# Patient Record
Sex: FEMALE | Race: ASIAN | NOT HISPANIC OR LATINO
[De-identification: names, ages, dates, MRNs, and addresses within clinical notes are randomized per-mention and may not be internally consistent; named-entity substitution may affect disease eponyms.]

---

## 2017-02-01 ENCOUNTER — APPOINTMENT (OUTPATIENT)
Dept: OPHTHALMOLOGY | Facility: CLINIC | Age: 72
End: 2017-02-01

## 2017-07-11 ENCOUNTER — APPOINTMENT (OUTPATIENT)
Dept: OPHTHALMOLOGY | Facility: CLINIC | Age: 72
End: 2017-07-11

## 2017-09-01 ENCOUNTER — APPOINTMENT (OUTPATIENT)
Dept: PAIN MANAGEMENT | Facility: CLINIC | Age: 72
End: 2017-09-01
Payer: MEDICARE

## 2017-09-01 VITALS
HEART RATE: 74 BPM | WEIGHT: 122 LBS | BODY MASS INDEX: 23.95 KG/M2 | DIASTOLIC BLOOD PRESSURE: 82 MMHG | SYSTOLIC BLOOD PRESSURE: 156 MMHG | HEIGHT: 60 IN

## 2017-09-01 DIAGNOSIS — M54.9 DORSALGIA, UNSPECIFIED: ICD-10-CM

## 2017-09-01 PROCEDURE — 99204 OFFICE O/P NEW MOD 45 MIN: CPT

## 2017-09-01 RX ORDER — URSODIOL 250 MG/1
250 TABLET ORAL
Qty: 180 | Refills: 0 | Status: ACTIVE | COMMUNITY
Start: 2017-05-05

## 2017-09-01 RX ORDER — PREDNISONE 10 MG/1
10 TABLET ORAL
Qty: 90 | Refills: 0 | Status: ACTIVE | COMMUNITY
Start: 2017-04-17

## 2017-09-01 RX ORDER — ALISKIREN HEMIFUMARATE 150 MG/1
150 TABLET, FILM COATED ORAL
Qty: 90 | Refills: 0 | Status: ACTIVE | COMMUNITY
Start: 2017-07-17

## 2017-09-01 RX ORDER — HYDROXYCHLOROQUINE SULFATE 200 MG/1
200 TABLET ORAL
Refills: 0 | Status: ACTIVE | COMMUNITY

## 2017-09-01 RX ORDER — METHOTREXATE 2.5 MG/1
2.5 TABLET ORAL
Qty: 25 | Refills: 0 | Status: ACTIVE | COMMUNITY
Start: 2017-04-15

## 2017-09-01 RX ORDER — MORPHINE SULFATE 15 MG/1
15 TABLET, FILM COATED, EXTENDED RELEASE ORAL
Qty: 60 | Refills: 0 | Status: ACTIVE | COMMUNITY
Start: 2017-04-13

## 2017-09-01 RX ORDER — PAROXETINE 25 MG/1
25 TABLET, FILM COATED, EXTENDED RELEASE ORAL
Qty: 90 | Refills: 0 | Status: ACTIVE | COMMUNITY
Start: 2017-03-22

## 2017-09-01 RX ORDER — OXYCODONE AND ACETAMINOPHEN 7.5; 325 MG/1; MG/1
7.5-325 TABLET ORAL
Qty: 240 | Refills: 0 | Status: ACTIVE | COMMUNITY
Start: 2017-03-21

## 2017-09-01 RX ORDER — AMLODIPINE BESYLATE 10 MG/1
10 TABLET ORAL
Qty: 30 | Refills: 0 | Status: ACTIVE | COMMUNITY
Start: 2017-04-06

## 2017-09-01 RX ORDER — LABETALOL HYDROCHLORIDE 100 MG/1
100 TABLET, FILM COATED ORAL
Qty: 540 | Refills: 0 | Status: ACTIVE | COMMUNITY
Start: 2017-04-17

## 2017-09-01 RX ORDER — LISINOPRIL 20 MG/1
20 TABLET ORAL
Qty: 30 | Refills: 0 | Status: ACTIVE | COMMUNITY
Start: 2017-04-06

## 2017-09-01 RX ORDER — OMEPRAZOLE 20 MG/1
20 CAPSULE, DELAYED RELEASE ORAL
Qty: 60 | Refills: 0 | Status: ACTIVE | COMMUNITY
Start: 2017-03-13

## 2017-09-05 ENCOUNTER — MEDICATION RENEWAL (OUTPATIENT)
Age: 72
End: 2017-09-05

## 2017-09-05 RX ORDER — NAPROXEN 500 MG/1
500 TABLET, DELAYED RELEASE ORAL TWICE DAILY
Qty: 60 | Refills: 0 | Status: DISCONTINUED | COMMUNITY
Start: 2017-09-01 | End: 2017-09-05

## 2017-09-13 ENCOUNTER — APPOINTMENT (OUTPATIENT)
Dept: PAIN MANAGEMENT | Facility: CLINIC | Age: 72
End: 2017-09-13
Payer: MEDICARE

## 2017-09-13 VITALS
WEIGHT: 120 LBS | BODY MASS INDEX: 23.56 KG/M2 | SYSTOLIC BLOOD PRESSURE: 144 MMHG | HEART RATE: 84 BPM | DIASTOLIC BLOOD PRESSURE: 77 MMHG | HEIGHT: 60 IN

## 2017-09-13 PROCEDURE — 99213 OFFICE O/P EST LOW 20 MIN: CPT

## 2017-09-14 RX ORDER — MORPHINE SULFATE 30 MG/1
30 TABLET, FILM COATED, EXTENDED RELEASE ORAL
Qty: 30 | Refills: 0 | Status: DISCONTINUED | COMMUNITY
Start: 2017-03-03 | End: 2017-09-14

## 2017-09-24 ENCOUNTER — INPATIENT (INPATIENT)
Facility: HOSPITAL | Age: 72
LOS: 3 days | Discharge: HOME | DRG: 872 | End: 2017-09-28
Attending: STUDENT IN AN ORGANIZED HEALTH CARE EDUCATION/TRAINING PROGRAM | Admitting: STUDENT IN AN ORGANIZED HEALTH CARE EDUCATION/TRAINING PROGRAM
Payer: MEDICARE

## 2017-09-24 VITALS
SYSTOLIC BLOOD PRESSURE: 144 MMHG | RESPIRATION RATE: 19 BRPM | OXYGEN SATURATION: 95 % | DIASTOLIC BLOOD PRESSURE: 97 MMHG | HEART RATE: 106 BPM | TEMPERATURE: 102 F

## 2017-09-24 DIAGNOSIS — A41.9 SEPSIS, UNSPECIFIED ORGANISM: ICD-10-CM

## 2017-09-24 DIAGNOSIS — I10 ESSENTIAL (PRIMARY) HYPERTENSION: ICD-10-CM

## 2017-09-24 DIAGNOSIS — Z96.649 PRESENCE OF UNSPECIFIED ARTIFICIAL HIP JOINT: Chronic | ICD-10-CM

## 2017-09-24 DIAGNOSIS — Z96.659 PRESENCE OF UNSPECIFIED ARTIFICIAL KNEE JOINT: Chronic | ICD-10-CM

## 2017-09-24 DIAGNOSIS — M06.9 RHEUMATOID ARTHRITIS, UNSPECIFIED: ICD-10-CM

## 2017-09-24 DIAGNOSIS — E87.6 HYPOKALEMIA: ICD-10-CM

## 2017-09-24 LAB
ALBUMIN SERPL ELPH-MCNC: 4 G/DL — SIGNIFICANT CHANGE UP (ref 3.3–5)
ALP SERPL-CCNC: 96 U/L — SIGNIFICANT CHANGE UP (ref 40–120)
ALT FLD-CCNC: 26 U/L RC — SIGNIFICANT CHANGE UP (ref 10–45)
ANION GAP SERPL CALC-SCNC: 10 MMOL/L — SIGNIFICANT CHANGE UP (ref 5–17)
ANION GAP SERPL CALC-SCNC: 16 MMOL/L — SIGNIFICANT CHANGE UP (ref 5–17)
ANION GAP SERPL CALC-SCNC: 17 MMOL/L — SIGNIFICANT CHANGE UP (ref 5–17)
APPEARANCE UR: CLEAR — SIGNIFICANT CHANGE UP
AST SERPL-CCNC: 32 U/L — SIGNIFICANT CHANGE UP (ref 10–40)
BACTERIA # UR AUTO: ABNORMAL /HPF
BASE EXCESS BLDV CALC-SCNC: -0.4 MMOL/L — SIGNIFICANT CHANGE UP (ref -2–2)
BASE EXCESS BLDV CALC-SCNC: 0.8 MMOL/L — SIGNIFICANT CHANGE UP (ref -2–2)
BASOPHILS # BLD AUTO: 0.1 K/UL — SIGNIFICANT CHANGE UP (ref 0–0.2)
BILIRUB SERPL-MCNC: 0.3 MG/DL — SIGNIFICANT CHANGE UP (ref 0.2–1.2)
BILIRUB UR-MCNC: NEGATIVE — SIGNIFICANT CHANGE UP
BUN SERPL-MCNC: 11 MG/DL — SIGNIFICANT CHANGE UP (ref 7–23)
BUN SERPL-MCNC: 13 MG/DL — SIGNIFICANT CHANGE UP (ref 7–23)
BUN SERPL-MCNC: 16 MG/DL — SIGNIFICANT CHANGE UP (ref 7–23)
CA-I SERPL-SCNC: 1.1 MMOL/L — LOW (ref 1.12–1.3)
CA-I SERPL-SCNC: 1.11 MMOL/L — LOW (ref 1.12–1.3)
CALCIUM SERPL-MCNC: 8.1 MG/DL — LOW (ref 8.4–10.5)
CALCIUM SERPL-MCNC: 8.3 MG/DL — LOW (ref 8.4–10.5)
CALCIUM SERPL-MCNC: 9.3 MG/DL — SIGNIFICANT CHANGE UP (ref 8.4–10.5)
CHLORIDE BLDV-SCNC: 100 MMOL/L — SIGNIFICANT CHANGE UP (ref 96–108)
CHLORIDE BLDV-SCNC: 103 MMOL/L — SIGNIFICANT CHANGE UP (ref 96–108)
CHLORIDE SERPL-SCNC: 101 MMOL/L — SIGNIFICANT CHANGE UP (ref 96–108)
CHLORIDE SERPL-SCNC: 101 MMOL/L — SIGNIFICANT CHANGE UP (ref 96–108)
CHLORIDE SERPL-SCNC: 98 MMOL/L — SIGNIFICANT CHANGE UP (ref 96–108)
CO2 BLDV-SCNC: 24 MMOL/L — SIGNIFICANT CHANGE UP (ref 22–30)
CO2 BLDV-SCNC: 25 MMOL/L — SIGNIFICANT CHANGE UP (ref 22–30)
CO2 SERPL-SCNC: 21 MMOL/L — LOW (ref 22–31)
CO2 SERPL-SCNC: 22 MMOL/L — SIGNIFICANT CHANGE UP (ref 22–31)
CO2 SERPL-SCNC: 23 MMOL/L — SIGNIFICANT CHANGE UP (ref 22–31)
COLOR SPEC: SIGNIFICANT CHANGE UP
CREAT SERPL-MCNC: 0.56 MG/DL — SIGNIFICANT CHANGE UP (ref 0.5–1.3)
CREAT SERPL-MCNC: 0.58 MG/DL — SIGNIFICANT CHANGE UP (ref 0.5–1.3)
CREAT SERPL-MCNC: 0.59 MG/DL — SIGNIFICANT CHANGE UP (ref 0.5–1.3)
DIFF PNL FLD: NEGATIVE — SIGNIFICANT CHANGE UP
EOSINOPHIL # BLD AUTO: 0.1 K/UL — SIGNIFICANT CHANGE UP (ref 0–0.5)
GAS PNL BLDV: 131 MMOL/L — LOW (ref 136–145)
GAS PNL BLDV: 132 MMOL/L — LOW (ref 136–145)
GAS PNL BLDV: SIGNIFICANT CHANGE UP
GLUCOSE BLDV-MCNC: 131 MG/DL — HIGH (ref 70–99)
GLUCOSE BLDV-MCNC: 206 MG/DL — HIGH (ref 70–99)
GLUCOSE SERPL-MCNC: 153 MG/DL — HIGH (ref 70–99)
GLUCOSE SERPL-MCNC: 153 MG/DL — HIGH (ref 70–99)
GLUCOSE SERPL-MCNC: 206 MG/DL — HIGH (ref 70–99)
GLUCOSE UR QL: NEGATIVE — SIGNIFICANT CHANGE UP
HCO3 BLDV-SCNC: 22 MMOL/L — SIGNIFICANT CHANGE UP (ref 21–29)
HCO3 BLDV-SCNC: 24 MMOL/L — SIGNIFICANT CHANGE UP (ref 21–29)
HCT VFR BLD CALC: 29.9 % — LOW (ref 34.5–45)
HCT VFR BLD CALC: 36.2 % — SIGNIFICANT CHANGE UP (ref 34.5–45)
HCT VFR BLDA CALC: 30 % — LOW (ref 39–50)
HCT VFR BLDA CALC: 32 % — LOW (ref 39–50)
HGB BLD CALC-MCNC: 10.3 G/DL — LOW (ref 11.5–15.5)
HGB BLD CALC-MCNC: 9.6 G/DL — LOW (ref 11.5–15.5)
HGB BLD-MCNC: 11.5 G/DL — SIGNIFICANT CHANGE UP (ref 11.5–15.5)
HGB BLD-MCNC: 9.9 G/DL — LOW (ref 11.5–15.5)
KETONES UR-MCNC: NEGATIVE — SIGNIFICANT CHANGE UP
LACTATE BLDV-MCNC: 1.4 MMOL/L — SIGNIFICANT CHANGE UP (ref 0.7–2)
LACTATE BLDV-MCNC: 2.7 MMOL/L — HIGH (ref 0.7–2)
LACTATE BLDV-MCNC: 3.9 MMOL/L — HIGH (ref 0.7–2)
LEUKOCYTE ESTERASE UR-ACNC: NEGATIVE — SIGNIFICANT CHANGE UP
LYMPHOCYTES # BLD AUTO: 2.1 K/UL — SIGNIFICANT CHANGE UP (ref 1–3.3)
LYMPHOCYTES # BLD AUTO: 7 % — LOW (ref 13–44)
MAGNESIUM SERPL-MCNC: 1.4 MG/DL — LOW (ref 1.6–2.6)
MAGNESIUM SERPL-MCNC: 2.7 MG/DL — HIGH (ref 1.6–2.6)
MCHC RBC-ENTMCNC: 30.2 PG — SIGNIFICANT CHANGE UP (ref 27–34)
MCHC RBC-ENTMCNC: 31.7 PG — SIGNIFICANT CHANGE UP (ref 27–34)
MCHC RBC-ENTMCNC: 31.8 GM/DL — LOW (ref 32–36)
MCHC RBC-ENTMCNC: 33.2 GM/DL — SIGNIFICANT CHANGE UP (ref 32–36)
MCV RBC AUTO: 95.1 FL — SIGNIFICANT CHANGE UP (ref 80–100)
MCV RBC AUTO: 95.6 FL — SIGNIFICANT CHANGE UP (ref 80–100)
MONOCYTES # BLD AUTO: 2 K/UL — HIGH (ref 0–0.9)
MONOCYTES NFR BLD AUTO: 9 % — SIGNIFICANT CHANGE UP (ref 2–14)
NEUTROPHILS # BLD AUTO: 24 K/UL — HIGH (ref 1.8–7.4)
NEUTROPHILS NFR BLD AUTO: 73 % — SIGNIFICANT CHANGE UP (ref 43–77)
NITRITE UR-MCNC: NEGATIVE — SIGNIFICANT CHANGE UP
PCO2 BLDV: 32 MMHG — LOW (ref 35–50)
PCO2 BLDV: 33 MMHG — LOW (ref 35–50)
PH BLDV: 7.46 — HIGH (ref 7.35–7.45)
PH BLDV: 7.47 — HIGH (ref 7.35–7.45)
PH UR: 6 — SIGNIFICANT CHANGE UP (ref 5–8)
PLATELET # BLD AUTO: 473 K/UL — HIGH (ref 150–400)
PLATELET # BLD AUTO: 539 K/UL — HIGH (ref 150–400)
PO2 BLDV: 44 MMHG — SIGNIFICANT CHANGE UP (ref 25–45)
PO2 BLDV: 52 MMHG — HIGH (ref 25–45)
POTASSIUM BLDV-SCNC: 3.3 MMOL/L — LOW (ref 3.5–5)
POTASSIUM BLDV-SCNC: 3.9 MMOL/L — SIGNIFICANT CHANGE UP (ref 3.5–5)
POTASSIUM SERPL-MCNC: 2.8 MMOL/L — CRITICAL LOW (ref 3.5–5.3)
POTASSIUM SERPL-MCNC: 3.7 MMOL/L — SIGNIFICANT CHANGE UP (ref 3.5–5.3)
POTASSIUM SERPL-MCNC: 4.2 MMOL/L — SIGNIFICANT CHANGE UP (ref 3.5–5.3)
POTASSIUM SERPL-SCNC: 2.8 MMOL/L — CRITICAL LOW (ref 3.5–5.3)
POTASSIUM SERPL-SCNC: 3.7 MMOL/L — SIGNIFICANT CHANGE UP (ref 3.5–5.3)
POTASSIUM SERPL-SCNC: 4.2 MMOL/L — SIGNIFICANT CHANGE UP (ref 3.5–5.3)
PROT SERPL-MCNC: 7.3 G/DL — SIGNIFICANT CHANGE UP (ref 6–8.3)
PROT UR-MCNC: NEGATIVE — SIGNIFICANT CHANGE UP
RBC # BLD: 3.13 M/UL — LOW (ref 3.8–5.2)
RBC # BLD: 3.8 M/UL — SIGNIFICANT CHANGE UP (ref 3.8–5.2)
RBC # FLD: 14.5 % — SIGNIFICANT CHANGE UP (ref 10.3–14.5)
RBC # FLD: 14.7 % — HIGH (ref 10.3–14.5)
RBC CASTS # UR COMP ASSIST: SIGNIFICANT CHANGE UP /HPF (ref 0–2)
SAO2 % BLDV: 80 % — SIGNIFICANT CHANGE UP (ref 67–88)
SAO2 % BLDV: 85 % — SIGNIFICANT CHANGE UP (ref 67–88)
SODIUM SERPL-SCNC: 133 MMOL/L — LOW (ref 135–145)
SODIUM SERPL-SCNC: 135 MMOL/L — SIGNIFICANT CHANGE UP (ref 135–145)
SODIUM SERPL-SCNC: 141 MMOL/L — SIGNIFICANT CHANGE UP (ref 135–145)
SP GR SPEC: 1.01 — LOW (ref 1.01–1.02)
UROBILINOGEN FLD QL: NEGATIVE — SIGNIFICANT CHANGE UP
WBC # BLD: 28.3 K/UL — HIGH (ref 3.8–10.5)
WBC # BLD: 33.8 K/UL — HIGH (ref 3.8–10.5)
WBC # FLD AUTO: 28.3 K/UL — HIGH (ref 3.8–10.5)
WBC # FLD AUTO: 33.8 K/UL — HIGH (ref 3.8–10.5)
WBC UR QL: SIGNIFICANT CHANGE UP /HPF (ref 0–5)

## 2017-09-24 PROCEDURE — 99285 EMERGENCY DEPT VISIT HI MDM: CPT | Mod: GC

## 2017-09-24 PROCEDURE — 76705 ECHO EXAM OF ABDOMEN: CPT | Mod: 26,RT

## 2017-09-24 PROCEDURE — 71010: CPT | Mod: 26

## 2017-09-24 PROCEDURE — 74177 CT ABD & PELVIS W/CONTRAST: CPT | Mod: 26

## 2017-09-24 PROCEDURE — 99223 1ST HOSP IP/OBS HIGH 75: CPT

## 2017-09-24 PROCEDURE — 76705 ECHO EXAM OF ABDOMEN: CPT | Mod: 26

## 2017-09-24 RX ORDER — ASPIRIN/CALCIUM CARB/MAGNESIUM 324 MG
81 TABLET ORAL DAILY
Qty: 0 | Refills: 0 | Status: DISCONTINUED | OUTPATIENT
Start: 2017-09-24 | End: 2017-09-28

## 2017-09-24 RX ORDER — ACETAMINOPHEN 500 MG
1000 TABLET ORAL ONCE
Qty: 0 | Refills: 0 | Status: COMPLETED | OUTPATIENT
Start: 2017-09-24 | End: 2017-09-24

## 2017-09-24 RX ORDER — KETOROLAC TROMETHAMINE 30 MG/ML
15 SYRINGE (ML) INJECTION ONCE
Qty: 0 | Refills: 0 | Status: DISCONTINUED | OUTPATIENT
Start: 2017-09-24 | End: 2017-09-24

## 2017-09-24 RX ORDER — SODIUM CHLORIDE 9 MG/ML
1000 INJECTION, SOLUTION INTRAVENOUS
Qty: 0 | Refills: 0 | Status: DISCONTINUED | OUTPATIENT
Start: 2017-09-24 | End: 2017-09-28

## 2017-09-24 RX ORDER — CHOLECALCIFEROL (VITAMIN D3) 125 MCG
2000 CAPSULE ORAL DAILY
Qty: 0 | Refills: 0 | Status: DISCONTINUED | OUTPATIENT
Start: 2017-09-24 | End: 2017-09-28

## 2017-09-24 RX ORDER — OXYCODONE AND ACETAMINOPHEN 5; 325 MG/1; MG/1
1 TABLET ORAL ONCE
Qty: 0 | Refills: 0 | Status: DISCONTINUED | OUTPATIENT
Start: 2017-09-24 | End: 2017-09-24

## 2017-09-24 RX ORDER — INSULIN LISPRO 100/ML
VIAL (ML) SUBCUTANEOUS
Qty: 0 | Refills: 0 | Status: DISCONTINUED | OUTPATIENT
Start: 2017-09-24 | End: 2017-09-28

## 2017-09-24 RX ORDER — MAGNESIUM SULFATE 500 MG/ML
2 VIAL (ML) INJECTION ONCE
Qty: 0 | Refills: 0 | Status: DISCONTINUED | OUTPATIENT
Start: 2017-09-24 | End: 2017-09-24

## 2017-09-24 RX ORDER — SODIUM CHLORIDE 9 MG/ML
1000 INJECTION INTRAMUSCULAR; INTRAVENOUS; SUBCUTANEOUS
Qty: 0 | Refills: 0 | Status: COMPLETED | OUTPATIENT
Start: 2017-09-24 | End: 2017-09-24

## 2017-09-24 RX ORDER — DEXTROSE 50 % IN WATER 50 %
25 SYRINGE (ML) INTRAVENOUS ONCE
Qty: 0 | Refills: 0 | Status: DISCONTINUED | OUTPATIENT
Start: 2017-09-24 | End: 2017-09-28

## 2017-09-24 RX ORDER — HYDROCORTISONE 20 MG
100 TABLET ORAL ONCE
Qty: 0 | Refills: 0 | Status: COMPLETED | OUTPATIENT
Start: 2017-09-24 | End: 2017-09-24

## 2017-09-24 RX ORDER — OXYCODONE AND ACETAMINOPHEN 5; 325 MG/1; MG/1
2 TABLET ORAL EVERY 6 HOURS
Qty: 0 | Refills: 0 | Status: DISCONTINUED | OUTPATIENT
Start: 2017-09-24 | End: 2017-09-28

## 2017-09-24 RX ORDER — GLUCAGON INJECTION, SOLUTION 0.5 MG/.1ML
1 INJECTION, SOLUTION SUBCUTANEOUS ONCE
Qty: 0 | Refills: 0 | Status: DISCONTINUED | OUTPATIENT
Start: 2017-09-24 | End: 2017-09-28

## 2017-09-24 RX ORDER — MORPHINE SULFATE 50 MG/1
15 CAPSULE, EXTENDED RELEASE ORAL DAILY
Qty: 0 | Refills: 0 | Status: DISCONTINUED | OUTPATIENT
Start: 2017-09-24 | End: 2017-09-28

## 2017-09-24 RX ORDER — VANCOMYCIN HCL 1 G
1000 VIAL (EA) INTRAVENOUS EVERY 12 HOURS
Qty: 0 | Refills: 0 | Status: DISCONTINUED | OUTPATIENT
Start: 2017-09-24 | End: 2017-09-24

## 2017-09-24 RX ORDER — ACETAMINOPHEN 500 MG
1000 TABLET ORAL ONCE
Qty: 0 | Refills: 0 | Status: DISCONTINUED | OUTPATIENT
Start: 2017-09-24 | End: 2017-09-28

## 2017-09-24 RX ORDER — PIPERACILLIN AND TAZOBACTAM 4; .5 G/20ML; G/20ML
3.38 INJECTION, POWDER, LYOPHILIZED, FOR SOLUTION INTRAVENOUS EVERY 8 HOURS
Qty: 0 | Refills: 0 | Status: DISCONTINUED | OUTPATIENT
Start: 2017-09-24 | End: 2017-09-26

## 2017-09-24 RX ORDER — PIPERACILLIN AND TAZOBACTAM 4; .5 G/20ML; G/20ML
3.38 INJECTION, POWDER, LYOPHILIZED, FOR SOLUTION INTRAVENOUS ONCE
Qty: 0 | Refills: 0 | Status: COMPLETED | OUTPATIENT
Start: 2017-09-24 | End: 2017-09-24

## 2017-09-24 RX ORDER — SODIUM CHLORIDE 9 MG/ML
1000 INJECTION, SOLUTION INTRAVENOUS ONCE
Qty: 0 | Refills: 0 | Status: COMPLETED | OUTPATIENT
Start: 2017-09-24 | End: 2017-09-24

## 2017-09-24 RX ORDER — ENOXAPARIN SODIUM 100 MG/ML
40 INJECTION SUBCUTANEOUS EVERY 24 HOURS
Qty: 0 | Refills: 0 | Status: DISCONTINUED | OUTPATIENT
Start: 2017-09-24 | End: 2017-09-28

## 2017-09-24 RX ORDER — POTASSIUM CHLORIDE 20 MEQ
40 PACKET (EA) ORAL ONCE
Qty: 0 | Refills: 0 | Status: COMPLETED | OUTPATIENT
Start: 2017-09-24 | End: 2017-09-24

## 2017-09-24 RX ORDER — MORPHINE SULFATE 50 MG/1
4 CAPSULE, EXTENDED RELEASE ORAL ONCE
Qty: 0 | Refills: 0 | Status: DISCONTINUED | OUTPATIENT
Start: 2017-09-24 | End: 2017-09-24

## 2017-09-24 RX ORDER — MAGNESIUM SULFATE 500 MG/ML
1 VIAL (ML) INJECTION ONCE
Qty: 0 | Refills: 0 | Status: COMPLETED | OUTPATIENT
Start: 2017-09-24 | End: 2017-09-24

## 2017-09-24 RX ORDER — MAGNESIUM SULFATE 500 MG/ML
2 VIAL (ML) INJECTION ONCE
Qty: 0 | Refills: 0 | Status: COMPLETED | OUTPATIENT
Start: 2017-09-24 | End: 2017-09-24

## 2017-09-24 RX ORDER — POTASSIUM CHLORIDE 20 MEQ
10 PACKET (EA) ORAL ONCE
Qty: 0 | Refills: 0 | Status: COMPLETED | OUTPATIENT
Start: 2017-09-24 | End: 2017-09-24

## 2017-09-24 RX ORDER — PANTOPRAZOLE SODIUM 20 MG/1
40 TABLET, DELAYED RELEASE ORAL
Qty: 0 | Refills: 0 | Status: DISCONTINUED | OUTPATIENT
Start: 2017-09-24 | End: 2017-09-28

## 2017-09-24 RX ORDER — URSODIOL 250 MG/1
250 TABLET, FILM COATED ORAL
Qty: 0 | Refills: 0 | Status: DISCONTINUED | OUTPATIENT
Start: 2017-09-24 | End: 2017-09-28

## 2017-09-24 RX ORDER — VANCOMYCIN HCL 1 G
750 VIAL (EA) INTRAVENOUS EVERY 12 HOURS
Qty: 0 | Refills: 0 | Status: DISCONTINUED | OUTPATIENT
Start: 2017-09-24 | End: 2017-09-26

## 2017-09-24 RX ORDER — HYDROXYCHLOROQUINE SULFATE 200 MG
400 TABLET ORAL DAILY
Qty: 0 | Refills: 0 | Status: DISCONTINUED | OUTPATIENT
Start: 2017-09-24 | End: 2017-09-28

## 2017-09-24 RX ORDER — VANCOMYCIN HCL 1 G
1000 VIAL (EA) INTRAVENOUS ONCE
Qty: 0 | Refills: 0 | Status: COMPLETED | OUTPATIENT
Start: 2017-09-24 | End: 2017-09-24

## 2017-09-24 RX ORDER — SODIUM CHLORIDE 9 MG/ML
2000 INJECTION, SOLUTION INTRAVENOUS ONCE
Qty: 0 | Refills: 0 | Status: COMPLETED | OUTPATIENT
Start: 2017-09-24 | End: 2017-09-24

## 2017-09-24 RX ORDER — DEXTROSE 50 % IN WATER 50 %
1 SYRINGE (ML) INTRAVENOUS ONCE
Qty: 0 | Refills: 0 | Status: DISCONTINUED | OUTPATIENT
Start: 2017-09-24 | End: 2017-09-28

## 2017-09-24 RX ADMIN — SODIUM CHLORIDE 1000 MILLILITER(S): 9 INJECTION, SOLUTION INTRAVENOUS at 09:12

## 2017-09-24 RX ADMIN — Medication 10 MILLIGRAM(S): at 14:45

## 2017-09-24 RX ADMIN — MORPHINE SULFATE 4 MILLIGRAM(S): 50 CAPSULE, EXTENDED RELEASE ORAL at 10:47

## 2017-09-24 RX ADMIN — Medication 15 MILLIGRAM(S): at 04:39

## 2017-09-24 RX ADMIN — PIPERACILLIN AND TAZOBACTAM 25 GRAM(S): 4; .5 INJECTION, POWDER, LYOPHILIZED, FOR SOLUTION INTRAVENOUS at 14:37

## 2017-09-24 RX ADMIN — OXYCODONE AND ACETAMINOPHEN 2 TABLET(S): 5; 325 TABLET ORAL at 22:30

## 2017-09-24 RX ADMIN — OXYCODONE AND ACETAMINOPHEN 1 TABLET(S): 5; 325 TABLET ORAL at 03:15

## 2017-09-24 RX ADMIN — MORPHINE SULFATE 4 MILLIGRAM(S): 50 CAPSULE, EXTENDED RELEASE ORAL at 04:39

## 2017-09-24 RX ADMIN — OXYCODONE AND ACETAMINOPHEN 2 TABLET(S): 5; 325 TABLET ORAL at 17:42

## 2017-09-24 RX ADMIN — Medication 50 GRAM(S): at 14:37

## 2017-09-24 RX ADMIN — OXYCODONE AND ACETAMINOPHEN 1 TABLET(S): 5; 325 TABLET ORAL at 05:39

## 2017-09-24 RX ADMIN — Medication 400 MILLIGRAM(S): at 09:13

## 2017-09-24 RX ADMIN — URSODIOL 250 MILLIGRAM(S): 250 TABLET, FILM COATED ORAL at 18:07

## 2017-09-24 RX ADMIN — Medication 81 MILLIGRAM(S): at 14:45

## 2017-09-24 RX ADMIN — Medication 20 MILLIGRAM(S): at 14:46

## 2017-09-24 RX ADMIN — Medication 100 GRAM(S): at 07:51

## 2017-09-24 RX ADMIN — SODIUM CHLORIDE 75 MILLILITER(S): 9 INJECTION INTRAMUSCULAR; INTRAVENOUS; SUBCUTANEOUS at 14:38

## 2017-09-24 RX ADMIN — OXYCODONE AND ACETAMINOPHEN 2 TABLET(S): 5; 325 TABLET ORAL at 16:24

## 2017-09-24 RX ADMIN — SODIUM CHLORIDE 4000 MILLILITER(S): 9 INJECTION, SOLUTION INTRAVENOUS at 03:32

## 2017-09-24 RX ADMIN — PIPERACILLIN AND TAZOBACTAM 25 GRAM(S): 4; .5 INJECTION, POWDER, LYOPHILIZED, FOR SOLUTION INTRAVENOUS at 21:25

## 2017-09-24 RX ADMIN — Medication 100 MILLIGRAM(S): at 05:38

## 2017-09-24 RX ADMIN — Medication 100 MILLIEQUIVALENT(S): at 07:51

## 2017-09-24 RX ADMIN — Medication 40 MILLIEQUIVALENT(S): at 04:39

## 2017-09-24 RX ADMIN — Medication 400 MILLIGRAM(S): at 14:46

## 2017-09-24 RX ADMIN — PIPERACILLIN AND TAZOBACTAM 200 GRAM(S): 4; .5 INJECTION, POWDER, LYOPHILIZED, FOR SOLUTION INTRAVENOUS at 04:00

## 2017-09-24 RX ADMIN — ENOXAPARIN SODIUM 40 MILLIGRAM(S): 100 INJECTION SUBCUTANEOUS at 14:45

## 2017-09-24 RX ADMIN — Medication 150 MILLIGRAM(S): at 18:07

## 2017-09-24 RX ADMIN — Medication 250 MILLIGRAM(S): at 06:11

## 2017-09-24 RX ADMIN — OXYCODONE AND ACETAMINOPHEN 2 TABLET(S): 5; 325 TABLET ORAL at 22:02

## 2017-09-24 RX ADMIN — Medication 2000 UNIT(S): at 14:44

## 2017-09-24 NOTE — H&P ADULT - PROBLEM SELECTOR PLAN 1
-severe sepsis given elevated lactate. unclear etiology, presumably GI in origin given symptoms of n/v and abd pain and CT findings of pneumobilia  -cont vanc/zosyn for now, f/u cultures  -likely GI Eval, spoke w/ Dr Sanchez, she will reach out to them  -s/p 3L LR, lactate improving, monitor closely  -hold antihypertensives in setting of sepsis  -check TTE given hx of endocarditis and murmur on exam -severe sepsis given elevated lactate. unclear etiology, presumably GI in origin given symptoms of n/v and abd pain and CT findings of pneumobilia  -cont vanc/zosyn for now, f/u cultures  -GI Eval, spoke w/ Dr Sanchez, she will reach out to them  -s/p 3L LR, lactate improving, monitor closely  -hold antihypertensives in setting of sepsis  -check TTE given hx of endocarditis and murmur on exam

## 2017-09-24 NOTE — PATIENT PROFILE ADULT. - FAMILY HISTORY
Mother  Still living? Unknown  Family history of cerebrovascular accident (CVA) in mother, Age at diagnosis: Age Unknown

## 2017-09-24 NOTE — ED ADULT NURSE REASSESSMENT NOTE - NS ED NURSE REASSESS COMMENT FT1
pt c/o pain at IV site- iv checked and site is free of redness and swelling. Pt is resting comfortably in bed at this time. Pt states she has chronic pain from RA. Pt  at the bedside. pending admission. will reassess.

## 2017-09-24 NOTE — H&P ADULT - ASSESSMENT
72f pmh htn ra on q6wk remicade, severe osteoporosis, L MCA CVA 2016 presumably 2/2 to mitral valve endocarditis p/w weakness abdominal pain

## 2017-09-24 NOTE — ED PROVIDER NOTE - ATTENDING CONTRIBUTION TO CARE
I was physically present for the E/M service provided. I agree with above history, physical, and plan which I have reviewed and edited where appropriate. I was physically present for the key portions of the service provided.    72F PMH of CVA in assisted living p/w fever, abdominal pain and nausea. Lungs CTA, abdomen soft NTND. Sepsis evaluations: IVF, Tylenol, cultures, UA and CT a/p. I was physically present for the E/M service provided. I agree with above history, physical, and plan which I have reviewed and edited where appropriate. I was physically present for the key portions of the service provided.    72F PMH of CVA in assisted living p/w fever, abdominal pain and nausea. Lungs CTA, abdomen soft NTND. Sepsis evaluations: IVF, Tylenol, cultures, UA and CT a/p. Broad spectrum Abx. Labs notable for Hypokalemia (normal intervals on EKG) oral and IV replacement given. +Leukocytosis however pt also on chronic steroids for RA. Mentating at baseline. +Elevated lactate IVF given. HD Stable.

## 2017-09-24 NOTE — ED ADULT NURSE REASSESSMENT NOTE - NS ED NURSE REASSESS COMMENT FT1
Pt received from Sharon Huntley RN.  Pt is resting in bed comfortably.  Feels warm to touch.  Rectal temp 100.5 MD Garcia  made aware.  ROM limited do to RA.  K and Mg hanging as per orders.  Waiting lab results.

## 2017-09-24 NOTE — ED PROVIDER NOTE - PHYSICAL EXAMINATION
Gen: WDWN, NAD  HEENT: EOMI, no nasal discharge, mucous membranes moist  CV: RRR, +S1/S2, no M/R/G  Resp: CTAB, no W/R/R  GI: Abdomen soft non-distended, minimal tenderness to palpation over suprapubic region, no masses  MSK: No open wounds, no bruising, no LE edema  Neuro: A&Ox2 (name and place), following commands, moving all four extremities spontaneously

## 2017-09-24 NOTE — ED PROVIDER NOTE - PMH
Osteoporosis    Rheumatoid arthritis CVA (cerebral vascular accident)    GERD (gastroesophageal reflux disease)    Hypertension    Leukocytosis    Osteoporosis    Pneumobilia    Rheumatoid arthritis

## 2017-09-24 NOTE — H&P ADULT - NSHPPHYSICALEXAM_GEN_ALL_CORE
PHYSICAL EXAM:  GENERAL: NAD  HEAD:  Atraumatic, Normocephalic  EYES: EOMI, PERRLA, conjunctiva and sclera clear  ENMT: Supple, No JVD, dry MM   RESPIRATORY: Clear to auscultation bilaterally; No wheeze  CARDIOVASCULAR: Regular rate and rhythm; 2/6 systolic murmur LUSB   GI: Soft, bowel sounds presents ttp throughout   EXTREMITIES:  2+ Peripheral Pulses, No clubbing, cyanosis, or edema  PSYCH: AAOx3  NEUROLOGY: non-focal  SKIN: No rashes or lesions

## 2017-09-24 NOTE — ED ADULT NURSE NOTE - OBJECTIVE STATEMENT
72y female presents to the ER s/p fall. Pt is disoriented but is able to speak coherently. Pt has a hx of stroke in 2016 and has acute memory loss. Pt  at the bedside states she had an episode of vomiting at dinner tonight and fell on the way to the bathroom. Pt also had acute onset of weakness after vomiting episode. Pt has hx of DM, stroke, chronic UTI, RA and htn. pt denies pain after bowel movement. Pt has a 2cm laceration to the right shin after fall. bruising noted diffusely to the right shin. Pt able to move all extremities. LASHAY. MD hanson pending. Will reassess.

## 2017-09-24 NOTE — ED ADULT NURSE NOTE - PSH
History of hip replacement, unspecified laterality    History of total knee replacement, unspecified laterality

## 2017-09-24 NOTE — H&P ADULT - PROBLEM SELECTOR PLAN 3
cont home plaquenil -cont home plaquenil  -pts  would like to resume standing percocet (we do not have 7.5mg/325mg tabs available on formulary here), voiced my hesitation w/ standing tylenol given potential to mask fever but  states oxycodone alone does not adequately relieve her pain

## 2017-09-24 NOTE — ED PROVIDER NOTE - OBJECTIVE STATEMENT
Patient is a 72 year old female with PMHx of CVA, RA, HTN, and gerd presenting with abdominal pain and weakness for 3 days. Patient is a 72 year old female with PMHx of CVA, RA, HTN, and gerd presenting with abdominal pain and weakness for 3 days. She was sent by her assisted living facility Patient is a 72 year old female with PMHx of CVA, RA, HTN, and gerd presenting with abdominal pain and weakness for 3 days. She was sent by her assisted living facility for abdominal pain. She has had dysuria, nausea, vomiting, and possible LOC. She is a poor historian and her  the health care proxy is unable to provide any additional history. Patient is a 72 year old female with PMHx of CVA, RA, HTN, and gerd presenting with abdominal pain and weakness for 3 days. She was sent by her assisted living facility for abdominal pain. She has had dysuria, nausea, vomiting, and possible LOC. She is a poor historian and her  the health care proxy is unable to provide any additional history.  Yancy herndon

## 2017-09-24 NOTE — H&P ADULT - HISTORY OF PRESENT ILLNESS
72f pmh htn ra on q6wk remicade, severe osteoporosis, L MCA CVA 2016 presumably 2/2 to mitral valve endocarditis, completed course of iv vanc/ctx via PICC line, who p/w weakness. Pt was sent in from assisted living for abdominal pain and weakness for 1-2 days. Per  who heard from nurse at assisted living, pt had vomiting abd pain and a bowel movement last night. Pt also notes occasional blood in stool although cannot really say for how long, perhaps over the past few days. Denies sick contacts. No prior hx of cscope.       In ED, pt received vanc zosyn toradol hydrocortisone iv tylenol morphine, IV mag, potassium po/iv and 3L LR

## 2017-09-24 NOTE — H&P ADULT - NSHPLABSRESULTS_GEN_ALL_CORE
Vital Signs Last 24 Hrs  T(C): 37.2 (24 Sep 2017 12:07), Max: 38.9 (24 Sep 2017 02:43)  T(F): 98.9 (24 Sep 2017 12:07), Max: 102 (24 Sep 2017 02:43)  HR: 97 (24 Sep 2017 12:07) (94 - 106)  BP: 107/68 (24 Sep 2017 12:07) (107/68 - 144/97)  BP(mean): --  RR: 18 (24 Sep 2017 12:07) (18 - 19)  SpO2: 96% (24 Sep 2017 12:07) (95% - 97%)  I&O's Summary    CAPILLARY BLOOD GLUCOSE  123 (24 Sep 2017 13:14)                                9.9    33.8  )-----------( 473      ( 24 Sep 2017 07:58 )             29.9     09-24    135  |  98  |  13  ----------------------------<  206<H>  3.7   |  21<L>  |  0.58    Ca    8.3<L>      24 Sep 2017 07:58  Mg     1.4     -    TPro  7.3  /  Alb  4.0  /  TBili  0.3  /  DBili  x   /  AST  32  /  ALT  26  /  AlkPhos  96  09-24        LIVER FUNCTIONS - ( 24 Sep 2017 03:28 )  Alb: 4.0 g/dL / Pro: 7.3 g/dL / ALK PHOS: 96 U/L / ALT: 26 U/L RC / AST: 32 U/L / GGT: x             Urinalysis Basic - ( 24 Sep 2017 03:28 )    Color: x / Appearance: Clear / S.008 / pH: x  Gluc: x / Ketone: Negative  / Bili: Negative / Urobili: Negative   Blood: x / Protein: Negative / Nitrite: Negative   Leuk Esterase: Negative / RBC: 0-2 /HPF / WBC 0-2 /HPF   Sq Epi: x / Non Sq Epi: x / Bacteria: Few /HPF        Labs/imaging personally reviewed:   ekg sinus tach qtc 641 hypomagnesemia hypokalemia severe sepsis elevated lactate   ct a/p results noted pneumobilia

## 2017-09-25 LAB
ANION GAP SERPL CALC-SCNC: 12 MMOL/L — SIGNIFICANT CHANGE UP (ref 5–17)
BASOPHILS # BLD AUTO: 0 K/UL — SIGNIFICANT CHANGE UP (ref 0–0.2)
BASOPHILS NFR BLD AUTO: 0 % — SIGNIFICANT CHANGE UP (ref 0–2)
BUN SERPL-MCNC: 9 MG/DL — SIGNIFICANT CHANGE UP (ref 7–23)
CALCIUM SERPL-MCNC: 8.1 MG/DL — LOW (ref 8.4–10.5)
CHLORIDE SERPL-SCNC: 101 MMOL/L — SIGNIFICANT CHANGE UP (ref 96–108)
CO2 SERPL-SCNC: 21 MMOL/L — LOW (ref 22–31)
CREAT SERPL-MCNC: 0.54 MG/DL — SIGNIFICANT CHANGE UP (ref 0.5–1.3)
CULTURE RESULTS: NO GROWTH — SIGNIFICANT CHANGE UP
EOSINOPHIL # BLD AUTO: 0 K/UL — SIGNIFICANT CHANGE UP (ref 0–0.5)
EOSINOPHIL NFR BLD AUTO: 0 % — SIGNIFICANT CHANGE UP (ref 0–6)
GLUCOSE SERPL-MCNC: 106 MG/DL — HIGH (ref 70–99)
HBA1C BLD-MCNC: 5.8 % — HIGH (ref 4–5.6)
HCT VFR BLD CALC: 28 % — LOW (ref 34.5–45)
HGB BLD-MCNC: 9.2 G/DL — LOW (ref 11.5–15.5)
LYMPHOCYTES # BLD AUTO: 2.51 K/UL — SIGNIFICANT CHANGE UP (ref 1–3.3)
LYMPHOCYTES # BLD AUTO: 6 % — LOW (ref 13–44)
MAGNESIUM SERPL-MCNC: 2.4 MG/DL — SIGNIFICANT CHANGE UP (ref 1.6–2.6)
MCHC RBC-ENTMCNC: 30.4 PG — SIGNIFICANT CHANGE UP (ref 27–34)
MCHC RBC-ENTMCNC: 32.9 GM/DL — SIGNIFICANT CHANGE UP (ref 32–36)
MCV RBC AUTO: 92.4 FL — SIGNIFICANT CHANGE UP (ref 80–100)
MONOCYTES # BLD AUTO: 2.51 K/UL — HIGH (ref 0–0.9)
MONOCYTES NFR BLD AUTO: 6 % — SIGNIFICANT CHANGE UP (ref 2–14)
NEUTROPHILS # BLD AUTO: 36.87 K/UL — HIGH (ref 1.8–7.4)
NEUTROPHILS NFR BLD AUTO: 82 % — HIGH (ref 43–77)
PLATELET # BLD AUTO: 413 K/UL — HIGH (ref 150–400)
POTASSIUM SERPL-MCNC: 3.8 MMOL/L — SIGNIFICANT CHANGE UP (ref 3.5–5.3)
POTASSIUM SERPL-SCNC: 3.8 MMOL/L — SIGNIFICANT CHANGE UP (ref 3.5–5.3)
RBC # BLD: 3.03 M/UL — LOW (ref 3.8–5.2)
RBC # FLD: 15.8 % — HIGH (ref 10.3–14.5)
SODIUM SERPL-SCNC: 134 MMOL/L — LOW (ref 135–145)
SPECIMEN SOURCE: SIGNIFICANT CHANGE UP
VANCOMYCIN TROUGH SERPL-MCNC: 7.4 UG/ML — LOW (ref 10–20)
WBC # BLD: 41.9 K/UL — CRITICAL HIGH (ref 3.8–10.5)
WBC # FLD AUTO: 41.9 K/UL — CRITICAL HIGH (ref 3.8–10.5)

## 2017-09-25 PROCEDURE — 78226 HEPATOBILIARY SYSTEM IMAGING: CPT | Mod: 26

## 2017-09-25 RX ORDER — ONDANSETRON 8 MG/1
4 TABLET, FILM COATED ORAL EVERY 6 HOURS
Qty: 0 | Refills: 0 | Status: DISCONTINUED | OUTPATIENT
Start: 2017-09-25 | End: 2017-09-28

## 2017-09-25 RX ORDER — SODIUM CHLORIDE 9 MG/ML
1000 INJECTION INTRAMUSCULAR; INTRAVENOUS; SUBCUTANEOUS
Qty: 0 | Refills: 0 | Status: DISCONTINUED | OUTPATIENT
Start: 2017-09-25 | End: 2017-09-26

## 2017-09-25 RX ADMIN — OXYCODONE AND ACETAMINOPHEN 2 TABLET(S): 5; 325 TABLET ORAL at 04:04

## 2017-09-25 RX ADMIN — OXYCODONE AND ACETAMINOPHEN 2 TABLET(S): 5; 325 TABLET ORAL at 21:50

## 2017-09-25 RX ADMIN — Medication 400 MILLIGRAM(S): at 13:02

## 2017-09-25 RX ADMIN — Medication 81 MILLIGRAM(S): at 13:01

## 2017-09-25 RX ADMIN — Medication 20 MILLIGRAM(S): at 13:01

## 2017-09-25 RX ADMIN — MORPHINE SULFATE 15 MILLIGRAM(S): 50 CAPSULE, EXTENDED RELEASE ORAL at 12:08

## 2017-09-25 RX ADMIN — OXYCODONE AND ACETAMINOPHEN 2 TABLET(S): 5; 325 TABLET ORAL at 15:30

## 2017-09-25 RX ADMIN — Medication 150 MILLIGRAM(S): at 18:47

## 2017-09-25 RX ADMIN — Medication 150 MILLIGRAM(S): at 05:33

## 2017-09-25 RX ADMIN — OXYCODONE AND ACETAMINOPHEN 2 TABLET(S): 5; 325 TABLET ORAL at 14:36

## 2017-09-25 RX ADMIN — Medication 2000 UNIT(S): at 13:02

## 2017-09-25 RX ADMIN — ENOXAPARIN SODIUM 40 MILLIGRAM(S): 100 INJECTION SUBCUTANEOUS at 13:02

## 2017-09-25 RX ADMIN — PIPERACILLIN AND TAZOBACTAM 25 GRAM(S): 4; .5 INJECTION, POWDER, LYOPHILIZED, FOR SOLUTION INTRAVENOUS at 05:33

## 2017-09-25 RX ADMIN — Medication 10 MILLIGRAM(S): at 05:33

## 2017-09-25 RX ADMIN — SODIUM CHLORIDE 75 MILLILITER(S): 9 INJECTION INTRAMUSCULAR; INTRAVENOUS; SUBCUTANEOUS at 12:08

## 2017-09-25 RX ADMIN — OXYCODONE AND ACETAMINOPHEN 2 TABLET(S): 5; 325 TABLET ORAL at 09:15

## 2017-09-25 RX ADMIN — URSODIOL 250 MILLIGRAM(S): 250 TABLET, FILM COATED ORAL at 18:47

## 2017-09-25 RX ADMIN — PIPERACILLIN AND TAZOBACTAM 25 GRAM(S): 4; .5 INJECTION, POWDER, LYOPHILIZED, FOR SOLUTION INTRAVENOUS at 13:00

## 2017-09-25 RX ADMIN — OXYCODONE AND ACETAMINOPHEN 2 TABLET(S): 5; 325 TABLET ORAL at 20:39

## 2017-09-25 RX ADMIN — URSODIOL 250 MILLIGRAM(S): 250 TABLET, FILM COATED ORAL at 08:14

## 2017-09-25 RX ADMIN — PIPERACILLIN AND TAZOBACTAM 25 GRAM(S): 4; .5 INJECTION, POWDER, LYOPHILIZED, FOR SOLUTION INTRAVENOUS at 21:59

## 2017-09-25 RX ADMIN — OXYCODONE AND ACETAMINOPHEN 2 TABLET(S): 5; 325 TABLET ORAL at 04:42

## 2017-09-25 RX ADMIN — OXYCODONE AND ACETAMINOPHEN 2 TABLET(S): 5; 325 TABLET ORAL at 10:00

## 2017-09-25 RX ADMIN — PANTOPRAZOLE SODIUM 40 MILLIGRAM(S): 20 TABLET, DELAYED RELEASE ORAL at 05:33

## 2017-09-25 NOTE — PROGRESS NOTE ADULT - PROBLEM SELECTOR PLAN 4
hold antihypertensives for now given sepsis hold antihypertensives for now given sepsis    plan of care dw patient,  and NP.

## 2017-09-25 NOTE — CONSULT NOTE ADULT - SUBJECTIVE AND OBJECTIVE BOX
HPI:   Patient is a 72y female with a history of RA,severe osteoporosis,Left MCA CVA in 2016 with concerns of MV endocarditis,s/p 4 weeks of CTX,who was admitted from an assisted living facility with fever and nausea and vomiting.The patient is a poor historian, cannot provide much useful medical information.Her  was called yesterday by the SNF that she had vomited .She was treated 1 year ago for culture negative possible native valve sbe with ceftriaxone.She has not required any in patient care since than.She is treated with MTX,remikade,and 10 mg daily of prednisone for her RA.No diarrhea and no abdominal pain today.Her ilness was of a short duration at the SNF.CT scan of C/A/P with a distended gallbladder,no wall thickening or perocholecystic fluid.She has received both vanco and zosyn.  GI has been asked to see.  REVIEW OF SYSTEMS:  All other review of systems negative (Comprehensive ROS)  ?aphasic,poor memory  PAST MEDICAL & SURGICAL HISTORY:  Osteoporosis  Rheumatoid arthritis  History of hip replacement, unspecified laterality  History of total knee replacement, b/l      Allergies    Actonel (Hives)  clonidine (Other)  crab meat (Unknown)    Intolerances        Antimicrobials Day #  :2  hydroxychloroquine 400 milliGRAM(s) Oral daily  piperacillin/tazobactam IVPB. 3.375 Gram(s) IV Intermittent every 8 hours  vancomycin  IVPB 750 milliGRAM(s) IV Intermittent every 12 hours    Other Medications:  enoxaparin Injectable 40 milliGRAM(s) SubCutaneous every 24 hours  insulin lispro (HumaLOG) corrective regimen sliding scale   SubCutaneous three times a day before meals  dextrose 5%. 1000 milliLiter(s) IV Continuous <Continuous>  dextrose Gel 1 Dose(s) Oral once PRN  dextrose 50% Injectable 25 Gram(s) IV Push once  glucagon  Injectable 1 milliGRAM(s) IntraMuscular once PRN  predniSONE   Tablet 10 milliGRAM(s) Oral daily  aspirin enteric coated 81 milliGRAM(s) Oral daily  PARoxetine 20 milliGRAM(s) Oral daily  ursodiol Tablet 250 milliGRAM(s) Oral two times a day with meals  pantoprazole    Tablet 40 milliGRAM(s) Oral before breakfast  cholecalciferol 2000 Unit(s) Oral daily  morphine ER Tablet 15 milliGRAM(s) Oral daily  acetaminophen  IVPB. 1000 milliGRAM(s) IV Intermittent once  oxyCODONE    5 mG/acetaminophen 325 mG 2 Tablet(s) Oral every 6 hours  sodium chloride 0.9%. 1000 milliLiter(s) IV Continuous <Continuous>      FAMILY HISTORY:  Family history of cerebrovascular accident (CVA) in mother (Mother)      SOCIAL HISTORY:  Smoking:  no   ETOH:  no   Drug Use: no       T(F): 98.7 (17 @ 06:15), Max: 99.7 (17 @ 15:12)  HR: 94 (17 @ 06:15)  BP: 120/74 (17 @ 06:15)  RR: 18 (17 @ 06:15)  SpO2: 94% (17 @ 06:15)  Wt(kg): --    PHYSICAL EXAM:  General: alert, no acute distress  Eyes:  anicteric, no conjunctival injection, no discharge  Oropharynx: no lesions or injection 	  Neck: supple, without adenopathy  Lungs: clear to auscultation  Heart: regular rate and rhythm; no murmur, rubs or gallops  Abdomen: soft, nondistended, nontender, without mass or organomegaly  Skin: few bruises on shins  Extremities: no clubbing, cyanosis, or edema.No knee effusions  Neurologic: alert, oriented, moves all extremities  Rheumatoid hand deformities  LAB RESULTS:                        9.2    41.90 )-----------( 413      ( 25 Sep 2017 07:27 )             28.0         134<L>  |  101  |  9   ----------------------------<  106<H>  3.8   |  21<L>  |  0.54    Ca    8.1<L>      25 Sep 2017 07:31  Mg     2.4         TPro  7.3  /  Alb  4.0  /  TBili  0.3  /  DBili  x   /  AST  32  /  ALT  26  /  AlkPhos  96      LIVER FUNCTIONS - ( 24 Sep 2017 03:28 )  Alb: 4.0 g/dL / Pro: 7.3 g/dL / ALK PHOS: 96 U/L / ALT: 26 U/L RC / AST: 32 U/L / GGT: x           Urinalysis Basic - ( 24 Sep 2017 03:28 )    Color: x / Appearance: Clear / S.008 / pH: x  Gluc: x / Ketone: Negative  / Bili: Negative / Urobili: Negative   Blood: x / Protein: Negative / Nitrite: Negative   Leuk Esterase: Negative / RBC: 0-2 /HPF / WBC 0-2 /HPF   Sq Epi: x / Non Sq Epi: x / Bacteria: Few /HPF        MICROBIOLOGY:  RECENT CULTURES:   blood and urine cultures are negative      RADIOLOGY REVIEWED:  CXR  negative  Sonogram   IMPRESSION:     Mild sludge within the gallbladder. CBD is within the upper limits of   normal and measures 0.9 cm.    Equivocal sonographic Garvin sign as patient is diffusely tender during   examination.  CT   IMPRESSION:     No significant bowel wall thickening or inflammatory change. No drainable   fluid collection, bowel obstruction or intraperitoneal free air.    Nonspecific 0.4 cm right middle lobe nodule. If the patient is at low   risk for malignancy, no further follow-up is needed. If the patient is at   high risk for malignancy, follow-up CT chest in 12 months is recommended.    Additional findings as described.
72 F immunocompromised due to Rheumatoid Arthritis for which she is on Remicade / Methotrexate / Prednisone 10mg, HTN, osteoporosis, MCA CVA, hx of mitral valve endocarditis admitted after having vomiting and weakness starting last Saturday evening 2 days ago.  Patient is not reliably able to provide entire HPI. Her  is at bedside but he does not reside with her at assisted living.  As per review of chart and , patient was referred to ED for vomiting.  It is unclear if she ever had abdominal pain although she denies any pain at this time.    Patient was last seen in our GI practice ~ . She has a history of dilated CBD and intermittently elevated LFTs. She has had an ERCP in the past (over 6 years ago)  Currently does not have any fevers or chills. No abd pain. Denies d/c.  Currently on broad spectrum antibiotics.     PAST MEDICAL & SURGICAL HISTORY:  Osteoporosis  Rheumatoid arthritis  History of hip replacement, unspecified laterality  History of total knee replacement, unspecified laterality      MEDICATIONS  (STANDING):  enoxaparin Injectable 40 milliGRAM(s) SubCutaneous every 24 hours  insulin lispro (HumaLOG) corrective regimen sliding scale   SubCutaneous three times a day before meals  dextrose 5%. 1000 milliLiter(s) (50 mL/Hr) IV Continuous <Continuous>  dextrose 50% Injectable 25 Gram(s) IV Push once  predniSONE   Tablet 10 milliGRAM(s) Oral daily  aspirin enteric coated 81 milliGRAM(s) Oral daily  PARoxetine 20 milliGRAM(s) Oral daily  hydroxychloroquine 400 milliGRAM(s) Oral daily  ursodiol Tablet 250 milliGRAM(s) Oral two times a day with meals  pantoprazole    Tablet 40 milliGRAM(s) Oral before breakfast  cholecalciferol 2000 Unit(s) Oral daily  morphine ER Tablet 15 milliGRAM(s) Oral daily  piperacillin/tazobactam IVPB. 3.375 Gram(s) IV Intermittent every 8 hours  vancomycin  IVPB 750 milliGRAM(s) IV Intermittent every 12 hours  acetaminophen  IVPB. 1000 milliGRAM(s) IV Intermittent once  oxyCODONE    5 mG/acetaminophen 325 mG 2 Tablet(s) Oral every 6 hours  sodium chloride 0.9%. 1000 milliLiter(s) (75 mL/Hr) IV Continuous <Continuous>    MEDICATIONS  (PRN):  dextrose Gel 1 Dose(s) Oral once PRN Blood Glucose LESS THAN 70 milliGRAM(s)/deciliter  glucagon  Injectable 1 milliGRAM(s) IntraMuscular once PRN Glucose LESS THAN 70 milligrams/deciliter      Allergies    Actonel (Hives)  clonidine (Other)  crab meat (Unknown)    Intolerances        Review of Systems:    General: +fevers, chills, night sweats,fatigue,   CV:  No pain, palpitatioins, hypo/hypertension  Resp:  No dyspnea, cough, tachypnea, wheezing  GI:  see HPI  :  No pain, bleeding, incontinence, nocturia  Muscle:  No pain, weakness  Neuro:  No weakness, tingling, memory problems  Psych:  No fatigue, insomnia, mood problems, depression  Endocrine:  No polyuria, polydypsia, cold/heat intolerance  Heme:  No petechiae, ecchymosis, easy bruisability  Skin:  No rash, tattoos, scars, edema      Vital Signs Last 24 Hrs  T(C): 37.1 (25 Sep 2017 06:15), Max: 37.6 (24 Sep 2017 15:12)  T(F): 98.7 (25 Sep 2017 06:15), Max: 99.7 (24 Sep 2017 15:12)  HR: 94 (25 Sep 2017 06:15) (85 - 101)  BP: 120/74 (25 Sep 2017 06:15) (119/66 - 144/78)  BP(mean): --  RR: 18 (25 Sep 2017 06:15) (18 - 18)  SpO2: 94% (25 Sep 2017 06:15) (94% - 97%)    PHYSICAL EXAM:    Constitutional: NAD, well-developed  Neck: No LAD  Respiratory: CTA   Cardiovascular: S1 and S2  Gastrointestinal: BS+, soft, NT/ND  Extremities: No peripheral edema  Vascular: 2+ peripheral pulses  Psychiatric: Normal mood, normal affect  Skin: No rashes      LABS:                        9.2    41.90 )-----------( 413      ( 25 Sep 2017 07:27 )             28.0                         9.9    33.8  )-----------( 473      ( 24 Sep 2017 07:58 )             29.9                         11.5   28.3  )-----------( 539      ( 24 Sep 2017 03:28 )             36.2     09-25    134<L>  |  101  |  9   ----------------------------<  106<H>  3.8   |  21<L>  |  0.54    Ca    8.1<L>      25 Sep 2017 07:31  Mg     2.4         TPro  7.3  /  Alb  4.0  /  TBili  0.3  /  DBili  x   /  AST  32  /  ALT  26  /  AlkPhos  96        Urinalysis Basic - ( 24 Sep 2017 03:28 )    Color: x / Appearance: Clear / S.008 / pH: x  Gluc: x / Ketone: Negative  / Bili: Negative / Urobili: Negative   Blood: x / Protein: Negative / Nitrite: Negative   Leuk Esterase: Negative / RBC: 0-2 /HPF / WBC 0-2 /HPF   Sq Epi: x / Non Sq Epi: x / Bacteria: Few /HPF      LIVER FUNCTIONS - ( 24 Sep 2017 03:28 )  Alb: 4.0 g/dL / Pro: 7.3 g/dL / ALK PHOS: 96 U/L / ALT: 26 U/L RC / AST: 32 U/L / GGT: x                 RADIOLOGY & ADDITIONAL TESTS:

## 2017-09-25 NOTE — CONSULT NOTE ADULT - ASSESSMENT
71 yo female with RA on steroids,remikade,and MTX admitted with fever, n/v and abdominal pain.  He exam today is rather benign although she is heavily immune compromised.  Leukocytosis of concern, out  of proportion to other parameters of infection and in part may still in part reflect steroid effect.  Suggest:  1.Vanco and zosyn okay for now  2.give blood cultures additional time of incubation  3.Consider HIDA,will defer to GI  4.Monitor exam and wbc count,unfortunately  she is an unreliable historian and most of history is with her 
71 yo immunocompromised female on Remicade/Methotrexate/Prednisone and chronic opioids p/w vomiting and ?abdominal pain  Patient has a history of dilated CBD and intermittently elevated LFTs for which she did have an ERCP in the past  Currently does not have any abdominal pain  No signs of acute cholecystitis on CT Scan a/p and US abd. ? dialted gallbladder  Agree w/ HIDA scan  Daily LFTs  Monitor WBC on antibiotics  Findings on examination and symptoms may be blunted due to degree of immunosuppression and findings masked by pain medications - will follow closely/

## 2017-09-26 PROCEDURE — 93306 TTE W/DOPPLER COMPLETE: CPT | Mod: 26

## 2017-09-26 RX ORDER — LISINOPRIL 2.5 MG/1
10 TABLET ORAL DAILY
Qty: 0 | Refills: 0 | Status: DISCONTINUED | OUTPATIENT
Start: 2017-09-26 | End: 2017-09-28

## 2017-09-26 RX ORDER — SODIUM CHLORIDE 9 MG/ML
1000 INJECTION INTRAMUSCULAR; INTRAVENOUS; SUBCUTANEOUS
Qty: 0 | Refills: 0 | Status: DISCONTINUED | OUTPATIENT
Start: 2017-09-26 | End: 2017-09-28

## 2017-09-26 RX ORDER — POTASSIUM CHLORIDE 20 MEQ
40 PACKET (EA) ORAL EVERY 4 HOURS
Qty: 0 | Refills: 0 | Status: COMPLETED | OUTPATIENT
Start: 2017-09-26 | End: 2017-09-26

## 2017-09-26 RX ORDER — AMLODIPINE BESYLATE 2.5 MG/1
10 TABLET ORAL DAILY
Qty: 0 | Refills: 0 | Status: DISCONTINUED | OUTPATIENT
Start: 2017-09-26 | End: 2017-09-28

## 2017-09-26 RX ADMIN — Medication 10 MILLIGRAM(S): at 05:07

## 2017-09-26 RX ADMIN — OXYCODONE AND ACETAMINOPHEN 2 TABLET(S): 5; 325 TABLET ORAL at 09:30

## 2017-09-26 RX ADMIN — PIPERACILLIN AND TAZOBACTAM 25 GRAM(S): 4; .5 INJECTION, POWDER, LYOPHILIZED, FOR SOLUTION INTRAVENOUS at 05:07

## 2017-09-26 RX ADMIN — OXYCODONE AND ACETAMINOPHEN 2 TABLET(S): 5; 325 TABLET ORAL at 02:22

## 2017-09-26 RX ADMIN — Medication 40 MILLIEQUIVALENT(S): at 14:56

## 2017-09-26 RX ADMIN — ENOXAPARIN SODIUM 40 MILLIGRAM(S): 100 INJECTION SUBCUTANEOUS at 14:57

## 2017-09-26 RX ADMIN — Medication 20 MILLIGRAM(S): at 12:02

## 2017-09-26 RX ADMIN — PANTOPRAZOLE SODIUM 40 MILLIGRAM(S): 20 TABLET, DELAYED RELEASE ORAL at 05:07

## 2017-09-26 RX ADMIN — Medication 81 MILLIGRAM(S): at 12:03

## 2017-09-26 RX ADMIN — Medication 400 MILLIGRAM(S): at 12:03

## 2017-09-26 RX ADMIN — OXYCODONE AND ACETAMINOPHEN 2 TABLET(S): 5; 325 TABLET ORAL at 16:00

## 2017-09-26 RX ADMIN — OXYCODONE AND ACETAMINOPHEN 2 TABLET(S): 5; 325 TABLET ORAL at 03:20

## 2017-09-26 RX ADMIN — Medication 40 MILLIEQUIVALENT(S): at 18:22

## 2017-09-26 RX ADMIN — LISINOPRIL 10 MILLIGRAM(S): 2.5 TABLET ORAL at 12:01

## 2017-09-26 RX ADMIN — OXYCODONE AND ACETAMINOPHEN 2 TABLET(S): 5; 325 TABLET ORAL at 14:55

## 2017-09-26 RX ADMIN — OXYCODONE AND ACETAMINOPHEN 2 TABLET(S): 5; 325 TABLET ORAL at 21:00

## 2017-09-26 RX ADMIN — Medication 150 MILLIGRAM(S): at 05:07

## 2017-09-26 RX ADMIN — AMLODIPINE BESYLATE 10 MILLIGRAM(S): 2.5 TABLET ORAL at 12:01

## 2017-09-26 RX ADMIN — MORPHINE SULFATE 15 MILLIGRAM(S): 50 CAPSULE, EXTENDED RELEASE ORAL at 12:01

## 2017-09-26 RX ADMIN — Medication 2000 UNIT(S): at 12:02

## 2017-09-26 RX ADMIN — URSODIOL 250 MILLIGRAM(S): 250 TABLET, FILM COATED ORAL at 08:27

## 2017-09-26 RX ADMIN — OXYCODONE AND ACETAMINOPHEN 2 TABLET(S): 5; 325 TABLET ORAL at 08:26

## 2017-09-26 RX ADMIN — PIPERACILLIN AND TAZOBACTAM 25 GRAM(S): 4; .5 INJECTION, POWDER, LYOPHILIZED, FOR SOLUTION INTRAVENOUS at 14:55

## 2017-09-26 RX ADMIN — URSODIOL 250 MILLIGRAM(S): 250 TABLET, FILM COATED ORAL at 17:20

## 2017-09-26 RX ADMIN — MORPHINE SULFATE 15 MILLIGRAM(S): 50 CAPSULE, EXTENDED RELEASE ORAL at 13:10

## 2017-09-26 RX ADMIN — OXYCODONE AND ACETAMINOPHEN 2 TABLET(S): 5; 325 TABLET ORAL at 20:30

## 2017-09-27 DIAGNOSIS — Z02.9 ENCOUNTER FOR ADMINISTRATIVE EXAMINATIONS, UNSPECIFIED: ICD-10-CM

## 2017-09-27 DIAGNOSIS — Z29.9 ENCOUNTER FOR PROPHYLACTIC MEASURES, UNSPECIFIED: ICD-10-CM

## 2017-09-27 LAB
ANION GAP SERPL CALC-SCNC: 11 MMOL/L — SIGNIFICANT CHANGE UP (ref 5–17)
ANISOCYTOSIS BLD QL: SLIGHT — SIGNIFICANT CHANGE UP
BASOPHILS # BLD AUTO: 0.13 K/UL — SIGNIFICANT CHANGE UP (ref 0–0.2)
BASOPHILS NFR BLD AUTO: 0.9 % — SIGNIFICANT CHANGE UP (ref 0–2)
BUN SERPL-MCNC: 7 MG/DL — SIGNIFICANT CHANGE UP (ref 7–23)
CALCIUM SERPL-MCNC: 8.4 MG/DL — SIGNIFICANT CHANGE UP (ref 8.4–10.5)
CHLORIDE SERPL-SCNC: 94 MMOL/L — LOW (ref 96–108)
CO2 SERPL-SCNC: 20 MMOL/L — LOW (ref 22–31)
CREAT SERPL-MCNC: 0.43 MG/DL — LOW (ref 0.5–1.3)
EOSINOPHIL # BLD AUTO: 0.52 K/UL — HIGH (ref 0–0.5)
EOSINOPHIL NFR BLD AUTO: 3.5 % — SIGNIFICANT CHANGE UP (ref 0–6)
GIANT PLATELETS BLD QL SMEAR: PRESENT — SIGNIFICANT CHANGE UP
GLUCOSE SERPL-MCNC: 80 MG/DL — SIGNIFICANT CHANGE UP (ref 70–99)
HCT VFR BLD CALC: 28 % — LOW (ref 34.5–45)
HGB BLD-MCNC: 9.2 G/DL — LOW (ref 11.5–15.5)
HYPOCHROMIA BLD QL: SLIGHT — SIGNIFICANT CHANGE UP
LYMPHOCYTES # BLD AUTO: 1.44 K/UL — SIGNIFICANT CHANGE UP (ref 1–3.3)
LYMPHOCYTES # BLD AUTO: 9.6 % — LOW (ref 13–44)
MACROCYTES BLD QL: SLIGHT — SIGNIFICANT CHANGE UP
MANUAL SMEAR VERIFICATION: SIGNIFICANT CHANGE UP
MCHC RBC-ENTMCNC: 29.8 PG — SIGNIFICANT CHANGE UP (ref 27–34)
MCHC RBC-ENTMCNC: 32.9 GM/DL — SIGNIFICANT CHANGE UP (ref 32–36)
MCV RBC AUTO: 90.6 FL — SIGNIFICANT CHANGE UP (ref 80–100)
MONOCYTES # BLD AUTO: 1.05 K/UL — HIGH (ref 0–0.9)
MONOCYTES NFR BLD AUTO: 7 % — SIGNIFICANT CHANGE UP (ref 2–14)
NEUTROPHILS # BLD AUTO: 11.84 K/UL — HIGH (ref 1.8–7.4)
NEUTROPHILS NFR BLD AUTO: 79 % — HIGH (ref 43–77)
PLAT MORPH BLD: NORMAL — SIGNIFICANT CHANGE UP
PLATELET # BLD AUTO: 491 K/UL — HIGH (ref 150–400)
POTASSIUM SERPL-MCNC: 4.1 MMOL/L — SIGNIFICANT CHANGE UP (ref 3.5–5.3)
POTASSIUM SERPL-SCNC: 4.1 MMOL/L — SIGNIFICANT CHANGE UP (ref 3.5–5.3)
RBC # BLD: 3.09 M/UL — LOW (ref 3.8–5.2)
RBC # FLD: 15.1 % — HIGH (ref 10.3–14.5)
RBC BLD AUTO: ABNORMAL
SODIUM SERPL-SCNC: 125 MMOL/L — LOW (ref 135–145)
WBC # BLD: 14.99 K/UL — HIGH (ref 3.8–10.5)
WBC # FLD AUTO: 14.99 K/UL — HIGH (ref 3.8–10.5)

## 2017-09-27 RX ORDER — INFLIXIMAB-DYYB 120 MG/ML
450 INJECTION SUBCUTANEOUS ONCE
Qty: 0 | Refills: 0 | Status: COMPLETED | OUTPATIENT
Start: 2017-09-27 | End: 2017-09-27

## 2017-09-27 RX ORDER — DIPHENHYDRAMINE HCL 50 MG
25 CAPSULE ORAL ONCE
Qty: 0 | Refills: 0 | Status: COMPLETED | OUTPATIENT
Start: 2017-09-27 | End: 2017-09-27

## 2017-09-27 RX ADMIN — Medication 25 MILLIGRAM(S): at 18:59

## 2017-09-27 RX ADMIN — Medication 400 MILLIGRAM(S): at 11:55

## 2017-09-27 RX ADMIN — URSODIOL 250 MILLIGRAM(S): 250 TABLET, FILM COATED ORAL at 08:31

## 2017-09-27 RX ADMIN — PANTOPRAZOLE SODIUM 40 MILLIGRAM(S): 20 TABLET, DELAYED RELEASE ORAL at 05:17

## 2017-09-27 RX ADMIN — MORPHINE SULFATE 15 MILLIGRAM(S): 50 CAPSULE, EXTENDED RELEASE ORAL at 12:56

## 2017-09-27 RX ADMIN — OXYCODONE AND ACETAMINOPHEN 2 TABLET(S): 5; 325 TABLET ORAL at 22:19

## 2017-09-27 RX ADMIN — LISINOPRIL 10 MILLIGRAM(S): 2.5 TABLET ORAL at 05:17

## 2017-09-27 RX ADMIN — Medication 2000 UNIT(S): at 11:54

## 2017-09-27 RX ADMIN — OXYCODONE AND ACETAMINOPHEN 2 TABLET(S): 5; 325 TABLET ORAL at 08:31

## 2017-09-27 RX ADMIN — OXYCODONE AND ACETAMINOPHEN 2 TABLET(S): 5; 325 TABLET ORAL at 21:01

## 2017-09-27 RX ADMIN — OXYCODONE AND ACETAMINOPHEN 2 TABLET(S): 5; 325 TABLET ORAL at 09:30

## 2017-09-27 RX ADMIN — OXYCODONE AND ACETAMINOPHEN 2 TABLET(S): 5; 325 TABLET ORAL at 15:30

## 2017-09-27 RX ADMIN — Medication 81 MILLIGRAM(S): at 11:54

## 2017-09-27 RX ADMIN — MORPHINE SULFATE 15 MILLIGRAM(S): 50 CAPSULE, EXTENDED RELEASE ORAL at 11:54

## 2017-09-27 RX ADMIN — Medication 20 MILLIGRAM(S): at 11:55

## 2017-09-27 RX ADMIN — ENOXAPARIN SODIUM 40 MILLIGRAM(S): 100 INJECTION SUBCUTANEOUS at 14:33

## 2017-09-27 RX ADMIN — OXYCODONE AND ACETAMINOPHEN 2 TABLET(S): 5; 325 TABLET ORAL at 14:32

## 2017-09-27 RX ADMIN — INFLIXIMAB-DYYB 147.5 MILLIGRAM(S): 120 INJECTION SUBCUTANEOUS at 18:58

## 2017-09-27 RX ADMIN — URSODIOL 250 MILLIGRAM(S): 250 TABLET, FILM COATED ORAL at 17:24

## 2017-09-27 RX ADMIN — OXYCODONE AND ACETAMINOPHEN 2 TABLET(S): 5; 325 TABLET ORAL at 04:13

## 2017-09-27 RX ADMIN — Medication 10 MILLIGRAM(S): at 05:17

## 2017-09-27 RX ADMIN — OXYCODONE AND ACETAMINOPHEN 2 TABLET(S): 5; 325 TABLET ORAL at 03:35

## 2017-09-27 RX ADMIN — AMLODIPINE BESYLATE 10 MILLIGRAM(S): 2.5 TABLET ORAL at 05:17

## 2017-09-27 NOTE — PHYSICAL THERAPY INITIAL EVALUATION ADULT - GENERAL OBSERVATIONS, REHAB EVAL
Pt received semi-supine in bed, NAD. Pt's spouse at bedside. Pt alert, forgetful, agreeable to PT eval.

## 2017-09-27 NOTE — PHYSICAL THERAPY INITIAL EVALUATION ADULT - ADDITIONAL COMMENTS
Pt lives in [facility] living [facility]. PTA, pt ambulating to/from dining room independently with rollator. Pt's spouse assists pt with ADLs. Pt owns rollator, w/c.

## 2017-09-27 NOTE — PROGRESS NOTE ADULT - SUBJECTIVE AND OBJECTIVE BOX
CC: f/u for fever    Patient reports no fever,no abdominal pain.She is tolerating diet    REVIEW OF SYSTEMS:  All other review of systems negative (Comprehensive ROS)    Antimicrobials Day # 3 :  hydroxychloroquine 400 milliGRAM(s) Oral daily  piperacillin/tazobactam IVPB. 3.375 Gram(s) IV Intermittent every 8 hours  vancomycin  IVPB 750 milliGRAM(s) IV Intermittent every 12 hours    Other Medications Reviewed    T(F): 98.8 (09-26-17 @ 05:19), Max: 98.8 (09-26-17 @ 05:19)  HR: 83 (09-26-17 @ 12:06)  BP: 164/95 (09-26-17 @ 12:06)  RR: 18 (09-26-17 @ 05:19)  SpO2: 97% (09-26-17 @ 05:19)  Wt(kg): --    PHYSICAL EXAM:  General: alert, no acute distress  Eyes:  anicteric, no conjunctival injection, no discharge  Oropharynx: no lesions or injection 	  Neck: supple, without adenopathy  Lungs: clear to auscultation  Heart: regular rate and rhythm; soft john  Abdomen: soft, nondistended, nontender, without mass or organomegaly  Skin: no lesions  Extremities: no clubbing, cyanosis, or edema  Neurologic: alert, oriented, moves all extremities    LAB RESULTS:                        9.6    31.3  )-----------( 436      ( 26 Sep 2017 09:31 )             29.2     09-26    130<L>  |  97  |  5<L>  ----------------------------<  147<H>  3.0<L>   |  20<L>  |  0.41<L>    Ca    8.3<L>      26 Sep 2017 09:31  Mg     2.4     09-25          MICROBIOLOGY:  RECENT CULTURES:  9/24 blod and urine culture negative    RADIOLOGY REVIEWED:  HIDA negative
CC: f/u for fever/leukocytosis    Patient reports no abdominal pain or GI symptoms.Her antibiotics were stopped yesterday.    REVIEW OF SYSTEMS:  All other review of systems negative (Comprehensive ROS)    Antimicrobials Day #  :  hydroxychloroquine 400 milliGRAM(s) Oral daily    Other Medications Reviewed    T(F): 98.7 (09-27-17 @ 05:08), Max: 98.7 (09-27-17 @ 05:08)  HR: 77 (09-27-17 @ 05:08)  BP: 156/94 (09-27-17 @ 05:08)  RR: 18 (09-27-17 @ 05:08)  SpO2: 97% (09-27-17 @ 05:08)  Wt(kg): --    PHYSICAL EXAM:  General: alert, no acute distress  Eyes:  anicteric, no conjunctival injection, no discharge  Oropharynx: no lesions or injection 	  Neck: supple, without adenopathy  Lungs: clear to auscultation  Heart: regular rate and rhythm; soft john  Abdomen: soft, nondistended, nontender, without mass or organomegaly  Skin: no lesions  Extremities: no clubbing, cyanosis, or edema  Neurologic: alert, oriented, moves all extremities    LAB RESULTS:                        9.2    14.99 )-----------( 491      ( 27 Sep 2017 07:50 )             28.0     09-27    125<L>  |  94<L>  |  7   ----------------------------<  80  4.1   |  20<L>  |  0.43<L>    Ca    8.4      27 Sep 2017 07:50          MICROBIOLOGY:  RECENT CULTURES:  9/24 blood cultures are negative    RADIOLOGY REVIEWED:    TTE treviewed
INTERVAL HPI/OVERNIGHT EVENTS:  no issues overnight. WBC continues to rise. normal HIDA scan with entrance into small bowel at 20 minutes.   no nausea or vomiting. tolerating diet. no fevers    MEDICATIONS  (STANDING):  enoxaparin Injectable 40 milliGRAM(s) SubCutaneous every 24 hours  insulin lispro (HumaLOG) corrective regimen sliding scale   SubCutaneous three times a day before meals  dextrose 5%. 1000 milliLiter(s) (50 mL/Hr) IV Continuous <Continuous>  dextrose 50% Injectable 25 Gram(s) IV Push once  predniSONE   Tablet 10 milliGRAM(s) Oral daily  aspirin enteric coated 81 milliGRAM(s) Oral daily  PARoxetine 20 milliGRAM(s) Oral daily  hydroxychloroquine 400 milliGRAM(s) Oral daily  ursodiol Tablet 250 milliGRAM(s) Oral two times a day with meals  pantoprazole    Tablet 40 milliGRAM(s) Oral before breakfast  cholecalciferol 2000 Unit(s) Oral daily  morphine ER Tablet 15 milliGRAM(s) Oral daily  piperacillin/tazobactam IVPB. 3.375 Gram(s) IV Intermittent every 8 hours  vancomycin  IVPB 750 milliGRAM(s) IV Intermittent every 12 hours  acetaminophen  IVPB. 1000 milliGRAM(s) IV Intermittent once  oxyCODONE    5 mG/acetaminophen 325 mG 2 Tablet(s) Oral every 6 hours  amLODIPine   Tablet 10 milliGRAM(s) Oral daily  lisinopril 10 milliGRAM(s) Oral daily  sodium chloride 0.9%. 1000 milliLiter(s) (50 mL/Hr) IV Continuous <Continuous>    MEDICATIONS  (PRN):  dextrose Gel 1 Dose(s) Oral once PRN Blood Glucose LESS THAN 70 milliGRAM(s)/deciliter  glucagon  Injectable 1 milliGRAM(s) IntraMuscular once PRN Glucose LESS THAN 70 milligrams/deciliter  ondansetron Injectable 4 milliGRAM(s) IV Push every 6 hours PRN Nausea and/or Vomiting      Allergies    Actonel (Hives)  clonidine (Other)  crab meat (Unknown)    Intolerances        Vital Signs Last 24 Hrs  T(C): 37.1 (26 Sep 2017 05:19), Max: 37.3 (25 Sep 2017 14:52)  T(F): 98.8 (26 Sep 2017 05:19), Max: 99.2 (25 Sep 2017 14:52)  HR: 85 (26 Sep 2017 05:19) (85 - 90)  BP: 167/91 (26 Sep 2017 05:19) (150/85 - 167/91)  BP(mean): --  RR: 18 (26 Sep 2017 05:19) (18 - 18)  SpO2: 97% (26 Sep 2017 05:19) (93% - 97%)    PHYSICAL EXAM:  General: comfortable in No acute distress  CV: regular rate and rhythm. no murmurs rubs or gallops  Lungs: clear to ascultation bilaterally  abdomen: soft nontender nondistened normal bowel sounds  ext: negative edema  skin: no rashes noted      LABS:                        9.6    31.3  )-----------( 436      ( 26 Sep 2017 09:31 )             29.2     09-25    134<L>  |  101  |  9   ----------------------------<  106<H>  3.8   |  21<L>  |  0.54    Ca    8.1<L>      25 Sep 2017 07:31  Mg     2.4     09-25          LIVER FUNCTIONS - ( 24 Sep 2017 03:28 )  Alb: 4.0 g/dL / Pro: 7.3 g/dL / ALK PHOS: 96 U/L / ALT: 26 U/L RC / AST: 32 U/L / GGT: x             RADIOLOGY & ADDITIONAL TESTS:
No fevers or recurrence of vomiting/diarrhea.  No acute cough/SOB.  Still has generalized myalgias.        Vital Signs Last 24 Hrs  T(C): 37.4 (27 Sep 2017 14:03), Max: 37.4 (27 Sep 2017 14:03)  T(F): 99.3 (27 Sep 2017 14:03), Max: 99.3 (27 Sep 2017 14:03)  HR: 90 (27 Sep 2017 15:14) (77 - 93)  BP: 99/58 (27 Sep 2017 15:14) (99/58 - 156/94)  BP(mean): --  RR: 18 (27 Sep 2017 14:03) (18 - 18)  SpO2: 95% (27 Sep 2017 15:14) (93% - 97%)    GENERAL: NAD, AAOx1  HEAD:  Atraumatic, Normocephalic  EYES: EOMI  NECK: Supple, No JVD  CHEST/LUNG: CTABL, No wheeze  HEART: Regular rate and rhythm; + murmur  ABDOMEN: Soft, Nontender, Nondistended; NABS  EXTREMITIES:  2+ Peripheral Pulses, No LE edema b/l  SKIN: R LE ulcer   NEURO: expressive aphasia     LABS:                        9.2    14.99 )-----------( 491      ( 27 Sep 2017 07:50 )             28.0     09-27    125<L>  |  94<L>  |  7   ----------------------------<  80  4.1   |  20<L>  |  0.43<L>    Ca    8.4      27 Sep 2017 07:50        CAPILLARY BLOOD GLUCOSE  114 (27 Sep 2017 16:38)  119 (27 Sep 2017 12:14)  104 (27 Sep 2017 08:01)  191 (26 Sep 2017 21:02)
Patient is a 72y old  Female who presents with a chief complaint of abd pain (24 Sep 2017 13:28)      SUBJECTIVE / OVERNIGHT EVENTS:    Patient seen and examined. denies cp sob. expressive aphasia from prior CVA. co severe left arm pain from RA worsened when she was moved.      Vital Signs Last 24 Hrs  T(C): 37.1 (25 Sep 2017 06:15), Max: 37.6 (24 Sep 2017 15:12)  T(F): 98.7 (25 Sep 2017 06:15), Max: 99.7 (24 Sep 2017 15:12)  HR: 94 (25 Sep 2017 06:15) (85 - 101)  BP: 120/74 (25 Sep 2017 06:15) (107/68 - 144/78)  BP(mean): --  RR: 18 (25 Sep 2017 06:15) (18 - 18)  SpO2: 94% (25 Sep 2017 06:15) (94% - 97%)  I&O's Summary    24 Sep 2017 07:01  -  25 Sep 2017 07:00  --------------------------------------------------------  IN: 350 mL / OUT: 0 mL / NET: 350 mL      PE:  GENERAL: NAD, AAOx1, non toxic  HEAD:  Atraumatic, Normocephalic  EYES: EOMI, PERRLA, conjunctiva and sclera clear  NECK: Supple, No JVD  CHEST/LUNG: CTABL, No wheeze  HEART: Regular rate and rhythm; + murmur  ABDOMEN: Soft, Nontender, Nondistended; Bowel sounds present, neg murphys  EXTREMITIES:  2+ Peripheral Pulses, No clubbing, cyanosis, or edema  SKIN: R LE ulcer   NEURO: expressive aphasia     LABS:                        9.2    41.90 )-----------( 413      ( 25 Sep 2017 07:27 )             28.0         134<L>  |  101  |  9   ----------------------------<  106<H>  3.8   |  21<L>  |  0.54    Ca    8.1<L>      25 Sep 2017 07:31  Mg     2.4         TPro  7.3  /  Alb  4.0  /  TBili  0.3  /  DBili  x   /  AST  32  /  ALT  26  /  AlkPhos  96        CAPILLARY BLOOD GLUCOSE  113 (25 Sep 2017 11:34)  109 (25 Sep 2017 08:09)  148 (24 Sep 2017 17:40)  123 (24 Sep 2017 13:14)            Urinalysis Basic - ( 24 Sep 2017 03:28 )    Color: x / Appearance: Clear / S.008 / pH: x  Gluc: x / Ketone: Negative  / Bili: Negative / Urobili: Negative   Blood: x / Protein: Negative / Nitrite: Negative   Leuk Esterase: Negative / RBC: 0-2 /HPF / WBC 0-2 /HPF   Sq Epi: x / Non Sq Epi: x / Bacteria: Few /HPF        RADIOLOGY & ADDITIONAL TESTS:    Imaging Personally Reviewed:  [x] YES  [ ] NO    Consultant(s) Notes Reviewed:  [x] YES  [ ] NO    MEDICATIONS  (STANDING):  enoxaparin Injectable 40 milliGRAM(s) SubCutaneous every 24 hours  insulin lispro (HumaLOG) corrective regimen sliding scale   SubCutaneous three times a day before meals  dextrose 5%. 1000 milliLiter(s) (50 mL/Hr) IV Continuous <Continuous>  dextrose 50% Injectable 25 Gram(s) IV Push once  predniSONE   Tablet 10 milliGRAM(s) Oral daily  aspirin enteric coated 81 milliGRAM(s) Oral daily  PARoxetine 20 milliGRAM(s) Oral daily  hydroxychloroquine 400 milliGRAM(s) Oral daily  ursodiol Tablet 250 milliGRAM(s) Oral two times a day with meals  pantoprazole    Tablet 40 milliGRAM(s) Oral before breakfast  cholecalciferol 2000 Unit(s) Oral daily  morphine ER Tablet 15 milliGRAM(s) Oral daily  piperacillin/tazobactam IVPB. 3.375 Gram(s) IV Intermittent every 8 hours  vancomycin  IVPB 750 milliGRAM(s) IV Intermittent every 12 hours  acetaminophen  IVPB. 1000 milliGRAM(s) IV Intermittent once  oxyCODONE    5 mG/acetaminophen 325 mG 2 Tablet(s) Oral every 6 hours    MEDICATIONS  (PRN):  dextrose Gel 1 Dose(s) Oral once PRN Blood Glucose LESS THAN 70 milliGRAM(s)/deciliter  glucagon  Injectable 1 milliGRAM(s) IntraMuscular once PRN Glucose LESS THAN 70 milligrams/deciliter      Care Discussed with Consultants/Other Providers [x] YES  [ ] NO    HEALTH ISSUES - PROBLEM Dx:  Hypertension: Hypertension  Rheumatoid arthritis: Rheumatoid arthritis  Hypokalemia: Hypokalemia  Sepsis: Sepsis
Patient is a 72y old  Female who presents with a chief complaint of abd pain (24 Sep 2017 13:28)      SUBJECTIVE / OVERNIGHT EVENTS:    Patient seen and examined. denies cp sob abd pain.  at bedside assisting with history as patient has expressive aphasia. no acute events.    Vital Signs Last 24 Hrs  T(C): 37.1 (25 Sep 2017 06:15), Max: 37.6 (24 Sep 2017 15:12)  T(F): 98.7 (25 Sep 2017 06:15), Max: 99.7 (24 Sep 2017 15:12)  HR: 94 (25 Sep 2017 06:15) (85 - 101)  BP: 120/74 (25 Sep 2017 06:15) (107/68 - 144/78)  BP(mean): --  RR: 18 (25 Sep 2017 06:15) (18 - 18)  SpO2: 94% (25 Sep 2017 06:15) (94% - 97%)  I&O's Summary    24 Sep 2017 07:01  -  25 Sep 2017 07:00  --------------------------------------------------------  IN: 350 mL / OUT: 0 mL / NET: 350 mL      PE:  GENERAL: NAD, AAOx1, non toxic  HEAD:  Atraumatic, Normocephalic  EYES: EOMI, PERRLA, conjunctiva and sclera clear  NECK: Supple, No JVD  CHEST/LUNG: CTABL, No wheeze  HEART: Regular rate and rhythm; + murmur  ABDOMEN: Soft, Nontender, Nondistended; Bowel sounds present, neg murphys  EXTREMITIES:  2+ Peripheral Pulses, No clubbing, cyanosis, or edema  SKIN: R LE ulcer   NEURO: expressive aphasia     LABS:                        9.2    41.90 )-----------( 413      ( 25 Sep 2017 07:27 )             28.0         134<L>  |  101  |  9   ----------------------------<  106<H>  3.8   |  21<L>  |  0.54    Ca    8.1<L>      25 Sep 2017 07:31  Mg     2.4         TPro  7.3  /  Alb  4.0  /  TBili  0.3  /  DBili  x   /  AST  32  /  ALT  26  /  AlkPhos  96        CAPILLARY BLOOD GLUCOSE  113 (25 Sep 2017 11:34)  109 (25 Sep 2017 08:09)  148 (24 Sep 2017 17:40)  123 (24 Sep 2017 13:14)            Urinalysis Basic - ( 24 Sep 2017 03:28 )    Color: x / Appearance: Clear / S.008 / pH: x  Gluc: x / Ketone: Negative  / Bili: Negative / Urobili: Negative   Blood: x / Protein: Negative / Nitrite: Negative   Leuk Esterase: Negative / RBC: 0-2 /HPF / WBC 0-2 /HPF   Sq Epi: x / Non Sq Epi: x / Bacteria: Few /HPF        RADIOLOGY & ADDITIONAL TESTS:    Imaging Personally Reviewed:  [x] YES  [ ] NO    Consultant(s) Notes Reviewed:  [x] YES  [ ] NO    MEDICATIONS  (STANDING):  enoxaparin Injectable 40 milliGRAM(s) SubCutaneous every 24 hours  insulin lispro (HumaLOG) corrective regimen sliding scale   SubCutaneous three times a day before meals  dextrose 5%. 1000 milliLiter(s) (50 mL/Hr) IV Continuous <Continuous>  dextrose 50% Injectable 25 Gram(s) IV Push once  predniSONE   Tablet 10 milliGRAM(s) Oral daily  aspirin enteric coated 81 milliGRAM(s) Oral daily  PARoxetine 20 milliGRAM(s) Oral daily  hydroxychloroquine 400 milliGRAM(s) Oral daily  ursodiol Tablet 250 milliGRAM(s) Oral two times a day with meals  pantoprazole    Tablet 40 milliGRAM(s) Oral before breakfast  cholecalciferol 2000 Unit(s) Oral daily  morphine ER Tablet 15 milliGRAM(s) Oral daily  piperacillin/tazobactam IVPB. 3.375 Gram(s) IV Intermittent every 8 hours  vancomycin  IVPB 750 milliGRAM(s) IV Intermittent every 12 hours  acetaminophen  IVPB. 1000 milliGRAM(s) IV Intermittent once  oxyCODONE    5 mG/acetaminophen 325 mG 2 Tablet(s) Oral every 6 hours    MEDICATIONS  (PRN):  dextrose Gel 1 Dose(s) Oral once PRN Blood Glucose LESS THAN 70 milliGRAM(s)/deciliter  glucagon  Injectable 1 milliGRAM(s) IntraMuscular once PRN Glucose LESS THAN 70 milligrams/deciliter      Care Discussed with Consultants/Other Providers [x] YES  [ ] NO    HEALTH ISSUES - PROBLEM Dx:  Hypertension: Hypertension  Rheumatoid arthritis: Rheumatoid arthritis  Hypokalemia: Hypokalemia  Sepsis: Sepsis

## 2017-09-27 NOTE — PHYSICAL THERAPY INITIAL EVALUATION ADULT - DIAGNOSIS, PT EVAL
Decr. functional mobility 2/2 R-sided weakness, decr. dynamic balance & postural control; gait dysfunction

## 2017-09-27 NOTE — ED ADULT NURSE REASSESSMENT NOTE - NS ED NURSE REASSESS COMMENT FT1
Problem: Pain:  Goal: Pain level will decrease  Pain level will decrease   Outcome: Ongoing hygiene care completed. IV fluids running at this time. MD barrow denies need for tele monitoring,

## 2017-09-27 NOTE — PHYSICAL THERAPY INITIAL EVALUATION ADULT - PLANNED THERAPY INTERVENTIONS, PT EVAL
gait training/postural re-education/strengthening/bed mobility training/balance training/transfer training

## 2017-09-27 NOTE — PROGRESS NOTE ADULT - PROVIDER SPECIALTY LIST ADULT
Gastroenterology
Infectious Disease
Infectious Disease
Internal Medicine

## 2017-09-27 NOTE — PHYSICAL THERAPY INITIAL EVALUATION ADULT - ACTIVE RANGE OF MOTION EXAMINATION, REHAB EVAL
bilateral upper extremity Active ROM was WFL (within functional limits)/bilateral  lower extremity Active ROM was WFL (within functional limits)/Pt with hx rheumatoid arthritis, (+) ulnar drift B fingers

## 2017-09-27 NOTE — PHYSICAL THERAPY INITIAL EVALUATION ADULT - DISCHARGE DISPOSITION, PT EVAL
rehabilitation facility/Subacute rehab. **Pt and pt's spouse refusing rehab, preferring d/c to [facility]; if pt to return to [facility], recommend home PT for gait, balance, & strength training and to return pt to baseline functional mobility status. Assist with all mobility skills (pt's spouse states he can provide). Recommend w/c as primary means for mobility at this time, pt also owns rollator. *Pt's spouse verbalizing understanding of all PT recommendations.

## 2017-09-27 NOTE — PROGRESS NOTE ADULT - PROBLEM SELECTOR PLAN 1
appreciate ID recs  unclear source, possible leukocytosis 2/2 chronic prednisone?  CT findings of pneumobilia, abd US shows sludge, but no longer tender on exam, HIDA neg for acute azeb, unlikely gallbladder source  appreciate GI recs  f/u TTE  NS 50cc/hr for 12 hrs then DC  cont IV vanco and zosyn empirically, f/u blood cultures
appreciate ID recs  possible leukocytosis 2/2 chronic prednisone?  off antibx since no clearcut source of infection found
severe sepsis, worsening leukocytosis but non toxic on exam  ID consult Dr. Marshall group  CT findings of pneumobilia, abd US shows sludge, but no longer tender on exam, consider HIDA?  GI consult  check TTE given hx of endocarditis and murmur on exam  cont NS 75cc/hr  cont IV vanco and zosyn  f/u blood cultures

## 2017-09-27 NOTE — PROGRESS NOTE ADULT - ASSESSMENT
71 yo female with RA on steroids,remikade,and MTX admitted with fever, n/v and abdominal pain.  He exam today is rather benign although she is heavily immune compromised.  Leukocytosis of concern, out  of proportion to other parameters of infection and in part may still in part reflect steroid effect.  Suggest:  1.With benign abdomin and negative cultures will stop antibiotics  2.Follow wbc count,? impact on steroids  4.She appears stable,  would like to d/c on 9/27, will await cbc and defer to primary team.She appears at baseline,TTE results are pending, this is not clinical picture of sbe,
71 yo female with RA on steroids,remikade,and MTX admitted with fever, n/v and abdominal pain.  He exam today is rather benign although she is heavily immune compromised.  Leukocytosis of concern, out  of proportion to other parameters of infection and in part may still in part reflect steroid effect.It has moderated from admission.No defined bacterial infection.A viral process is possible  Suggest:  1.With benign abdomin and negative cultures will observe  antibiotics  2.Follow wbc count,? impact on steroids, it is moderating  3.Timing of discharge per primary team.At this point advise clinical observation,? d/c today or 9/28
72 year old woman with RA on remicade, mtx, steroids with nausea fevers and vomiting and leukocytosis  lft's normal  mild biliary dilation likely 2nd to opiate use.   normal hida  no evidence of cholecystitis.   possible viral infection and appears to be improving.   no diarrhea currently and no evidence of colitis. low suspicion for C diff but would check if no other source obtained and wbc continues to rise. Will defer to ID.   at this time favor against further GI workup  will sign off.   please call if new issues develop  [phone]
72-yo female with pmhx of htn ra on q6wk remicade, severe osteoporosis, L MCA CVA 2016 presumably 2/2 to mitral valve endocarditis p/w weakness abdominal pain
72f pmh htn ra on q6wk remicade, severe osteoporosis, L MCA CVA 2016 presumably 2/2 to mitral valve endocarditis p/w weakness abdominal pain
72f pmh htn ra on q6wk remicade, severe osteoporosis, L MCA CVA 2016 presumably 2/2 to mitral valve endocarditis p/w weakness abdominal pain

## 2017-09-27 NOTE — PHYSICAL THERAPY INITIAL EVALUATION ADULT - PERTINENT HX OF CURRENT PROBLEM, REHAB EVAL
72f pmh htn ra on q6wk remicade, severe osteoporosis, L MCA CVA 2016 presumably 2/2 to mitral valve endocarditis, completed course of iv vanc/ctx via PICC line, who p/w weakness. Pt was sent in from assisted living for abdominal pain and weakness for 1-2 days. 72f pmh htn ra on q6wk remicade, severe osteoporosis, L MCA CVA 2016 presumably 2/2 to mitral valve endocarditis, completed course of iv vanc/ctx via PICC line, who p/w weakness. Pt was sent in from assisted living for abdominal pain and weakness for 1-2 days. EKG: sinus tachycardia. Pt s/p TTE 9/26.

## 2017-09-28 ENCOUNTER — TRANSCRIPTION ENCOUNTER (OUTPATIENT)
Age: 72
End: 2017-09-28

## 2017-09-28 VITALS
RESPIRATION RATE: 18 BRPM | TEMPERATURE: 99 F | HEART RATE: 98 BPM | DIASTOLIC BLOOD PRESSURE: 87 MMHG | OXYGEN SATURATION: 96 % | SYSTOLIC BLOOD PRESSURE: 145 MMHG

## 2017-09-28 LAB
ANION GAP SERPL CALC-SCNC: 17 MMOL/L — SIGNIFICANT CHANGE UP (ref 5–17)
BUN SERPL-MCNC: 12 MG/DL — SIGNIFICANT CHANGE UP (ref 7–23)
CALCIUM SERPL-MCNC: 8.6 MG/DL — SIGNIFICANT CHANGE UP (ref 8.4–10.5)
CHLORIDE SERPL-SCNC: 99 MMOL/L — SIGNIFICANT CHANGE UP (ref 96–108)
CO2 SERPL-SCNC: 19 MMOL/L — LOW (ref 22–31)
CREAT SERPL-MCNC: 0.61 MG/DL — SIGNIFICANT CHANGE UP (ref 0.5–1.3)
GLUCOSE SERPL-MCNC: 111 MG/DL — HIGH (ref 70–99)
HCT VFR BLD CALC: 30.6 % — LOW (ref 34.5–45)
HGB BLD-MCNC: 9.8 G/DL — LOW (ref 11.5–15.5)
MCHC RBC-ENTMCNC: 29.1 PG — SIGNIFICANT CHANGE UP (ref 27–34)
MCHC RBC-ENTMCNC: 32 GM/DL — SIGNIFICANT CHANGE UP (ref 32–36)
MCV RBC AUTO: 90.8 FL — SIGNIFICANT CHANGE UP (ref 80–100)
PLATELET # BLD AUTO: 602 K/UL — HIGH (ref 150–400)
POTASSIUM SERPL-MCNC: 3.9 MMOL/L — SIGNIFICANT CHANGE UP (ref 3.5–5.3)
POTASSIUM SERPL-SCNC: 3.9 MMOL/L — SIGNIFICANT CHANGE UP (ref 3.5–5.3)
RBC # BLD: 3.37 M/UL — LOW (ref 3.8–5.2)
RBC # FLD: 14.9 % — HIGH (ref 10.3–14.5)
SODIUM SERPL-SCNC: 135 MMOL/L — SIGNIFICANT CHANGE UP (ref 135–145)
WBC # BLD: 8.78 K/UL — SIGNIFICANT CHANGE UP (ref 3.8–10.5)
WBC # FLD AUTO: 8.78 K/UL — SIGNIFICANT CHANGE UP (ref 3.8–10.5)

## 2017-09-28 RX ORDER — DOCUSATE SODIUM 100 MG
100 CAPSULE ORAL THREE TIMES A DAY
Qty: 0 | Refills: 0 | Status: DISCONTINUED | OUTPATIENT
Start: 2017-09-28 | End: 2017-09-28

## 2017-09-28 RX ORDER — LABETALOL HCL 100 MG
3 TABLET ORAL
Qty: 0 | Refills: 0 | COMMUNITY

## 2017-09-28 RX ORDER — AMLODIPINE BESYLATE 2.5 MG/1
1 TABLET ORAL
Qty: 30 | Refills: 0 | OUTPATIENT
Start: 2017-09-28 | End: 2017-10-28

## 2017-09-28 RX ORDER — OMEPRAZOLE 10 MG/1
1 CAPSULE, DELAYED RELEASE ORAL
Qty: 30 | Refills: 0 | OUTPATIENT
Start: 2017-09-28 | End: 2017-10-28

## 2017-09-28 RX ORDER — DOCUSATE SODIUM 100 MG
1 CAPSULE ORAL
Qty: 90 | Refills: 0 | OUTPATIENT
Start: 2017-09-28 | End: 2017-10-28

## 2017-09-28 RX ORDER — AMLODIPINE BESYLATE 2.5 MG/1
1 TABLET ORAL
Qty: 0 | Refills: 0 | COMMUNITY

## 2017-09-28 RX ORDER — ASPIRIN/CALCIUM CARB/MAGNESIUM 324 MG
1 TABLET ORAL
Qty: 30 | Refills: 0 | OUTPATIENT
Start: 2017-09-28 | End: 2017-10-28

## 2017-09-28 RX ORDER — LISINOPRIL 2.5 MG/1
1 TABLET ORAL
Qty: 30 | Refills: 0 | OUTPATIENT
Start: 2017-09-28 | End: 2017-10-28

## 2017-09-28 RX ORDER — POLYETHYLENE GLYCOL 3350 17 G/17G
17 POWDER, FOR SOLUTION ORAL DAILY
Qty: 0 | Refills: 0 | Status: DISCONTINUED | OUTPATIENT
Start: 2017-09-28 | End: 2017-09-28

## 2017-09-28 RX ORDER — LISINOPRIL 2.5 MG/1
1 TABLET ORAL
Qty: 0 | Refills: 0 | COMMUNITY

## 2017-09-28 RX ORDER — ALISKIREN HEMIFUMARATE 300 MG/1
1 TABLET, FILM COATED ORAL
Qty: 30 | Refills: 0 | OUTPATIENT
Start: 2017-09-28 | End: 2017-10-28

## 2017-09-28 RX ORDER — HYDROXYCHLOROQUINE SULFATE 200 MG
2 TABLET ORAL
Qty: 0 | Refills: 0 | COMMUNITY

## 2017-09-28 RX ORDER — METHOTREXATE 2.5 MG/1
5 TABLET ORAL
Qty: 21 | Refills: 0 | OUTPATIENT
Start: 2017-09-28 | End: 2017-10-28

## 2017-09-28 RX ORDER — CHOLECALCIFEROL (VITAMIN D3) 125 MCG
1 CAPSULE ORAL
Qty: 30 | Refills: 0 | OUTPATIENT
Start: 2017-09-28 | End: 2017-10-28

## 2017-09-28 RX ORDER — URSODIOL 250 MG/1
1 TABLET, FILM COATED ORAL
Qty: 60 | Refills: 0 | OUTPATIENT
Start: 2017-09-28 | End: 2017-10-28

## 2017-09-28 RX ORDER — METHOTREXATE 2.5 MG/1
5 TABLET ORAL
Qty: 0 | Refills: 0 | COMMUNITY

## 2017-09-28 RX ORDER — SENNA PLUS 8.6 MG/1
2 TABLET ORAL
Qty: 60 | Refills: 0 | OUTPATIENT
Start: 2017-09-28 | End: 2017-10-28

## 2017-09-28 RX ORDER — LABETALOL HCL 100 MG
3 TABLET ORAL
Qty: 180 | Refills: 0 | OUTPATIENT
Start: 2017-09-28 | End: 2017-10-28

## 2017-09-28 RX ORDER — HYDROXYCHLOROQUINE SULFATE 200 MG
2 TABLET ORAL
Qty: 60 | Refills: 0 | OUTPATIENT
Start: 2017-09-28 | End: 2017-10-28

## 2017-09-28 RX ORDER — CHOLECALCIFEROL (VITAMIN D3) 125 MCG
1 CAPSULE ORAL
Qty: 0 | Refills: 0 | COMMUNITY

## 2017-09-28 RX ORDER — SENNA PLUS 8.6 MG/1
2 TABLET ORAL AT BEDTIME
Qty: 0 | Refills: 0 | Status: DISCONTINUED | OUTPATIENT
Start: 2017-09-28 | End: 2017-09-28

## 2017-09-28 RX ORDER — URSODIOL 250 MG/1
1 TABLET, FILM COATED ORAL
Qty: 0 | Refills: 0 | COMMUNITY

## 2017-09-28 RX ORDER — POLYETHYLENE GLYCOL 3350 17 G/17G
17 POWDER, FOR SOLUTION ORAL
Qty: 519 | Refills: 0 | OUTPATIENT
Start: 2017-09-28 | End: 2017-10-28

## 2017-09-28 RX ORDER — ASPIRIN/CALCIUM CARB/MAGNESIUM 324 MG
1 TABLET ORAL
Qty: 0 | Refills: 0 | COMMUNITY

## 2017-09-28 RX ORDER — OXYCODONE HYDROCHLORIDE 5 MG/1
5 TABLET ORAL ONCE
Qty: 0 | Refills: 0 | Status: DISCONTINUED | OUTPATIENT
Start: 2017-09-28 | End: 2017-09-28

## 2017-09-28 RX ADMIN — Medication 10 MILLIGRAM(S): at 05:36

## 2017-09-28 RX ADMIN — URSODIOL 250 MILLIGRAM(S): 250 TABLET, FILM COATED ORAL at 08:22

## 2017-09-28 RX ADMIN — OXYCODONE AND ACETAMINOPHEN 2 TABLET(S): 5; 325 TABLET ORAL at 08:22

## 2017-09-28 RX ADMIN — LISINOPRIL 10 MILLIGRAM(S): 2.5 TABLET ORAL at 05:36

## 2017-09-28 RX ADMIN — OXYCODONE HYDROCHLORIDE 5 MILLIGRAM(S): 5 TABLET ORAL at 03:14

## 2017-09-28 RX ADMIN — Medication 400 MILLIGRAM(S): at 12:25

## 2017-09-28 RX ADMIN — Medication 20 MILLIGRAM(S): at 12:24

## 2017-09-28 RX ADMIN — OXYCODONE AND ACETAMINOPHEN 2 TABLET(S): 5; 325 TABLET ORAL at 03:14

## 2017-09-28 RX ADMIN — Medication 100 MILLIGRAM(S): at 12:23

## 2017-09-28 RX ADMIN — OXYCODONE HYDROCHLORIDE 5 MILLIGRAM(S): 5 TABLET ORAL at 01:56

## 2017-09-28 RX ADMIN — OXYCODONE AND ACETAMINOPHEN 2 TABLET(S): 5; 325 TABLET ORAL at 09:10

## 2017-09-28 RX ADMIN — POLYETHYLENE GLYCOL 3350 17 GRAM(S): 17 POWDER, FOR SOLUTION ORAL at 12:23

## 2017-09-28 RX ADMIN — AMLODIPINE BESYLATE 10 MILLIGRAM(S): 2.5 TABLET ORAL at 05:36

## 2017-09-28 RX ADMIN — ENOXAPARIN SODIUM 40 MILLIGRAM(S): 100 INJECTION SUBCUTANEOUS at 14:20

## 2017-09-28 RX ADMIN — OXYCODONE AND ACETAMINOPHEN 2 TABLET(S): 5; 325 TABLET ORAL at 14:19

## 2017-09-28 RX ADMIN — PANTOPRAZOLE SODIUM 40 MILLIGRAM(S): 20 TABLET, DELAYED RELEASE ORAL at 05:36

## 2017-09-28 RX ADMIN — MORPHINE SULFATE 15 MILLIGRAM(S): 50 CAPSULE, EXTENDED RELEASE ORAL at 12:22

## 2017-09-28 RX ADMIN — MORPHINE SULFATE 15 MILLIGRAM(S): 50 CAPSULE, EXTENDED RELEASE ORAL at 13:00

## 2017-09-28 RX ADMIN — Medication 81 MILLIGRAM(S): at 12:24

## 2017-09-28 RX ADMIN — OXYCODONE AND ACETAMINOPHEN 2 TABLET(S): 5; 325 TABLET ORAL at 04:37

## 2017-09-28 RX ADMIN — Medication 2000 UNIT(S): at 12:24

## 2017-09-28 NOTE — DISCHARGE NOTE ADULT - HOSPITAL COURSE
Pt admitted with FUO; treated with broad spectrum antibioitcs; pan cultured - all cultures negative; given normal saline for hypnatremia - resolved; received remicade infusion for RA; returned to Assisted Living with Home Pt

## 2017-09-28 NOTE — DISCHARGE NOTE ADULT - PLAN OF CARE
resolved s/p course of broad spectrum antobiotics; f/u with PCP remicade given during admission; f/u wit private rheumatologist take prescribed medication; f/u with PCP s/p course of broad spectrum antibiotics; f/u with PCP

## 2017-09-28 NOTE — DISCHARGE NOTE ADULT - HOME CARE AGENCY
[facility] [phone]  Referral has been made for visiting nurser and physical therapy.

## 2017-09-28 NOTE — DISCHARGE NOTE ADULT - MEDICATION SUMMARY - MEDICATIONS TO TAKE
I will START or STAY ON the medications listed below when I get home from the hospital:    MS Contin 15mg tablet  -- 1 tab(s) by mouth once a day  -- Indication: For pain    Tekturna 150mg tablets  -- 1 tab(s) by mouth once a day   -- Indication: For Htn    predniSONE 10 mg oral tablet  -- 1 tab(s) by mouth once a day  -- Indication: For RA    oxycodone-acetaminophen 7.5 mg-300 mg oral tablet  -- 1 tab(s) by mouth every 6 hours  -- Indication: For pain    Aspirin Enteric Coated 81 mg oral delayed release tablet  -- 1 tab(s) by mouth once a day  -- Indication: For antiplatelet    lisinopril 20 mg oral tablet  -- 1 tab(s) by mouth once a day  -- Indication: For blood pressure    Paxil CR 25 mg oral tablet, extended release  -- 1 tab(s) by mouth once a day  -- Indication: For Depression    hydroxychloroquine 200 mg oral tablet  -- 2 tab(s) by mouth once a day  -- Indication: For RA    methotrexate 2.5 mg oral tablet  -- 5 tab(s) by mouth once a week on saturday  -- Indication: For RA    labetalol 100 mg oral tablet  -- 3 tab(s) by mouth 2 times a day  -- Indication: For Htn    Norvasc 10 mg oral tablet  -- 1 tab(s) by mouth once a day  -- Indication: For Htn    ursodiol 250 mg oral tablet  -- 1 tab(s) by mouth 2 times a day  -- Indication: For GI    docusate sodium 100 mg oral capsule  -- 1 cap(s) by mouth 3 times a day  -- Indication: For Stool softener    polyethylene glycol 3350 oral powder for reconstitution  -- 17 gram(s) by mouth once a day, As needed, Constipation  -- Indication: For constipatiion    senna oral tablet  -- 2 tab(s) by mouth once a day (at bedtime)  -- Indication: For Stool softener    PriLOSEC 20 mg oral delayed release capsule  -- 1 cap(s) by mouth once a day  -- Indication: For GI    aliskiren 150 mg oral tablet  -- 1 tab(s) by mouth once a day   -- Do not take this drug if you are pregnant.    -- Indication: For Htn    B Complex 50 oral tablet, extended release  -- 1 tab(s) by mouth once a day  -- Indication: For Supplement    Vitamin D3 2000 intl units oral capsule  -- 1 cap(s) by mouth once a day  -- Indication: For Supplement I will START or STAY ON the medications listed below when I get home from the hospital:    MS Contin 15mg tablet  -- 1 tab(s) by mouth once a day  -- Indication: For pain    predniSONE 10 mg oral tablet  -- 1 tab(s) by mouth once a day  -- Indication: For RA    oxycodone-acetaminophen 7.5 mg-300 mg oral tablet  -- 1 tab(s) by mouth every 6 hours  -- Indication: For pain    Aspirin Enteric Coated 81 mg oral delayed release tablet  -- 1 tab(s) by mouth once a day  -- Indication: For antiplatelet    lisinopril 20 mg oral tablet  -- 1 tab(s) by mouth once a day  -- Indication: For blood pressure    Paxil CR 25 mg oral tablet, extended release  -- 1 tab(s) by mouth once a day  -- Indication: For Depression    hydroxychloroquine 200 mg oral tablet  -- 2 tab(s) by mouth once a day  -- Indication: For RA    methotrexate 2.5 mg oral tablet  -- 5 tab(s) by mouth once a week on saturday  -- Indication: For RA    labetalol 100 mg oral tablet  -- 3 tab(s) by mouth 2 times a day  -- Indication: For Htn    Norvasc 10 mg oral tablet  -- 1 tab(s) by mouth once a day  -- Indication: For Htn    ursodiol 250 mg oral tablet  -- 1 tab(s) by mouth 2 times a day  -- Indication: For GI    docusate sodium 100 mg oral capsule  -- 1 cap(s) by mouth 3 times a day  -- Indication: For Stool softener    polyethylene glycol 3350 oral powder for reconstitution  -- 17 gram(s) by mouth once a day, As needed, Constipation  -- Indication: For constipatiion    senna oral tablet  -- 2 tab(s) by mouth once a day (at bedtime)  -- Indication: For Stool softener    omeprazole 20 mg oral delayed release capsule  -- 1 cap(s) by mouth once a day   -- It is very important that you take or use this exactly as directed.  Do not skip doses or discontinue unless directed by your doctor.  Obtain medical advice before taking any non-prescription drugs as some may affect the action of this medication.  Swallow whole.  Do not crush.    -- Indication: For GI    aliskiren 150 mg oral tablet  -- 1 tab(s) by mouth once a day   -- Do not take this drug if you are pregnant.    -- Indication: For Htn    B Complex 50 oral tablet, extended release  -- 1 tab(s) by mouth once a day  -- Indication: For Supplement    Vitamin D3 2000 intl units oral capsule  -- 1 cap(s) by mouth once a day  -- Indication: For Supplement I will START or STAY ON the medications listed below when I get home from the hospital:    MS Contin 15mg tablet  -- 1 tab(s) by mouth once a day  -- Indication: For pain    predniSONE 10 mg oral tablet  -- 1 tab(s) by mouth once a day  -- Indication: For RA    oxycodone-acetaminophen 7.5 mg-300 mg oral tablet  -- 1 tab(s) by mouth every 6 hours  -- Indication: For pain    Aspirin Enteric Coated 81 mg oral delayed release tablet  -- 1 tab(s) by mouth once a day  -- Indication: For antiplatelet    lisinopril 20 mg oral tablet  -- 1 tab(s) by mouth once a day  -- Indication: For blood pressure    Paxil CR 25 mg oral tablet, extended release  -- 1 tab(s) by mouth once a day  -- Indication: For Depression    hydroxychloroquine 200 mg oral tablet  -- 2 tab(s) by mouth once a day  -- Indication: For RA    methotrexate 2.5 mg oral tablet  -- 5 tab(s) by mouth once a week on saturday  -- Indication: For RA    labetalol 100 mg oral tablet  -- 3 tab(s) by mouth 2 times a day  -- Indication: For Htn    Norvasc 10 mg oral tablet  -- 1 tab(s) by mouth once a day  -- Indication: For Htn    ursodiol 250 mg oral tablet  -- 1 tab(s) by mouth 2 times a day  -- Indication: For GI    docusate sodium 100 mg oral capsule  -- 1 cap(s) by mouth 3 times a day  -- Indication: For Stool softener    polyethylene glycol 3350 oral powder for reconstitution  -- 17 gram(s) by mouth once a day, As needed, Constipation  -- Indication: For constipatiion    senna oral tablet  -- 2 tab(s) by mouth once a day (at bedtime)  -- Indication: For Stool softener    omeprazole 20 mg oral delayed release capsule  -- 1 cap(s) by mouth once a day   -- It is very important that you take or use this exactly as directed.  Do not skip doses or discontinue unless directed by your doctor.  Obtain medical advice before taking any non-prescription drugs as some may affect the action of this medication.  Swallow whole.  Do not crush.    -- Indication: For GI    aliskiren 150 mg oral tablet  -- 1 tab(s) by mouth once a day   -- Do not take this drug if you are pregnant.    -- Indication: For Htn    Balanced B-50 oral tablet  -- 1 tab(s) by mouth once a day   -- Indication: For Supplement    Vitamin D3 2000 intl units oral capsule  -- 1 cap(s) by mouth once a day  -- Indication: For Supplement

## 2017-09-28 NOTE — DISCHARGE NOTE ADULT - CARE PLAN
Principal Discharge DX:	Sepsis  Goal:	resolved  Instructions for follow-up, activity and diet:	s/p course of broad spectrum antobiotics; f/u with PCP  Secondary Diagnosis:	Rheumatoid arthritis  Instructions for follow-up, activity and diet:	remicade given during admission; f/u wit private rheumatologist  Secondary Diagnosis:	Hypertension  Instructions for follow-up, activity and diet:	take prescribed medication; f/u with PCP Principal Discharge DX:	Sepsis  Goal:	resolved  Instructions for follow-up, activity and diet:	s/p course of broad spectrum antibiotics; f/u with PCP  Secondary Diagnosis:	Rheumatoid arthritis  Instructions for follow-up, activity and diet:	remicade given during admission; f/u wit private rheumatologist  Secondary Diagnosis:	Hypertension  Instructions for follow-up, activity and diet:	take prescribed medication; f/u with PCP

## 2017-09-28 NOTE — DISCHARGE NOTE ADULT - PATIENT PORTAL LINK FT
“You can access the Follow[employer] Patient Portal, offered by [facility], by registering with the following website: [email or web address]”

## 2017-09-28 NOTE — DISCHARGE NOTE ADULT - CARE PROVIDER_API CALL
Yancy Starr), Internal Medicine  [address] [place] [address]  Phone: [phone]  Fax: [phone]

## 2017-09-29 LAB
CULTURE RESULTS: SIGNIFICANT CHANGE UP
CULTURE RESULTS: SIGNIFICANT CHANGE UP
SPECIMEN SOURCE: SIGNIFICANT CHANGE UP
SPECIMEN SOURCE: SIGNIFICANT CHANGE UP

## 2017-10-02 ENCOUNTER — APPOINTMENT (OUTPATIENT)
Dept: PAIN MANAGEMENT | Facility: CLINIC | Age: 72
End: 2017-10-02

## 2017-10-10 ENCOUNTER — TRANSCRIPTION ENCOUNTER (OUTPATIENT)
Age: 72
End: 2017-10-10

## 2017-10-16 ENCOUNTER — INPATIENT (INPATIENT)
Facility: HOSPITAL | Age: 72
LOS: 6 days | Discharge: HOME | DRG: 481 | End: 2017-10-23
Attending: ORTHOPAEDIC SURGERY | Admitting: ORTHOPAEDIC SURGERY
Payer: MEDICARE

## 2017-10-16 VITALS
TEMPERATURE: 99 F | HEART RATE: 65 BPM | RESPIRATION RATE: 19 BRPM | DIASTOLIC BLOOD PRESSURE: 73 MMHG | SYSTOLIC BLOOD PRESSURE: 154 MMHG | OXYGEN SATURATION: 96 %

## 2017-10-16 DIAGNOSIS — Z96.659 PRESENCE OF UNSPECIFIED ARTIFICIAL KNEE JOINT: Chronic | ICD-10-CM

## 2017-10-16 DIAGNOSIS — S72.142A DISPLACED INTERTROCHANTERIC FRACTURE OF LEFT FEMUR, INITIAL ENCOUNTER FOR CLOSED FRACTURE: ICD-10-CM

## 2017-10-16 DIAGNOSIS — M06.9 RHEUMATOID ARTHRITIS, UNSPECIFIED: ICD-10-CM

## 2017-10-16 DIAGNOSIS — Z96.649 PRESENCE OF UNSPECIFIED ARTIFICIAL HIP JOINT: Chronic | ICD-10-CM

## 2017-10-16 DIAGNOSIS — I10 ESSENTIAL (PRIMARY) HYPERTENSION: ICD-10-CM

## 2017-10-16 DIAGNOSIS — M81.0 AGE-RELATED OSTEOPOROSIS WITHOUT CURRENT PATHOLOGICAL FRACTURE: ICD-10-CM

## 2017-10-16 LAB
ALBUMIN SERPL ELPH-MCNC: 3.9 G/DL — SIGNIFICANT CHANGE UP (ref 3.3–5)
ALP SERPL-CCNC: 73 U/L — SIGNIFICANT CHANGE UP (ref 40–120)
ALT FLD-CCNC: 15 U/L RC — SIGNIFICANT CHANGE UP (ref 10–45)
ANION GAP SERPL CALC-SCNC: 14 MMOL/L — SIGNIFICANT CHANGE UP (ref 5–17)
APPEARANCE UR: CLEAR — SIGNIFICANT CHANGE UP
APTT BLD: 27.2 SEC — LOW (ref 27.5–37.4)
AST SERPL-CCNC: 19 U/L — SIGNIFICANT CHANGE UP (ref 10–40)
BASOPHILS # BLD AUTO: 0.1 K/UL — SIGNIFICANT CHANGE UP (ref 0–0.2)
BASOPHILS NFR BLD AUTO: 0.6 % — SIGNIFICANT CHANGE UP (ref 0–2)
BILIRUB SERPL-MCNC: 0.3 MG/DL — SIGNIFICANT CHANGE UP (ref 0.2–1.2)
BILIRUB UR-MCNC: NEGATIVE — SIGNIFICANT CHANGE UP
BLD GP AB SCN SERPL QL: NEGATIVE — SIGNIFICANT CHANGE UP
BUN SERPL-MCNC: 15 MG/DL — SIGNIFICANT CHANGE UP (ref 7–23)
CALCIUM SERPL-MCNC: 8.8 MG/DL — SIGNIFICANT CHANGE UP (ref 8.4–10.5)
CHLORIDE SERPL-SCNC: 103 MMOL/L — SIGNIFICANT CHANGE UP (ref 96–108)
CO2 SERPL-SCNC: 22 MMOL/L — SIGNIFICANT CHANGE UP (ref 22–31)
COLOR SPEC: YELLOW — SIGNIFICANT CHANGE UP
CREAT SERPL-MCNC: 0.53 MG/DL — SIGNIFICANT CHANGE UP (ref 0.5–1.3)
DIFF PNL FLD: NEGATIVE — SIGNIFICANT CHANGE UP
EOSINOPHIL # BLD AUTO: 0.2 K/UL — SIGNIFICANT CHANGE UP (ref 0–0.5)
EOSINOPHIL NFR BLD AUTO: 1.8 % — SIGNIFICANT CHANGE UP (ref 0–6)
GLUCOSE SERPL-MCNC: 130 MG/DL — HIGH (ref 70–99)
GLUCOSE UR QL: NEGATIVE — SIGNIFICANT CHANGE UP
HCT VFR BLD CALC: 34.4 % — LOW (ref 34.5–45)
HGB BLD-MCNC: 11.3 G/DL — LOW (ref 11.5–15.5)
INR BLD: 0.93 RATIO — SIGNIFICANT CHANGE UP (ref 0.88–1.16)
KETONES UR-MCNC: NEGATIVE — SIGNIFICANT CHANGE UP
LEUKOCYTE ESTERASE UR-ACNC: NEGATIVE — SIGNIFICANT CHANGE UP
LYMPHOCYTES # BLD AUTO: 1.2 K/UL — SIGNIFICANT CHANGE UP (ref 1–3.3)
LYMPHOCYTES # BLD AUTO: 11 % — LOW (ref 13–44)
MCHC RBC-ENTMCNC: 32.1 PG — SIGNIFICANT CHANGE UP (ref 27–34)
MCHC RBC-ENTMCNC: 32.9 GM/DL — SIGNIFICANT CHANGE UP (ref 32–36)
MCV RBC AUTO: 97.8 FL — SIGNIFICANT CHANGE UP (ref 80–100)
MONOCYTES # BLD AUTO: 0.5 K/UL — SIGNIFICANT CHANGE UP (ref 0–0.9)
MONOCYTES NFR BLD AUTO: 4.6 % — SIGNIFICANT CHANGE UP (ref 2–14)
NEUTROPHILS # BLD AUTO: 8.7 K/UL — HIGH (ref 1.8–7.4)
NEUTROPHILS NFR BLD AUTO: 82 % — HIGH (ref 43–77)
NITRITE UR-MCNC: NEGATIVE — SIGNIFICANT CHANGE UP
PH UR: 6.5 — SIGNIFICANT CHANGE UP (ref 5–8)
PLATELET # BLD AUTO: 464 K/UL — HIGH (ref 150–400)
POTASSIUM SERPL-MCNC: 4.2 MMOL/L — SIGNIFICANT CHANGE UP (ref 3.5–5.3)
POTASSIUM SERPL-SCNC: 4.2 MMOL/L — SIGNIFICANT CHANGE UP (ref 3.5–5.3)
PROT SERPL-MCNC: 7.7 G/DL — SIGNIFICANT CHANGE UP (ref 6–8.3)
PROT UR-MCNC: 30 MG/DL
PROTHROM AB SERPL-ACNC: 10.1 SEC — SIGNIFICANT CHANGE UP (ref 9.8–12.7)
RBC # BLD: 3.52 M/UL — LOW (ref 3.8–5.2)
RBC # FLD: 14.8 % — HIGH (ref 10.3–14.5)
RH IG SCN BLD-IMP: POSITIVE — SIGNIFICANT CHANGE UP
SODIUM SERPL-SCNC: 139 MMOL/L — SIGNIFICANT CHANGE UP (ref 135–145)
SP GR SPEC: 1.01 — SIGNIFICANT CHANGE UP (ref 1.01–1.02)
UROBILINOGEN FLD QL: NEGATIVE — SIGNIFICANT CHANGE UP
WBC # BLD: 10.7 K/UL — HIGH (ref 3.8–10.5)
WBC # FLD AUTO: 10.7 K/UL — HIGH (ref 3.8–10.5)

## 2017-10-16 PROCEDURE — 70450 CT HEAD/BRAIN W/O DYE: CPT | Mod: 26

## 2017-10-16 PROCEDURE — 73502 X-RAY EXAM HIP UNI 2-3 VIEWS: CPT | Mod: 26,LT

## 2017-10-16 PROCEDURE — 71010: CPT | Mod: 26

## 2017-10-16 PROCEDURE — 99285 EMERGENCY DEPT VISIT HI MDM: CPT

## 2017-10-16 PROCEDURE — 73552 X-RAY EXAM OF FEMUR 2/>: CPT | Mod: 26,LT

## 2017-10-16 RX ORDER — OXYCODONE AND ACETAMINOPHEN 5; 325 MG/1; MG/1
2 TABLET ORAL EVERY 4 HOURS
Qty: 0 | Refills: 0 | Status: DISCONTINUED | OUTPATIENT
Start: 2017-10-16 | End: 2017-10-17

## 2017-10-16 RX ORDER — ACETAMINOPHEN 500 MG
325 TABLET ORAL EVERY 4 HOURS
Qty: 0 | Refills: 0 | Status: DISCONTINUED | OUTPATIENT
Start: 2017-10-16 | End: 2017-10-16

## 2017-10-16 RX ORDER — LISINOPRIL 2.5 MG/1
20 TABLET ORAL DAILY
Qty: 0 | Refills: 0 | Status: DISCONTINUED | OUTPATIENT
Start: 2017-10-16 | End: 2017-10-17

## 2017-10-16 RX ORDER — URSODIOL 250 MG/1
250 TABLET, FILM COATED ORAL
Qty: 0 | Refills: 0 | Status: DISCONTINUED | OUTPATIENT
Start: 2017-10-16 | End: 2017-10-17

## 2017-10-16 RX ORDER — LABETALOL HCL 100 MG
300 TABLET ORAL THREE TIMES A DAY
Qty: 0 | Refills: 0 | Status: DISCONTINUED | OUTPATIENT
Start: 2017-10-16 | End: 2017-10-17

## 2017-10-16 RX ORDER — PANTOPRAZOLE SODIUM 20 MG/1
40 TABLET, DELAYED RELEASE ORAL
Qty: 0 | Refills: 0 | Status: DISCONTINUED | OUTPATIENT
Start: 2017-10-16 | End: 2017-10-17

## 2017-10-16 RX ORDER — OXYCODONE AND ACETAMINOPHEN 5; 325 MG/1; MG/1
1 TABLET ORAL EVERY 4 HOURS
Qty: 0 | Refills: 0 | Status: DISCONTINUED | OUTPATIENT
Start: 2017-10-16 | End: 2017-10-17

## 2017-10-16 RX ORDER — HYDROXYCHLOROQUINE SULFATE 200 MG
200 TABLET ORAL DAILY
Qty: 0 | Refills: 0 | Status: DISCONTINUED | OUTPATIENT
Start: 2017-10-16 | End: 2017-10-17

## 2017-10-16 RX ORDER — OXYCODONE AND ACETAMINOPHEN 5; 325 MG/1; MG/1
2 TABLET ORAL ONCE
Qty: 0 | Refills: 0 | Status: DISCONTINUED | OUTPATIENT
Start: 2017-10-16 | End: 2017-10-16

## 2017-10-16 RX ORDER — AMLODIPINE BESYLATE 2.5 MG/1
10 TABLET ORAL ONCE
Qty: 0 | Refills: 0 | Status: DISCONTINUED | OUTPATIENT
Start: 2017-10-16 | End: 2017-10-17

## 2017-10-16 RX ORDER — MORPHINE SULFATE 50 MG/1
15 CAPSULE, EXTENDED RELEASE ORAL DAILY
Qty: 0 | Refills: 0 | Status: DISCONTINUED | OUTPATIENT
Start: 2017-10-16 | End: 2017-10-17

## 2017-10-16 RX ORDER — ACETAMINOPHEN 500 MG
650 TABLET ORAL EVERY 6 HOURS
Qty: 0 | Refills: 0 | Status: DISCONTINUED | OUTPATIENT
Start: 2017-10-16 | End: 2017-10-16

## 2017-10-16 RX ORDER — SODIUM CHLORIDE 9 MG/ML
1000 INJECTION, SOLUTION INTRAVENOUS
Qty: 0 | Refills: 0 | Status: DISCONTINUED | OUTPATIENT
Start: 2017-10-16 | End: 2017-10-17

## 2017-10-16 RX ORDER — INFLUENZA VIRUS VACCINE 15; 15; 15; 15 UG/.5ML; UG/.5ML; UG/.5ML; UG/.5ML
0.5 SUSPENSION INTRAMUSCULAR ONCE
Qty: 0 | Refills: 0 | Status: DISCONTINUED | OUTPATIENT
Start: 2017-10-16 | End: 2017-10-17

## 2017-10-16 RX ORDER — SENNA PLUS 8.6 MG/1
2 TABLET ORAL AT BEDTIME
Qty: 0 | Refills: 0 | Status: DISCONTINUED | OUTPATIENT
Start: 2017-10-16 | End: 2017-10-17

## 2017-10-16 RX ORDER — OXYCODONE HYDROCHLORIDE 5 MG/1
5 TABLET ORAL EVERY 4 HOURS
Qty: 0 | Refills: 0 | Status: DISCONTINUED | OUTPATIENT
Start: 2017-10-16 | End: 2017-10-16

## 2017-10-16 RX ORDER — DOCUSATE SODIUM 100 MG
100 CAPSULE ORAL THREE TIMES A DAY
Qty: 0 | Refills: 0 | Status: DISCONTINUED | OUTPATIENT
Start: 2017-10-16 | End: 2017-10-17

## 2017-10-16 RX ORDER — METHOTREXATE 2.5 MG/1
12.5 TABLET ORAL
Qty: 0 | Refills: 0 | Status: DISCONTINUED | OUTPATIENT
Start: 2017-10-16 | End: 2017-10-17

## 2017-10-16 RX ORDER — HEPARIN SODIUM 5000 [USP'U]/ML
5000 INJECTION INTRAVENOUS; SUBCUTANEOUS EVERY 12 HOURS
Qty: 0 | Refills: 0 | Status: COMPLETED | OUTPATIENT
Start: 2017-10-16 | End: 2017-10-16

## 2017-10-16 RX ORDER — POLYETHYLENE GLYCOL 3350 17 G/17G
17 POWDER, FOR SOLUTION ORAL DAILY
Qty: 0 | Refills: 0 | Status: DISCONTINUED | OUTPATIENT
Start: 2017-10-16 | End: 2017-10-17

## 2017-10-16 RX ORDER — HYDROMORPHONE HYDROCHLORIDE 2 MG/ML
1 INJECTION INTRAMUSCULAR; INTRAVENOUS; SUBCUTANEOUS EVERY 6 HOURS
Qty: 0 | Refills: 0 | Status: DISCONTINUED | OUTPATIENT
Start: 2017-10-16 | End: 2017-10-17

## 2017-10-16 RX ORDER — OXYCODONE HYDROCHLORIDE 5 MG/1
10 TABLET ORAL EVERY 4 HOURS
Qty: 0 | Refills: 0 | Status: DISCONTINUED | OUTPATIENT
Start: 2017-10-16 | End: 2017-10-16

## 2017-10-16 RX ADMIN — HEPARIN SODIUM 5000 UNIT(S): 5000 INJECTION INTRAVENOUS; SUBCUTANEOUS at 18:00

## 2017-10-16 RX ADMIN — SODIUM CHLORIDE 100 MILLILITER(S): 9 INJECTION, SOLUTION INTRAVENOUS at 15:42

## 2017-10-16 RX ADMIN — SODIUM CHLORIDE 100 MILLILITER(S): 9 INJECTION, SOLUTION INTRAVENOUS at 16:13

## 2017-10-16 RX ADMIN — OXYCODONE AND ACETAMINOPHEN 2 TABLET(S): 5; 325 TABLET ORAL at 16:10

## 2017-10-16 RX ADMIN — Medication 300 MILLIGRAM(S): at 22:29

## 2017-10-16 RX ADMIN — Medication 100 MILLIGRAM(S): at 22:27

## 2017-10-16 RX ADMIN — OXYCODONE AND ACETAMINOPHEN 2 TABLET(S): 5; 325 TABLET ORAL at 22:34

## 2017-10-16 NOTE — H&P ADULT - ASSESSMENT
A/P: 72y Female with L IT fracture  Pain control  NWB L LE, bedrest  FU labs/imaging  Outpt osteoporosis workup  Admit to ortho team  Medical clearance/optimization for OR  plan for OR 10/17  NPOpmn  IVF when NPO

## 2017-10-16 NOTE — PROGRESS NOTE ADULT - SUBJECTIVE AND OBJECTIVE BOX
Preop Dx: L IT Fx  Surgeon: Neal  Procedure: L IMN    Vital Signs Last 24 Hrs  T(C): 37.1 (16 Oct 2017 17:30), Max: 37.3 (16 Oct 2017 16:33)  T(F): 98.8 (16 Oct 2017 17:30), Max: 99.2 (16 Oct 2017 16:33)  HR: 71 (16 Oct 2017 17:30) (59 - 72)  BP: 169/74 (16 Oct 2017 17:30) (135/78 - 169/74)  BP(mean): --  RR: 18 (16 Oct 2017 17:30) (18 - 19)  SpO2: 95% (16 Oct 2017 17:30) (95% - 100%)  CBC Full  -  ( 16 Oct 2017 13:06 )  WBC Count : 10.7 K/uL  Hemoglobin : 11.3 g/dL  Hematocrit : 34.4 %  Platelet Count - Automated : 464 K/uL  Mean Cell Volume : 97.8 fl  Mean Cell Hemoglobin : 32.1 pg  Mean Cell Hemoglobin Concentration : 32.9 gm/dL  Auto Neutrophil # : 8.7 K/uL  Auto Lymphocyte # : 1.2 K/uL  Auto Monocyte # : 0.5 K/uL  Auto Eosinophil # : 0.2 K/uL  Auto Basophil # : 0.1 K/uL  Auto Neutrophil % : 82.0 %  Auto Lymphocyte % : 11.0 %  Auto Monocyte % : 4.6 %  Auto Eosinophil % : 1.8 %  Auto Basophil % : 0.6 %    10-16    139  |  103  |  15  ----------------------------<  130<H>  4.2   |  22  |  0.53    Ca    8.8      16 Oct 2017 13:06    TPro  7.7  /  Alb  3.9  /  TBili  0.3  /  DBili  x   /  AST  19  /  ALT  15  /  AlkPhos  73  10-16    PT/INR - ( 16 Oct 2017 13:06 )   PT: 10.1 sec;   INR: 0.93 ratio         PTT - ( 16 Oct 2017 13:06 )  PTT:27.2 sec  Daily     Daily     EKG: in chart  CXR: clear  UA: negative  UCx; pending  Type and Screen: x1, pending am    Plan:  OR 10/17 L IMN  NPO after midnight except meds  hold anticoagulation after midnight  IVF while NPO  Needs med Clearance for OR

## 2017-10-16 NOTE — ED PROVIDER NOTE - PMH
CVA (cerebral vascular accident)    GERD (gastroesophageal reflux disease)    Hypertension    Leukocytosis    Osteoporosis    Pneumobilia    Rheumatoid arthritis

## 2017-10-16 NOTE — ED ADULT NURSE REASSESSMENT NOTE - NS ED NURSE REASSESS COMMENT FT1
Pt admit Percocet given as ordered made comfortable spouse at bedside Resp even and nonlab Palp DP LLE Denies numbness or tingling lle

## 2017-10-16 NOTE — H&P ADULT - NSHPPHYSICALEXAM_GEN_ALL_CORE
Physical Exam  Gen: NAD  L LE: skin intact, short/ER leg, unable to SLR, + log roll, +ttp hip/groin, no ttp elsewhere, +ehl/fhl/ta/gs function, no calf ttp, dp/pt pulse intact, compartments soft  Secondary survey: benign, nv intact, able to SLR contralateral leg, negative log roll contralateral leg, no bony ttp elsewhere, bilateral upper extremity skin intact with no gross deformity, non tender to palpation over bony prominences, neurovascularly intact

## 2017-10-16 NOTE — ED ADULT TRIAGE NOTE - ARRIVAL FROM
[facility]

## 2017-10-16 NOTE — ED PROVIDER NOTE - MEDICAL DECISION MAKING DETAILS
Attending MD Jean Baptiste: 72F with RA, CVA presenting from assisted living facility with mechanical fall and left hip pain, exam with left leg foreshortened and externally rotated, likely femur fx. Plan for CT head as well given advanced age and head strike. Cervical spine cleared clinically of fracture without need for imaging according to [employer] Criteria.

## 2017-10-16 NOTE — H&P ADULT - NSHPLABSRESULTS_GEN_ALL_CORE
16 Oct 2017 13:06    139    |  103    |  15     ----------------------------<  130    4.2     |  22     |  0.53     Ca    8.8        16 Oct 2017 13:06    TPro  7.7    /  Alb  3.9    /  TBili  0.3    /  DBili  x      /  AST  19     /  ALT  15     /  AlkPhos  73     16 Oct 2017 13:06    PT/INR - ( 16 Oct 2017 13:06 )   PT: 10.1 sec;   INR: 0.93 ratio         PTT - ( 16 Oct 2017 13:06 )  PTT:27.2 sec  Vital Signs Last 24 Hrs  T(C): 36.8 (10-16-17 @ 12:50), Max: 37.2 (10-16-17 @ 12:23)  T(F): 98.3 (10-16-17 @ 12:50), Max: 99 (10-16-17 @ 12:23)  HR: 59 (10-16-17 @ 12:50) (59 - 65)  BP: 155/71 (10-16-17 @ 12:50) (154/73 - 155/71)  BP(mean): --  RR: 18 (10-16-17 @ 12:50) (18 - 19)  SpO2: 97% (10-16-17 @ 12:50) (96% - 97%)    Imaging: XR were personally reviewed and demonstrates L intertrochanteric hip fracture

## 2017-10-16 NOTE — H&P ADULT - HISTORY OF PRESENT ILLNESS
72y Female community ambulator w/o assistive devices presents c/o R/L hip pain and inability to ambulate sp mechanical fall. Denies HS/LOC. Denies numbness/tingling. Denies fever/chills. Denies pain/injury elsewhere.     PAST MEDICAL & SURGICAL HISTORY:  Pneumobilia  Leukocytosis  GERD (gastroesophageal reflux disease)  Hypertension  CVA (cerebral vascular accident)  Osteoporosis  Rheumatoid arthritis  History of hip replacement, unspecified laterality  History of total knee replacement, unspecified laterality    MEDICATIONS  (STANDING):  lactated ringers. 1000 milliLiter(s) IV Continuous <Continuous>    Allergies    Actonel (Hives)  clonidine (Other)  crab meat (Unknown)    Intolerances

## 2017-10-16 NOTE — ED PROVIDER NOTE - PHYSICAL EXAMINATION
left lower extremity: +ttp with logroll, distal pulses in tact, foreshortened and externally rotated, +ttp left lateral hip

## 2017-10-16 NOTE — ED ADULT NURSE NOTE - OBJECTIVE STATEMENT
72 yr old female to ed via [facility]  s/p trip and fall on floor Ambulates with walker Denies dizziness or lightheaded  Pt states " I hit my head slightly." Pt awake alert and orientedx3 Resp even and nonlab External rotation lle LLE pos sensation Palp DP Denies numbness or tingling denies chest pain Denies sob Denies abd pain denies n/v

## 2017-10-16 NOTE — CONSULT NOTE ADULT - SUBJECTIVE AND OBJECTIVE BOX
72y Female community ambulator w/o assistive devices presents c/o R/L hip pain and inability to ambulate sp mechanical fall. Denies HS/LOC. Denies numbness/tingling. Denies fever/chills. Denies pain/injury elsewhere. Patient scheduled for ORIF of right hip. Patient seen now resting comfortably    Patient is a 72y old  Female who presents with a chief complaint of mechanical fall (16 Oct 2017 14:20)      PAST MEDICAL & SURGICAL HISTORY:  Pneumobilia  Leukocytosis  GERD (gastroesophageal reflux disease)  Hypertension  CVA (cerebral vascular accident)  Osteoporosis  Rheumatoid arthritis  History of hip replacement, unspecified laterality  History of total knee replacement, unspecified laterality        MEDICATIONS  (STANDING):  amLODIPine   Tablet 10 milliGRAM(s) Oral Once  docusate sodium 100 milliGRAM(s) Oral three times a day  hydroxychloroquine 200 milliGRAM(s) Oral daily  influenza   Vaccine 0.5 milliLiter(s) IntraMuscular once  labetalol 300 milliGRAM(s) Oral three times a day  lactated ringers. 1000 milliLiter(s) (100 mL/Hr) IV Continuous <Continuous>  lisinopril 20 milliGRAM(s) Oral daily  methotrexate (Non - oncologic) 12.5 milliGRAM(s) Oral <User Schedule>  morphine ER Tablet 15 milliGRAM(s) Oral daily  pantoprazole    Tablet 40 milliGRAM(s) Oral before breakfast  PARoxetine 20 milliGRAM(s) Oral daily  predniSONE   Tablet 10 milliGRAM(s) Oral daily  senna 2 Tablet(s) Oral at bedtime  ursodiol Tablet 250 milliGRAM(s) Oral two times a day with meals    MEDICATIONS  (PRN):  HYDROmorphone  Injectable 1 milliGRAM(s) IV Push every 6 hours PRN Severe Pain (7 - 10)  oxyCODONE    5 mG/acetaminophen 325 mG 1 Tablet(s) Oral every 4 hours PRN Moderate Pain (4 - 6)  oxyCODONE    5 mG/acetaminophen 325 mG 2 Tablet(s) Oral every 4 hours PRN Severe Pain (7 - 10)  polyethylene glycol 3350 17 Gram(s) Oral daily PRN Constipation    Soc Hx:   Tobacco: Neg  ETOH: Neg  Drugs: Neg    Family Hx:   as per my conversation with the patient. Noncontributory      ROS  CONSTITUTIONAL: No weakness, fevers or chills  EYES/ENT: No visual changes;  No vertigo or throat pain   NECK: No pain or stiffness  RESPIRATORY: No cough, wheezing, hemoptysis; No shortness of breath  CARDIOVASCULAR: No chest pain or palpitations  GASTROINTESTINAL: No abdominal or epigastric pain. No nausea, vomiting, or hematemesis; No diarrhea or constipation. No melena or hematochezia.  GENITOURINARY: No dysuria, frequency or hematuria  NEUROLOGICAL: No numbness or weakness  SKIN: No itching, burning, rashes, or lesions   MUSCULOSKELETAL: right leg pain    INTERVAL HPI/OVERNIGHT EVENTS:  T(C): 37.1 (10-16-17 @ 21:18), Max: 37.3 (10-16-17 @ 16:33)  HR: 73 (10-16-17 @ 21:18) (59 - 73)  BP: 173/80 (10-16-17 @ 21:18) (135/78 - 173/80)  RR: 18 (10-16-17 @ 21:18) (18 - 19)  SpO2: 98% (10-16-17 @ 21:18) (95% - 100%)  Wt(kg): --  I&O's Summary    16 Oct 2017 07:01  -  16 Oct 2017 23:07  --------------------------------------------------------  IN: 320 mL / OUT: 650 mL / NET: -330 mL        PHYSICAL EXAM:  GENERAL: NAD, well-groomed, well-developed  HEAD:  Atraumatic, Normocephalic  EYES: EOMI, PERRLA, conjunctiva and sclera clear  ENMT: No tonsillar erythema, exudates, or enlargement; Moist mucous membranes, Good dentition, No lesions  NECK: Supple, No JVD, Normal thyroid  NERVOUS SYSTEM:  Alert & Oriented X3, Good concentration; Motor Strength 5/5 B/L upper and lower extremities; DTRs 2+ intact and symmetric  CHEST/LUNG: Clear to percussion bilaterally; No rales, rhonchi, wheezing, or rubs  HEART: Regular rate and rhythm; No murmurs, rubs, or gallops  ABDOMEN: Soft, Nontender, Nondistended; Bowel sounds present  EXTREMITIES:  2+ Peripheral Pulses, No clubbing, cyanosis, or edema  LYMPH: No lymphadenopathy noted  SKIN: No rashes or lesions        LABS:                        11.3   10.7  )-----------( 464      ( 16 Oct 2017 13:06 )             34.4     10-16    139  |  103  |  15  ----------------------------<  130<H>  4.2   |  22  |  0.53    Ca    8.8      16 Oct 2017 13:06    TPro  7.7  /  Alb  3.9  /  TBili  0.3  /  DBili  x   /  AST  19  /  ALT  15  /  AlkPhos  73  10-16    PT/INR - ( 16 Oct 2017 13:06 )   PT: 10.1 sec;   INR: 0.93 ratio         PTT - ( 16 Oct 2017 13:06 )  PTT:27.2 sec  Urinalysis Basic - ( 16 Oct 2017 14:44 )    Color: Yellow / Appearance: Clear / S.014 / pH: x  Gluc: x / Ketone: Negative  / Bili: Negative / Urobili: Negative   Blood: x / Protein: 30 mg/dL / Nitrite: Negative   Leuk Esterase: Negative / RBC: 0-2 /HPF / WBC 0-2 /HPF   Sq Epi: x / Non Sq Epi: x / Bacteria: x      CAPILLARY BLOOD GLUCOSE    EKG:   Sinus brad @ 58        Urinalysis Basic - ( 16 Oct 2017 14:44 )    Color: Yellow / Appearance: Clear / S.014 / pH: x  Gluc: x / Ketone: Negative  / Bili: Negative / Urobili: Negative   Blood: x / Protein: 30 mg/dL / Nitrite: Negative   Leuk Esterase: Negative / RBC: 0-2 /HPF / WBC 0-2 /HPF   Sq Epi: x / Non Sq Epi: x / Bacteria: x        Radiology reports:

## 2017-10-16 NOTE — ED PROVIDER NOTE - OBJECTIVE STATEMENT
72F with RA, CVA presenting from assisted living facility with fall and left hip pain. Patient states she might have tripped on the door, fall on left side, +head strike no LOC. No proceding chest pain or dyspnea. No abdominal pain. Ambulates with a walker at baseline.       PMD Dr. Starr

## 2017-10-17 LAB
ANION GAP SERPL CALC-SCNC: 12 MMOL/L — SIGNIFICANT CHANGE UP (ref 5–17)
ANION GAP SERPL CALC-SCNC: 12 MMOL/L — SIGNIFICANT CHANGE UP (ref 5–17)
APTT BLD: 27.1 SEC — LOW (ref 27.5–37.4)
BLD GP AB SCN SERPL QL: NEGATIVE — SIGNIFICANT CHANGE UP
BUN SERPL-MCNC: 13 MG/DL — SIGNIFICANT CHANGE UP (ref 7–23)
BUN SERPL-MCNC: 9 MG/DL — SIGNIFICANT CHANGE UP (ref 7–23)
CALCIUM SERPL-MCNC: 7.6 MG/DL — LOW (ref 8.4–10.5)
CALCIUM SERPL-MCNC: 7.9 MG/DL — LOW (ref 8.4–10.5)
CHLORIDE SERPL-SCNC: 103 MMOL/L — SIGNIFICANT CHANGE UP (ref 96–108)
CHLORIDE SERPL-SCNC: 105 MMOL/L — SIGNIFICANT CHANGE UP (ref 96–108)
CO2 SERPL-SCNC: 21 MMOL/L — LOW (ref 22–31)
CO2 SERPL-SCNC: 23 MMOL/L — SIGNIFICANT CHANGE UP (ref 22–31)
CREAT SERPL-MCNC: 0.36 MG/DL — LOW (ref 0.5–1.3)
CREAT SERPL-MCNC: 0.41 MG/DL — LOW (ref 0.5–1.3)
CULTURE RESULTS: NO GROWTH — SIGNIFICANT CHANGE UP
GLUCOSE SERPL-MCNC: 101 MG/DL — HIGH (ref 70–99)
GLUCOSE SERPL-MCNC: 144 MG/DL — HIGH (ref 70–99)
HCT VFR BLD CALC: 26.1 % — LOW (ref 34.5–45)
HCT VFR BLD CALC: 31.1 % — LOW (ref 34.5–45)
HGB BLD-MCNC: 10.8 G/DL — LOW (ref 11.5–15.5)
HGB BLD-MCNC: 8.9 G/DL — LOW (ref 11.5–15.5)
INR BLD: 1.02 RATIO — SIGNIFICANT CHANGE UP (ref 0.88–1.16)
MCHC RBC-ENTMCNC: 31.7 PG — SIGNIFICANT CHANGE UP (ref 27–34)
MCHC RBC-ENTMCNC: 32.8 PG — SIGNIFICANT CHANGE UP (ref 27–34)
MCHC RBC-ENTMCNC: 34 GM/DL — SIGNIFICANT CHANGE UP (ref 32–36)
MCHC RBC-ENTMCNC: 34.6 GM/DL — SIGNIFICANT CHANGE UP (ref 32–36)
MCV RBC AUTO: 91.8 FL — SIGNIFICANT CHANGE UP (ref 80–100)
MCV RBC AUTO: 96.7 FL — SIGNIFICANT CHANGE UP (ref 80–100)
PLATELET # BLD AUTO: 353 K/UL — SIGNIFICANT CHANGE UP (ref 150–400)
PLATELET # BLD AUTO: 385 K/UL — SIGNIFICANT CHANGE UP (ref 150–400)
POTASSIUM SERPL-MCNC: 3.8 MMOL/L — SIGNIFICANT CHANGE UP (ref 3.5–5.3)
POTASSIUM SERPL-MCNC: 4.3 MMOL/L — SIGNIFICANT CHANGE UP (ref 3.5–5.3)
POTASSIUM SERPL-SCNC: 3.8 MMOL/L — SIGNIFICANT CHANGE UP (ref 3.5–5.3)
POTASSIUM SERPL-SCNC: 4.3 MMOL/L — SIGNIFICANT CHANGE UP (ref 3.5–5.3)
PROTHROM AB SERPL-ACNC: 11.1 SEC — SIGNIFICANT CHANGE UP (ref 9.8–12.7)
RBC # BLD: 2.7 M/UL — LOW (ref 3.8–5.2)
RBC # BLD: 3.39 M/UL — LOW (ref 3.8–5.2)
RBC # FLD: 14.5 % — SIGNIFICANT CHANGE UP (ref 10.3–14.5)
RBC # FLD: 19.4 % — HIGH (ref 10.3–14.5)
RH IG SCN BLD-IMP: POSITIVE — SIGNIFICANT CHANGE UP
SODIUM SERPL-SCNC: 138 MMOL/L — SIGNIFICANT CHANGE UP (ref 135–145)
SODIUM SERPL-SCNC: 138 MMOL/L — SIGNIFICANT CHANGE UP (ref 135–145)
SPECIMEN SOURCE: SIGNIFICANT CHANGE UP
WBC # BLD: 11.1 K/UL — HIGH (ref 3.8–10.5)
WBC # BLD: 12.6 K/UL — HIGH (ref 3.8–10.5)
WBC # FLD AUTO: 11.1 K/UL — HIGH (ref 3.8–10.5)
WBC # FLD AUTO: 12.6 K/UL — HIGH (ref 3.8–10.5)

## 2017-10-17 PROCEDURE — 73552 X-RAY EXAM OF FEMUR 2/>: CPT | Mod: 26,LT

## 2017-10-17 PROCEDURE — 73502 X-RAY EXAM HIP UNI 2-3 VIEWS: CPT | Mod: 26,LT

## 2017-10-17 RX ORDER — URSODIOL 250 MG/1
250 TABLET, FILM COATED ORAL
Qty: 0 | Refills: 0 | Status: DISCONTINUED | OUTPATIENT
Start: 2017-10-17 | End: 2017-10-23

## 2017-10-17 RX ORDER — DOCUSATE SODIUM 100 MG
100 CAPSULE ORAL THREE TIMES A DAY
Qty: 0 | Refills: 0 | Status: DISCONTINUED | OUTPATIENT
Start: 2017-10-17 | End: 2017-10-23

## 2017-10-17 RX ORDER — INFLUENZA VIRUS VACCINE 15; 15; 15; 15 UG/.5ML; UG/.5ML; UG/.5ML; UG/.5ML
0.5 SUSPENSION INTRAMUSCULAR ONCE
Qty: 0 | Refills: 0 | Status: COMPLETED | OUTPATIENT
Start: 2017-10-17 | End: 2017-10-17

## 2017-10-17 RX ORDER — HYDROMORPHONE HYDROCHLORIDE 2 MG/ML
1 INJECTION INTRAMUSCULAR; INTRAVENOUS; SUBCUTANEOUS EVERY 6 HOURS
Qty: 0 | Refills: 0 | Status: DISCONTINUED | OUTPATIENT
Start: 2017-10-17 | End: 2017-10-23

## 2017-10-17 RX ORDER — POLYETHYLENE GLYCOL 3350 17 G/17G
17 POWDER, FOR SOLUTION ORAL DAILY
Qty: 0 | Refills: 0 | Status: DISCONTINUED | OUTPATIENT
Start: 2017-10-17 | End: 2017-10-23

## 2017-10-17 RX ORDER — LABETALOL HCL 100 MG
100 TABLET ORAL THREE TIMES A DAY
Qty: 0 | Refills: 0 | Status: DISCONTINUED | OUTPATIENT
Start: 2017-10-17 | End: 2017-10-17

## 2017-10-17 RX ORDER — OXYCODONE AND ACETAMINOPHEN 5; 325 MG/1; MG/1
2 TABLET ORAL EVERY 4 HOURS
Qty: 0 | Refills: 0 | Status: DISCONTINUED | OUTPATIENT
Start: 2017-10-17 | End: 2017-10-18

## 2017-10-17 RX ORDER — MORPHINE SULFATE 50 MG/1
15 CAPSULE, EXTENDED RELEASE ORAL DAILY
Qty: 0 | Refills: 0 | Status: DISCONTINUED | OUTPATIENT
Start: 2017-10-17 | End: 2017-10-23

## 2017-10-17 RX ORDER — CEFAZOLIN SODIUM 1 G
2000 VIAL (EA) INJECTION EVERY 8 HOURS
Qty: 0 | Refills: 0 | Status: COMPLETED | OUTPATIENT
Start: 2017-10-17 | End: 2017-10-18

## 2017-10-17 RX ORDER — OXYCODONE AND ACETAMINOPHEN 5; 325 MG/1; MG/1
1 TABLET ORAL EVERY 4 HOURS
Qty: 0 | Refills: 0 | Status: DISCONTINUED | OUTPATIENT
Start: 2017-10-17 | End: 2017-10-18

## 2017-10-17 RX ORDER — ONDANSETRON 8 MG/1
4 TABLET, FILM COATED ORAL ONCE
Qty: 0 | Refills: 0 | Status: DISCONTINUED | OUTPATIENT
Start: 2017-10-17 | End: 2017-10-17

## 2017-10-17 RX ORDER — METHOTREXATE 2.5 MG/1
12.5 TABLET ORAL
Qty: 0 | Refills: 0 | Status: DISCONTINUED | OUTPATIENT
Start: 2017-10-17 | End: 2017-10-23

## 2017-10-17 RX ORDER — LABETALOL HCL 100 MG
300 TABLET ORAL THREE TIMES A DAY
Qty: 0 | Refills: 0 | Status: DISCONTINUED | OUTPATIENT
Start: 2017-10-17 | End: 2017-10-17

## 2017-10-17 RX ORDER — HYDROMORPHONE HYDROCHLORIDE 2 MG/ML
0.25 INJECTION INTRAMUSCULAR; INTRAVENOUS; SUBCUTANEOUS
Qty: 0 | Refills: 0 | Status: DISCONTINUED | OUTPATIENT
Start: 2017-10-17 | End: 2017-10-17

## 2017-10-17 RX ORDER — ASPIRIN/CALCIUM CARB/MAGNESIUM 324 MG
325 TABLET ORAL
Qty: 0 | Refills: 0 | Status: DISCONTINUED | OUTPATIENT
Start: 2017-10-18 | End: 2017-10-23

## 2017-10-17 RX ORDER — SODIUM CHLORIDE 9 MG/ML
1000 INJECTION, SOLUTION INTRAVENOUS
Qty: 0 | Refills: 0 | Status: DISCONTINUED | OUTPATIENT
Start: 2017-10-17 | End: 2017-10-23

## 2017-10-17 RX ORDER — AMLODIPINE BESYLATE 2.5 MG/1
10 TABLET ORAL DAILY
Qty: 0 | Refills: 0 | Status: COMPLETED | OUTPATIENT
Start: 2017-10-17 | End: 2017-10-17

## 2017-10-17 RX ORDER — HYDROXYCHLOROQUINE SULFATE 200 MG
200 TABLET ORAL DAILY
Qty: 0 | Refills: 0 | Status: DISCONTINUED | OUTPATIENT
Start: 2017-10-17 | End: 2017-10-23

## 2017-10-17 RX ORDER — SENNA PLUS 8.6 MG/1
2 TABLET ORAL AT BEDTIME
Qty: 0 | Refills: 0 | Status: DISCONTINUED | OUTPATIENT
Start: 2017-10-17 | End: 2017-10-23

## 2017-10-17 RX ORDER — LABETALOL HCL 100 MG
300 TABLET ORAL
Qty: 0 | Refills: 0 | Status: DISCONTINUED | OUTPATIENT
Start: 2017-10-17 | End: 2017-10-23

## 2017-10-17 RX ORDER — PANTOPRAZOLE SODIUM 20 MG/1
40 TABLET, DELAYED RELEASE ORAL
Qty: 0 | Refills: 0 | Status: DISCONTINUED | OUTPATIENT
Start: 2017-10-17 | End: 2017-10-23

## 2017-10-17 RX ORDER — LISINOPRIL 2.5 MG/1
20 TABLET ORAL DAILY
Qty: 0 | Refills: 0 | Status: DISCONTINUED | OUTPATIENT
Start: 2017-10-17 | End: 2017-10-23

## 2017-10-17 RX ORDER — AMLODIPINE BESYLATE 2.5 MG/1
10 TABLET ORAL ONCE
Qty: 0 | Refills: 0 | Status: DISCONTINUED | OUTPATIENT
Start: 2017-10-17 | End: 2017-10-17

## 2017-10-17 RX ADMIN — AMLODIPINE BESYLATE 10 MILLIGRAM(S): 2.5 TABLET ORAL at 17:07

## 2017-10-17 RX ADMIN — Medication 300 MILLIGRAM(S): at 17:08

## 2017-10-17 RX ADMIN — OXYCODONE AND ACETAMINOPHEN 2 TABLET(S): 5; 325 TABLET ORAL at 23:14

## 2017-10-17 RX ADMIN — LISINOPRIL 20 MILLIGRAM(S): 2.5 TABLET ORAL at 06:42

## 2017-10-17 RX ADMIN — Medication 100 MILLIGRAM(S): at 18:18

## 2017-10-17 RX ADMIN — OXYCODONE AND ACETAMINOPHEN 2 TABLET(S): 5; 325 TABLET ORAL at 02:53

## 2017-10-17 RX ADMIN — OXYCODONE AND ACETAMINOPHEN 2 TABLET(S): 5; 325 TABLET ORAL at 15:31

## 2017-10-17 RX ADMIN — MORPHINE SULFATE 15 MILLIGRAM(S): 50 CAPSULE, EXTENDED RELEASE ORAL at 17:08

## 2017-10-17 RX ADMIN — Medication 300 MILLIGRAM(S): at 06:42

## 2017-10-17 RX ADMIN — URSODIOL 250 MILLIGRAM(S): 250 TABLET, FILM COATED ORAL at 17:08

## 2017-10-17 RX ADMIN — OXYCODONE AND ACETAMINOPHEN 2 TABLET(S): 5; 325 TABLET ORAL at 15:00

## 2017-10-17 RX ADMIN — OXYCODONE AND ACETAMINOPHEN 2 TABLET(S): 5; 325 TABLET ORAL at 06:42

## 2017-10-17 RX ADMIN — Medication 200 MILLIGRAM(S): at 22:33

## 2017-10-17 RX ADMIN — PANTOPRAZOLE SODIUM 40 MILLIGRAM(S): 20 TABLET, DELAYED RELEASE ORAL at 17:08

## 2017-10-17 RX ADMIN — OXYCODONE AND ACETAMINOPHEN 2 TABLET(S): 5; 325 TABLET ORAL at 23:44

## 2017-10-17 RX ADMIN — Medication 10 MILLIGRAM(S): at 06:42

## 2017-10-17 RX ADMIN — OXYCODONE AND ACETAMINOPHEN 2 TABLET(S): 5; 325 TABLET ORAL at 18:55

## 2017-10-17 RX ADMIN — MORPHINE SULFATE 15 MILLIGRAM(S): 50 CAPSULE, EXTENDED RELEASE ORAL at 17:59

## 2017-10-17 RX ADMIN — Medication 20 MILLIGRAM(S): at 17:08

## 2017-10-17 NOTE — BRIEF OPERATIVE NOTE - PRE-OP DX
Closed displaced intertrochanteric fracture of left femur, initial encounter  10/17/2017    Active  Phil Henriquez

## 2017-10-17 NOTE — PROGRESS NOTE ADULT - ASSESSMENT
73 yo woman hx of a fall with a right hip fx scheduled for ORIF 73 yo woman hx of a fall with a right hip fx.  Post-op ORIF. 71 yo woman hx of a fall with a left hip fx.  Post-op ORIF with IM nail

## 2017-10-17 NOTE — PROGRESS NOTE ADULT - SUBJECTIVE AND OBJECTIVE BOX
72y Female community ambulator w/o assistive devices presents c/o R/L hip pain and inability to ambulate sp mechanical fall. Denies HS/LOC. Denies numbness/tingling. Denies fever/chills. Denies pain/injury elsewhere. Patient scheduled for ORIF of right hip. Patient seen now resting comfortably    Patient is a 72y old  Female who presents with a chief complaint of mechanical fall (16 Oct 2017 14:20)      PAST MEDICAL & SURGICAL HISTORY:  Pneumobilia  Leukocytosis  GERD (gastroesophageal reflux disease)  Hypertension  CVA (cerebral vascular accident)  Osteoporosis  Rheumatoid arthritis  History of hip replacement, unspecified laterality  History of total knee replacement, unspecified laterality  MEDICATIONS  (STANDING):  ceFAZolin   IVPB 2000 milliGRAM(s) IV Intermittent every 8 hours  docusate sodium 100 milliGRAM(s) Oral three times a day  hydroxychloroquine 200 milliGRAM(s) Oral daily  influenza   Vaccine 0.5 milliLiter(s) IntraMuscular once  labetalol 300 milliGRAM(s) Oral two times a day  lactated ringers. 1000 milliLiter(s) (100 mL/Hr) IV Continuous <Continuous>  lisinopril 20 milliGRAM(s) Oral daily  methotrexate (Non - oncologic) 12.5 milliGRAM(s) Oral <User Schedule>  morphine ER Tablet 15 milliGRAM(s) Oral daily  pantoprazole    Tablet 40 milliGRAM(s) Oral before breakfast  PARoxetine 20 milliGRAM(s) Oral daily  predniSONE   Tablet 10 milliGRAM(s) Oral daily  senna 2 Tablet(s) Oral at bedtime  ursodiol Tablet 250 milliGRAM(s) Oral two times a day with meals    MEDICATIONS  (PRN):  HYDROmorphone  Injectable 1 milliGRAM(s) IV Push every 6 hours PRN Severe Pain (7 - 10)  oxyCODONE    5 mG/acetaminophen 325 mG 1 Tablet(s) Oral every 4 hours PRN Moderate Pain (4 - 6)  oxyCODONE    5 mG/acetaminophen 325 mG 2 Tablet(s) Oral every 4 hours PRN Severe Pain (7 - 10)  polyethylene glycol 3350 17 Gram(s) Oral daily PRN Constipation        Soc Hx:   Tobacco: Neg  ETOH: Neg  Drugs: Neg    Family Hx:   as per my conversation with the patient. Noncontributory      ROS  CONSTITUTIONAL: No weakness, fevers or chills  EYES/ENT: No visual changes;  No vertigo or throat pain   NECK: No pain or stiffness  RESPIRATORY: No cough, wheezing, hemoptysis; No shortness of breath  CARDIOVASCULAR: No chest pain or palpitations  GASTROINTESTINAL: No abdominal or epigastric pain. No nausea, vomiting, or hematemesis; No diarrhea or constipation. No melena or hematochezia.  GENITOURINARY: No dysuria, frequency or hematuria  NEUROLOGICAL: No numbness or weakness  SKIN: No itching, burning, rashes, or lesions   MUSCULOSKELETAL: right leg pain    Vital Signs Last 24 Hrs  T(C): 36.6 (17 Oct 2017 18:06), Max: 37.4 (17 Oct 2017 15:08)  T(F): 97.9 (17 Oct 2017 18:06), Max: 99.3 (17 Oct 2017 15:08)  HR: 73 (17 Oct 2017 18:06) (55 - 85)  BP: 129/75 (17 Oct 2017 18:06) (97/60 - 197/90)  BP(mean): 105 (17 Oct 2017 12:15) (105 - 105)  RR: 18 (17 Oct 2017 18:06) (14 - 18)  SpO2: 95% (17 Oct 2017 18:06) (95% - 100%)      PHYSICAL EXAM:  GENERAL: NAD, well-groomed, well-developed  HEAD:  Atraumatic, Normocephalic  EYES: EOMI, PERRLA, conjunctiva and sclera clear  ENMT: No tonsillar erythema, exudates, or enlargement; Moist mucous membranes, Good dentition, No lesions  NECK: Supple, No JVD, Normal thyroid  NERVOUS SYSTEM:  Alert & Oriented X3, Good concentration; Motor Strength 5/5 B/L upper and lower extremities; DTRs 2+ intact and symmetric  CHEST/LUNG: Clear to percussion bilaterally; No rales, rhonchi, wheezing, or rubs  HEART: Regular rate and rhythm; No murmurs, rubs, or gallops  ABDOMEN: Soft, Nontender, Nondistended; Bowel sounds present  EXTREMITIES:  2+ Peripheral Pulses, No clubbing, cyanosis, or edema  LYMPH: No lymphadenopathy noted  SKIN: No rashes or lesions        LABS:         CBC Full  -  ( 17 Oct 2017 13:11 )  WBC Count : 12.6 K/uL  Hemoglobin : 10.8 g/dL  Hematocrit : 31.1 %  Platelet Count - Automated : 353 K/uL  Mean Cell Volume : 91.8 fl  Mean Cell Hemoglobin : 31.7 pg  Mean Cell Hemoglobin Concentration : 34.6 gm/dL  Auto Neutrophil # : x  Auto Lymphocyte # : x  Auto Monocyte # : x  Auto Eosinophil # : x  Auto Basophil # : x  Auto Neutrophil % : x  Auto Lymphocyte % : x  Auto Monocyte % : x  Auto Eosinophil % : x  Auto Basophil % : x    10-    138  |  105  |  9   ----------------------------<  144<H>  4.3   |  21<L>  |  0.36<L>    Ca    7.6<L>      17 Oct 2017 13:11    TPro  7.7  /  Alb  3.9  /  TBili  0.3  /  DBili  x   /  AST  19  /  ALT  15  /  AlkPhos  73  10-16    LIVER FUNCTIONS - ( 16 Oct 2017 13:06 )  Alb: 3.9 g/dL / Pro: 7.7 g/dL / ALK PHOS: 73 U/L / ALT: 15 U/L RC / AST: 19 U/L / GGT: x           PT/INR - ( 17 Oct 2017 05:17 )   PT: 11.1 sec;   INR: 1.02 ratio         PTT - ( 17 Oct 2017 05:17 )  PTT:27.1 sec  Urinalysis Basic - ( 16 Oct 2017 14:44 )    Color: Yellow / Appearance: Clear / S.014 / pH: x  Gluc: x / Ketone: Negative  / Bili: Negative / Urobili: Negative   Blood: x / Protein: 30 mg/dL / Nitrite: Negative   Leuk Esterase: Negative / RBC: 0-2 /HPF / WBC 0-2 /HPF   Sq Epi: x / Non Sq Epi: x / Bacteria: x                  EKG:   Sinus brad @ 58        Urinalysis Basic - ( 16 Oct 2017 14:44 )    Color: Yellow / Appearance: Clear / S.014 / pH: x  Gluc: x / Ketone: Negative  / Bili: Negative / Urobili: Negative   Blood: x / Protein: 30 mg/dL / Nitrite: Negative   Leuk Esterase: Negative / RBC: 0-2 /HPF / WBC 0-2 /HPF   Sq Epi: x / Non Sq Epi: x / Bacteria: x        Radiology reports: 72y Female community ambulator w/o assistive devices presents c/o Right hip pain and inability to ambulate sp mechanical fall secondary to fracture. Denies HS/LOC. Denies numbness/tingling. Denies fever/chills. Denies pain/injury elsewhere. Patient post-op ORIF of right hip. Patient seen now resting comfortably. Complains of incisional pain but now with better range of motion. At bedrest post-op.      PAST MEDICAL & SURGICAL HISTORY:  Pneumobilia  Leukocytosis  GERD (gastroesophageal reflux disease)  Hypertension  CVA (cerebral vascular accident)  Osteoporosis  Rheumatoid arthritis  History of hip replacement, unspecified laterality  History of total knee replacement, unspecified laterality  MEDICATIONS  (STANDING):  ceFAZolin   IVPB 2000 milliGRAM(s) IV Intermittent every 8 hours  docusate sodium 100 milliGRAM(s) Oral three times a day  hydroxychloroquine 200 milliGRAM(s) Oral daily  influenza   Vaccine 0.5 milliLiter(s) IntraMuscular once  labetalol 300 milliGRAM(s) Oral two times a day  lactated ringers. 1000 milliLiter(s) (100 mL/Hr) IV Continuous <Continuous>  lisinopril 20 milliGRAM(s) Oral daily  methotrexate (Non - oncologic) 12.5 milliGRAM(s) Oral <User Schedule>  morphine ER Tablet 15 milliGRAM(s) Oral daily  pantoprazole    Tablet 40 milliGRAM(s) Oral before breakfast  PARoxetine 20 milliGRAM(s) Oral daily  predniSONE   Tablet 10 milliGRAM(s) Oral daily  senna 2 Tablet(s) Oral at bedtime  ursodiol Tablet 250 milliGRAM(s) Oral two times a day with meals    MEDICATIONS  (PRN):  HYDROmorphone  Injectable 1 milliGRAM(s) IV Push every 6 hours PRN Severe Pain (7 - 10)  oxyCODONE    5 mG/acetaminophen 325 mG 1 Tablet(s) Oral every 4 hours PRN Moderate Pain (4 - 6)  oxyCODONE    5 mG/acetaminophen 325 mG 2 Tablet(s) Oral every 4 hours PRN Severe Pain (7 - 10)  polyethylene glycol 3350 17 Gram(s) Oral daily PRN Constipation        Soc Hx:   Tobacco: Neg  ETOH: Neg  Drugs: Neg    Family Hx:   as per my conversation with the patient. Noncontributory      ROS  CONSTITUTIONAL: No weakness, fevers or chills  EYES/ENT: No visual changes;  No vertigo or throat pain   NECK: No pain or stiffness  RESPIRATORY: No cough, wheezing, hemoptysis; No shortness of breath  CARDIOVASCULAR: No chest pain or palpitations  GASTROINTESTINAL: No abdominal or epigastric pain. No nausea, vomiting, or hematemesis; No diarrhea or constipation. No melena or hematochezia.  GENITOURINARY: No dysuria, frequency or hematuria  NEUROLOGICAL: No numbness or weakness  SKIN: No itching, burning, rashes, or lesions   MUSCULOSKELETAL: right leg pain    Vital Signs Last 24 Hrs  T(C): 36.6 (17 Oct 2017 18:06), Max: 37.4 (17 Oct 2017 15:08)  T(F): 97.9 (17 Oct 2017 18:06), Max: 99.3 (17 Oct 2017 15:08)  HR: 73 (17 Oct 2017 18:06) (55 - 85)  BP: 129/75 (17 Oct 2017 18:06) (97/60 - 197/90)  BP(mean): 105 (17 Oct 2017 12:15) (105 - 105)  RR: 18 (17 Oct 2017 18:06) (14 - 18)  SpO2: 95% (17 Oct 2017 18:06) (95% - 100%)      PHYSICAL EXAM:  GENERAL: NAD, well-groomed, well-developed  HEAD:  Atraumatic, Normocephalic  EYES: EOMI, PERRLA, conjunctiva and sclera clear  ENMT: No tonsillar erythema, exudates, or enlargement; Moist mucous membranes, Good dentition, No lesions  NECK: Supple, No JVD, Normal thyroid  NERVOUS SYSTEM:  Alert & Oriented X3, Good concentration; Motor Strength 5/5 B/L upper and lower extremities; DTRs 2+ intact and symmetric  CHEST/LUNG: Clear to percussion bilaterally; No rales, rhonchi, wheezing, or rubs  HEART: Regular rate and rhythm; No murmurs, rubs, or gallops  ABDOMEN: Soft, Nontender, Nondistended; Bowel sounds present  EXTREMITIES:  2+ Peripheral Pulses, No clubbing, cyanosis, or edema Right hip with 3+ edema post-op  LYMPH: No lymphadenopathy noted  SKIN: No rashes or lesions        LABS:         CBC Full  -  ( 17 Oct 2017 13:11 )  WBC Count : 12.6 K/uL  Hemoglobin : 10.8 g/dL  Hematocrit : 31.1 %  Platelet Count - Automated : 353 K/uL  Mean Cell Volume : 91.8 fl  Mean Cell Hemoglobin : 31.7 pg  Mean Cell Hemoglobin Concentration : 34.6 gm/dL  Auto Neutrophil # : x  Auto Lymphocyte # : x  Auto Monocyte # : x  Auto Eosinophil # : x  Auto Basophil # : x  Auto Neutrophil % : x  Auto Lymphocyte % : x  Auto Monocyte % : x  Auto Eosinophil % : x  Auto Basophil % : x    10-    138  |  105  |  9   ----------------------------<  144<H>  4.3   |  21<L>  |  0.36<L>    Ca    7.6<L>      17 Oct 2017 13:11    TPro  7.7  /  Alb  3.9  /  TBili  0.3  /  DBili  x   /  AST  19  /  ALT  15  /  AlkPhos  73  10-16    LIVER FUNCTIONS - ( 16 Oct 2017 13:06 )  Alb: 3.9 g/dL / Pro: 7.7 g/dL / ALK PHOS: 73 U/L / ALT: 15 U/L RC / AST: 19 U/L / GGT: x           PT/INR - ( 17 Oct 2017 05:17 )   PT: 11.1 sec;   INR: 1.02 ratio         PTT - ( 17 Oct 2017 05:17 )  PTT:27.1 sec  Urinalysis Basic - ( 16 Oct 2017 14:44 )    Color: Yellow / Appearance: Clear / S.014 / pH: x  Gluc: x / Ketone: Negative  / Bili: Negative / Urobili: Negative   Blood: x / Protein: 30 mg/dL / Nitrite: Negative   Leuk Esterase: Negative / RBC: 0-2 /HPF / WBC 0-2 /HPF   Sq Epi: x / Non Sq Epi: x / Bacteria: x                  EKG:   Sinus brad @ 58        Urinalysis Basic - ( 16 Oct 2017 14:44 )    Color: Yellow / Appearance: Clear / S.014 / pH: x  Gluc: x / Ketone: Negative  / Bili: Negative / Urobili: Negative   Blood: x / Protein: 30 mg/dL / Nitrite: Negative   Leuk Esterase: Negative / RBC: 0-2 /HPF / WBC 0-2 /HPF   Sq Epi: x / Non Sq Epi: x / Bacteria: x        Radiology reports: 72y Female community ambulator w/o assistive devices presents c/o Left hip pain and inability to ambulate sp mechanical fall secondary to fracture. Denies HS/LOC. Denies numbness/tingling. Denies fever/chills. Denies pain/injury elsewhere. Patient post-op ORIF of right hip. Patient seen now resting comfortably. Complains of incisional pain but now with better range of motion. At bedrest post-op.      PAST MEDICAL & SURGICAL HISTORY:  Pneumobilia  Leukocytosis  GERD (gastroesophageal reflux disease)  Hypertension  CVA (cerebral vascular accident)  Osteoporosis  Rheumatoid arthritis  History of hip replacement, unspecified laterality  History of total knee replacement, unspecified laterality  MEDICATIONS  (STANDING):  ceFAZolin   IVPB 2000 milliGRAM(s) IV Intermittent every 8 hours  docusate sodium 100 milliGRAM(s) Oral three times a day  hydroxychloroquine 200 milliGRAM(s) Oral daily  influenza   Vaccine 0.5 milliLiter(s) IntraMuscular once  labetalol 300 milliGRAM(s) Oral two times a day  lactated ringers. 1000 milliLiter(s) (100 mL/Hr) IV Continuous <Continuous>  lisinopril 20 milliGRAM(s) Oral daily  methotrexate (Non - oncologic) 12.5 milliGRAM(s) Oral <User Schedule>  morphine ER Tablet 15 milliGRAM(s) Oral daily  pantoprazole    Tablet 40 milliGRAM(s) Oral before breakfast  PARoxetine 20 milliGRAM(s) Oral daily  predniSONE   Tablet 10 milliGRAM(s) Oral daily  senna 2 Tablet(s) Oral at bedtime  ursodiol Tablet 250 milliGRAM(s) Oral two times a day with meals    MEDICATIONS  (PRN):  HYDROmorphone  Injectable 1 milliGRAM(s) IV Push every 6 hours PRN Severe Pain (7 - 10)  oxyCODONE    5 mG/acetaminophen 325 mG 1 Tablet(s) Oral every 4 hours PRN Moderate Pain (4 - 6)  oxyCODONE    5 mG/acetaminophen 325 mG 2 Tablet(s) Oral every 4 hours PRN Severe Pain (7 - 10)  polyethylene glycol 3350 17 Gram(s) Oral daily PRN Constipation        Soc Hx:   Tobacco: Neg  ETOH: Neg  Drugs: Neg    Family Hx:   as per my conversation with the patient. Noncontributory      ROS  CONSTITUTIONAL: No weakness, fevers or chills  EYES/ENT: No visual changes;  No vertigo or throat pain   NECK: No pain or stiffness  RESPIRATORY: No cough, wheezing, hemoptysis; No shortness of breath  CARDIOVASCULAR: No chest pain or palpitations  GASTROINTESTINAL: No abdominal or epigastric pain. No nausea, vomiting, or hematemesis; No diarrhea or constipation. No melena or hematochezia.  GENITOURINARY: No dysuria, frequency or hematuria  NEUROLOGICAL: No numbness or weakness  SKIN: No itching, burning, rashes, or lesions   MUSCULOSKELETAL: right leg pain    Vital Signs Last 24 Hrs  T(C): 36.6 (17 Oct 2017 18:06), Max: 37.4 (17 Oct 2017 15:08)  T(F): 97.9 (17 Oct 2017 18:06), Max: 99.3 (17 Oct 2017 15:08)  HR: 73 (17 Oct 2017 18:06) (55 - 85)  BP: 129/75 (17 Oct 2017 18:06) (97/60 - 197/90)  BP(mean): 105 (17 Oct 2017 12:15) (105 - 105)  RR: 18 (17 Oct 2017 18:06) (14 - 18)  SpO2: 95% (17 Oct 2017 18:06) (95% - 100%)      PHYSICAL EXAM:  GENERAL: NAD, well-groomed, well-developed  HEAD:  Atraumatic, Normocephalic  EYES: EOMI, PERRLA, conjunctiva and sclera clear  ENMT: No tonsillar erythema, exudates, or enlargement; Moist mucous membranes, Good dentition, No lesions  NECK: Supple, No JVD, Normal thyroid  NERVOUS SYSTEM:  Alert & Oriented X3, Good concentration; Motor Strength 5/5 B/L upper and lower extremities; DTRs 2+ intact and symmetric  CHEST/LUNG: Clear to percussion bilaterally; No rales, rhonchi, wheezing, or rubs  HEART: Regular rate and rhythm; No murmurs, rubs, or gallops  ABDOMEN: Soft, Nontender, Nondistended; Bowel sounds present  EXTREMITIES:  2+ Peripheral Pulses, No clubbing, cyanosis, or edema Left hip with 3+ edema post-op  LYMPH: No lymphadenopathy noted  SKIN: No rashes or lesions        LABS:         CBC Full  -  ( 17 Oct 2017 13:11 )  WBC Count : 12.6 K/uL  Hemoglobin : 10.8 g/dL  Hematocrit : 31.1 %  Platelet Count - Automated : 353 K/uL  Mean Cell Volume : 91.8 fl  Mean Cell Hemoglobin : 31.7 pg  Mean Cell Hemoglobin Concentration : 34.6 gm/dL  Auto Neutrophil # : x  Auto Lymphocyte # : x  Auto Monocyte # : x  Auto Eosinophil # : x  Auto Basophil # : x  Auto Neutrophil % : x  Auto Lymphocyte % : x  Auto Monocyte % : x  Auto Eosinophil % : x  Auto Basophil % : x    10-    138  |  105  |  9   ----------------------------<  144<H>  4.3   |  21<L>  |  0.36<L>    Ca    7.6<L>      17 Oct 2017 13:11    TPro  7.7  /  Alb  3.9  /  TBili  0.3  /  DBili  x   /  AST  19  /  ALT  15  /  AlkPhos  73  10-16    LIVER FUNCTIONS - ( 16 Oct 2017 13:06 )  Alb: 3.9 g/dL / Pro: 7.7 g/dL / ALK PHOS: 73 U/L / ALT: 15 U/L RC / AST: 19 U/L / GGT: x           PT/INR - ( 17 Oct 2017 05:17 )   PT: 11.1 sec;   INR: 1.02 ratio         PTT - ( 17 Oct 2017 05:17 )  PTT:27.1 sec  Urinalysis Basic - ( 16 Oct 2017 14:44 )    Color: Yellow / Appearance: Clear / S.014 / pH: x  Gluc: x / Ketone: Negative  / Bili: Negative / Urobili: Negative   Blood: x / Protein: 30 mg/dL / Nitrite: Negative   Leuk Esterase: Negative / RBC: 0-2 /HPF / WBC 0-2 /HPF   Sq Epi: x / Non Sq Epi: x / Bacteria: x                  EKG:   Sinus brad @ 58        Urinalysis Basic - ( 16 Oct 2017 14:44 )    Color: Yellow / Appearance: Clear / S.014 / pH: x  Gluc: x / Ketone: Negative  / Bili: Negative / Urobili: Negative   Blood: x / Protein: 30 mg/dL / Nitrite: Negative   Leuk Esterase: Negative / RBC: 0-2 /HPF / WBC 0-2 /HPF   Sq Epi: x / Non Sq Epi: x / Bacteria: x        Radiology reports:

## 2017-10-17 NOTE — CHART NOTE - NSCHARTNOTEFT_GEN_A_CORE
Resting without complaints.  No Chest Pain, SOB, N/V.    T(C): 37.4 (10-17-17 @ 15:08), Max: 37.4 (10-17-17 @ 15:08)  HR: 85 (10-17-17 @ 15:08) (55 - 85)  BP: 197/90 (10-17-17 @ 15:08) (97/60 - 197/90)  RR: 18 (10-17-17 @ 15:08) (14 - 18)  SpO2: 97% (10-17-17 @ 15:08) (95% - 100%)  Wt(kg): --    Exam:  Alert and [place], No Acute Distress  Card: +S1/S2, RRR  Pulm: CTAB  Lt thigh dsgs c/d/i with soft/compressible compartments  Calves soft, non-tender bilaterally  +PF/DF/EHL/FHL  SILT  + DP pulse                          10.8   12.6  )-----------( 353      ( 17 Oct 2017 13:11 )             31.1    10-17    138  |  105  |  9   ----------------------------<  144<H>  4.3   |  21<L>  |  0.36<L>    17 Oct 2017 13:11    A/P: 72yFemale S/p Lt hip IMN. Hypertensive post op, hx HTN. NAD  -PT/OT-WBAT  -IVL fluids, patient just took HTN meds, repeat BP x1 hour  -IS  -DVT PPx  -Pain Control  -Continue Current Tx    Karan Hastings PA-C  Team Pager #[phone]

## 2017-10-17 NOTE — BRIEF OPERATIVE NOTE - PROCEDURE
<<-----Click on this checkbox to enter Procedure Antegrade intramedullary nailing of left femur  10/17/2017    Active  DSTAL

## 2017-10-18 ENCOUNTER — TRANSCRIPTION ENCOUNTER (OUTPATIENT)
Age: 72
End: 2017-10-18

## 2017-10-18 LAB
ANION GAP SERPL CALC-SCNC: 13 MMOL/L — SIGNIFICANT CHANGE UP (ref 5–17)
BUN SERPL-MCNC: 6 MG/DL — LOW (ref 7–23)
CALCIUM SERPL-MCNC: 7.8 MG/DL — LOW (ref 8.4–10.5)
CHLORIDE SERPL-SCNC: 103 MMOL/L — SIGNIFICANT CHANGE UP (ref 96–108)
CO2 SERPL-SCNC: 22 MMOL/L — SIGNIFICANT CHANGE UP (ref 22–31)
CREAT SERPL-MCNC: 0.43 MG/DL — LOW (ref 0.5–1.3)
GLUCOSE SERPL-MCNC: 102 MG/DL — HIGH (ref 70–99)
HCT VFR BLD CALC: 25.4 % — LOW (ref 34.5–45)
HGB BLD-MCNC: 8.4 G/DL — LOW (ref 11.5–15.5)
MCHC RBC-ENTMCNC: 28.6 PG — SIGNIFICANT CHANGE UP (ref 27–34)
MCHC RBC-ENTMCNC: 33.1 GM/DL — SIGNIFICANT CHANGE UP (ref 32–36)
MCV RBC AUTO: 86.4 FL — SIGNIFICANT CHANGE UP (ref 80–100)
PLATELET # BLD AUTO: 314 K/UL — SIGNIFICANT CHANGE UP (ref 150–400)
POTASSIUM SERPL-MCNC: 3.4 MMOL/L — LOW (ref 3.5–5.3)
POTASSIUM SERPL-SCNC: 3.4 MMOL/L — LOW (ref 3.5–5.3)
RBC # BLD: 2.94 M/UL — LOW (ref 3.8–5.2)
RBC # FLD: 20.7 % — HIGH (ref 10.3–14.5)
SODIUM SERPL-SCNC: 138 MMOL/L — SIGNIFICANT CHANGE UP (ref 135–145)
WBC # BLD: 13.14 K/UL — HIGH (ref 3.8–10.5)
WBC # FLD AUTO: 13.14 K/UL — HIGH (ref 3.8–10.5)

## 2017-10-18 RX ORDER — ACETAMINOPHEN 500 MG
650 TABLET ORAL EVERY 6 HOURS
Qty: 0 | Refills: 0 | Status: DISCONTINUED | OUTPATIENT
Start: 2017-10-18 | End: 2017-10-23

## 2017-10-18 RX ORDER — OXYCODONE HYDROCHLORIDE 5 MG/1
10 TABLET ORAL EVERY 4 HOURS
Qty: 0 | Refills: 0 | Status: DISCONTINUED | OUTPATIENT
Start: 2017-10-18 | End: 2017-10-23

## 2017-10-18 RX ORDER — OXYCODONE HYDROCHLORIDE 5 MG/1
5 TABLET ORAL
Qty: 0 | Refills: 0 | Status: DISCONTINUED | OUTPATIENT
Start: 2017-10-18 | End: 2017-10-23

## 2017-10-18 RX ADMIN — OXYCODONE HYDROCHLORIDE 10 MILLIGRAM(S): 5 TABLET ORAL at 12:26

## 2017-10-18 RX ADMIN — OXYCODONE AND ACETAMINOPHEN 2 TABLET(S): 5; 325 TABLET ORAL at 07:21

## 2017-10-18 RX ADMIN — Medication 300 MILLIGRAM(S): at 17:22

## 2017-10-18 RX ADMIN — Medication 100 MILLIGRAM(S): at 21:15

## 2017-10-18 RX ADMIN — OXYCODONE AND ACETAMINOPHEN 2 TABLET(S): 5; 325 TABLET ORAL at 08:17

## 2017-10-18 RX ADMIN — Medication 10 MILLIGRAM(S): at 06:30

## 2017-10-18 RX ADMIN — OXYCODONE AND ACETAMINOPHEN 2 TABLET(S): 5; 325 TABLET ORAL at 03:42

## 2017-10-18 RX ADMIN — MORPHINE SULFATE 15 MILLIGRAM(S): 50 CAPSULE, EXTENDED RELEASE ORAL at 11:14

## 2017-10-18 RX ADMIN — Medication 100 MILLIGRAM(S): at 03:11

## 2017-10-18 RX ADMIN — PANTOPRAZOLE SODIUM 40 MILLIGRAM(S): 20 TABLET, DELAYED RELEASE ORAL at 06:30

## 2017-10-18 RX ADMIN — Medication 100 MILLIGRAM(S): at 07:36

## 2017-10-18 RX ADMIN — SENNA PLUS 2 TABLET(S): 8.6 TABLET ORAL at 21:15

## 2017-10-18 RX ADMIN — OXYCODONE HYDROCHLORIDE 10 MILLIGRAM(S): 5 TABLET ORAL at 16:14

## 2017-10-18 RX ADMIN — OXYCODONE HYDROCHLORIDE 10 MILLIGRAM(S): 5 TABLET ORAL at 12:21

## 2017-10-18 RX ADMIN — Medication 200 MILLIGRAM(S): at 11:14

## 2017-10-18 RX ADMIN — OXYCODONE AND ACETAMINOPHEN 2 TABLET(S): 5; 325 TABLET ORAL at 03:12

## 2017-10-18 RX ADMIN — OXYCODONE HYDROCHLORIDE 10 MILLIGRAM(S): 5 TABLET ORAL at 20:26

## 2017-10-18 RX ADMIN — URSODIOL 250 MILLIGRAM(S): 250 TABLET, FILM COATED ORAL at 17:22

## 2017-10-18 RX ADMIN — Medication 325 MILLIGRAM(S): at 17:22

## 2017-10-18 RX ADMIN — Medication 300 MILLIGRAM(S): at 06:30

## 2017-10-18 RX ADMIN — Medication 650 MILLIGRAM(S): at 10:32

## 2017-10-18 RX ADMIN — Medication 20 MILLIGRAM(S): at 11:14

## 2017-10-18 RX ADMIN — Medication 650 MILLIGRAM(S): at 11:31

## 2017-10-18 RX ADMIN — URSODIOL 250 MILLIGRAM(S): 250 TABLET, FILM COATED ORAL at 07:21

## 2017-10-18 RX ADMIN — MORPHINE SULFATE 15 MILLIGRAM(S): 50 CAPSULE, EXTENDED RELEASE ORAL at 11:31

## 2017-10-18 RX ADMIN — OXYCODONE HYDROCHLORIDE 10 MILLIGRAM(S): 5 TABLET ORAL at 20:56

## 2017-10-18 RX ADMIN — OXYCODONE HYDROCHLORIDE 10 MILLIGRAM(S): 5 TABLET ORAL at 16:18

## 2017-10-18 RX ADMIN — Medication 325 MILLIGRAM(S): at 07:40

## 2017-10-18 RX ADMIN — LISINOPRIL 20 MILLIGRAM(S): 2.5 TABLET ORAL at 06:30

## 2017-10-18 NOTE — PHYSICAL THERAPY INITIAL EVALUATION ADULT - ACTIVE RANGE OF MOTION EXAMINATION, REHAB EVAL
Right LE Active ROM was WFL (within functional limits)/bilateral upper extremity Active ROM was WFL (within functional limits)/Left hip flexion to 20 deg

## 2017-10-18 NOTE — OCCUPATIONAL THERAPY INITIAL EVALUATION ADULT - LEVEL OF INDEPENDENCE: DRESS UPPER BODY, OT EVAL
moderate assist (50% patients effort)/assistance with closures required/maximum assist (25% patients effort)

## 2017-10-18 NOTE — OCCUPATIONAL THERAPY INITIAL EVALUATION ADULT - ADDITIONAL COMMENTS
pt with some confusion and memory secondary to old cva,  reports she required some assist with adl's such as dressing and bathing secondary to severe arthritis. Pt has a shower chair, walker and grab bars.

## 2017-10-18 NOTE — OCCUPATIONAL THERAPY INITIAL EVALUATION ADULT - LIVES WITH, PROFILE
pt lives in assisted living facility.  visits and assists for meals, bathing and dressing secondary to previous stroke. Meals provided/alone

## 2017-10-18 NOTE — PROGRESS NOTE ADULT - SUBJECTIVE AND OBJECTIVE BOX
Patient seen and examined at bedside.  No acute events overnight    Vital Signs Last 24 Hrs  T(C): 37.2 (18 Oct 2017 04:18), Max: 37.4 (17 Oct 2017 15:08)  T(F): 98.9 (18 Oct 2017 04:18), Max: 99.3 (17 Oct 2017 15:08)  HR: 82 (18 Oct 2017 04:18) (55 - 85)  BP: 160/76 (18 Oct 2017 04:18) (97/60 - 197/90)  BP(mean): 105 (17 Oct 2017 12:15) (105 - 105)  RR: 18 (18 Oct 2017 04:18) (14 - 18)  SpO2: 95% (18 Oct 2017 04:18) (95% - 100%)    PE:  DSG C/D/I  SILT  +TA EHL FHL GSC  soft compartments  neg calf ttp

## 2017-10-18 NOTE — OCCUPATIONAL THERAPY INITIAL EVALUATION ADULT - NS ASR FOLLOW COMMAND OT EVAL
100% of the time/reports difficulty with word finding/able to follow single-step instructions/unable to follow multi-step instructions

## 2017-10-18 NOTE — PROGRESS NOTE ADULT - SUBJECTIVE AND OBJECTIVE BOX
Patient is a 72y old  Female who presents with a chief complaint of mechanical fall (16 Oct 2017 14:20)      HPI:    MEDICATIONS  (STANDING):  aspirin enteric coated 325 milliGRAM(s) Oral two times a day  docusate sodium 100 milliGRAM(s) Oral three times a day  hydroxychloroquine 200 milliGRAM(s) Oral daily  influenza   Vaccine 0.5 milliLiter(s) IntraMuscular once  labetalol 300 milliGRAM(s) Oral two times a day  lactated ringers. 1000 milliLiter(s) (100 mL/Hr) IV Continuous <Continuous>  lisinopril 20 milliGRAM(s) Oral daily  methotrexate (Non - oncologic) 12.5 milliGRAM(s) Oral <User Schedule>  morphine ER Tablet 15 milliGRAM(s) Oral daily  pantoprazole    Tablet 40 milliGRAM(s) Oral before breakfast  PARoxetine 20 milliGRAM(s) Oral daily  predniSONE   Tablet 10 milliGRAM(s) Oral daily  senna 2 Tablet(s) Oral at bedtime  ursodiol Tablet 250 milliGRAM(s) Oral two times a day with meals    MEDICATIONS  (PRN):  acetaminophen   Tablet. 650 milliGRAM(s) Oral every 6 hours PRN Mild Pain (1 - 3)  HYDROmorphone  Injectable 1 milliGRAM(s) IV Push every 6 hours PRN Severe Pain (7 - 10)  oxyCODONE    IR 5 milliGRAM(s) Oral every 3 hours PRN Moderate Pain (4 - 6)  oxyCODONE    IR 10 milliGRAM(s) Oral every 4 hours PRN Severe Pain (7 - 10)  polyethylene glycol 3350 17 Gram(s) Oral daily PRN Constipation      Allergies    Actonel (Hives)  clonidine (Other)  crab meat (Unknown)    Intolerances        REVIEW OF SYSTEM:  CONSTITUTIONAL: No fever, No change in weight, No fatigue  HEAD: No headache, No dizziness, No recent trauma  EYES: No eye pain, No visual disturbances, No discharge  ENT:  No difficulty hearing, No tinnitus, No vertigo, No sinus pain, No throat pain  NECK: No pain, No stiffness  BREASTS: No pain, No masses, No nipple discharge  RESPIRATORY: No cough, No wheezing, No chills, No hemoptysis, No shortness of breath at rest or exertional shortness of breath  CARDIOVASCULAR: No chest pain, No palpitations, No dizziness, No CHF, No arrhythmia, No cardiomegaly, No leg swelling  GASTROINTESTINAL: No abdominal, No epigastric pain. No nausea, No vomiting, No hematemesis, No diarrhea, No constipation. No melena, No hematochezia. No GERD  GENITOURINARY: No dysuria, No frequency, No hematuria, No incontinence, No nocturia, No hesitancy,  SKIN: No itching, No burning, No rashes, No lesions   LYMPH NODES: No history of enlarged glands  ENDOCRINE: No heat or cold intolerance, No hair loss. No osteoporosis, No thyroid disease  MUSCULOSKELETAL: No joint pain or swelling, No muscle, back, or extremity pain  PSYCHIATRIC: No depression, No anxiety, No mood swings, No difficulty sleeping  HEME/LYMPH: No easy bruising, No anticoagulants, No bleeding disorder, No bleeding gums  ALLERGY AND IMMUNOLOGIC: No hives, No eczema  NEUROLOGICAL: No memory loss, No loss of strength, No numbness, No tremors        VITALS:   T(C): 37.6 (10-18-17 @ 17:27), Max: 37.6 (10-18-17 @ 17:27)  HR: 87 (10-18-17 @ 17:27) (72 - 87)  BP: 155/72 (10-18-17 @ 17:27) (135/80 - 160/76)  RR: 18 (10-18-17 @ 17:27) (18 - 18)  SpO2: 95% (10-18-17 @ 17:27) (95% - 95%)  Wt(kg): --    10-17 @ 07:01  -  10-18 @ 07:00  --------------------------------------------------------  IN: 1850 mL / OUT: 1500 mL / NET: 350 mL    10-18 @ 07:01  -  10-18 @ 21:42  --------------------------------------------------------  IN: 1240 mL / OUT: 1325 mL / NET: -85 mL        PHYSICAL EXAM:  GENERAL: NAD, well nourished and conversant  HEAD:  Atraumatic  EYES: EOM, PERRLA, conjunctiva pink and sclera white  ENT: No tonsillar erythema, exudates, or enlargement, moist mucous membranes, good dentition, no lesions  NECK: Supple, No JVD, normal thyroid, carotids with normal upstrokes and no bruits  CHEST/LUNG: Clear to auscultation bilaterally, No rales, rhonchi, wheezing, or rubs  HEART: Regular rate and rhythm, No murmurs, rubs, or gallops  ABDOMEN: Soft, nondistended, no masses, guarding, tenderness or rebound, bowel sounds present  EXTREMITIES:  2+ Peripheral Pulses, No clubbing, cyanosis, or edema. No arthritis of shoulders, elbows, hands, hips, knees, ankles, or feet. No DJD C spine, T spine, or L/S spine  LYMPH: No lymphadenopathy noted  SKIN: No rashes or lesions  NERVOUS SYSTEM:  Alert & Oriented X3, normal cognitive function. Motor Strength 5/5 right upper and right lower.  5/5 left upper and left lower extremities, DTRs 2+ intact and symmetric    LABS:                          8.4    13.14 )-----------( 314      ( 18 Oct 2017 07:30 )             25.4     10-18    138  |  103  |  6<L>  ----------------------------<  102<H>  3.4<L>   |  22  |  0.43<L>  10-17    138  |  105  |  9   ----------------------------<  144<H>  4.3   |  21<L>  |  0.36<L>  10-17    138  |  103  |  13  ----------------------------<  101<H>  3.8   |  23  |  0.41<L>    Ca    7.8<L>      18 Oct 2017 07:33  Ca    7.6<L>      17 Oct 2017 13:11  Ca    7.9<L>      17 Oct 2017 05:17    TPro  7.7  /  Alb  3.9  /  TBili  0.3  /  DBili  x   /  AST  19  /  ALT  15  /  AlkPhos  73  10-16    CAPILLARY BLOOD GLUCOSE          RADIOLOGY & ADDITIONAL TESTS:      Consultant(s):    Care Discussed with Consultants/Other Providers [ ] YES  [ ] NO Patient is a 72y old  Female who presents with a chief complaint of mechanical fall (16 Oct 2017 14:20)      HPI:  Resting comfortably  Pain 3-4 / 10 when moving  No dyspnea or chest pain no dvt .    MEDICATIONS  (STANDING):  aspirin enteric coated 325 milliGRAM(s) Oral two times a day  docusate sodium 100 milliGRAM(s) Oral three times a day  hydroxychloroquine 200 milliGRAM(s) Oral daily  influenza   Vaccine 0.5 milliLiter(s) IntraMuscular once  labetalol 300 milliGRAM(s) Oral two times a day  lactated ringers. 1000 milliLiter(s) (100 mL/Hr) IV Continuous <Continuous>  lisinopril 20 milliGRAM(s) Oral daily  methotrexate (Non - oncologic) 12.5 milliGRAM(s) Oral <User Schedule>  morphine ER Tablet 15 milliGRAM(s) Oral daily  pantoprazole    Tablet 40 milliGRAM(s) Oral before breakfast  PARoxetine 20 milliGRAM(s) Oral daily  predniSONE   Tablet 10 milliGRAM(s) Oral daily  senna 2 Tablet(s) Oral at bedtime  ursodiol Tablet 250 milliGRAM(s) Oral two times a day with meals    MEDICATIONS  (PRN):  acetaminophen   Tablet. 650 milliGRAM(s) Oral every 6 hours PRN Mild Pain (1 - 3)  HYDROmorphone  Injectable 1 milliGRAM(s) IV Push every 6 hours PRN Severe Pain (7 - 10)  oxyCODONE    IR 5 milliGRAM(s) Oral every 3 hours PRN Moderate Pain (4 - 6)  oxyCODONE    IR 10 milliGRAM(s) Oral every 4 hours PRN Severe Pain (7 - 10)  polyethylene glycol 3350 17 Gram(s) Oral daily PRN Constipation      Allergies    Actonel (Hives)  clonidine (Other)  crab meat (Unknown)    Intolerances        REVIEW OF SYSTEM:  CONSTITUTIONAL: No fever, No change in weight, No fatigue  HEAD: No headache, No dizziness, No recent trauma  EYES: No eye pain, No visual disturbances, No discharge  ENT:  No difficulty hearing, No tinnitus, No vertigo, No sinus pain, No throat pain  NECK: No pain, No stiffness  BREASTS: No pain, No masses, No nipple discharge  RESPIRATORY: No cough, No wheezing, No chills, No hemoptysis, No shortness of breath at rest or exertional shortness of breath  CARDIOVASCULAR: No chest pain, No palpitations, No dizziness, No CHF, No arrhythmia, No cardiomegaly, No leg swelling  GASTROINTESTINAL: No abdominal, No epigastric pain. No nausea, No vomiting, No hematemesis, No diarrhea, No constipation. No melena, No hematochezia. No GERD  GENITOURINARY: No dysuria, No frequency, No hematuria, No incontinence, No nocturia, No hesitancy,  SKIN: No itching, No burning, No rashes, No lesions   LYMPH NODES: No history of enlarged glands  ENDOCRINE: No heat or cold intolerance, No hair loss. No osteoporosis, No thyroid disease  MUSCULOSKELETAL: No joint pain or swelling, No muscle, back, or extremity pain  PSYCHIATRIC: No depression, No anxiety, No mood swings, No difficulty sleeping  HEME/LYMPH: No easy bruising, No anticoagulants, No bleeding disorder, No bleeding gums  ALLERGY AND IMMUNOLOGIC: No hives, No eczema  NEUROLOGICAL: No memory loss, No loss of strength, No numbness, No tremors        VITALS:   T(C): 37.6 (10-18-17 @ 17:27), Max: 37.6 (10-18-17 @ 17:27)  HR: 87 (10-18-17 @ 17:27) (72 - 87)  BP: 155/72 (10-18-17 @ 17:27) (135/80 - 160/76)  RR: 18 (10-18-17 @ 17:27) (18 - 18)  SpO2: 95% (10-18-17 @ 17:27) (95% - 95%)  Wt(kg): --    10-17 @ 07:01  -  10-18 @ 07:00  --------------------------------------------------------  IN: 1850 mL / OUT: 1500 mL / NET: 350 mL    10-18 @ 07:01  -  10-18 @ 21:42  --------------------------------------------------------  IN: 1240 mL / OUT: 1325 mL / NET: -85 mL        PHYSICAL EXAM:  GENERAL: NAD, well nourished and conversant  HEAD:  Atraumatic  EYES: EOM, PERRLA, conjunctiva pink and sclera white  ENT: No tonsillar erythema, exudates, or enlargement, moist mucous membranes, good dentition, no lesions  NECK: Supple, No JVD, normal thyroid, carotids with normal upstrokes and no bruits  CHEST/LUNG: Clear to auscultation bilaterally, No rales, rhonchi, wheezing, or rubs  HEART: Regular rate and rhythm, No murmurs, rubs, or gallops  ABDOMEN: Soft, nondistended, no masses, guarding, tenderness or rebound, bowel sounds present  EXTREMITIES:  2+ Peripheral Pulses, No clubbing, cyanosis, or edema. No arthritis of shoulders, elbows, hands, hips, knees, ankles, or feet. No DJD C spine, T spine, or L/S spine  LYMPH: No lymphadenopathy noted  SKIN: No rashes or lesions  NERVOUS SYSTEM:  Alert & Oriented X3, normal cognitive function. Motor Strength 5/5 right upper and right lower.  5/5 left upper and left lower extremities, DTRs 2+ intact and symmetric    LABS:                          8.4    13.14 )-----------( 314      ( 18 Oct 2017 07:30 )             25.4     10-18    138  |  103  |  6<L>  ----------------------------<  102<H>  3.4<L>   |  22  |  0.43<L>  10-17    138  |  105  |  9   ----------------------------<  144<H>  4.3   |  21<L>  |  0.36<L>  10-17    138  |  103  |  13  ----------------------------<  101<H>  3.8   |  23  |  0.41<L>    Ca    7.8<L>      18 Oct 2017 07:33  Ca    7.6<L>      17 Oct 2017 13:11  Ca    7.9<L>      17 Oct 2017 05:17    TPro  7.7  /  Alb  3.9  /  TBili  0.3  /  DBili  x   /  AST  19  /  ALT  15  /  AlkPhos  73  10-16    CAPILLARY BLOOD GLUCOSE          RADIOLOGY & ADDITIONAL TESTS:      Consultant(s):    Care Discussed with Consultants/Other Providers [ ] YES  [ ] NO

## 2017-10-19 ENCOUNTER — TRANSCRIPTION ENCOUNTER (OUTPATIENT)
Age: 72
End: 2017-10-19

## 2017-10-19 LAB
ANION GAP SERPL CALC-SCNC: 16 MMOL/L — SIGNIFICANT CHANGE UP (ref 5–17)
BUN SERPL-MCNC: 7 MG/DL — SIGNIFICANT CHANGE UP (ref 7–23)
CALCIUM SERPL-MCNC: 9.1 MG/DL — SIGNIFICANT CHANGE UP (ref 8.4–10.5)
CHLORIDE SERPL-SCNC: 99 MMOL/L — SIGNIFICANT CHANGE UP (ref 96–108)
CO2 SERPL-SCNC: 22 MMOL/L — SIGNIFICANT CHANGE UP (ref 22–31)
CREAT SERPL-MCNC: 0.43 MG/DL — LOW (ref 0.5–1.3)
GLUCOSE SERPL-MCNC: 83 MG/DL — SIGNIFICANT CHANGE UP (ref 70–99)
HCT VFR BLD CALC: 28.3 % — LOW (ref 34.5–45)
HGB BLD-MCNC: 9.5 G/DL — LOW (ref 11.5–15.5)
MCHC RBC-ENTMCNC: 29.1 PG — SIGNIFICANT CHANGE UP (ref 27–34)
MCHC RBC-ENTMCNC: 33.6 GM/DL — SIGNIFICANT CHANGE UP (ref 32–36)
MCV RBC AUTO: 86.8 FL — SIGNIFICANT CHANGE UP (ref 80–100)
PLATELET # BLD AUTO: 324 K/UL — SIGNIFICANT CHANGE UP (ref 150–400)
POTASSIUM SERPL-MCNC: 3.9 MMOL/L — SIGNIFICANT CHANGE UP (ref 3.5–5.3)
POTASSIUM SERPL-SCNC: 3.9 MMOL/L — SIGNIFICANT CHANGE UP (ref 3.5–5.3)
RBC # BLD: 3.26 M/UL — LOW (ref 3.8–5.2)
RBC # FLD: 20 % — HIGH (ref 10.3–14.5)
SODIUM SERPL-SCNC: 137 MMOL/L — SIGNIFICANT CHANGE UP (ref 135–145)
WBC # BLD: 13.78 K/UL — HIGH (ref 3.8–10.5)
WBC # FLD AUTO: 13.78 K/UL — HIGH (ref 3.8–10.5)

## 2017-10-19 PROCEDURE — 84295 ASSAY OF SERUM SODIUM: CPT

## 2017-10-19 PROCEDURE — 78226 HEPATOBILIARY SYSTEM IMAGING: CPT

## 2017-10-19 PROCEDURE — 83036 HEMOGLOBIN GLYCOSYLATED A1C: CPT

## 2017-10-19 PROCEDURE — 84132 ASSAY OF SERUM POTASSIUM: CPT

## 2017-10-19 PROCEDURE — 87086 URINE CULTURE/COLONY COUNT: CPT

## 2017-10-19 PROCEDURE — 82947 ASSAY GLUCOSE BLOOD QUANT: CPT

## 2017-10-19 PROCEDURE — 85014 HEMATOCRIT: CPT

## 2017-10-19 PROCEDURE — 82435 ASSAY OF BLOOD CHLORIDE: CPT

## 2017-10-19 PROCEDURE — 99285 EMERGENCY DEPT VISIT HI MDM: CPT | Mod: 25

## 2017-10-19 PROCEDURE — 81001 URINALYSIS AUTO W/SCOPE: CPT

## 2017-10-19 PROCEDURE — 71045 X-RAY EXAM CHEST 1 VIEW: CPT

## 2017-10-19 PROCEDURE — 82803 BLOOD GASES ANY COMBINATION: CPT

## 2017-10-19 PROCEDURE — 85027 COMPLETE CBC AUTOMATED: CPT

## 2017-10-19 PROCEDURE — 83605 ASSAY OF LACTIC ACID: CPT

## 2017-10-19 PROCEDURE — 87040 BLOOD CULTURE FOR BACTERIA: CPT

## 2017-10-19 PROCEDURE — 83735 ASSAY OF MAGNESIUM: CPT

## 2017-10-19 PROCEDURE — A9537: CPT

## 2017-10-19 PROCEDURE — 80053 COMPREHEN METABOLIC PANEL: CPT

## 2017-10-19 PROCEDURE — 82330 ASSAY OF CALCIUM: CPT

## 2017-10-19 PROCEDURE — 80048 BASIC METABOLIC PNL TOTAL CA: CPT

## 2017-10-19 PROCEDURE — 76705 ECHO EXAM OF ABDOMEN: CPT

## 2017-10-19 PROCEDURE — 74177 CT ABD & PELVIS W/CONTRAST: CPT

## 2017-10-19 PROCEDURE — 93005 ELECTROCARDIOGRAM TRACING: CPT

## 2017-10-19 PROCEDURE — 93306 TTE W/DOPPLER COMPLETE: CPT

## 2017-10-19 PROCEDURE — 80202 ASSAY OF VANCOMYCIN: CPT

## 2017-10-19 PROCEDURE — 97162 PT EVAL MOD COMPLEX 30 MIN: CPT

## 2017-10-19 PROCEDURE — 96374 THER/PROPH/DIAG INJ IV PUSH: CPT | Mod: XU

## 2017-10-19 PROCEDURE — 96375 TX/PRO/DX INJ NEW DRUG ADDON: CPT

## 2017-10-19 RX ORDER — ASPIRIN/CALCIUM CARB/MAGNESIUM 324 MG
1 TABLET ORAL
Qty: 0 | Refills: 0 | COMMUNITY

## 2017-10-19 RX ORDER — POLYETHYLENE GLYCOL 3350 17 G/17G
17 POWDER, FOR SOLUTION ORAL
Qty: 0 | Refills: 0 | DISCHARGE
Start: 2017-10-19

## 2017-10-19 RX ORDER — MORPHINE SULFATE 50 MG/1
1 CAPSULE, EXTENDED RELEASE ORAL
Qty: 0 | Refills: 0 | COMMUNITY
Start: 2017-10-19

## 2017-10-19 RX ORDER — ASPIRIN/CALCIUM CARB/MAGNESIUM 324 MG
1 TABLET ORAL
Qty: 0 | Refills: 0 | COMMUNITY
Start: 2017-10-19

## 2017-10-19 RX ORDER — POLYETHYLENE GLYCOL 3350 17 G/17G
17 POWDER, FOR SOLUTION ORAL
Qty: 0 | Refills: 0 | COMMUNITY
Start: 2017-10-19

## 2017-10-19 RX ORDER — ACETAMINOPHEN 500 MG
2 TABLET ORAL
Qty: 0 | Refills: 0 | DISCHARGE
Start: 2017-10-19

## 2017-10-19 RX ORDER — OXYCODONE HYDROCHLORIDE 5 MG/1
1 TABLET ORAL
Qty: 0 | Refills: 0 | COMMUNITY
Start: 2017-10-19

## 2017-10-19 RX ORDER — DOCUSATE SODIUM 100 MG
1 CAPSULE ORAL
Qty: 0 | Refills: 0 | DISCHARGE
Start: 2017-10-19

## 2017-10-19 RX ORDER — SENNA PLUS 8.6 MG/1
2 TABLET ORAL
Qty: 0 | Refills: 0 | COMMUNITY
Start: 2017-10-19

## 2017-10-19 RX ORDER — DOCUSATE SODIUM 100 MG
1 CAPSULE ORAL
Qty: 0 | Refills: 0 | COMMUNITY
Start: 2017-10-19

## 2017-10-19 RX ADMIN — Medication 100 MILLIGRAM(S): at 05:53

## 2017-10-19 RX ADMIN — LISINOPRIL 20 MILLIGRAM(S): 2.5 TABLET ORAL at 05:53

## 2017-10-19 RX ADMIN — OXYCODONE HYDROCHLORIDE 10 MILLIGRAM(S): 5 TABLET ORAL at 04:24

## 2017-10-19 RX ADMIN — URSODIOL 250 MILLIGRAM(S): 250 TABLET, FILM COATED ORAL at 08:21

## 2017-10-19 RX ADMIN — Medication 100 MILLIGRAM(S): at 20:27

## 2017-10-19 RX ADMIN — OXYCODONE HYDROCHLORIDE 10 MILLIGRAM(S): 5 TABLET ORAL at 17:17

## 2017-10-19 RX ADMIN — Medication 300 MILLIGRAM(S): at 17:18

## 2017-10-19 RX ADMIN — OXYCODONE HYDROCHLORIDE 10 MILLIGRAM(S): 5 TABLET ORAL at 01:06

## 2017-10-19 RX ADMIN — MORPHINE SULFATE 15 MILLIGRAM(S): 50 CAPSULE, EXTENDED RELEASE ORAL at 11:51

## 2017-10-19 RX ADMIN — OXYCODONE HYDROCHLORIDE 10 MILLIGRAM(S): 5 TABLET ORAL at 08:21

## 2017-10-19 RX ADMIN — OXYCODONE HYDROCHLORIDE 10 MILLIGRAM(S): 5 TABLET ORAL at 20:24

## 2017-10-19 RX ADMIN — OXYCODONE HYDROCHLORIDE 10 MILLIGRAM(S): 5 TABLET ORAL at 12:15

## 2017-10-19 RX ADMIN — Medication 20 MILLIGRAM(S): at 11:16

## 2017-10-19 RX ADMIN — Medication 200 MILLIGRAM(S): at 11:16

## 2017-10-19 RX ADMIN — Medication 300 MILLIGRAM(S): at 05:53

## 2017-10-19 RX ADMIN — OXYCODONE HYDROCHLORIDE 10 MILLIGRAM(S): 5 TABLET ORAL at 04:54

## 2017-10-19 RX ADMIN — URSODIOL 250 MILLIGRAM(S): 250 TABLET, FILM COATED ORAL at 17:17

## 2017-10-19 RX ADMIN — Medication 100 MILLIGRAM(S): at 13:17

## 2017-10-19 RX ADMIN — SENNA PLUS 2 TABLET(S): 8.6 TABLET ORAL at 20:27

## 2017-10-19 RX ADMIN — Medication 10 MILLIGRAM(S): at 05:53

## 2017-10-19 RX ADMIN — Medication 325 MILLIGRAM(S): at 05:53

## 2017-10-19 RX ADMIN — Medication 325 MILLIGRAM(S): at 17:17

## 2017-10-19 RX ADMIN — OXYCODONE HYDROCHLORIDE 10 MILLIGRAM(S): 5 TABLET ORAL at 13:16

## 2017-10-19 RX ADMIN — MORPHINE SULFATE 15 MILLIGRAM(S): 50 CAPSULE, EXTENDED RELEASE ORAL at 11:15

## 2017-10-19 RX ADMIN — PANTOPRAZOLE SODIUM 40 MILLIGRAM(S): 20 TABLET, DELAYED RELEASE ORAL at 05:53

## 2017-10-19 RX ADMIN — OXYCODONE HYDROCHLORIDE 10 MILLIGRAM(S): 5 TABLET ORAL at 16:27

## 2017-10-19 RX ADMIN — OXYCODONE HYDROCHLORIDE 10 MILLIGRAM(S): 5 TABLET ORAL at 08:25

## 2017-10-19 RX ADMIN — OXYCODONE HYDROCHLORIDE 10 MILLIGRAM(S): 5 TABLET ORAL at 21:00

## 2017-10-19 RX ADMIN — OXYCODONE HYDROCHLORIDE 10 MILLIGRAM(S): 5 TABLET ORAL at 00:36

## 2017-10-19 NOTE — DISCHARGE NOTE ADULT - CARE PROVIDER_API CALL
Keara De Jesus (MD), Orthopaedic Surgery  [address] [place] [id number]  Phone: [phone]  Fax: [phone]

## 2017-10-19 NOTE — PROGRESS NOTE ADULT - SUBJECTIVE AND OBJECTIVE BOX
Patient seen and examined at bedside.  No acute events overnight    Vital Signs Last 24 Hrs  T(C): 37.1 (19 Oct 2017 04:33), Max: 37.7 (18 Oct 2017 23:38)  T(F): 98.8 (19 Oct 2017 04:33), Max: 99.9 (18 Oct 2017 23:38)  HR: 73 (19 Oct 2017 04:33) (72 - 87)  BP: 148/68 (19 Oct 2017 04:33) (133/82 - 155/72)  BP(mean): --  RR: 18 (19 Oct 2017 04:33) (18 - 18)  SpO2: 96% (19 Oct 2017 04:33) (95% - 98%)    PE:  DSG C/D/I  SILT  +TA EHL FHL GSC  soft compartments  neg calf ttp

## 2017-10-19 NOTE — DISCHARGE NOTE ADULT - MEDICATION SUMMARY - MEDICATIONS TO TAKE
I will START or STAY ON the medications listed below when I get home from the hospital:    Tekturna 150 mg oral tablet  -- 1 tab(s) by mouth once a day  -- Indication: For Htn    Vitamin B Complex oral tablet  -- 1 tab(s) by mouth once a day  -- Indication: For supplement    predniSONE 10 mg oral tablet  -- 1 tab(s) by mouth once a day  -- Indication: For Rheumatoid arthritis    oxyCODONE 5 mg oral tablet  -- 1-2  tab(s) by mouth every 4 hours, As Needed MDD:12  -- Indication: For pain    aspirin 325 mg oral delayed release tablet  -- 1 tab(s) by mouth 2 times a day x 6 WEEKS Total (blood thinner) then RESUME BABY Aspirin (81mg) DAILY  -- Indication: For blood thinner    acetaminophen 325 mg oral tablet  -- 2 tab(s) by mouth every 6 hours, As needed, Mild Pain (1 - 3)  -- Indication: For pain / fever    lisinopril 20 mg oral tablet  -- 1 tab(s) by mouth once a day  -- Indication: For Hypertension    Paxil CR 25 mg oral tablet, extended release  -- 1 tab(s) by mouth once a day (in the evening)  -- Indication: For anxiety    hydroxychloroquine 200 mg oral tablet  -- 2 tab(s) by mouth once a day  -- Indication: For Rheumatoid arthritis    methotrexate 2.5 mg oral tablet  -- 5 tab(s) by mouth once a week on Saturdays  -- Indication: For Rheumatoid arthritis    labetalol 100 mg oral tablet  -- 3 tab(s) by mouth 2 times a day  -- Indication: For Hypertension    amLODIPine 10 mg oral tablet  -- 1 tab(s) by mouth once a day  -- Indication: For Hypertension    ursodiol 250 mg oral tablet  -- 1 tab(s) by mouth 2 times a day  -- Indication: For Gallstones    docusate sodium 100 mg oral capsule  -- 1 cap(s) by mouth 3 times a day  Purchase over-the-counter Colace 100mg and continue to take three times-a-day to prevent constipation while on pain meds.    -- Indication: For stool softener    polyethylene glycol 3350 oral powder for reconstitution  -- 17 gram(s) by mouth once a day  while on pain medications   -- Indication: For laxative    omeprazole 20 mg oral delayed release capsule  -- 1 cap(s) by mouth 2 times a day  -- Indication: For Reflux    Multiple Vitamins oral tablet  -- 1 tab(s) by mouth once a day  -- Indication: For supplement    Vitamin D3 2000 intl units oral tablet  -- 1 tab(s) by mouth once a day  -- Indication: For supplement

## 2017-10-19 NOTE — DISCHARGE NOTE ADULT - NS AS ACTIVITY OBS
Do not drive or operate machinery/Walking-Indoors allowed/Walking-Outdoors allowed/Stairs allowed/Showering allowed/No Heavy lifting/straining/Do not make important decisions

## 2017-10-19 NOTE — DISCHARGE NOTE ADULT - HOSPITAL COURSE
Chief Complaint/Reason for Admission: mechanical fall  History of Present Illness:   72y Female community ambulator w/o assistive devices presents c/o R/L hip pain and inability to ambulate sp mechanical fall. Denies HS/LOC. Denies numbness/tingling. Denies fever/chills. Denies pain/injury elsewhere.     PAST MEDICAL & SURGICAL HISTORY:  Pneumobilia  Leukocytosis  GERD (gastroesophageal reflux disease)  Hypertension  CVA (cerebral vascular accident)  Osteoporosis  Rheumatoid arthritis  History of hip replacement, unspecified laterality  History of total knee replacement, unspecified laterality    MEDICATIONS  (STANDING):  lactated ringers. 1000 milliLiter(s) IV Continuous <Continuous>    Allergies    Actonel (Hives)  clonidine (Other)  crab meat (Unknown)    Intolerances            Allergies and Intolerances:        Allergies:  	Actonel: Drug, Hives  	clonidine: Drug, Other, dry mouth  	crab meat: Food, Unknown, crab meat    Home Medications:   * Patient Currently Takes Medications as of 28-Sep-2017 12:57 documented in Structured Notes  · 	Vitamin D3 2000 intl units oral capsule: 1 cap(s) orally once a day  · 	Balanced B-50 oral tablet: 1 tab(s) orally once a day   · 	omeprazole 20 mg oral delayed release capsule: 1 cap(s) orally once a day   · 	aliskiren 150 mg oral tablet: 1 tab(s) orally once a day   · 	senna oral tablet: 2 tab(s) orally once a day (at bedtime)  · 	polyethylene glycol 3350 oral powder for reconstitution: 17 gram(s) orally once a day, As needed, Constipation  · 	docusate sodium 100 mg oral capsule: 1 cap(s) orally 3 times a day  · 	ursodiol 250 mg oral tablet: 1 tab(s) orally 2 times a day  · 	Norvasc 10 mg oral tablet: 1 tab(s) orally once a day  · 	labetalol 100 mg oral tablet: 3 tab(s) orally 2 times a day  · 	Aspirin Enteric Coated 81 mg oral delayed release tablet: 1 tab(s) orally once a day  · 	methotrexate 2.5 mg oral tablet: 5 tab(s) orally once a week on saturday  · 	hydroxychloroquine 200 mg oral tablet: 2 tab(s) orally once a day  · 	Paxil CR 25 mg oral tablet, extended release: 1 tab(s) orally once a day  · 	lisinopril 20 mg oral tablet: 1 tab(s) orally once a day  · 	predniSONE 10 mg oral tablet: 1 tab(s) orally once a day  · 	MS Contin 15mg tablet: 1 tab(s) orally once a day  · 	oxycodone-acetaminophen 7.5 mg-300 mg oral tablet: 1 tab(s) orally every 6 hours    .    Patient History:    Past Medical History:  CVA (cerebral vascular accident)    GERD (gastroesophageal reflux disease)    Hypertension    Leukocytosis    Osteoporosis    Pneumobilia    Rheumatoid arthritis.     Past Surgical History:  History of hip replacement, unspecified laterality    History of total knee replacement, unspecified laterality.    Hospital Course:  10/16: Admitted to [facility] with Left intertrochantric femur fracture  10/17: underwent left hip intramedullaray nailing; received 1U prbc intraop for acute blood loss anemia intraop; pt tolerated procedure well   10/19: evaluated by physical therapy/occupational therapy who recommended: rehab  Pt stable for discharge on:  Discharge H/H: Chief Complaint/Reason for Admission: mechanical fall  History of Present Illness:   72y Female community ambulator w/o assistive devices presents c/o R/L hip pain and inability to ambulate sp mechanical fall. Denies HS/LOC. Denies numbness/tingling. Denies fever/chills. Denies pain/injury elsewhere.     PAST MEDICAL & SURGICAL HISTORY:  Pneumobilia  Leukocytosis  GERD (gastroesophageal reflux disease)  Hypertension  CVA (cerebral vascular accident)  Osteoporosis  Rheumatoid arthritis  History of hip replacement, unspecified laterality  History of total knee replacement, unspecified laterality    MEDICATIONS  (STANDING):  lactated ringers. 1000 milliLiter(s) IV Continuous <Continuous>    Allergies    Actonel (Hives)  clonidine (Other)  crab meat (Unknown)    Intolerances            Allergies and Intolerances:        Allergies:  	Actonel: Drug, Hives  	clonidine: Drug, Other, dry mouth  	crab meat: Food, Unknown, crab meat    Home Medications:   * Patient Currently Takes Medications as of 28-Sep-2017 12:57 documented in Structured Notes  · 	Vitamin D3 2000 intl units oral capsule: 1 cap(s) orally once a day  · 	Balanced B-50 oral tablet: 1 tab(s) orally once a day   · 	omeprazole 20 mg oral delayed release capsule: 1 cap(s) orally once a day   · 	aliskiren 150 mg oral tablet: 1 tab(s) orally once a day   · 	senna oral tablet: 2 tab(s) orally once a day (at bedtime)  · 	polyethylene glycol 3350 oral powder for reconstitution: 17 gram(s) orally once a day, As needed, Constipation  · 	docusate sodium 100 mg oral capsule: 1 cap(s) orally 3 times a day  · 	ursodiol 250 mg oral tablet: 1 tab(s) orally 2 times a day  · 	Norvasc 10 mg oral tablet: 1 tab(s) orally once a day  · 	labetalol 100 mg oral tablet: 3 tab(s) orally 2 times a day  · 	Aspirin Enteric Coated 81 mg oral delayed release tablet: 1 tab(s) orally once a day  · 	methotrexate 2.5 mg oral tablet: 5 tab(s) orally once a week on saturday  · 	hydroxychloroquine 200 mg oral tablet: 2 tab(s) orally once a day  · 	Paxil CR 25 mg oral tablet, extended release: 1 tab(s) orally once a day  · 	lisinopril 20 mg oral tablet: 1 tab(s) orally once a day  · 	predniSONE 10 mg oral tablet: 1 tab(s) orally once a day  · 	MS Contin 15mg tablet: 1 tab(s) orally once a day  · 	oxycodone-acetaminophen 7.5 mg-300 mg oral tablet: 1 tab(s) orally every 6 hours    .    Patient History:    Past Medical History:  CVA (cerebral vascular accident)    GERD (gastroesophageal reflux disease)    Hypertension    Leukocytosis    Osteoporosis    Pneumobilia    Rheumatoid arthritis.     Past Surgical History:  History of hip replacement, unspecified laterality    History of total knee replacement, unspecified laterality.    Hospital Course:  10/16: Admitted to [facility] with Left intertrochantric femur fracture  10/17: underwent left hip intramedullaray nailing; received 1U prbc intraop for acute blood loss anemia intraop; pt tolerated procedure well   10/19: evaluated by physical therapy/occupational therapy who recommended: rehab.   Pt stable for discharge on:  Discharge H/H: Chief Complaint/Reason for Admission: mechanical fall  History of Present Illness:   72y Female community ambulator w/o assistive devices presents c/o R/L hip pain and inability to ambulate sp mechanical fall. Denies HS/LOC. Denies numbness/tingling. Denies fever/chills. Denies pain/injury elsewhere.     PAST MEDICAL & SURGICAL HISTORY:  Pneumobilia  Leukocytosis  GERD (gastroesophageal reflux disease)  Hypertension  CVA (cerebral vascular accident)  Osteoporosis  Rheumatoid arthritis  History of hip replacement, unspecified laterality  History of total knee replacement, unspecified laterality    MEDICATIONS  (STANDING):  lactated ringers. 1000 milliLiter(s) IV Continuous <Continuous>    Allergies    Actonel (Hives)  clonidine (Other)  crab meat (Unknown)    Intolerances            Allergies and Intolerances:        Allergies:  	Actonel: Drug, Hives  	clonidine: Drug, Other, dry mouth  	crab meat: Food, Unknown, crab meat    Home Medications:   * Patient Currently Takes Medications as of 28-Sep-2017 12:57 documented in Structured Notes  · 	Vitamin D3 2000 intl units oral capsule: 1 cap(s) orally once a day  · 	Balanced B-50 oral tablet: 1 tab(s) orally once a day   · 	omeprazole 20 mg oral delayed release capsule: 1 cap(s) orally once a day   · 	aliskiren 150 mg oral tablet: 1 tab(s) orally once a day   · 	senna oral tablet: 2 tab(s) orally once a day (at bedtime)  · 	polyethylene glycol 3350 oral powder for reconstitution: 17 gram(s) orally once a day, As needed, Constipation  · 	docusate sodium 100 mg oral capsule: 1 cap(s) orally 3 times a day  · 	ursodiol 250 mg oral tablet: 1 tab(s) orally 2 times a day  · 	Norvasc 10 mg oral tablet: 1 tab(s) orally once a day  · 	labetalol 100 mg oral tablet: 3 tab(s) orally 2 times a day  · 	Aspirin Enteric Coated 81 mg oral delayed release tablet: 1 tab(s) orally once a day  · 	methotrexate 2.5 mg oral tablet: 5 tab(s) orally once a week on saturday  · 	hydroxychloroquine 200 mg oral tablet: 2 tab(s) orally once a day  · 	Paxil CR 25 mg oral tablet, extended release: 1 tab(s) orally once a day  · 	lisinopril 20 mg oral tablet: 1 tab(s) orally once a day  · 	predniSONE 10 mg oral tablet: 1 tab(s) orally once a day  · 	MS Contin 15mg tablet: 1 tab(s) orally once a day  · 	oxycodone-acetaminophen 7.5 mg-300 mg oral tablet: 1 tab(s) orally every 6 hours    .    Patient History:    Past Medical History:  CVA (cerebral vascular accident)    GERD (gastroesophageal reflux disease)    Hypertension    Leukocytosis    Osteoporosis    Pneumobilia    Rheumatoid arthritis.     Past Surgical History:  History of hip replacement, unspecified laterality    History of total knee replacement, unspecified laterality.    Hospital Course:  10/16: Admitted to [facility] with Left intertrochantric femur fracture  10/17: underwent left hip intramedullaray nailing; received 1U prbc intraop for acute blood loss anemia intraop; pt tolerated procedure well   10/19: evaluated by physical therapy/occupational therapy who recommended: rehab.   Pt stable for discharge on: 10/23  Discharge H/H:  9/28

## 2017-10-19 NOTE — DISCHARGE NOTE ADULT - PLAN OF CARE
pain free ambulation DIET: resume regular diet regimen   DVT PROPHYLAXIS: ecotrin 325mg twice a day x 6 weeks total of treatment  WEIGHT-BEARING STATUS: weight bearing as tolerated left leg  BATHING: keep dressing clean and dry   DRESSING CHANGES: every 2 days until dressings clean and dry DIET: resume regular diet regimen   DVT PROPHYLAXIS: ecotrin 325mg twice a day x 6 weeks total of treatment  WEIGHT-BEARING STATUS: weight bearing as tolerated left leg  BATHING: keep dressing clean and dry   DRESSING CHANGES: every 2 days ONLY if Necessary

## 2017-10-19 NOTE — DISCHARGE NOTE ADULT - REASON FOR ADMISSION
mechanical fall mechanical fall: L Hip Fracture  Open Reduction Internal Fixation / Surgical Repair / IM Nail L Hip

## 2017-10-19 NOTE — DISCHARGE NOTE ADULT - CARE PLAN
Principal Discharge DX:	Closed displaced intertrochanteric fracture of left femur, initial encounter  Goal:	pain free ambulation  Instructions for follow-up, activity and diet:	DIET: resume regular diet regimen   DVT PROPHYLAXIS: ecotrin 325mg twice a day x 6 weeks total of treatment  WEIGHT-BEARING STATUS: weight bearing as tolerated left leg  BATHING: keep dressing clean and dry   DRESSING CHANGES: every 2 days until dressings clean and dry  Secondary Diagnosis:	Rheumatoid arthritis, involving unspecified site, unspecified rheumatoid factor presence Principal Discharge DX:	Closed displaced intertrochanteric fracture of left femur, initial encounter  Goal:	pain free ambulation  Instructions for follow-up, activity and diet:	DIET: resume regular diet regimen   DVT PROPHYLAXIS: ecotrin 325mg twice a day x 6 weeks total of treatment  WEIGHT-BEARING STATUS: weight bearing as tolerated left leg  BATHING: keep dressing clean and dry   DRESSING CHANGES: every 2 days ONLY if Necessary  Secondary Diagnosis:	Rheumatoid arthritis, involving unspecified site, unspecified rheumatoid factor presence

## 2017-10-19 NOTE — PROGRESS NOTE ADULT - SUBJECTIVE AND OBJECTIVE BOX
72y Female community ambulator w/o assistive devices presents c/o Left hip pain and inability to ambulate sp mechanical fall secondary to fracture. Denies HS/LOC. Denies numbness/tingling. Denies fever/chills. Denies pain/injury elsewhere. Patient post-op ORIF of right hip. Patient seen now resting comfortably. Complains of incisional pain but now with better range of motion. At bedrest post-op.      MEDICATIONS  (STANDING):  aspirin enteric coated 325 milliGRAM(s) Oral two times a day  docusate sodium 100 milliGRAM(s) Oral three times a day  hydroxychloroquine 200 milliGRAM(s) Oral daily  influenza   Vaccine 0.5 milliLiter(s) IntraMuscular once  labetalol 300 milliGRAM(s) Oral two times a day  lactated ringers. 1000 milliLiter(s) (100 mL/Hr) IV Continuous <Continuous>  lisinopril 20 milliGRAM(s) Oral daily  methotrexate (Non - oncologic) 12.5 milliGRAM(s) Oral <User Schedule>  morphine ER Tablet 15 milliGRAM(s) Oral daily  pantoprazole    Tablet 40 milliGRAM(s) Oral before breakfast  PARoxetine 20 milliGRAM(s) Oral daily  predniSONE   Tablet 10 milliGRAM(s) Oral daily  senna 2 Tablet(s) Oral at bedtime  ursodiol Tablet 250 milliGRAM(s) Oral two times a day with meals    MEDICATIONS  (PRN):  acetaminophen   Tablet. 650 milliGRAM(s) Oral every 6 hours PRN Mild Pain (1 - 3)  HYDROmorphone  Injectable 1 milliGRAM(s) IV Push every 6 hours PRN Severe Pain (7 - 10)  oxyCODONE    IR 5 milliGRAM(s) Oral every 3 hours PRN Moderate Pain (4 - 6)  oxyCODONE    IR 10 milliGRAM(s) Oral every 4 hours PRN Severe Pain (7 - 10)  polyethylene glycol 3350 17 Gram(s) Oral daily PRN Constipation      ROS  CONSTITUTIONAL: No weakness, fevers or chills  EYES/ENT: No visual changes;  No vertigo or throat pain   NECK: No pain or stiffness  RESPIRATORY: No cough, wheezing, hemoptysis; No shortness of breath  CARDIOVASCULAR: No chest pain or palpitations  GASTROINTESTINAL: No abdominal or epigastric pain. No nausea, vomiting, or hematemesis; No diarrhea or constipation. No melena or hematochezia.  GENITOURINARY: No dysuria, frequency or hematuria  NEUROLOGICAL: No numbness or weakness  SKIN: No itching, burning, rashes, or lesions   MUSCULOSKELETAL: right leg pain    VITALS:   T(C): 37 (10-19-17 @ 13:12), Max: 37.7 (10-18-17 @ 23:38)  HR: 73 (10-19-17 @ 13:12) (72 - 87)  BP: 143/85 (10-19-17 @ 13:12) (130/73 - 155/72)  RR: 17 (10-19-17 @ 13:12) (17 - 18)  SpO2: 97% (10-19-17 @ 13:12) (95% - 98%)  Wt(kg): --    PHYSICAL EXAM:  GENERAL: NAD, well-groomed, well-developed  HEAD:  Atraumatic, Normocephalic  EYES: EOMI, PERRLA, conjunctiva and sclera clear  ENMT: No tonsillar erythema, exudates, or enlargement; Moist mucous membranes, Good dentition, No lesions  NECK: Supple, No JVD, Normal thyroid  NERVOUS SYSTEM:  Alert & Oriented X3, Good concentration; Motor Strength 5/5 B/L upper and lower extremities; DTRs 2+ intact and symmetric  CHEST/LUNG: Clear to percussion bilaterally; No rales, rhonchi, wheezing, or rubs  HEART: Regular rate and rhythm; No murmurs, rubs, or gallops  ABDOMEN: Soft, Nontender, Nondistended; Bowel sounds present  EXTREMITIES:  2+ Peripheral Pulses, No clubbing, cyanosis, or edema Left hip with 3+ edema post-op  LYMPH: No lymphadenopathy noted  SKIN: No rashes or lesions    LABS:        CBC Full  -  ( 19 Oct 2017 07:25 )  WBC Count : 13.78 K/uL  Hemoglobin : 9.5 g/dL  Hematocrit : 28.3 %  Platelet Count - Automated : 324 K/uL  Mean Cell Volume : 86.8 fl  Mean Cell Hemoglobin : 29.1 pg  Mean Cell Hemoglobin Concentration : 33.6 gm/dL  Auto Neutrophil # : x  Auto Lymphocyte # : x  Auto Monocyte # : x  Auto Eosinophil # : x  Auto Basophil # : x  Auto Neutrophil % : x  Auto Lymphocyte % : x  Auto Monocyte % : x  Auto Eosinophil % : x  Auto Basophil % : x    10-19    137  |  99  |  7   ----------------------------<  83  3.9   |  22  |  0.43<L>    Ca    9.1      19 Oct 2017 07:25

## 2017-10-19 NOTE — DISCHARGE NOTE ADULT - PATIENT PORTAL LINK FT
“You can access the [facility] Patient Portal, offered by [facility], by registering with the following website: [email or web address]”

## 2017-10-19 NOTE — DISCHARGE NOTE ADULT - MEDICATION SUMMARY - MEDICATIONS TO CHANGE
I will SWITCH the dose or number of times a day I take the medications listed below when I get home from the hospital:    Aspirin Enteric Coated 81 mg oral delayed release tablet  -- 1 tab(s) by mouth once a day    Aspirin Enteric Coated 81 mg oral delayed release tablet  -- 1 tab(s) by mouth once a day

## 2017-10-19 NOTE — DISCHARGE NOTE ADULT - MEDICATION SUMMARY - MEDICATIONS TO STOP TAKING
I will STOP taking the medications listed below when I get home from the hospital:    MS Contin 15mg tablet  -- 1 tab(s) by mouth once a day    oxycodone-acetaminophen 7.5 mg-300 mg oral tablet  -- 1 tab(s) by mouth every 6 hours    oxyCODONE-acetaminophen 7.5 mg-325 mg oral tablet  -- 2 tab(s) by mouth every 6 hours

## 2017-10-19 NOTE — DISCHARGE NOTE ADULT - ADDITIONAL INSTRUCTIONS
- Follow up with Dr. De Jesus in 3-4 weeks after surgery; call office for appointment upon discharge from rehab  - - Please have staples/sutures removed by physician 10-14 days after surgery if applicable  - - please follow up with your primary care doctor after discharge from hospital to discuss your hospital stay and any changes to you medications Continue ECOTRIN 325 mg by mouth 2x / day (at breakfast and at dinner) for a total of 6WEEKS then RESUME BABY Aspirin (81mg) Daily  (New Scripts sent to [facility] @ [phone])  - Follow up with Dr. De Jesus 10-12 days FROM SURGERY re: wound check and staple removal  - - please follow up with your primary care doctor after discharge from hospital to discuss your hospital stay and any changes to you medications  Please call for an appointment

## 2017-10-20 LAB
ANION GAP SERPL CALC-SCNC: 13 MMOL/L — SIGNIFICANT CHANGE UP (ref 5–17)
BUN SERPL-MCNC: 11 MG/DL — SIGNIFICANT CHANGE UP (ref 7–23)
CALCIUM SERPL-MCNC: 8.2 MG/DL — LOW (ref 8.4–10.5)
CHLORIDE SERPL-SCNC: 101 MMOL/L — SIGNIFICANT CHANGE UP (ref 96–108)
CO2 SERPL-SCNC: 23 MMOL/L — SIGNIFICANT CHANGE UP (ref 22–31)
CREAT SERPL-MCNC: 0.4 MG/DL — LOW (ref 0.5–1.3)
GLUCOSE SERPL-MCNC: 107 MG/DL — HIGH (ref 70–99)
HCT VFR BLD CALC: 28.1 % — LOW (ref 34.5–45)
HGB BLD-MCNC: 9.4 G/DL — LOW (ref 11.5–15.5)
MCHC RBC-ENTMCNC: 30.4 PG — SIGNIFICANT CHANGE UP (ref 27–34)
MCHC RBC-ENTMCNC: 33.3 GM/DL — SIGNIFICANT CHANGE UP (ref 32–36)
MCV RBC AUTO: 91.4 FL — SIGNIFICANT CHANGE UP (ref 80–100)
PLATELET # BLD AUTO: 368 K/UL — SIGNIFICANT CHANGE UP (ref 150–400)
POTASSIUM SERPL-MCNC: 3.4 MMOL/L — LOW (ref 3.5–5.3)
POTASSIUM SERPL-SCNC: 3.4 MMOL/L — LOW (ref 3.5–5.3)
RBC # BLD: 3.07 M/UL — LOW (ref 3.8–5.2)
RBC # FLD: 18.4 % — HIGH (ref 10.3–14.5)
SODIUM SERPL-SCNC: 137 MMOL/L — SIGNIFICANT CHANGE UP (ref 135–145)
WBC # BLD: 11.3 K/UL — HIGH (ref 3.8–10.5)
WBC # FLD AUTO: 11.3 K/UL — HIGH (ref 3.8–10.5)

## 2017-10-20 RX ADMIN — URSODIOL 250 MILLIGRAM(S): 250 TABLET, FILM COATED ORAL at 08:25

## 2017-10-20 RX ADMIN — Medication 200 MILLIGRAM(S): at 12:26

## 2017-10-20 RX ADMIN — OXYCODONE HYDROCHLORIDE 10 MILLIGRAM(S): 5 TABLET ORAL at 04:27

## 2017-10-20 RX ADMIN — OXYCODONE HYDROCHLORIDE 10 MILLIGRAM(S): 5 TABLET ORAL at 08:59

## 2017-10-20 RX ADMIN — OXYCODONE HYDROCHLORIDE 10 MILLIGRAM(S): 5 TABLET ORAL at 21:00

## 2017-10-20 RX ADMIN — LISINOPRIL 20 MILLIGRAM(S): 2.5 TABLET ORAL at 06:36

## 2017-10-20 RX ADMIN — Medication 10 MILLIGRAM(S): at 06:37

## 2017-10-20 RX ADMIN — URSODIOL 250 MILLIGRAM(S): 250 TABLET, FILM COATED ORAL at 17:46

## 2017-10-20 RX ADMIN — Medication 325 MILLIGRAM(S): at 06:36

## 2017-10-20 RX ADMIN — OXYCODONE HYDROCHLORIDE 10 MILLIGRAM(S): 5 TABLET ORAL at 00:20

## 2017-10-20 RX ADMIN — Medication 325 MILLIGRAM(S): at 17:46

## 2017-10-20 RX ADMIN — MORPHINE SULFATE 15 MILLIGRAM(S): 50 CAPSULE, EXTENDED RELEASE ORAL at 15:15

## 2017-10-20 RX ADMIN — Medication 20 MILLIGRAM(S): at 12:26

## 2017-10-20 RX ADMIN — OXYCODONE HYDROCHLORIDE 10 MILLIGRAM(S): 5 TABLET ORAL at 12:45

## 2017-10-20 RX ADMIN — Medication 300 MILLIGRAM(S): at 17:46

## 2017-10-20 RX ADMIN — OXYCODONE HYDROCHLORIDE 10 MILLIGRAM(S): 5 TABLET ORAL at 05:00

## 2017-10-20 RX ADMIN — OXYCODONE HYDROCHLORIDE 10 MILLIGRAM(S): 5 TABLET ORAL at 12:27

## 2017-10-20 RX ADMIN — PANTOPRAZOLE SODIUM 40 MILLIGRAM(S): 20 TABLET, DELAYED RELEASE ORAL at 06:36

## 2017-10-20 RX ADMIN — OXYCODONE HYDROCHLORIDE 10 MILLIGRAM(S): 5 TABLET ORAL at 08:25

## 2017-10-20 RX ADMIN — OXYCODONE HYDROCHLORIDE 10 MILLIGRAM(S): 5 TABLET ORAL at 20:28

## 2017-10-20 RX ADMIN — OXYCODONE HYDROCHLORIDE 10 MILLIGRAM(S): 5 TABLET ORAL at 16:18

## 2017-10-20 RX ADMIN — OXYCODONE HYDROCHLORIDE 10 MILLIGRAM(S): 5 TABLET ORAL at 00:50

## 2017-10-20 RX ADMIN — Medication 300 MILLIGRAM(S): at 06:36

## 2017-10-20 RX ADMIN — OXYCODONE HYDROCHLORIDE 10 MILLIGRAM(S): 5 TABLET ORAL at 16:44

## 2017-10-20 RX ADMIN — MORPHINE SULFATE 15 MILLIGRAM(S): 50 CAPSULE, EXTENDED RELEASE ORAL at 14:35

## 2017-10-20 NOTE — PROGRESS NOTE ADULT - SUBJECTIVE AND OBJECTIVE BOX
Patient seen and examined at bedside.  No acute events overnight    AVSS    PE:  DSG C/D/I  SILT  +TA EHL FHL GSC  soft compartments  neg calf ttp      72F s/p Left hip IMN POD #3    pain control  dvt ppx  PT   WBAT  dispo planning

## 2017-10-20 NOTE — PROGRESS NOTE ADULT - SUBJECTIVE AND OBJECTIVE BOX
Patient is a 72y old  Female who presents with a chief complaint of mechanical fall (19 Oct 2017 11:06)      HPI:    MEDICATIONS  (STANDING):  aspirin enteric coated 325 milliGRAM(s) Oral two times a day  docusate sodium 100 milliGRAM(s) Oral three times a day  hydroxychloroquine 200 milliGRAM(s) Oral daily  influenza   Vaccine 0.5 milliLiter(s) IntraMuscular once  labetalol 300 milliGRAM(s) Oral two times a day  lactated ringers. 1000 milliLiter(s) (100 mL/Hr) IV Continuous <Continuous>  lisinopril 20 milliGRAM(s) Oral daily  methotrexate (Non - oncologic) 12.5 milliGRAM(s) Oral <User Schedule>  morphine ER Tablet 15 milliGRAM(s) Oral daily  pantoprazole    Tablet 40 milliGRAM(s) Oral before breakfast  PARoxetine 20 milliGRAM(s) Oral daily  predniSONE   Tablet 10 milliGRAM(s) Oral daily  senna 2 Tablet(s) Oral at bedtime  ursodiol Tablet 250 milliGRAM(s) Oral two times a day with meals    MEDICATIONS  (PRN):  acetaminophen   Tablet. 650 milliGRAM(s) Oral every 6 hours PRN Mild Pain (1 - 3)  HYDROmorphone  Injectable 1 milliGRAM(s) IV Push every 6 hours PRN Severe Pain (7 - 10)  oxyCODONE    IR 5 milliGRAM(s) Oral every 3 hours PRN Moderate Pain (4 - 6)  oxyCODONE    IR 10 milliGRAM(s) Oral every 4 hours PRN Severe Pain (7 - 10)  polyethylene glycol 3350 17 Gram(s) Oral daily PRN Constipation      Allergies    Actonel (Hives)  clonidine (Other)  crab meat (Unknown)    Intolerances        REVIEW OF SYSTEM:  CONSTITUTIONAL: No fever, No change in weight, No fatigue  HEAD: No headache, No dizziness, No recent trauma  EYES: No eye pain, No visual disturbances, No discharge  ENT:  No difficulty hearing, No tinnitus, No vertigo, No sinus pain, No throat pain  NECK: No pain, No stiffness  BREASTS: No pain, No masses, No nipple discharge  RESPIRATORY: No cough, No wheezing, No chills, No hemoptysis, No shortness of breath at rest or exertional shortness of breath  CARDIOVASCULAR: No chest pain, No palpitations, No dizziness, No CHF, No arrhythmia, No cardiomegaly, No leg swelling  GASTROINTESTINAL: No abdominal, No epigastric pain. No nausea, No vomiting, No hematemesis, No diarrhea, No constipation. No melena, No hematochezia. No GERD  GENITOURINARY: No dysuria, No frequency, No hematuria, No incontinence, No nocturia, No hesitancy,  SKIN: No itching, No burning, No rashes, No lesions   LYMPH NODES: No history of enlarged glands  ENDOCRINE: No heat or cold intolerance, No hair loss. No osteoporosis, No thyroid disease  MUSCULOSKELETAL: No joint pain or swelling, No muscle, back, or extremity pain  PSYCHIATRIC: No depression, No anxiety, No mood swings, No difficulty sleeping  HEME/LYMPH: No easy bruising, No anticoagulants, No bleeding disorder, No bleeding gums  ALLERGY AND IMMUNOLOGIC: No hives, No eczema  NEUROLOGICAL: No memory loss, No loss of strength, No numbness, No tremors        VITALS:   T(C): 36.6 (10-20-17 @ 21:26), Max: 36.9 (10-20-17 @ 05:13)  HR: 82 (10-20-17 @ 21:26) (64 - 82)  BP: 115/67 (10-20-17 @ 21:26) (107/73 - 157/77)  RR: 18 (10-20-17 @ 21:26) (18 - 18)  SpO2: 93% (10-20-17 @ 21:26) (93% - 99%)  Wt(kg): --    10-19 @ 07:01  -  10-20 @ 07:00  --------------------------------------------------------  IN: 1660 mL / OUT: 550 mL / NET: 1110 mL    10-20 @ 07:01  -  10-20 @ 22:25  --------------------------------------------------------  IN: 660 mL / OUT: 1150 mL / NET: -490 mL        PHYSICAL EXAM:  GENERAL: NAD, well nourished and conversant  HEAD:  Atraumatic  EYES: EOM, PERRLA, conjunctiva pink and sclera white  ENT: No tonsillar erythema, exudates, or enlargement, moist mucous membranes, good dentition, no lesions  NECK: Supple, No JVD, normal thyroid, carotids with normal upstrokes and no bruits  CHEST/LUNG: Clear to auscultation bilaterally, No rales, rhonchi, wheezing, or rubs  HEART: Regular rate and rhythm, No murmurs, rubs, or gallops  ABDOMEN: Soft, nondistended, no masses, guarding, tenderness or rebound, bowel sounds present  EXTREMITIES:  2+ Peripheral Pulses, No clubbing, cyanosis, or edema. No arthritis of shoulders, elbows, hands, hips, knees, ankles, or feet. No DJD C spine, T spine, or L/S spine  LYMPH: No lymphadenopathy noted  SKIN: No rashes or lesions  NERVOUS SYSTEM:  Alert & Oriented X3, normal cognitive function. Motor Strength 5/5 right upper and right lower.  5/5 left upper and left lower extremities, DTRs 2+ intact and symmetric    LABS:                          9.4    11.3  )-----------( 368      ( 20 Oct 2017 06:28 )             28.1     10-20    137  |  101  |  11  ----------------------------<  107<H>  3.4<L>   |  23  |  0.40<L>  10-19    137  |  99  |  7   ----------------------------<  83  3.9   |  22  |  0.43<L>  10-18    138  |  103  |  6<L>  ----------------------------<  102<H>  3.4<L>   |  22  |  0.43<L>    Ca    8.2<L>      20 Oct 2017 06:28  Ca    9.1      19 Oct 2017 07:25  Ca    7.8<L>      18 Oct 2017 07:33      CAPILLARY BLOOD GLUCOSE          RADIOLOGY & ADDITIONAL TESTS:      Consultant(s):    Care Discussed with Consultants/Other Providers [ ] YES  [ ] NO Patient is a 72y old  Female who presents with a chief complaint of mechanical fall (19 Oct 2017 11:06)      HPI: no specific complaints no chest pain or sob or chest pain   (+) bony pain 3/10    MEDICATIONS  (STANDING):  aspirin enteric coated 325 milliGRAM(s) Oral two times a day  docusate sodium 100 milliGRAM(s) Oral three times a day  hydroxychloroquine 200 milliGRAM(s) Oral daily  influenza   Vaccine 0.5 milliLiter(s) IntraMuscular once  labetalol 300 milliGRAM(s) Oral two times a day  lactated ringers. 1000 milliLiter(s) (100 mL/Hr) IV Continuous <Continuous>  lisinopril 20 milliGRAM(s) Oral daily  methotrexate (Non - oncologic) 12.5 milliGRAM(s) Oral <User Schedule>  morphine ER Tablet 15 milliGRAM(s) Oral daily  pantoprazole    Tablet 40 milliGRAM(s) Oral before breakfast  PARoxetine 20 milliGRAM(s) Oral daily  predniSONE   Tablet 10 milliGRAM(s) Oral daily  senna 2 Tablet(s) Oral at bedtime  ursodiol Tablet 250 milliGRAM(s) Oral two times a day with meals    MEDICATIONS  (PRN):  acetaminophen   Tablet. 650 milliGRAM(s) Oral every 6 hours PRN Mild Pain (1 - 3)  HYDROmorphone  Injectable 1 milliGRAM(s) IV Push every 6 hours PRN Severe Pain (7 - 10)  oxyCODONE    IR 5 milliGRAM(s) Oral every 3 hours PRN Moderate Pain (4 - 6)  oxyCODONE    IR 10 milliGRAM(s) Oral every 4 hours PRN Severe Pain (7 - 10)  polyethylene glycol 3350 17 Gram(s) Oral daily PRN Constipation      Allergies    Actonel (Hives)  clonidine (Other)  crab meat (Unknown)    Intolerances        REVIEW OF SYSTEM:  CONSTITUTIONAL: No fever, No change in weight, No fatigue  HEAD: No headache, No dizziness, No recent trauma  EYES: No eye pain, No visual disturbances, No discharge  ENT:  No difficulty hearing, No tinnitus, No vertigo, No sinus pain, No throat pain  NECK: No pain, No stiffness  BREASTS: No pain, No masses, No nipple discharge  RESPIRATORY: No cough, No wheezing, No chills, No hemoptysis, No shortness of breath at rest or exertional shortness of breath  CARDIOVASCULAR: No chest pain, No palpitations, No dizziness, No CHF, No arrhythmia, No cardiomegaly, No leg swelling  GASTROINTESTINAL: No abdominal, No epigastric pain. No nausea, No vomiting, No hematemesis, No diarrhea, No constipation. No melena, No hematochezia. No GERD  GENITOURINARY: No dysuria, No frequency, No hematuria, No incontinence, No nocturia, No hesitancy,  SKIN: No itching, No burning, No rashes, No lesions   LYMPH NODES: No history of enlarged glands  ENDOCRINE: No heat or cold intolerance, No hair loss. No osteoporosis, No thyroid disease  MUSCULOSKELETAL:  pain 3/10 from femur  fracture repain  PSYCHIATRIC: No depression, No anxiety, No mood swings, No difficulty sleeping  HEME/LYMPH: No easy bruising, No anticoagulants, No bleeding disorder, No bleeding gums  ALLERGY AND IMMUNOLOGIC: No hives, No eczema  NEUROLOGICAL: No memory loss, No loss of strength, No numbness, No tremors        VITALS:   T(C): 36.6 (10-20-17 @ 21:26), Max: 36.9 (10-20-17 @ 05:13)  HR: 82 (10-20-17 @ 21:26) (64 - 82)  BP: 115/67 (10-20-17 @ 21:26) (107/73 - 157/77)  RR: 18 (10-20-17 @ 21:26) (18 - 18)  SpO2: 93% (10-20-17 @ 21:26) (93% - 99%)  Wt(kg): --    10-19 @ 07:01  -  10-20 @ 07:00  --------------------------------------------------------  IN: 1660 mL / OUT: 550 mL / NET: 1110 mL    10-20 @ 07:01  -  10-20 @ 22:25  --------------------------------------------------------  IN: 660 mL / OUT: 1150 mL / NET: -490 mL        PHYSICAL EXAM:  GENERAL: NAD, well nourished and conversant  HEAD:  Atraumatic  EYES: EOM, PERRLA, conjunctiva pink and sclera white  ENT: No tonsillar erythema, exudates, or enlargement, moist mucous membranes, good dentition, no lesions  NECK: Supple, No JVD, normal thyroid, carotids with normal upstrokes and no bruits  CHEST/LUNG: Clear to auscultation bilaterally, No rales, rhonchi, wheezing, or rubs  HEART: Regular rate and rhythm, No murmurs, rubs, or gallops  ABDOMEN: Soft, nondistended, no masses, guarding, tenderness or rebound, bowel sounds present  EXTREMITIES:  2+ Peripheral Pulses, No clubbing, cyanosis, or edema.   (+) femur fracture pain   LYMPH: No lymphadenopathy noted  SKIN: No rashes or lesions  NERVOUS SYSTEM:  Alert & Oriented X3, normal cognitive function. Motor Strength 5/5 right upper and right lower.  5/5 left upper and left lower extremities, DTRs 2+ intact and symmetric    LABS:                          9.4    11.3  )-----------( 368      ( 20 Oct 2017 06:28 )             28.1     10-20    137  |  101  |  11  ----------------------------<  107<H>  3.4<L>   |  23  |  0.40<L>  10-19    137  |  99  |  7   ----------------------------<  83  3.9   |  22  |  0.43<L>  10-18    138  |  103  |  6<L>  ----------------------------<  102<H>  3.4<L>   |  22  |  0.43<L>    Ca    8.2<L>      20 Oct 2017 06:28  Ca    9.1      19 Oct 2017 07:25  Ca    7.8<L>      18 Oct 2017 07:33      CAPILLARY BLOOD GLUCOSE          RADIOLOGY & ADDITIONAL TESTS:      Consultant(s):    Care Discussed with Consultants/Other Providers [ ] YES  [ ] NO

## 2017-10-21 DIAGNOSIS — E87.6 HYPOKALEMIA: ICD-10-CM

## 2017-10-21 RX ADMIN — OXYCODONE HYDROCHLORIDE 10 MILLIGRAM(S): 5 TABLET ORAL at 08:45

## 2017-10-21 RX ADMIN — OXYCODONE HYDROCHLORIDE 10 MILLIGRAM(S): 5 TABLET ORAL at 04:40

## 2017-10-21 RX ADMIN — OXYCODONE HYDROCHLORIDE 10 MILLIGRAM(S): 5 TABLET ORAL at 16:19

## 2017-10-21 RX ADMIN — PANTOPRAZOLE SODIUM 40 MILLIGRAM(S): 20 TABLET, DELAYED RELEASE ORAL at 06:39

## 2017-10-21 RX ADMIN — OXYCODONE HYDROCHLORIDE 10 MILLIGRAM(S): 5 TABLET ORAL at 20:51

## 2017-10-21 RX ADMIN — MORPHINE SULFATE 15 MILLIGRAM(S): 50 CAPSULE, EXTENDED RELEASE ORAL at 12:40

## 2017-10-21 RX ADMIN — Medication 20 MILLIGRAM(S): at 11:58

## 2017-10-21 RX ADMIN — Medication 325 MILLIGRAM(S): at 18:04

## 2017-10-21 RX ADMIN — OXYCODONE HYDROCHLORIDE 10 MILLIGRAM(S): 5 TABLET ORAL at 17:30

## 2017-10-21 RX ADMIN — OXYCODONE HYDROCHLORIDE 10 MILLIGRAM(S): 5 TABLET ORAL at 13:30

## 2017-10-21 RX ADMIN — LISINOPRIL 20 MILLIGRAM(S): 2.5 TABLET ORAL at 06:39

## 2017-10-21 RX ADMIN — OXYCODONE HYDROCHLORIDE 10 MILLIGRAM(S): 5 TABLET ORAL at 00:27

## 2017-10-21 RX ADMIN — Medication 200 MILLIGRAM(S): at 11:58

## 2017-10-21 RX ADMIN — URSODIOL 250 MILLIGRAM(S): 250 TABLET, FILM COATED ORAL at 07:48

## 2017-10-21 RX ADMIN — OXYCODONE HYDROCHLORIDE 10 MILLIGRAM(S): 5 TABLET ORAL at 20:21

## 2017-10-21 RX ADMIN — OXYCODONE HYDROCHLORIDE 10 MILLIGRAM(S): 5 TABLET ORAL at 12:44

## 2017-10-21 RX ADMIN — MORPHINE SULFATE 15 MILLIGRAM(S): 50 CAPSULE, EXTENDED RELEASE ORAL at 11:58

## 2017-10-21 RX ADMIN — Medication 300 MILLIGRAM(S): at 06:39

## 2017-10-21 RX ADMIN — Medication 10 MILLIGRAM(S): at 06:39

## 2017-10-21 RX ADMIN — Medication 300 MILLIGRAM(S): at 18:04

## 2017-10-21 RX ADMIN — Medication 325 MILLIGRAM(S): at 06:39

## 2017-10-21 RX ADMIN — OXYCODONE HYDROCHLORIDE 10 MILLIGRAM(S): 5 TABLET ORAL at 09:30

## 2017-10-21 RX ADMIN — METHOTREXATE 12.5 MILLIGRAM(S): 2.5 TABLET ORAL at 06:39

## 2017-10-21 RX ADMIN — OXYCODONE HYDROCHLORIDE 10 MILLIGRAM(S): 5 TABLET ORAL at 05:15

## 2017-10-21 RX ADMIN — URSODIOL 250 MILLIGRAM(S): 250 TABLET, FILM COATED ORAL at 18:04

## 2017-10-21 NOTE — PROGRESS NOTE ADULT - SUBJECTIVE AND OBJECTIVE BOX
72y Female community ambulator w/o assistive devices presents c/o Left hip pain and inability to ambulate sp mechanical fall secondary to fracture. Denies HS/LOC. Denies numbness/tingling. Denies fever/chills. Denies pain/injury elsewhere. Patient post-op ORIF of right hip. Patient seen now resting comfortably. Complains of incisional pain but now with better range of motion. At bedrest post-op.    MEDICATIONS  (STANDING):  aspirin enteric coated 325 milliGRAM(s) Oral two times a day  docusate sodium 100 milliGRAM(s) Oral three times a day  hydroxychloroquine 200 milliGRAM(s) Oral daily  influenza   Vaccine 0.5 milliLiter(s) IntraMuscular once  labetalol 300 milliGRAM(s) Oral two times a day  lactated ringers. 1000 milliLiter(s) (100 mL/Hr) IV Continuous <Continuous>  lisinopril 20 milliGRAM(s) Oral daily  methotrexate (Non - oncologic) 12.5 milliGRAM(s) Oral <User Schedule>  morphine ER Tablet 15 milliGRAM(s) Oral daily  pantoprazole    Tablet 40 milliGRAM(s) Oral before breakfast  PARoxetine 20 milliGRAM(s) Oral daily  predniSONE   Tablet 10 milliGRAM(s) Oral daily  senna 2 Tablet(s) Oral at bedtime  ursodiol Tablet 250 milliGRAM(s) Oral two times a day with meals    MEDICATIONS  (PRN):  acetaminophen   Tablet. 650 milliGRAM(s) Oral every 6 hours PRN Mild Pain (1 - 3)  HYDROmorphone  Injectable 1 milliGRAM(s) IV Push every 6 hours PRN Severe Pain (7 - 10)  oxyCODONE    IR 5 milliGRAM(s) Oral every 3 hours PRN Moderate Pain (4 - 6)  oxyCODONE    IR 10 milliGRAM(s) Oral every 4 hours PRN Severe Pain (7 - 10)  polyethylene glycol 3350 17 Gram(s) Oral daily PRN Constipation          ROS  CONSTITUTIONAL: No weakness, fevers or chills  EYES/ENT: No visual changes;  No vertigo or throat pain   NECK: No pain or stiffness  RESPIRATORY: No cough, wheezing, hemoptysis; No shortness of breath  CARDIOVASCULAR: No chest pain or palpitations  GASTROINTESTINAL: No abdominal or epigastric pain. No nausea, vomiting, or hematemesis; No diarrhea or constipation. No melena or hematochezia.  GENITOURINARY: No dysuria, frequency or hematuria  NEUROLOGICAL: No numbness or weakness  SKIN: No itching, burning, rashes, or lesions   MUSCULOSKELETAL: right leg pain    Vital Signs Last 24 Hrs  T(C): 36.8 (21 Oct 2017 08:19), Max: 36.9 (20 Oct 2017 16:40)  T(F): 98.3 (21 Oct 2017 08:19), Max: 98.4 (20 Oct 2017 16:40)  HR: 70 (21 Oct 2017 08:19) (64 - 82)  BP: 114/72 (21 Oct 2017 08:19) (114/72 - 157/77)  BP(mean): --  RR: 18 (21 Oct 2017 08:19) (18 - 18)  SpO2: 97% (21 Oct 2017 08:19) (93% - 100%)      PHYSICAL EXAM:  GENERAL: NAD, well-groomed, well-developed  HEAD:  Atraumatic, Normocephalic  EYES: EOMI, PERRLA, conjunctiva and sclera clear  ENMT: No tonsillar erythema, exudates, or enlargement; Moist mucous membranes, Good dentition, No lesions  NECK: Supple, No JVD, Normal thyroid  NERVOUS SYSTEM:  Alert & Oriented X3, Good concentration; Motor Strength 5/5 B/L upper and lower extremities; DTRs 2+ intact and symmetric  CHEST/LUNG: Clear to percussion bilaterally; No rales, rhonchi, wheezing, or rubs  HEART: Regular rate and rhythm; No murmurs, rubs, or gallops  ABDOMEN: Soft, Nontender, Nondistended; Bowel sounds present  EXTREMITIES:  2+ Peripheral Pulses, No clubbing, cyanosis, or edema Left hip with 3+ edema post-op  LYMPH: No lymphadenopathy noted  SKIN: No rashes or lesions      CBC Full  -  ( 20 Oct 2017 06:28 )  WBC Count : 11.3 K/uL  Hemoglobin : 9.4 g/dL  Hematocrit : 28.1 %  Platelet Count - Automated : 368 K/uL  Mean Cell Volume : 91.4 fl  Mean Cell Hemoglobin : 30.4 pg  Mean Cell Hemoglobin Concentration : 33.3 gm/dL  Auto Neutrophil # : x  Auto Lymphocyte # : x  Auto Monocyte # : x  Auto Eosinophil # : x  Auto Basophil # : x  Auto Neutrophil % : x  Auto Lymphocyte % : x  Auto Monocyte % : x  Auto Eosinophil % : x  Auto Basophil % : x    10-20    137  |  101  |  11  ----------------------------<  107<H>  3.4<L>   |  23  |  0.40<L>    Ca    8.2<L>      20 Oct 2017 06:28

## 2017-10-21 NOTE — PROGRESS NOTE ADULT - SUBJECTIVE AND OBJECTIVE BOX
Patient is a 72y old  Female who presents with a chief complaint of mechanical fall (19 Oct 2017 11:06)      POST OPERATIVE DAY #: 4  Patient comfortable       VS:  T(C): 36.8 (10-21-17 @ 05:23), Max: 36.9 (10-20-17 @ 16:40)  T(F): 98.3 (10-21-17 @ 05:23), Max: 98.4 (10-20-17 @ 16:40)  HR: 66 (10-21-17 @ 05:23) (64 - 82)  BP: 152/81 (10-21-17 @ 05:23) (115/67 - 157/77)  RR: 18 (10-21-17 @ 05:23) (18 - 18)  SpO2: 100% (10-21-17 @ 05:23) (93% - 100%)  Wt(kg): --      PHYSICAL EXAM:  NAD, Alert  EXT:   Lt Hip   incisions clean and dry              healing well  No Calf Tenderness  (+) DF/PF; (+) Distal Pulses;  Sensation: No gross deficits noted      B/L, PAS     LABS:                        9.4<L>  11.3<H> )-----------( 368      ( 20 Oct 2017 06:28 )             28.1<L>    10-20    137  |  101  |  11  ----------------------------<  107<H>  3.4<L>   |  23  |  0.40<L>

## 2017-10-21 NOTE — PROGRESS NOTE ADULT - ASSESSMENT
73 yo woman hx of a fall with a left hip fx.  Post-op ORIF with IM nail 71 yo woman hx of a fall with a left hip fx.  Post-op ORIF with IM nail. Stable and requires suitable rehabilitation that has the ability to  communicate  with her in her own langauge

## 2017-10-22 RX ADMIN — OXYCODONE HYDROCHLORIDE 10 MILLIGRAM(S): 5 TABLET ORAL at 20:31

## 2017-10-22 RX ADMIN — PANTOPRAZOLE SODIUM 40 MILLIGRAM(S): 20 TABLET, DELAYED RELEASE ORAL at 05:23

## 2017-10-22 RX ADMIN — LISINOPRIL 20 MILLIGRAM(S): 2.5 TABLET ORAL at 05:23

## 2017-10-22 RX ADMIN — Medication 200 MILLIGRAM(S): at 11:54

## 2017-10-22 RX ADMIN — OXYCODONE HYDROCHLORIDE 10 MILLIGRAM(S): 5 TABLET ORAL at 09:30

## 2017-10-22 RX ADMIN — URSODIOL 250 MILLIGRAM(S): 250 TABLET, FILM COATED ORAL at 16:34

## 2017-10-22 RX ADMIN — Medication 100 MILLIGRAM(S): at 13:08

## 2017-10-22 RX ADMIN — OXYCODONE HYDROCHLORIDE 10 MILLIGRAM(S): 5 TABLET ORAL at 01:05

## 2017-10-22 RX ADMIN — OXYCODONE HYDROCHLORIDE 10 MILLIGRAM(S): 5 TABLET ORAL at 08:42

## 2017-10-22 RX ADMIN — OXYCODONE HYDROCHLORIDE 10 MILLIGRAM(S): 5 TABLET ORAL at 13:07

## 2017-10-22 RX ADMIN — Medication 300 MILLIGRAM(S): at 05:23

## 2017-10-22 RX ADMIN — OXYCODONE HYDROCHLORIDE 10 MILLIGRAM(S): 5 TABLET ORAL at 05:50

## 2017-10-22 RX ADMIN — URSODIOL 250 MILLIGRAM(S): 250 TABLET, FILM COATED ORAL at 08:43

## 2017-10-22 RX ADMIN — OXYCODONE HYDROCHLORIDE 10 MILLIGRAM(S): 5 TABLET ORAL at 13:44

## 2017-10-22 RX ADMIN — OXYCODONE HYDROCHLORIDE 10 MILLIGRAM(S): 5 TABLET ORAL at 16:33

## 2017-10-22 RX ADMIN — OXYCODONE HYDROCHLORIDE 10 MILLIGRAM(S): 5 TABLET ORAL at 00:34

## 2017-10-22 RX ADMIN — OXYCODONE HYDROCHLORIDE 10 MILLIGRAM(S): 5 TABLET ORAL at 17:30

## 2017-10-22 RX ADMIN — MORPHINE SULFATE 15 MILLIGRAM(S): 50 CAPSULE, EXTENDED RELEASE ORAL at 11:53

## 2017-10-22 RX ADMIN — OXYCODONE HYDROCHLORIDE 10 MILLIGRAM(S): 5 TABLET ORAL at 21:01

## 2017-10-22 RX ADMIN — Medication 20 MILLIGRAM(S): at 11:53

## 2017-10-22 RX ADMIN — MORPHINE SULFATE 15 MILLIGRAM(S): 50 CAPSULE, EXTENDED RELEASE ORAL at 12:30

## 2017-10-22 RX ADMIN — Medication 300 MILLIGRAM(S): at 17:35

## 2017-10-22 RX ADMIN — Medication 325 MILLIGRAM(S): at 17:35

## 2017-10-22 RX ADMIN — Medication 10 MILLIGRAM(S): at 05:23

## 2017-10-22 RX ADMIN — OXYCODONE HYDROCHLORIDE 10 MILLIGRAM(S): 5 TABLET ORAL at 05:21

## 2017-10-22 RX ADMIN — Medication 325 MILLIGRAM(S): at 05:23

## 2017-10-22 NOTE — PROGRESS NOTE ADULT - SUBJECTIVE AND OBJECTIVE BOX
72y Female community ambulator w/o assistive devices presents c/o Left hip pain and inability to ambulate sp mechanical fall secondary to fracture. Denies HS/LOC. Denies numbness/tingling. Denies fever/chills. Denies pain/injury elsewhere. Patient post-op ORIF of right hip. Patient seen now resting comfortably. Complains of incisional pain but now with better range of motion. At bedrest post-op.    MEDICATIONS  (STANDING):  aspirin enteric coated 325 milliGRAM(s) Oral two times a day  docusate sodium 100 milliGRAM(s) Oral three times a day  hydroxychloroquine 200 milliGRAM(s) Oral daily  influenza   Vaccine 0.5 milliLiter(s) IntraMuscular once  labetalol 300 milliGRAM(s) Oral two times a day  lactated ringers. 1000 milliLiter(s) (100 mL/Hr) IV Continuous <Continuous>  lisinopril 20 milliGRAM(s) Oral daily  methotrexate (Non - oncologic) 12.5 milliGRAM(s) Oral <User Schedule>  morphine ER Tablet 15 milliGRAM(s) Oral daily  pantoprazole    Tablet 40 milliGRAM(s) Oral before breakfast  PARoxetine 20 milliGRAM(s) Oral daily  predniSONE   Tablet 10 milliGRAM(s) Oral daily  senna 2 Tablet(s) Oral at bedtime  ursodiol Tablet 250 milliGRAM(s) Oral two times a day with meals    MEDICATIONS  (PRN):  acetaminophen   Tablet. 650 milliGRAM(s) Oral every 6 hours PRN Mild Pain (1 - 3)  HYDROmorphone  Injectable 1 milliGRAM(s) IV Push every 6 hours PRN Severe Pain (7 - 10)  oxyCODONE    IR 5 milliGRAM(s) Oral every 3 hours PRN Moderate Pain (4 - 6)  oxyCODONE    IR 10 milliGRAM(s) Oral every 4 hours PRN Severe Pain (7 - 10)  polyethylene glycol 3350 17 Gram(s) Oral daily PRN Constipation          ROS  CONSTITUTIONAL: No weakness, fevers or chills  EYES/ENT: No visual changes;  No vertigo or throat pain   NECK: No pain or stiffness  RESPIRATORY: No cough, wheezing, hemoptysis; No shortness of breath  CARDIOVASCULAR: No chest pain or palpitations  GASTROINTESTINAL: No abdominal or epigastric pain. No nausea, vomiting, or hematemesis; No diarrhea or constipation. No melena or hematochezia.  GENITOURINARY: No dysuria, frequency or hematuria  NEUROLOGICAL: No numbness or weakness  SKIN: No itching, burning, rashes, or lesions   MUSCULOSKELETAL: right leg pain    Vital Signs Last 24 Hrs  T(C): 36.8 (21 Oct 2017 08:19), Max: 36.9 (20 Oct 2017 16:40)  T(F): 98.3 (21 Oct 2017 08:19), Max: 98.4 (20 Oct 2017 16:40)  HR: 70 (21 Oct 2017 08:19) (64 - 82)  BP: 114/72 (21 Oct 2017 08:19) (114/72 - 157/77)  BP(mean): --  RR: 18 (21 Oct 2017 08:19) (18 - 18)  SpO2: 97% (21 Oct 2017 08:19) (93% - 100%)      PHYSICAL EXAM:  GENERAL: NAD, well-groomed, well-developed  HEAD:  Atraumatic, Normocephalic  EYES: EOMI, PERRLA, conjunctiva and sclera clear  ENMT: No tonsillar erythema, exudates, or enlargement; Moist mucous membranes, Good dentition, No lesions  NECK: Supple, No JVD, Normal thyroid  NERVOUS SYSTEM:  Alert & Oriented X3, Good concentration; Motor Strength 5/5 B/L upper and lower extremities; DTRs 2+ intact and symmetric  CHEST/LUNG: Clear to percussion bilaterally; No rales, rhonchi, wheezing, or rubs  HEART: Regular rate and rhythm; No murmurs, rubs, or gallops  ABDOMEN: Soft, Nontender, Nondistended; Bowel sounds present  EXTREMITIES:  2+ Peripheral Pulses, No clubbing, cyanosis, or edema Left hip with 3+ edema post-op  LYMPH: No lymphadenopathy noted  SKIN: No rashes or lesions      CBC Full  -  ( 20 Oct 2017 06:28 )  WBC Count : 11.3 K/uL  Hemoglobin : 9.4 g/dL  Hematocrit : 28.1 %  Platelet Count - Automated : 368 K/uL  Mean Cell Volume : 91.4 fl  Mean Cell Hemoglobin : 30.4 pg  Mean Cell Hemoglobin Concentration : 33.3 gm/dL  Auto Neutrophil # : x  Auto Lymphocyte # : x  Auto Monocyte # : x  Auto Eosinophil # : x  Auto Basophil # : x  Auto Neutrophil % : x  Auto Lymphocyte % : x  Auto Monocyte % : x  Auto Eosinophil % : x  Auto Basophil % : x    10-20    137  |  101  |  11  ----------------------------<  107<H>  3.4<L>   |  23  |  0.40<L>    Ca    8.2<L>      20 Oct 2017 06:28 72y Female community ambulator w/o assistive devices presents c/o Left hip pain and inability to ambulate sp mechanical fall secondary to fracture. Denies HS/LOC. Denies numbness/tingling. Denies fever/chills. Denies pain/injury elsewhere. Patient post-op ORIF of right hip. Patient seen now resting comfortably. Complains of incisional pain but now with better range of motion. At bedrest post-op.    MEDICATIONS  (STANDING):  aspirin enteric coated 325 milliGRAM(s) Oral two times a day  docusate sodium 100 milliGRAM(s) Oral three times a day  hydroxychloroquine 200 milliGRAM(s) Oral daily  influenza   Vaccine 0.5 milliLiter(s) IntraMuscular once  labetalol 300 milliGRAM(s) Oral two times a day  lactated ringers. 1000 milliLiter(s) (100 mL/Hr) IV Continuous <Continuous>  lisinopril 20 milliGRAM(s) Oral daily  methotrexate (Non - oncologic) 12.5 milliGRAM(s) Oral <User Schedule>  morphine ER Tablet 15 milliGRAM(s) Oral daily  pantoprazole    Tablet 40 milliGRAM(s) Oral before breakfast  PARoxetine 20 milliGRAM(s) Oral daily  predniSONE   Tablet 10 milliGRAM(s) Oral daily  senna 2 Tablet(s) Oral at bedtime  ursodiol Tablet 250 milliGRAM(s) Oral two times a day with meals    MEDICATIONS  (PRN):  acetaminophen   Tablet. 650 milliGRAM(s) Oral every 6 hours PRN Mild Pain (1 - 3)  HYDROmorphone  Injectable 1 milliGRAM(s) IV Push every 6 hours PRN Severe Pain (7 - 10)  oxyCODONE    IR 5 milliGRAM(s) Oral every 3 hours PRN Moderate Pain (4 - 6)  oxyCODONE    IR 10 milliGRAM(s) Oral every 4 hours PRN Severe Pain (7 - 10)  polyethylene glycol 3350 17 Gram(s) Oral daily PRN Constipation          ROS  CONSTITUTIONAL: No weakness, fevers or chills  EYES/ENT: No visual changes;  No vertigo or throat pain   NECK: No pain or stiffness  RESPIRATORY: No cough, wheezing, hemoptysis; No shortness of breath  CARDIOVASCULAR: No chest pain or palpitations  GASTROINTESTINAL: No abdominal or epigastric pain. No nausea, vomiting, or hematemesis; No diarrhea or constipation. No melena or hematochezia.  GENITOURINARY: No dysuria, frequency or hematuria  NEUROLOGICAL: No numbness or weakness  SKIN: No itching, burning, rashes, or lesions   MUSCULOSKELETAL: left leg pain    Vital Signs Last 24 Hrs  T(C): 36.8 (22 Oct 2017 21:03), Max: 37.2 (22 Oct 2017 00:44)  T(F): 98.3 (22 Oct 2017 21:03), Max: 98.9 (22 Oct 2017 00:44)  HR: 72 (22 Oct 2017 21:03) (66 - 76)  BP: 148/74 (22 Oct 2017 21:03) (134/75 - 163/76)  BP(mean): --  RR: 18 (22 Oct 2017 21:03) (18 - 18)  SpO2: 97% (22 Oct 2017 21:03) (95% - 99%)    PHYSICAL EXAM:  GENERAL: NAD, well-groomed, well-developed  HEAD:  Atraumatic, Normocephalic  EYES: EOMI, PERRLA, conjunctiva and sclera clear  ENMT: No tonsillar erythema, exudates, or enlargement; Moist mucous membranes, Good dentition, No lesions  NECK: Supple, No JVD, Normal thyroid  NERVOUS SYSTEM:  Alert & Oriented X3, Good concentration; Motor Strength 5/5 B/L upper and lower extremities; DTRs 2+ intact and symmetric  CHEST/LUNG: Clear to percussion bilaterally; No rales, rhonchi, wheezing, or rubs  HEART: Regular rate and rhythm; No murmurs, rubs, or gallops  ABDOMEN: Soft, Nontender, Nondistended; Bowel sounds present  EXTREMITIES: Both hands with advanced rheumatoid arthritis. 2+ Peripheral Pulses, No clubbing, cyanosis, or edema Left hip with 3+ edema post-op  LYMPH: No lymphadenopathy noted  SKIN: No rashes or lesions    LABS:    No labs obtained today 72y Female community ambulator w/o assistive devices presents c/o Left hip pain and inability to ambulate sp mechanical fall secondary to fracture. Denies HS/LOC. Denies numbness/tingling. Denies fever/chills. Denies pain/injury elsewhere. Patient post-op ORIF of right hip. Patient seen now resting comfortably. Complains of incisional pain but now with better range of motion. At bedrest post-op. Patient complains of inability to use her hands because of advanced rheumatoid arthritis.  Left hip discomfort persists and the patient is walking slowly.  Son has informed me that her assisted living facility will be able to provide rehabilitative services for her and anticipates return  to this facility tomorrow.      MEDICATIONS  (STANDING):  aspirin enteric coated 325 milliGRAM(s) Oral two times a day  docusate sodium 100 milliGRAM(s) Oral three times a day  hydroxychloroquine 200 milliGRAM(s) Oral daily  influenza   Vaccine 0.5 milliLiter(s) IntraMuscular once  labetalol 300 milliGRAM(s) Oral two times a day  lactated ringers. 1000 milliLiter(s) (100 mL/Hr) IV Continuous <Continuous>  lisinopril 20 milliGRAM(s) Oral daily  methotrexate (Non - oncologic) 12.5 milliGRAM(s) Oral <User Schedule>  morphine ER Tablet 15 milliGRAM(s) Oral daily  pantoprazole    Tablet 40 milliGRAM(s) Oral before breakfast  PARoxetine 20 milliGRAM(s) Oral daily  predniSONE   Tablet 10 milliGRAM(s) Oral daily  senna 2 Tablet(s) Oral at bedtime  ursodiol Tablet 250 milliGRAM(s) Oral two times a day with meals    MEDICATIONS  (PRN):  acetaminophen   Tablet. 650 milliGRAM(s) Oral every 6 hours PRN Mild Pain (1 - 3)  HYDROmorphone  Injectable 1 milliGRAM(s) IV Push every 6 hours PRN Severe Pain (7 - 10)  oxyCODONE    IR 5 milliGRAM(s) Oral every 3 hours PRN Moderate Pain (4 - 6)  oxyCODONE    IR 10 milliGRAM(s) Oral every 4 hours PRN Severe Pain (7 - 10)  polyethylene glycol 3350 17 Gram(s) Oral daily PRN Constipation          ROS  CONSTITUTIONAL: No weakness, fevers or chills  EYES/ENT: No visual changes;  No vertigo or throat pain   NECK: No pain or stiffness  RESPIRATORY: No cough, wheezing, hemoptysis; No shortness of breath  CARDIOVASCULAR: No chest pain or palpitations  GASTROINTESTINAL: No abdominal or epigastric pain. No nausea, vomiting, or hematemesis; No diarrhea or constipation. No melena or hematochezia.  GENITOURINARY: No dysuria, frequency or hematuria  NEUROLOGICAL: No numbness or weakness  SKIN: No itching, burning, rashes, or lesions   MUSCULOSKELETAL: left leg pain    Vital Signs Last 24 Hrs  T(C): 36.8 (22 Oct 2017 21:03), Max: 37.2 (22 Oct 2017 00:44)  T(F): 98.3 (22 Oct 2017 21:03), Max: 98.9 (22 Oct 2017 00:44)  HR: 72 (22 Oct 2017 21:03) (66 - 76)  BP: 148/74 (22 Oct 2017 21:03) (134/75 - 163/76)  BP(mean): --  RR: 18 (22 Oct 2017 21:03) (18 - 18)  SpO2: 97% (22 Oct 2017 21:03) (95% - 99%)    PHYSICAL EXAM:  GENERAL: NAD, well-groomed, well-developed  HEAD:  Atraumatic, Normocephalic  EYES: EOMI, PERRLA, conjunctiva and sclera clear  ENMT: No tonsillar erythema, exudates, or enlargement; Moist mucous membranes, Good dentition, No lesions  NECK: Supple, No JVD, Normal thyroid  NERVOUS SYSTEM:  Alert & Oriented X3, Good concentration; Motor Strength 5/5 B/L upper and lower extremities; DTRs 2+ intact and symmetric  CHEST/LUNG: Clear to percussion bilaterally; No rales, rhonchi, wheezing, or rubs  HEART: Regular rate and rhythm; No murmurs, rubs, or gallops  ABDOMEN: Soft, Nontender, Nondistended; Bowel sounds present  EXTREMITIES: Both hands with advanced rheumatoid arthritis. 2+ Peripheral Pulses, No clubbing, cyanosis, or edema Left hip with 3+ edema post-op  LYMPH: No lymphadenopathy noted  SKIN: No rashes or lesions    LABS:    No labs obtained today

## 2017-10-22 NOTE — PROGRESS NOTE ADULT - ASSESSMENT
73 yo woman hx of a fall with a left hip fx.  Post-op ORIF with IM nail. Stable and requires suitable rehabilitation that has the ability to  communicate  with her in her own langauge 73 yo woman hx of a fall with a left hip fx.  Post-op ORIF with IM nail. Stable and requires suitable rehabilitation that has the ability to  communicate  with her in her own langauge. c/o Left hip pain and inability to ambulate sp mechanical fall secondary to fracture. Denies HS/LOC. Denies numbness/tingling. Denies fever/chills. Denies pain/injury elsewhere. Patient post-op ORIF of right hip. Patient seen now resting comfortably. Complains of incisional pain but now with better range of motion. At bedrest post-op. Patient complains of inability to use her hands because of advanced rheumatoid arthritis.  Left hip discomfort persists and the patient is walking slowly.  Son has informed me that her assisted living facility will be able to provide rehabilitative services for her and anticipates return  to this facility tomorrow.

## 2017-10-22 NOTE — PROGRESS NOTE ADULT - SUBJECTIVE AND OBJECTIVE BOX
10-22-17 @ 09:50    Pt comfortable.  Pain well controlled.  No overnight events.    T(C): 36.8 (10-22-17 @ 05:26), Max: 37.2 (10-21-17 @ 16:38)  HR: 69 (10-22-17 @ 05:26) (68 - 72)  BP: 157/79 (10-22-17 @ 05:26) (135/69 - 166/69)  RR: 18 (10-22-17 @ 05:26) (18 - 18)  SpO2: 99% (10-22-17 @ 05:26) (95% - 99%)    Left hip incisions clean/dry/intact.  +DF/PF.  +Distal Pulses.   Motor/Sensory function grossly intact.  Calves soft/NT with venodynes  intact.

## 2017-10-23 VITALS
OXYGEN SATURATION: 96 % | TEMPERATURE: 98 F | SYSTOLIC BLOOD PRESSURE: 137 MMHG | DIASTOLIC BLOOD PRESSURE: 74 MMHG | RESPIRATION RATE: 18 BRPM | HEART RATE: 86 BPM

## 2017-10-23 RX ORDER — ASPIRIN/CALCIUM CARB/MAGNESIUM 324 MG
1 TABLET ORAL
Qty: 80 | Refills: 0
Start: 2017-10-23 | End: 2017-12-02

## 2017-10-23 RX ORDER — OXYCODONE HYDROCHLORIDE 5 MG/1
1 TABLET ORAL
Qty: 84 | Refills: 0
Start: 2017-10-23

## 2017-10-23 RX ORDER — OXYCODONE HYDROCHLORIDE 5 MG/1
1 TABLET ORAL
Qty: 0 | Refills: 0 | DISCHARGE
Start: 2017-10-23

## 2017-10-23 RX ADMIN — Medication 300 MILLIGRAM(S): at 05:20

## 2017-10-23 RX ADMIN — OXYCODONE HYDROCHLORIDE 10 MILLIGRAM(S): 5 TABLET ORAL at 12:51

## 2017-10-23 RX ADMIN — URSODIOL 250 MILLIGRAM(S): 250 TABLET, FILM COATED ORAL at 08:48

## 2017-10-23 RX ADMIN — Medication 10 MILLIGRAM(S): at 05:19

## 2017-10-23 RX ADMIN — OXYCODONE HYDROCHLORIDE 10 MILLIGRAM(S): 5 TABLET ORAL at 01:35

## 2017-10-23 RX ADMIN — Medication 200 MILLIGRAM(S): at 12:20

## 2017-10-23 RX ADMIN — Medication 325 MILLIGRAM(S): at 05:19

## 2017-10-23 RX ADMIN — OXYCODONE HYDROCHLORIDE 10 MILLIGRAM(S): 5 TABLET ORAL at 13:25

## 2017-10-23 RX ADMIN — MORPHINE SULFATE 15 MILLIGRAM(S): 50 CAPSULE, EXTENDED RELEASE ORAL at 12:21

## 2017-10-23 RX ADMIN — OXYCODONE HYDROCHLORIDE 10 MILLIGRAM(S): 5 TABLET ORAL at 08:48

## 2017-10-23 RX ADMIN — OXYCODONE HYDROCHLORIDE 10 MILLIGRAM(S): 5 TABLET ORAL at 01:05

## 2017-10-23 RX ADMIN — Medication 1 TABLET(S): at 12:21

## 2017-10-23 RX ADMIN — OXYCODONE HYDROCHLORIDE 10 MILLIGRAM(S): 5 TABLET ORAL at 16:22

## 2017-10-23 RX ADMIN — MORPHINE SULFATE 15 MILLIGRAM(S): 50 CAPSULE, EXTENDED RELEASE ORAL at 12:52

## 2017-10-23 RX ADMIN — OXYCODONE HYDROCHLORIDE 10 MILLIGRAM(S): 5 TABLET ORAL at 09:22

## 2017-10-23 RX ADMIN — OXYCODONE HYDROCHLORIDE 10 MILLIGRAM(S): 5 TABLET ORAL at 04:51

## 2017-10-23 RX ADMIN — Medication 20 MILLIGRAM(S): at 12:20

## 2017-10-23 RX ADMIN — LISINOPRIL 20 MILLIGRAM(S): 2.5 TABLET ORAL at 05:20

## 2017-10-23 RX ADMIN — PANTOPRAZOLE SODIUM 40 MILLIGRAM(S): 20 TABLET, DELAYED RELEASE ORAL at 05:19

## 2017-10-23 RX ADMIN — OXYCODONE HYDROCHLORIDE 10 MILLIGRAM(S): 5 TABLET ORAL at 05:21

## 2017-10-23 NOTE — PROGRESS NOTE ADULT - PROBLEM SELECTOR PLAN 3
Treatment as per ortho/ Rheum  currently stable
Treatment as per ortho/ Rheum
Treatment as per ortho/ Rheum  currently stable
Treatment as per ortho/ Rheum  currently stable

## 2017-10-23 NOTE — PROGRESS NOTE ADULT - ATTENDING COMMENTS
Beginning to ambulate and doing well. No medical complications post-op to date and to proceed with physical therapy, as tolerated. Continues pulmonary toilet to lessen atelectasis, leg exercises as taught to lessen the risk of DVT and supervised pain medications for post-op pain control. I anticipate transfer to rehabilitation to follow when cleared by orthopedics.
No medical complications post-op to date and to proceed with physical therapy, as tolerated. Continues pulmonary toilet to lessen atelectasis, leg exercises as taught to lessen the risk of DVT and supervised pain medications for post-op pain control.
Patient dced to assisted living  PO as tolerated  activity as tolerated  continue current meds  follow up with ortho  Patient and family aware of plan
No medical complications post-op to date and to proceed with physical therapy, as tolerated. Continues pulmonary toilet to lessen atelectasis, leg exercises as taught to lessen the risk of DVT and supervised pain medications for post-op pain control.
Beginning to ambulate and doing well. No medical complications post-op to date and to proceed with physical therapy, as tolerated. Continues pulmonary toilet to lessen atelectasis, leg exercises as taught to lessen the risk of DVT and supervised pain medications for post-op pain control. I anticipate transfer to rehabilitation to follow when cleared by orthopedics.

## 2017-10-23 NOTE — PROGRESS NOTE ADULT - SUBJECTIVE AND OBJECTIVE BOX
Patient seen and examined. Pain controlled. No acute events overnight. denies cp/sob.      HEALTH ISSUES - PROBLEM Dx:  Hypokalemia: Hypokalemia  Osteoporosis: Osteoporosis  Rheumatoid arthritis: Rheumatoid arthritis  Hypertension: Hypertension  Closed displaced intertrochanteric fracture of left femur, initial encounter: Closed displaced intertrochanteric fracture of left femur, initial encounter        MEDICATIONS  (STANDING):  aspirin enteric coated 325 milliGRAM(s) Oral two times a day  docusate sodium 100 milliGRAM(s) Oral three times a day  hydroxychloroquine 200 milliGRAM(s) Oral daily  influenza   Vaccine 0.5 milliLiter(s) IntraMuscular once  labetalol 300 milliGRAM(s) Oral two times a day  lactated ringers. 1000 milliLiter(s) IV Continuous <Continuous>  lisinopril 20 milliGRAM(s) Oral daily  methotrexate (Non - oncologic) 12.5 milliGRAM(s) Oral <User Schedule>  morphine ER Tablet 15 milliGRAM(s) Oral daily  pantoprazole    Tablet 40 milliGRAM(s) Oral before breakfast  PARoxetine 20 milliGRAM(s) Oral daily  predniSONE   Tablet 10 milliGRAM(s) Oral daily  senna 2 Tablet(s) Oral at bedtime  ursodiol Tablet 250 milliGRAM(s) Oral two times a day with meals    Allergies    Actonel (Hives)  clonidine (Other)  crab meat (Unknown)    Intolerances                  Vital Signs Last 24 Hrs  T(C): 37.3 (10-23-17 @ 05:12), Max: 37.3 (10-23-17 @ 05:12)  T(F): 99.1 (10-23-17 @ 05:12), Max: 99.1 (10-23-17 @ 05:12)  HR: 70 (10-23-17 @ 05:12) (66 - 76)  BP: 150/80 (10-23-17 @ 05:12) (132/67 - 163/76)  BP(mean): --  RR: 18 (10-23-17 @ 05:12) (18 - 18)  SpO2: 97% (10-23-17 @ 05:12) (94% - 98%)    Gen: NAD  LLE:  Dressing c/d/i  +ehl/fhl/ta/gs function  l2-s1 SILT  dp/pt pulse intact  no calf ttp  compartments soft

## 2017-10-23 NOTE — PROGRESS NOTE ADULT - ASSESSMENT
A/P: 72y Female sp L Hip IMN POD 6  pain control  dvt ppx  PT/OOBTC/WBAT with assistive devices as needed  Ice  FU Labs  Incentive spirometry   Medical management  Dispo planning

## 2017-10-23 NOTE — PROGRESS NOTE ADULT - PROVIDER SPECIALTY LIST ADULT
Infectious Disease
Internal Medicine
Orthopedics
Internal Medicine
Internal Medicine

## 2017-10-23 NOTE — PROGRESS NOTE ADULT - PROBLEM SELECTOR PLAN 1
Doing well with pain 2/10    continue physical therapy  PO as tolerated  DVT and GI prophylaxis  DC planning back to assisted living
Doing well with pain 2/10  Stat PT
Doing well with pain 2/10    continue physical therapy  PO as tolerated  DVT and GI prophylaxis  DC planning back to assisted living

## 2017-10-23 NOTE — PROGRESS NOTE ADULT - PROBLEM SELECTOR PLAN 2
blood pressure under control  will adjust meds as needed
blood pressure under control
blood pressure under control  will adjust meds as needed
blood pressure under control  will adjust meds as needed

## 2017-10-23 NOTE — PROGRESS NOTE ADULT - PROBLEM SELECTOR PROBLEM 1
Closed displaced intertrochanteric fracture of left femur, initial encounter

## 2017-10-23 NOTE — PROGRESS NOTE ADULT - PROBLEM SELECTOR PROBLEM 3
Rheumatoid arthritis

## 2017-10-23 NOTE — PROGRESS NOTE ADULT - SUBJECTIVE AND OBJECTIVE BOX
72y Female community ambulator w/o assistive devices presents c/o Left hip pain and inability to ambulate sp mechanical fall secondary to fracture. Denies HS/LOC. Denies numbness/tingling. Denies fever/chills. Denies pain/injury elsewhere. Patient post-op ORIF of right hip. Patient seen now resting comfortably. Complains of incisional pain but now with better range of motion. At bedrest post-op.    MEDICATIONS  (STANDING):  aspirin enteric coated 325 milliGRAM(s) Oral two times a day  docusate sodium 100 milliGRAM(s) Oral three times a day  hydroxychloroquine 200 milliGRAM(s) Oral daily  influenza   Vaccine 0.5 milliLiter(s) IntraMuscular once  labetalol 300 milliGRAM(s) Oral two times a day  lactated ringers. 1000 milliLiter(s) (100 mL/Hr) IV Continuous <Continuous>  lisinopril 20 milliGRAM(s) Oral daily  methotrexate (Non - oncologic) 12.5 milliGRAM(s) Oral <User Schedule>  morphine ER Tablet 15 milliGRAM(s) Oral daily  pantoprazole    Tablet 40 milliGRAM(s) Oral before breakfast  PARoxetine 20 milliGRAM(s) Oral daily  predniSONE   Tablet 10 milliGRAM(s) Oral daily  senna 2 Tablet(s) Oral at bedtime  ursodiol Tablet 250 milliGRAM(s) Oral two times a day with meals    MEDICATIONS  (PRN):  acetaminophen   Tablet. 650 milliGRAM(s) Oral every 6 hours PRN Mild Pain (1 - 3)  HYDROmorphone  Injectable 1 milliGRAM(s) IV Push every 6 hours PRN Severe Pain (7 - 10)  oxyCODONE    IR 5 milliGRAM(s) Oral every 3 hours PRN Moderate Pain (4 - 6)  oxyCODONE    IR 10 milliGRAM(s) Oral every 4 hours PRN Severe Pain (7 - 10)  polyethylene glycol 3350 17 Gram(s) Oral daily PRN Constipation          ROS  CONSTITUTIONAL: No weakness, fevers or chills  EYES/ENT: No visual changes;  No vertigo or throat pain   NECK: No pain or stiffness  RESPIRATORY: No cough, wheezing, hemoptysis; No shortness of breath  CARDIOVASCULAR: No chest pain or palpitations  GASTROINTESTINAL: No abdominal or epigastric pain. No nausea, vomiting, or hematemesis; No diarrhea or constipation. No melena or hematochezia.  GENITOURINARY: No dysuria, frequency or hematuria  NEUROLOGICAL: No numbness or weakness  SKIN: No itching, burning, rashes, or lesions   MUSCULOSKELETAL: left leg pain    Vital Signs Last 24 Hrs  T(C): 36.8 (22 Oct 2017 21:03), Max: 37.2 (22 Oct 2017 00:44)  T(F): 98.3 (22 Oct 2017 21:03), Max: 98.9 (22 Oct 2017 00:44)  HR: 72 (22 Oct 2017 21:03) (66 - 76)  BP: 148/74 (22 Oct 2017 21:03) (134/75 - 163/76)  BP(mean): --  RR: 18 (22 Oct 2017 21:03) (18 - 18)  SpO2: 97% (22 Oct 2017 21:03) (95% - 99%)    PHYSICAL EXAM:  GENERAL: NAD, well-groomed, well-developed  HEAD:  Atraumatic, Normocephalic  EYES: EOMI, PERRLA, conjunctiva and sclera clear  ENMT: No tonsillar erythema, exudates, or enlargement; Moist mucous membranes, Good dentition, No lesions  NECK: Supple, No JVD, Normal thyroid  NERVOUS SYSTEM:  Alert & Oriented X3, Good concentration; Motor Strength 5/5 B/L upper and lower extremities; DTRs 2+ intact and symmetric  CHEST/LUNG: Clear to percussion bilaterally; No rales, rhonchi, wheezing, or rubs  HEART: Regular rate and rhythm; No murmurs, rubs, or gallops  ABDOMEN: Soft, Nontender, Nondistended; Bowel sounds present  EXTREMITIES: Both hands with advanced rheumatoid arthritis. 2+ Peripheral Pulses, No clubbing, cyanosis, or edema Left hip with 3+ edema post-op  LYMPH: No lymphadenopathy noted  SKIN: No rashes or lesions    LABS:    No labs obtained today

## 2017-10-23 NOTE — PROGRESS NOTE ADULT - ASSESSMENT
71 yo woman hx of a fall with a left hip fx.  Post-op ORIF with IM nail. Stable and requires suitable rehabilitation that has the ability to  communicate  with her in her own langauge

## 2017-10-23 NOTE — PROGRESS NOTE ADULT - PROBLEM SELECTOR PLAN 4
outpatient follow up with endocrine
outpatient follow up
outpatient follow up with endocrine

## 2017-12-19 PROCEDURE — 86900 BLOOD TYPING SEROLOGIC ABO: CPT

## 2017-12-19 PROCEDURE — 97162 PT EVAL MOD COMPLEX 30 MIN: CPT

## 2017-12-19 PROCEDURE — P9016: CPT

## 2017-12-19 PROCEDURE — C1769: CPT

## 2017-12-19 PROCEDURE — 80048 BASIC METABOLIC PNL TOTAL CA: CPT

## 2017-12-19 PROCEDURE — 80053 COMPREHEN METABOLIC PANEL: CPT

## 2017-12-19 PROCEDURE — 71045 X-RAY EXAM CHEST 1 VIEW: CPT

## 2017-12-19 PROCEDURE — 81001 URINALYSIS AUTO W/SCOPE: CPT

## 2017-12-19 PROCEDURE — 86850 RBC ANTIBODY SCREEN: CPT

## 2017-12-19 PROCEDURE — 87086 URINE CULTURE/COLONY COUNT: CPT

## 2017-12-19 PROCEDURE — 85610 PROTHROMBIN TIME: CPT

## 2017-12-19 PROCEDURE — 73502 X-RAY EXAM HIP UNI 2-3 VIEWS: CPT

## 2017-12-19 PROCEDURE — 97530 THERAPEUTIC ACTIVITIES: CPT

## 2017-12-19 PROCEDURE — 85027 COMPLETE CBC AUTOMATED: CPT

## 2017-12-19 PROCEDURE — C1713: CPT

## 2017-12-19 PROCEDURE — 97116 GAIT TRAINING THERAPY: CPT

## 2017-12-19 PROCEDURE — 73552 X-RAY EXAM OF FEMUR 2/>: CPT

## 2017-12-19 PROCEDURE — 97110 THERAPEUTIC EXERCISES: CPT

## 2017-12-19 PROCEDURE — 86901 BLOOD TYPING SEROLOGIC RH(D): CPT

## 2017-12-19 PROCEDURE — 99285 EMERGENCY DEPT VISIT HI MDM: CPT | Mod: 25

## 2017-12-19 PROCEDURE — 76000 FLUOROSCOPY <1 HR PHYS/QHP: CPT

## 2017-12-19 PROCEDURE — 51702 INSERT TEMP BLADDER CATH: CPT

## 2017-12-19 PROCEDURE — 70450 CT HEAD/BRAIN W/O DYE: CPT

## 2017-12-19 PROCEDURE — 85730 THROMBOPLASTIN TIME PARTIAL: CPT

## 2017-12-19 PROCEDURE — 97165 OT EVAL LOW COMPLEX 30 MIN: CPT

## 2017-12-19 PROCEDURE — 36430 TRANSFUSION BLD/BLD COMPNT: CPT

## 2017-12-19 PROCEDURE — 86923 COMPATIBILITY TEST ELECTRIC: CPT

## 2018-04-18 ENCOUNTER — APPOINTMENT (OUTPATIENT)
Dept: OPHTHALMOLOGY | Facility: CLINIC | Age: 73
End: 2018-04-18
Payer: MEDICARE

## 2018-04-18 PROCEDURE — 92083 EXTENDED VISUAL FIELD XM: CPT

## 2018-04-18 PROCEDURE — 92014 COMPRE OPH EXAM EST PT 1/>: CPT

## 2018-04-18 PROCEDURE — 92226: CPT | Mod: LT

## 2018-04-18 PROCEDURE — 92134 CPTRZ OPH DX IMG PST SGM RTA: CPT

## 2018-09-12 NOTE — H&P ADULT - NSHPPOADEEPVENOUSTHROMB_GEN_A_CORE
Per Dr. Smith: dispense Humalog vial samples, provide Verio Flex meter to pt, send script for Arely View, and order CMP, MAR, A1c, FLP, TSH 1/2019.    no

## 2018-10-15 ENCOUNTER — APPOINTMENT (OUTPATIENT)
Dept: OPHTHALMOLOGY | Facility: CLINIC | Age: 73
End: 2018-10-15
Payer: MEDICARE

## 2018-10-15 DIAGNOSIS — Z79.899 OTHER LONG TERM (CURRENT) DRUG THERAPY: ICD-10-CM

## 2018-10-15 DIAGNOSIS — H35.371 PUCKERING OF MACULA, RIGHT EYE: ICD-10-CM

## 2018-10-15 PROBLEM — K21.9 GASTRO-ESOPHAGEAL REFLUX DISEASE WITHOUT ESOPHAGITIS: Chronic | Status: ACTIVE | Noted: 2017-10-08

## 2018-10-15 PROBLEM — D72.829 ELEVATED WHITE BLOOD CELL COUNT, UNSPECIFIED: Chronic | Status: ACTIVE | Noted: 2017-10-08

## 2018-10-15 PROBLEM — K83.8 OTHER SPECIFIED DISEASES OF BILIARY TRACT: Chronic | Status: ACTIVE | Noted: 2017-10-08

## 2018-10-15 PROBLEM — I10 ESSENTIAL (PRIMARY) HYPERTENSION: Chronic | Status: ACTIVE | Noted: 2017-10-08

## 2018-10-15 PROBLEM — I63.9 CEREBRAL INFARCTION, UNSPECIFIED: Chronic | Status: ACTIVE | Noted: 2017-10-08

## 2018-10-15 PROCEDURE — 92134 CPTRZ OPH DX IMG PST SGM RTA: CPT

## 2018-10-15 PROCEDURE — 92083 EXTENDED VISUAL FIELD XM: CPT

## 2018-10-15 PROCEDURE — ZZZZZ: CPT

## 2018-10-15 PROCEDURE — 92015 DETERMINE REFRACTIVE STATE: CPT

## 2018-10-15 PROCEDURE — 92285 EXTERNAL OCULAR PHOTOGRAPHY: CPT

## 2018-10-15 PROCEDURE — 92014 COMPRE OPH EXAM EST PT 1/>: CPT

## 2019-01-01 ENCOUNTER — TRANSCRIPTION ENCOUNTER (OUTPATIENT)
Age: 74
End: 2019-01-01

## 2019-01-01 ENCOUNTER — INPATIENT (INPATIENT)
Facility: HOSPITAL | Age: 74
LOS: 4 days | Discharge: HOME | DRG: 871 | End: 2019-10-31
Attending: HOSPITALIST | Admitting: HOSPITALIST
Payer: MEDICARE

## 2019-01-01 VITALS
WEIGHT: 126.99 LBS | DIASTOLIC BLOOD PRESSURE: 78 MMHG | HEART RATE: 89 BPM | SYSTOLIC BLOOD PRESSURE: 118 MMHG | RESPIRATION RATE: 20 BRPM | TEMPERATURE: 100 F | OXYGEN SATURATION: 96 % | HEIGHT: 60 IN

## 2019-01-01 VITALS
DIASTOLIC BLOOD PRESSURE: 85 MMHG | OXYGEN SATURATION: 95 % | TEMPERATURE: 98 F | HEART RATE: 81 BPM | SYSTOLIC BLOOD PRESSURE: 137 MMHG | RESPIRATION RATE: 18 BRPM

## 2019-01-01 DIAGNOSIS — R74.0 NONSPECIFIC ELEVATION OF LEVELS OF TRANSAMINASE AND LACTIC ACID DEHYDROGENASE [LDH]: ICD-10-CM

## 2019-01-01 DIAGNOSIS — A41.9 SEPSIS, UNSPECIFIED ORGANISM: ICD-10-CM

## 2019-01-01 DIAGNOSIS — Z96.659 PRESENCE OF UNSPECIFIED ARTIFICIAL KNEE JOINT: Chronic | ICD-10-CM

## 2019-01-01 DIAGNOSIS — R50.9 FEVER, UNSPECIFIED: ICD-10-CM

## 2019-01-01 DIAGNOSIS — I10 ESSENTIAL (PRIMARY) HYPERTENSION: ICD-10-CM

## 2019-01-01 DIAGNOSIS — F32.9 MAJOR DEPRESSIVE DISORDER, SINGLE EPISODE, UNSPECIFIED: ICD-10-CM

## 2019-01-01 DIAGNOSIS — J18.9 PNEUMONIA, UNSPECIFIED ORGANISM: ICD-10-CM

## 2019-01-01 DIAGNOSIS — E87.6 HYPOKALEMIA: ICD-10-CM

## 2019-01-01 DIAGNOSIS — Z96.649 PRESENCE OF UNSPECIFIED ARTIFICIAL HIP JOINT: Chronic | ICD-10-CM

## 2019-01-01 DIAGNOSIS — M06.9 RHEUMATOID ARTHRITIS, UNSPECIFIED: ICD-10-CM

## 2019-01-01 DIAGNOSIS — E87.1 HYPO-OSMOLALITY AND HYPONATREMIA: ICD-10-CM

## 2019-01-01 DIAGNOSIS — D72.829 ELEVATED WHITE BLOOD CELL COUNT, UNSPECIFIED: ICD-10-CM

## 2019-01-01 DIAGNOSIS — E83.39 OTHER DISORDERS OF PHOSPHORUS METABOLISM: ICD-10-CM

## 2019-01-01 DIAGNOSIS — G93.41 METABOLIC ENCEPHALOPATHY: ICD-10-CM

## 2019-01-01 LAB
ALBUMIN SERPL ELPH-MCNC: 3 G/DL — LOW (ref 3.3–5)
ALBUMIN SERPL ELPH-MCNC: 3.3 G/DL — SIGNIFICANT CHANGE UP (ref 3.3–5)
ALBUMIN SERPL ELPH-MCNC: 3.9 G/DL — SIGNIFICANT CHANGE UP (ref 3.3–5)
ALP SERPL-CCNC: 160 U/L — HIGH (ref 40–120)
ALP SERPL-CCNC: 186 U/L — HIGH (ref 40–120)
ALT FLD-CCNC: 102 U/L — HIGH (ref 10–45)
ALT FLD-CCNC: 119 U/L — HIGH (ref 10–45)
ANION GAP SERPL CALC-SCNC: 10 MMOL/L — SIGNIFICANT CHANGE UP (ref 5–17)
ANION GAP SERPL CALC-SCNC: 13 MMOL/L — SIGNIFICANT CHANGE UP (ref 5–17)
ANION GAP SERPL CALC-SCNC: 16 MMOL/L — SIGNIFICANT CHANGE UP (ref 5–17)
ANION GAP SERPL CALC-SCNC: 16 MMOL/L — SIGNIFICANT CHANGE UP (ref 5–17)
ANION GAP SERPL CALC-SCNC: 19 MMOL/L — HIGH (ref 5–17)
ANION GAP SERPL CALC-SCNC: 23 MMOL/L — HIGH (ref 5–17)
APPEARANCE UR: ABNORMAL
APPEARANCE UR: CLEAR — SIGNIFICANT CHANGE UP
APTT BLD: 29.4 SEC — SIGNIFICANT CHANGE UP (ref 27.5–36.3)
AST SERPL-CCNC: 73 U/L — HIGH (ref 10–40)
AST SERPL-CCNC: 88 U/L — HIGH (ref 10–40)
B PERT DNA SPEC QL NAA+PROBE: SIGNIFICANT CHANGE UP
BACTERIA # UR AUTO: NEGATIVE — SIGNIFICANT CHANGE UP
BASOPHILS # BLD AUTO: 0 K/UL — SIGNIFICANT CHANGE UP (ref 0–0.2)
BASOPHILS # BLD AUTO: 0.02 K/UL — SIGNIFICANT CHANGE UP (ref 0–0.2)
BASOPHILS # BLD AUTO: 0.03 K/UL — SIGNIFICANT CHANGE UP (ref 0–0.2)
BASOPHILS NFR BLD AUTO: 0 % — SIGNIFICANT CHANGE UP (ref 0–2)
BASOPHILS NFR BLD AUTO: 0.1 % — SIGNIFICANT CHANGE UP (ref 0–2)
BASOPHILS NFR BLD AUTO: 0.1 % — SIGNIFICANT CHANGE UP (ref 0–2)
BILIRUB SERPL-MCNC: 1.1 MG/DL — SIGNIFICANT CHANGE UP (ref 0.2–1.2)
BILIRUB SERPL-MCNC: 1.1 MG/DL — SIGNIFICANT CHANGE UP (ref 0.2–1.2)
BILIRUB UR-MCNC: ABNORMAL
BILIRUB UR-MCNC: NEGATIVE — SIGNIFICANT CHANGE UP
BUN SERPL-MCNC: 11 MG/DL — SIGNIFICANT CHANGE UP (ref 7–23)
BUN SERPL-MCNC: 16 MG/DL — SIGNIFICANT CHANGE UP (ref 7–23)
BUN SERPL-MCNC: 7 MG/DL — SIGNIFICANT CHANGE UP (ref 7–23)
BUN SERPL-MCNC: 8 MG/DL — SIGNIFICANT CHANGE UP (ref 7–23)
C PNEUM DNA SPEC QL NAA+PROBE: SIGNIFICANT CHANGE UP
CALCIUM SERPL-MCNC: 7.9 MG/DL — LOW (ref 8.4–10.5)
CALCIUM SERPL-MCNC: 8 MG/DL — LOW (ref 8.4–10.5)
CALCIUM SERPL-MCNC: 8.1 MG/DL — LOW (ref 8.4–10.5)
CALCIUM SERPL-MCNC: 8.8 MG/DL — SIGNIFICANT CHANGE UP (ref 8.4–10.5)
CALCIUM UR-MCNC: 1.6 MG/DL — SIGNIFICANT CHANGE UP
CHLORIDE SERPL-SCNC: 100 MMOL/L — SIGNIFICANT CHANGE UP (ref 96–108)
CHLORIDE SERPL-SCNC: 103 MMOL/L — SIGNIFICANT CHANGE UP (ref 96–108)
CHLORIDE SERPL-SCNC: 92 MMOL/L — LOW (ref 96–108)
CHLORIDE SERPL-SCNC: 94 MMOL/L — LOW (ref 96–108)
CHLORIDE SERPL-SCNC: 94 MMOL/L — LOW (ref 96–108)
CHLORIDE SERPL-SCNC: 95 MMOL/L — LOW (ref 96–108)
CHLORIDE UR-SCNC: <35 MMOL/L — SIGNIFICANT CHANGE UP
CO2 SERPL-SCNC: 17 MMOL/L — LOW (ref 22–31)
CO2 SERPL-SCNC: 18 MMOL/L — LOW (ref 22–31)
CO2 SERPL-SCNC: 18 MMOL/L — LOW (ref 22–31)
CO2 SERPL-SCNC: 20 MMOL/L — LOW (ref 22–31)
CO2 SERPL-SCNC: 22 MMOL/L — SIGNIFICANT CHANGE UP (ref 22–31)
CO2 SERPL-SCNC: 23 MMOL/L — SIGNIFICANT CHANGE UP (ref 22–31)
COLOR SPEC: ABNORMAL
COLOR SPEC: YELLOW — SIGNIFICANT CHANGE UP
COMMENT - URINE: SIGNIFICANT CHANGE UP
CORTICOSTEROID BINDING GLOBULIN RESULT: 2 MG/DL — SIGNIFICANT CHANGE UP
CORTIS F/TOTAL MFR SERPL: 8.8 % — SIGNIFICANT CHANGE UP
CORTIS SERPL-MCNC: 11 UG/DL — SIGNIFICANT CHANGE UP
CORTISOL, FREE RESULT: 0.97 UG/DL — SIGNIFICANT CHANGE UP
CREAT ?TM UR-MCNC: 75 MG/DL — SIGNIFICANT CHANGE UP
CREAT SERPL-MCNC: 0.35 MG/DL — LOW (ref 0.5–1.3)
CREAT SERPL-MCNC: 0.37 MG/DL — LOW (ref 0.5–1.3)
CREAT SERPL-MCNC: 0.41 MG/DL — LOW (ref 0.5–1.3)
CREAT SERPL-MCNC: 0.43 MG/DL — LOW (ref 0.5–1.3)
CREAT SERPL-MCNC: 0.48 MG/DL — LOW (ref 0.5–1.3)
CREAT SERPL-MCNC: 0.5 MG/DL — SIGNIFICANT CHANGE UP (ref 0.5–1.3)
CULTURE RESULTS: NO GROWTH — SIGNIFICANT CHANGE UP
CULTURE RESULTS: SIGNIFICANT CHANGE UP
CULTURE RESULTS: SIGNIFICANT CHANGE UP
DIFF PNL FLD: ABNORMAL
DIFF PNL FLD: NEGATIVE — SIGNIFICANT CHANGE UP
EOSINOPHIL # BLD AUTO: 0 K/UL — SIGNIFICANT CHANGE UP (ref 0–0.5)
EOSINOPHIL NFR BLD AUTO: 0 % — SIGNIFICANT CHANGE UP (ref 0–6)
EPI CELLS # UR: 7 — HIGH
FLUAV H1 2009 PAND RNA SPEC QL NAA+PROBE: SIGNIFICANT CHANGE UP
FLUAV H1 RNA SPEC QL NAA+PROBE: SIGNIFICANT CHANGE UP
FLUAV H3 RNA SPEC QL NAA+PROBE: SIGNIFICANT CHANGE UP
FLUAV SUBTYP SPEC NAA+PROBE: SIGNIFICANT CHANGE UP
FLUBV RNA SPEC QL NAA+PROBE: SIGNIFICANT CHANGE UP
GAS PNL BLDV: SIGNIFICANT CHANGE UP
GLUCOSE SERPL-MCNC: 119 MG/DL — HIGH (ref 70–99)
GLUCOSE SERPL-MCNC: 143 MG/DL — HIGH (ref 70–99)
GLUCOSE SERPL-MCNC: 145 MG/DL — HIGH (ref 70–99)
GLUCOSE SERPL-MCNC: 149 MG/DL — HIGH (ref 70–99)
GLUCOSE SERPL-MCNC: 160 MG/DL — HIGH (ref 70–99)
GLUCOSE SERPL-MCNC: 168 MG/DL — HIGH (ref 70–99)
GLUCOSE UR QL: NEGATIVE — SIGNIFICANT CHANGE UP
GLUCOSE UR QL: NEGATIVE — SIGNIFICANT CHANGE UP
HADV DNA SPEC QL NAA+PROBE: SIGNIFICANT CHANGE UP
HCOV PNL SPEC NAA+PROBE: SIGNIFICANT CHANGE UP
HCT VFR BLD CALC: 30.9 % — LOW (ref 34.5–45)
HCT VFR BLD CALC: 31 % — LOW (ref 34.5–45)
HCT VFR BLD CALC: 32 % — LOW (ref 34.5–45)
HCT VFR BLD CALC: 33.4 % — LOW (ref 34.5–45)
HCT VFR BLD CALC: 34.8 % — SIGNIFICANT CHANGE UP (ref 34.5–45)
HCV AB S/CO SERPL IA: 0.35 S/CO — SIGNIFICANT CHANGE UP (ref 0–0.99)
HCV AB SERPL-IMP: SIGNIFICANT CHANGE UP
HGB BLD-MCNC: 10.7 G/DL — LOW (ref 11.5–15.5)
HGB BLD-MCNC: 10.8 G/DL — LOW (ref 11.5–15.5)
HGB BLD-MCNC: 11.1 G/DL — LOW (ref 11.5–15.5)
HGB BLD-MCNC: 11.6 G/DL — SIGNIFICANT CHANGE UP (ref 11.5–15.5)
HGB BLD-MCNC: 11.7 G/DL — SIGNIFICANT CHANGE UP (ref 11.5–15.5)
HMPV RNA SPEC QL NAA+PROBE: SIGNIFICANT CHANGE UP
HPIV1 RNA SPEC QL NAA+PROBE: SIGNIFICANT CHANGE UP
HPIV2 RNA SPEC QL NAA+PROBE: SIGNIFICANT CHANGE UP
HPIV3 RNA SPEC QL NAA+PROBE: SIGNIFICANT CHANGE UP
HPIV4 RNA SPEC QL NAA+PROBE: SIGNIFICANT CHANGE UP
HYALINE CASTS # UR AUTO: 0 /LPF — SIGNIFICANT CHANGE UP (ref 0–7)
IMM GRANULOCYTES NFR BLD AUTO: 1.1 % — SIGNIFICANT CHANGE UP (ref 0–1.5)
IMM GRANULOCYTES NFR BLD AUTO: 1.3 % — SIGNIFICANT CHANGE UP (ref 0–1.5)
INR BLD: 1.06 RATIO — SIGNIFICANT CHANGE UP (ref 0.88–1.16)
KETONES UR-MCNC: ABNORMAL
KETONES UR-MCNC: ABNORMAL
LEGIONELLA AG UR QL: NEGATIVE — SIGNIFICANT CHANGE UP
LEUKOCYTE ESTERASE UR-ACNC: NEGATIVE — SIGNIFICANT CHANGE UP
LEUKOCYTE ESTERASE UR-ACNC: NEGATIVE — SIGNIFICANT CHANGE UP
LYMPHOCYTES # BLD AUTO: 0 % — LOW (ref 13–44)
LYMPHOCYTES # BLD AUTO: 0 K/UL — LOW (ref 1–3.3)
LYMPHOCYTES # BLD AUTO: 0.55 K/UL — LOW (ref 1–3.3)
LYMPHOCYTES # BLD AUTO: 0.92 K/UL — LOW (ref 1–3.3)
LYMPHOCYTES # BLD AUTO: 2 % — LOW (ref 13–44)
LYMPHOCYTES # BLD AUTO: 4 % — LOW (ref 13–44)
MAGNESIUM SERPL-MCNC: 2 MG/DL — SIGNIFICANT CHANGE UP (ref 1.6–2.6)
MAGNESIUM SERPL-MCNC: 2.3 MG/DL — SIGNIFICANT CHANGE UP (ref 1.6–2.6)
MCHC RBC-ENTMCNC: 31.9 PG — SIGNIFICANT CHANGE UP (ref 27–34)
MCHC RBC-ENTMCNC: 32.2 PG — SIGNIFICANT CHANGE UP (ref 27–34)
MCHC RBC-ENTMCNC: 32.6 PG — SIGNIFICANT CHANGE UP (ref 27–34)
MCHC RBC-ENTMCNC: 33.3 GM/DL — SIGNIFICANT CHANGE UP (ref 32–36)
MCHC RBC-ENTMCNC: 33.4 GM/DL — SIGNIFICANT CHANGE UP (ref 32–36)
MCHC RBC-ENTMCNC: 34.8 GM/DL — SIGNIFICANT CHANGE UP (ref 32–36)
MCHC RBC-ENTMCNC: 35 GM/DL — SIGNIFICANT CHANGE UP (ref 32–36)
MCHC RBC-ENTMCNC: 35.9 GM/DL — SIGNIFICANT CHANGE UP (ref 32–36)
MCV RBC AUTO: 90.6 FL — SIGNIFICANT CHANGE UP (ref 80–100)
MCV RBC AUTO: 92 FL — SIGNIFICANT CHANGE UP (ref 80–100)
MCV RBC AUTO: 92.5 FL — SIGNIFICANT CHANGE UP (ref 80–100)
MCV RBC AUTO: 95.5 FL — SIGNIFICANT CHANGE UP (ref 80–100)
MCV RBC AUTO: 96.7 FL — SIGNIFICANT CHANGE UP (ref 80–100)
MONOCYTES # BLD AUTO: 0.88 K/UL — SIGNIFICANT CHANGE UP (ref 0–0.9)
MONOCYTES # BLD AUTO: 1.39 K/UL — HIGH (ref 0–0.9)
MONOCYTES # BLD AUTO: 1.76 K/UL — HIGH (ref 0–0.9)
MONOCYTES NFR BLD AUTO: 4.4 % — SIGNIFICANT CHANGE UP (ref 2–14)
MONOCYTES NFR BLD AUTO: 5.1 % — SIGNIFICANT CHANGE UP (ref 2–14)
MONOCYTES NFR BLD AUTO: 7.7 % — SIGNIFICANT CHANGE UP (ref 2–14)
MRSA PCR RESULT.: SIGNIFICANT CHANGE UP
NEUTROPHILS # BLD AUTO: 18.26 K/UL — HIGH (ref 1.8–7.4)
NEUTROPHILS # BLD AUTO: 19.86 K/UL — HIGH (ref 1.8–7.4)
NEUTROPHILS # BLD AUTO: 25.15 K/UL — HIGH (ref 1.8–7.4)
NEUTROPHILS NFR BLD AUTO: 87.1 % — HIGH (ref 43–77)
NEUTROPHILS NFR BLD AUTO: 90.4 % — HIGH (ref 43–77)
NEUTROPHILS NFR BLD AUTO: 91.5 % — HIGH (ref 43–77)
NITRITE UR-MCNC: NEGATIVE — SIGNIFICANT CHANGE UP
NITRITE UR-MCNC: NEGATIVE — SIGNIFICANT CHANGE UP
NRBC # BLD: 0 /100 WBCS — SIGNIFICANT CHANGE UP (ref 0–0)
OSMOLALITY SERPL: 275 MOSMOL/KG — LOW (ref 280–301)
OSMOLALITY UR: 481 MOS/KG — SIGNIFICANT CHANGE UP (ref 300–900)
PH UR: 6 — SIGNIFICANT CHANGE UP (ref 5–8)
PH UR: 7 — SIGNIFICANT CHANGE UP (ref 5–8)
PHOSPHATE 24H UR-MCNC: 68 MG/DL — SIGNIFICANT CHANGE UP
PHOSPHATE SERPL-MCNC: 1.7 MG/DL — LOW (ref 2.5–4.5)
PHOSPHATE SERPL-MCNC: 1.8 MG/DL — LOW (ref 2.5–4.5)
PLATELET # BLD AUTO: 313 K/UL — SIGNIFICANT CHANGE UP (ref 150–400)
PLATELET # BLD AUTO: 313 K/UL — SIGNIFICANT CHANGE UP (ref 150–400)
PLATELET # BLD AUTO: 352 K/UL — SIGNIFICANT CHANGE UP (ref 150–400)
PLATELET # BLD AUTO: 379 K/UL — SIGNIFICANT CHANGE UP (ref 150–400)
PLATELET # BLD AUTO: 439 K/UL — HIGH (ref 150–400)
POTASSIUM SERPL-MCNC: 2.9 MMOL/L — CRITICAL LOW (ref 3.5–5.3)
POTASSIUM SERPL-MCNC: 3.1 MMOL/L — LOW (ref 3.5–5.3)
POTASSIUM SERPL-MCNC: 3.3 MMOL/L — LOW (ref 3.5–5.3)
POTASSIUM SERPL-MCNC: 3.7 MMOL/L — SIGNIFICANT CHANGE UP (ref 3.5–5.3)
POTASSIUM SERPL-MCNC: 4.2 MMOL/L — SIGNIFICANT CHANGE UP (ref 3.5–5.3)
POTASSIUM SERPL-MCNC: 4.5 MMOL/L — SIGNIFICANT CHANGE UP (ref 3.5–5.3)
POTASSIUM SERPL-SCNC: 2.9 MMOL/L — CRITICAL LOW (ref 3.5–5.3)
POTASSIUM SERPL-SCNC: 3.1 MMOL/L — LOW (ref 3.5–5.3)
POTASSIUM SERPL-SCNC: 3.3 MMOL/L — LOW (ref 3.5–5.3)
POTASSIUM SERPL-SCNC: 3.7 MMOL/L — SIGNIFICANT CHANGE UP (ref 3.5–5.3)
POTASSIUM SERPL-SCNC: 4.2 MMOL/L — SIGNIFICANT CHANGE UP (ref 3.5–5.3)
POTASSIUM SERPL-SCNC: 4.5 MMOL/L — SIGNIFICANT CHANGE UP (ref 3.5–5.3)
POTASSIUM UR-SCNC: 56 MMOL/L — SIGNIFICANT CHANGE UP
PROCALCITONIN SERPL-MCNC: 0.44 NG/ML — HIGH (ref 0.02–0.1)
PROT SERPL-MCNC: 7.3 G/DL — SIGNIFICANT CHANGE UP (ref 6–8.3)
PROT SERPL-MCNC: 7.8 G/DL — SIGNIFICANT CHANGE UP (ref 6–8.3)
PROT UR-MCNC: 100 — SIGNIFICANT CHANGE UP
PROT UR-MCNC: ABNORMAL
PROTHROM AB SERPL-ACNC: 12.2 SEC — SIGNIFICANT CHANGE UP (ref 10–12.9)
RAPID RVP RESULT: SIGNIFICANT CHANGE UP
RAPID RVP RESULT: SIGNIFICANT CHANGE UP
RBC # BLD: 3.35 M/UL — LOW (ref 3.8–5.2)
RBC # BLD: 3.35 M/UL — LOW (ref 3.8–5.2)
RBC # BLD: 3.41 M/UL — LOW (ref 3.8–5.2)
RBC # BLD: 3.6 M/UL — LOW (ref 3.8–5.2)
RBC # BLD: 3.63 M/UL — LOW (ref 3.8–5.2)
RBC # FLD: 13.5 % — SIGNIFICANT CHANGE UP (ref 10.3–14.5)
RBC # FLD: 13.8 % — SIGNIFICANT CHANGE UP (ref 10.3–14.5)
RBC # FLD: 13.9 % — SIGNIFICANT CHANGE UP (ref 10.3–14.5)
RBC # FLD: 14 % — SIGNIFICANT CHANGE UP (ref 10.3–14.5)
RBC # FLD: 14.3 % — SIGNIFICANT CHANGE UP (ref 10.3–14.5)
RBC CASTS # UR COMP ASSIST: 3 /HPF — SIGNIFICANT CHANGE UP (ref 0–4)
RSV RNA SPEC QL NAA+PROBE: SIGNIFICANT CHANGE UP
RV+EV RNA SPEC QL NAA+PROBE: SIGNIFICANT CHANGE UP
S AUREUS DNA NOSE QL NAA+PROBE: SIGNIFICANT CHANGE UP
SODIUM SERPL-SCNC: 128 MMOL/L — LOW (ref 135–145)
SODIUM SERPL-SCNC: 129 MMOL/L — LOW (ref 135–145)
SODIUM SERPL-SCNC: 133 MMOL/L — LOW (ref 135–145)
SODIUM SERPL-SCNC: 133 MMOL/L — LOW (ref 135–145)
SODIUM SERPL-SCNC: 135 MMOL/L — SIGNIFICANT CHANGE UP (ref 135–145)
SODIUM SERPL-SCNC: 135 MMOL/L — SIGNIFICANT CHANGE UP (ref 135–145)
SODIUM UR-SCNC: <20 MMOL/L — SIGNIFICANT CHANGE UP
SP GR SPEC: 1.02 — SIGNIFICANT CHANGE UP (ref 1.01–1.02)
SP GR SPEC: 1.03 — HIGH (ref 1.01–1.02)
SPECIMEN SOURCE: SIGNIFICANT CHANGE UP
T4 AB SER-ACNC: 4.8 UG/DL — SIGNIFICANT CHANGE UP (ref 4.6–12)
TSH SERPL-MCNC: 0.23 UIU/ML — LOW (ref 0.27–4.2)
TSH SERPL-MCNC: 0.31 UIU/ML — SIGNIFICANT CHANGE UP (ref 0.27–4.2)
URATE SERPL-MCNC: 2.5 MG/DL — SIGNIFICANT CHANGE UP (ref 2.5–7)
UROBILINOGEN FLD QL: ABNORMAL
UROBILINOGEN FLD QL: NEGATIVE — SIGNIFICANT CHANGE UP
UUN UR-MCNC: 644 MG/DL — SIGNIFICANT CHANGE UP
VANCOMYCIN TROUGH SERPL-MCNC: 11.8 UG/ML — SIGNIFICANT CHANGE UP (ref 10–20)
WBC # BLD: 20 K/UL — HIGH (ref 3.8–10.5)
WBC # BLD: 21.84 K/UL — HIGH (ref 3.8–10.5)
WBC # BLD: 22.82 K/UL — HIGH (ref 3.8–10.5)
WBC # BLD: 27.47 K/UL — HIGH (ref 3.8–10.5)
WBC # BLD: 28.75 K/UL — HIGH (ref 3.8–10.5)
WBC # FLD AUTO: 20 K/UL — HIGH (ref 3.8–10.5)
WBC # FLD AUTO: 21.84 K/UL — HIGH (ref 3.8–10.5)
WBC # FLD AUTO: 22.82 K/UL — HIGH (ref 3.8–10.5)
WBC # FLD AUTO: 27.47 K/UL — HIGH (ref 3.8–10.5)
WBC # FLD AUTO: 28.75 K/UL — HIGH (ref 3.8–10.5)
WBC UR QL: 14 /HPF — HIGH (ref 0–5)

## 2019-01-01 PROCEDURE — 70450 CT HEAD/BRAIN W/O DYE: CPT

## 2019-01-01 PROCEDURE — 82330 ASSAY OF CALCIUM: CPT

## 2019-01-01 PROCEDURE — 71275 CT ANGIOGRAPHY CHEST: CPT

## 2019-01-01 PROCEDURE — 82533 TOTAL CORTISOL: CPT

## 2019-01-01 PROCEDURE — 83930 ASSAY OF BLOOD OSMOLALITY: CPT

## 2019-01-01 PROCEDURE — 84443 ASSAY THYROID STIM HORMONE: CPT

## 2019-01-01 PROCEDURE — 87449 NOS EACH ORGANISM AG IA: CPT

## 2019-01-01 PROCEDURE — 82570 ASSAY OF URINE CREATININE: CPT

## 2019-01-01 PROCEDURE — 82436 ASSAY OF URINE CHLORIDE: CPT

## 2019-01-01 PROCEDURE — 85014 HEMATOCRIT: CPT

## 2019-01-01 PROCEDURE — 84295 ASSAY OF SERUM SODIUM: CPT

## 2019-01-01 PROCEDURE — C8929: CPT

## 2019-01-01 PROCEDURE — 87581 M.PNEUMON DNA AMP PROBE: CPT

## 2019-01-01 PROCEDURE — 80048 BASIC METABOLIC PNL TOTAL CA: CPT

## 2019-01-01 PROCEDURE — 84100 ASSAY OF PHOSPHORUS: CPT

## 2019-01-01 PROCEDURE — 82947 ASSAY GLUCOSE BLOOD QUANT: CPT

## 2019-01-01 PROCEDURE — 71045 X-RAY EXAM CHEST 1 VIEW: CPT

## 2019-01-01 PROCEDURE — 83735 ASSAY OF MAGNESIUM: CPT

## 2019-01-01 PROCEDURE — 87633 RESP VIRUS 12-25 TARGETS: CPT

## 2019-01-01 PROCEDURE — 97162 PT EVAL MOD COMPLEX 30 MIN: CPT

## 2019-01-01 PROCEDURE — 93005 ELECTROCARDIOGRAM TRACING: CPT

## 2019-01-01 PROCEDURE — 83605 ASSAY OF LACTIC ACID: CPT

## 2019-01-01 PROCEDURE — 84436 ASSAY OF TOTAL THYROXINE: CPT

## 2019-01-01 PROCEDURE — 82435 ASSAY OF BLOOD CHLORIDE: CPT

## 2019-01-01 PROCEDURE — 80202 ASSAY OF VANCOMYCIN: CPT

## 2019-01-01 PROCEDURE — 82340 ASSAY OF CALCIUM IN URINE: CPT

## 2019-01-01 PROCEDURE — 87798 DETECT AGENT NOS DNA AMP: CPT

## 2019-01-01 PROCEDURE — 81001 URINALYSIS AUTO W/SCOPE: CPT

## 2019-01-01 PROCEDURE — 71045 X-RAY EXAM CHEST 1 VIEW: CPT | Mod: 26

## 2019-01-01 PROCEDURE — 96374 THER/PROPH/DIAG INJ IV PUSH: CPT | Mod: XU

## 2019-01-01 PROCEDURE — 85027 COMPLETE CBC AUTOMATED: CPT

## 2019-01-01 PROCEDURE — 80069 RENAL FUNCTION PANEL: CPT

## 2019-01-01 PROCEDURE — 70450 CT HEAD/BRAIN W/O DYE: CPT | Mod: 26

## 2019-01-01 PROCEDURE — 76700 US EXAM ABDOM COMPLETE: CPT | Mod: 26

## 2019-01-01 PROCEDURE — 74177 CT ABD & PELVIS W/CONTRAST: CPT

## 2019-01-01 PROCEDURE — 84105 ASSAY OF URINE PHOSPHORUS: CPT

## 2019-01-01 PROCEDURE — 76700 US EXAM ABDOM COMPLETE: CPT

## 2019-01-01 PROCEDURE — 86803 HEPATITIS C AB TEST: CPT

## 2019-01-01 PROCEDURE — 99223 1ST HOSP IP/OBS HIGH 75: CPT

## 2019-01-01 PROCEDURE — 71275 CT ANGIOGRAPHY CHEST: CPT | Mod: 26

## 2019-01-01 PROCEDURE — 93306 TTE W/DOPPLER COMPLETE: CPT | Mod: 26

## 2019-01-01 PROCEDURE — 84133 ASSAY OF URINE POTASSIUM: CPT

## 2019-01-01 PROCEDURE — 87040 BLOOD CULTURE FOR BACTERIA: CPT

## 2019-01-01 PROCEDURE — 80053 COMPREHEN METABOLIC PANEL: CPT

## 2019-01-01 PROCEDURE — 93010 ELECTROCARDIOGRAM REPORT: CPT

## 2019-01-01 PROCEDURE — 87640 STAPH A DNA AMP PROBE: CPT

## 2019-01-01 PROCEDURE — 84132 ASSAY OF SERUM POTASSIUM: CPT

## 2019-01-01 PROCEDURE — 74177 CT ABD & PELVIS W/CONTRAST: CPT | Mod: 26

## 2019-01-01 PROCEDURE — 87486 CHLMYD PNEUM DNA AMP PROBE: CPT

## 2019-01-01 PROCEDURE — 85730 THROMBOPLASTIN TIME PARTIAL: CPT

## 2019-01-01 PROCEDURE — 94640 AIRWAY INHALATION TREATMENT: CPT

## 2019-01-01 PROCEDURE — 87641 MR-STAPH DNA AMP PROBE: CPT

## 2019-01-01 PROCEDURE — 82803 BLOOD GASES ANY COMBINATION: CPT

## 2019-01-01 PROCEDURE — 84540 ASSAY OF URINE/UREA-N: CPT

## 2019-01-01 PROCEDURE — 85610 PROTHROMBIN TIME: CPT

## 2019-01-01 PROCEDURE — 84550 ASSAY OF BLOOD/URIC ACID: CPT

## 2019-01-01 PROCEDURE — 99285 EMERGENCY DEPT VISIT HI MDM: CPT | Mod: GC

## 2019-01-01 PROCEDURE — 99285 EMERGENCY DEPT VISIT HI MDM: CPT | Mod: 25

## 2019-01-01 PROCEDURE — 83935 ASSAY OF URINE OSMOLALITY: CPT

## 2019-01-01 PROCEDURE — 84300 ASSAY OF URINE SODIUM: CPT

## 2019-01-01 PROCEDURE — 87086 URINE CULTURE/COLONY COUNT: CPT

## 2019-01-01 PROCEDURE — 84145 PROCALCITONIN (PCT): CPT

## 2019-01-01 RX ORDER — HYDROXYCHLOROQUINE SULFATE 200 MG
2 TABLET ORAL
Qty: 0 | Refills: 0 | DISCHARGE

## 2019-01-01 RX ORDER — ACETAMINOPHEN 500 MG
650 TABLET ORAL EVERY 6 HOURS
Refills: 0 | Status: DISCONTINUED | OUTPATIENT
Start: 2019-01-01 | End: 2019-01-01

## 2019-01-01 RX ORDER — CEFUROXIME AXETIL 250 MG
1 TABLET ORAL
Qty: 8 | Refills: 0
Start: 2019-01-01 | End: 2019-01-01

## 2019-01-01 RX ORDER — POTASSIUM CHLORIDE 20 MEQ
10 PACKET (EA) ORAL
Refills: 0 | Status: COMPLETED | OUTPATIENT
Start: 2019-01-01 | End: 2019-01-01

## 2019-01-01 RX ORDER — PIPERACILLIN AND TAZOBACTAM 4; .5 G/20ML; G/20ML
3.38 INJECTION, POWDER, LYOPHILIZED, FOR SOLUTION INTRAVENOUS ONCE
Refills: 0 | Status: COMPLETED | OUTPATIENT
Start: 2019-01-01 | End: 2019-01-01

## 2019-01-01 RX ORDER — CHOLECALCIFEROL (VITAMIN D3) 125 MCG
2000 CAPSULE ORAL DAILY
Refills: 0 | Status: DISCONTINUED | OUTPATIENT
Start: 2019-01-01 | End: 2019-01-01

## 2019-01-01 RX ORDER — SODIUM CHLORIDE 9 MG/ML
1000 INJECTION INTRAMUSCULAR; INTRAVENOUS; SUBCUTANEOUS
Refills: 0 | Status: DISCONTINUED | OUTPATIENT
Start: 2019-01-01 | End: 2019-01-01

## 2019-01-01 RX ORDER — ASPIRIN/CALCIUM CARB/MAGNESIUM 324 MG
81 TABLET ORAL DAILY
Refills: 0 | Status: DISCONTINUED | OUTPATIENT
Start: 2019-01-01 | End: 2019-01-01

## 2019-01-01 RX ORDER — ACETAMINOPHEN 500 MG
650 TABLET ORAL ONCE
Refills: 0 | Status: COMPLETED | OUTPATIENT
Start: 2019-01-01 | End: 2019-01-01

## 2019-01-01 RX ORDER — VANCOMYCIN HCL 1 G
1000 VIAL (EA) INTRAVENOUS EVERY 12 HOURS
Refills: 0 | Status: DISCONTINUED | OUTPATIENT
Start: 2019-01-01 | End: 2019-01-01

## 2019-01-01 RX ORDER — IPRATROPIUM/ALBUTEROL SULFATE 18-103MCG
3 AEROSOL WITH ADAPTER (GRAM) INHALATION EVERY 6 HOURS
Refills: 0 | Status: DISCONTINUED | OUTPATIENT
Start: 2019-01-01 | End: 2019-01-01

## 2019-01-01 RX ORDER — SODIUM CHLORIDE 9 MG/ML
1800 INJECTION INTRAMUSCULAR; INTRAVENOUS; SUBCUTANEOUS ONCE
Refills: 0 | Status: COMPLETED | OUTPATIENT
Start: 2019-01-01 | End: 2019-01-01

## 2019-01-01 RX ORDER — PANTOPRAZOLE SODIUM 20 MG/1
40 TABLET, DELAYED RELEASE ORAL
Refills: 0 | Status: DISCONTINUED | OUTPATIENT
Start: 2019-01-01 | End: 2019-01-01

## 2019-01-01 RX ORDER — URSODIOL 250 MG/1
250 TABLET, FILM COATED ORAL
Refills: 0 | Status: DISCONTINUED | OUTPATIENT
Start: 2019-01-01 | End: 2019-01-01

## 2019-01-01 RX ORDER — CEFTRIAXONE 500 MG/1
1000 INJECTION, POWDER, FOR SOLUTION INTRAMUSCULAR; INTRAVENOUS EVERY 24 HOURS
Refills: 0 | Status: DISCONTINUED | OUTPATIENT
Start: 2019-01-01 | End: 2019-01-01

## 2019-01-01 RX ORDER — ENOXAPARIN SODIUM 100 MG/ML
40 INJECTION SUBCUTANEOUS DAILY
Refills: 0 | Status: DISCONTINUED | OUTPATIENT
Start: 2019-01-01 | End: 2019-01-01

## 2019-01-01 RX ORDER — HYDROXYCHLOROQUINE SULFATE 200 MG
400 TABLET ORAL DAILY
Refills: 0 | Status: DISCONTINUED | OUTPATIENT
Start: 2019-01-01 | End: 2019-01-01

## 2019-01-01 RX ORDER — AMLODIPINE BESYLATE 2.5 MG/1
10 TABLET ORAL DAILY
Refills: 0 | Status: DISCONTINUED | OUTPATIENT
Start: 2019-01-01 | End: 2019-01-01

## 2019-01-01 RX ORDER — BUDESONIDE AND FORMOTEROL FUMARATE DIHYDRATE 160; 4.5 UG/1; UG/1
2 AEROSOL RESPIRATORY (INHALATION)
Qty: 1 | Refills: 0
Start: 2019-01-01 | End: 2019-01-01

## 2019-01-01 RX ORDER — ALISKIREN HEMIFUMARATE 300 MG/1
1 TABLET, FILM COATED ORAL
Qty: 0 | Refills: 0 | DISCHARGE

## 2019-01-01 RX ORDER — PIPERACILLIN AND TAZOBACTAM 4; .5 G/20ML; G/20ML
3.38 INJECTION, POWDER, LYOPHILIZED, FOR SOLUTION INTRAVENOUS EVERY 8 HOURS
Refills: 0 | Status: DISCONTINUED | OUTPATIENT
Start: 2019-01-01 | End: 2019-01-01

## 2019-01-01 RX ORDER — POTASSIUM CHLORIDE 20 MEQ
40 PACKET (EA) ORAL EVERY 4 HOURS
Refills: 0 | Status: COMPLETED | OUTPATIENT
Start: 2019-01-01 | End: 2019-01-01

## 2019-01-01 RX ORDER — LABETALOL HCL 100 MG
300 TABLET ORAL
Refills: 0 | Status: DISCONTINUED | OUTPATIENT
Start: 2019-01-01 | End: 2019-01-01

## 2019-01-01 RX ORDER — OMEPRAZOLE 10 MG/1
1 CAPSULE, DELAYED RELEASE ORAL
Qty: 0 | Refills: 0 | DISCHARGE

## 2019-01-01 RX ORDER — SODIUM,POTASSIUM PHOSPHATES 278-250MG
1 POWDER IN PACKET (EA) ORAL
Refills: 0 | Status: DISCONTINUED | OUTPATIENT
Start: 2019-01-01 | End: 2019-01-01

## 2019-01-01 RX ORDER — CEFTRIAXONE 500 MG/1
1000 INJECTION, POWDER, FOR SOLUTION INTRAMUSCULAR; INTRAVENOUS ONCE
Refills: 0 | Status: DISCONTINUED | OUTPATIENT
Start: 2019-01-01 | End: 2019-01-01

## 2019-01-01 RX ORDER — IPRATROPIUM/ALBUTEROL SULFATE 18-103MCG
3 AEROSOL WITH ADAPTER (GRAM) INHALATION EVERY 4 HOURS
Refills: 0 | Status: DISCONTINUED | OUTPATIENT
Start: 2019-01-01 | End: 2019-01-01

## 2019-01-01 RX ORDER — HYDROXYCHLOROQUINE SULFATE 200 MG
2 TABLET ORAL
Qty: 0 | Refills: 0 | DISCHARGE
Start: 2019-01-01

## 2019-01-01 RX ORDER — SODIUM,POTASSIUM PHOSPHATES 278-250MG
1 POWDER IN PACKET (EA) ORAL THREE TIMES A DAY
Refills: 0 | Status: DISCONTINUED | OUTPATIENT
Start: 2019-01-01 | End: 2019-01-01

## 2019-01-01 RX ORDER — BUDESONIDE AND FORMOTEROL FUMARATE DIHYDRATE 160; 4.5 UG/1; UG/1
2 AEROSOL RESPIRATORY (INHALATION)
Refills: 0 | Status: DISCONTINUED | OUTPATIENT
Start: 2019-01-01 | End: 2019-01-01

## 2019-01-01 RX ORDER — BUDESONIDE, MICRONIZED 100 %
0.25 POWDER (GRAM) MISCELLANEOUS
Refills: 0 | Status: DISCONTINUED | OUTPATIENT
Start: 2019-01-01 | End: 2019-01-01

## 2019-01-01 RX ORDER — LISINOPRIL 2.5 MG/1
20 TABLET ORAL DAILY
Refills: 0 | Status: DISCONTINUED | OUTPATIENT
Start: 2019-01-01 | End: 2019-01-01

## 2019-01-01 RX ORDER — POTASSIUM PHOSPHATE, MONOBASIC POTASSIUM PHOSPHATE, DIBASIC 236; 224 MG/ML; MG/ML
30 INJECTION, SOLUTION INTRAVENOUS ONCE
Refills: 0 | Status: COMPLETED | OUTPATIENT
Start: 2019-01-01 | End: 2019-01-01

## 2019-01-01 RX ORDER — POTASSIUM CHLORIDE 20 MEQ
40 PACKET (EA) ORAL ONCE
Refills: 0 | Status: COMPLETED | OUTPATIENT
Start: 2019-01-01 | End: 2019-01-01

## 2019-01-01 RX ORDER — IPRATROPIUM/ALBUTEROL SULFATE 18-103MCG
3 AEROSOL WITH ADAPTER (GRAM) INHALATION ONCE
Refills: 0 | Status: COMPLETED | OUTPATIENT
Start: 2019-01-01 | End: 2019-01-01

## 2019-01-01 RX ADMIN — Medication 300 MILLIGRAM(S): at 05:03

## 2019-01-01 RX ADMIN — Medication 0.25 MILLIGRAM(S): at 18:15

## 2019-01-01 RX ADMIN — AMLODIPINE BESYLATE 10 MILLIGRAM(S): 2.5 TABLET ORAL at 04:56

## 2019-01-01 RX ADMIN — PANTOPRAZOLE SODIUM 40 MILLIGRAM(S): 20 TABLET, DELAYED RELEASE ORAL at 05:03

## 2019-01-01 RX ADMIN — Medication 0.25 MILLIGRAM(S): at 18:09

## 2019-01-01 RX ADMIN — Medication 3 MILLILITER(S): at 12:04

## 2019-01-01 RX ADMIN — Medication 1 TABLET(S): at 10:17

## 2019-01-01 RX ADMIN — Medication 30 MILLIGRAM(S): at 06:10

## 2019-01-01 RX ADMIN — ENOXAPARIN SODIUM 40 MILLIGRAM(S): 100 INJECTION SUBCUTANEOUS at 22:56

## 2019-01-01 RX ADMIN — Medication 300 MILLIGRAM(S): at 18:01

## 2019-01-01 RX ADMIN — Medication 400 MILLIGRAM(S): at 14:28

## 2019-01-01 RX ADMIN — Medication 2000 UNIT(S): at 12:31

## 2019-01-01 RX ADMIN — Medication 3 MILLILITER(S): at 06:15

## 2019-01-01 RX ADMIN — AMLODIPINE BESYLATE 10 MILLIGRAM(S): 2.5 TABLET ORAL at 05:03

## 2019-01-01 RX ADMIN — SODIUM CHLORIDE 75 MILLILITER(S): 9 INJECTION INTRAMUSCULAR; INTRAVENOUS; SUBCUTANEOUS at 18:08

## 2019-01-01 RX ADMIN — URSODIOL 250 MILLIGRAM(S): 250 TABLET, FILM COATED ORAL at 18:15

## 2019-01-01 RX ADMIN — Medication 20 MILLIGRAM(S): at 20:15

## 2019-01-01 RX ADMIN — Medication 3 MILLILITER(S): at 21:26

## 2019-01-01 RX ADMIN — Medication 20 MILLIGRAM(S): at 11:55

## 2019-01-01 RX ADMIN — Medication 81 MILLIGRAM(S): at 12:32

## 2019-01-01 RX ADMIN — Medication 40 MILLIEQUIVALENT(S): at 14:16

## 2019-01-01 RX ADMIN — Medication 300 MILLIGRAM(S): at 17:06

## 2019-01-01 RX ADMIN — Medication 3 MILLILITER(S): at 02:00

## 2019-01-01 RX ADMIN — Medication 3 MILLILITER(S): at 21:15

## 2019-01-01 RX ADMIN — Medication 3 MILLILITER(S): at 11:51

## 2019-01-01 RX ADMIN — Medication 100 MILLIEQUIVALENT(S): at 11:14

## 2019-01-01 RX ADMIN — ENOXAPARIN SODIUM 40 MILLIGRAM(S): 100 INJECTION SUBCUTANEOUS at 11:53

## 2019-01-01 RX ADMIN — Medication 100 MILLIEQUIVALENT(S): at 15:18

## 2019-01-01 RX ADMIN — PIPERACILLIN AND TAZOBACTAM 25 GRAM(S): 4; .5 INJECTION, POWDER, LYOPHILIZED, FOR SOLUTION INTRAVENOUS at 01:26

## 2019-01-01 RX ADMIN — Medication 81 MILLIGRAM(S): at 11:52

## 2019-01-01 RX ADMIN — PANTOPRAZOLE SODIUM 40 MILLIGRAM(S): 20 TABLET, DELAYED RELEASE ORAL at 06:10

## 2019-01-01 RX ADMIN — Medication 300 MILLIGRAM(S): at 06:15

## 2019-01-01 RX ADMIN — Medication 300 MILLIGRAM(S): at 06:18

## 2019-01-01 RX ADMIN — Medication 2000 UNIT(S): at 12:57

## 2019-01-01 RX ADMIN — Medication 3 MILLILITER(S): at 13:19

## 2019-01-01 RX ADMIN — Medication 30 MILLIGRAM(S): at 21:25

## 2019-01-01 RX ADMIN — SODIUM CHLORIDE 75 MILLILITER(S): 9 INJECTION INTRAMUSCULAR; INTRAVENOUS; SUBCUTANEOUS at 00:04

## 2019-01-01 RX ADMIN — Medication 0.25 MILLIGRAM(S): at 06:10

## 2019-01-01 RX ADMIN — CEFTRIAXONE 100 MILLIGRAM(S): 500 INJECTION, POWDER, FOR SOLUTION INTRAMUSCULAR; INTRAVENOUS at 21:14

## 2019-01-01 RX ADMIN — PIPERACILLIN AND TAZOBACTAM 25 GRAM(S): 4; .5 INJECTION, POWDER, LYOPHILIZED, FOR SOLUTION INTRAVENOUS at 20:15

## 2019-01-01 RX ADMIN — Medication 10 MILLIGRAM(S): at 10:17

## 2019-01-01 RX ADMIN — ENOXAPARIN SODIUM 40 MILLIGRAM(S): 100 INJECTION SUBCUTANEOUS at 12:58

## 2019-01-01 RX ADMIN — Medication 250 MILLIGRAM(S): at 09:22

## 2019-01-01 RX ADMIN — PIPERACILLIN AND TAZOBACTAM 200 GRAM(S): 4; .5 INJECTION, POWDER, LYOPHILIZED, FOR SOLUTION INTRAVENOUS at 15:28

## 2019-01-01 RX ADMIN — Medication 300 MILLIGRAM(S): at 04:56

## 2019-01-01 RX ADMIN — PIPERACILLIN AND TAZOBACTAM 25 GRAM(S): 4; .5 INJECTION, POWDER, LYOPHILIZED, FOR SOLUTION INTRAVENOUS at 22:04

## 2019-01-01 RX ADMIN — PANTOPRAZOLE SODIUM 40 MILLIGRAM(S): 20 TABLET, DELAYED RELEASE ORAL at 06:18

## 2019-01-01 RX ADMIN — Medication 3 MILLILITER(S): at 14:21

## 2019-01-01 RX ADMIN — Medication 10 MILLIGRAM(S): at 12:57

## 2019-01-01 RX ADMIN — PIPERACILLIN AND TAZOBACTAM 25 GRAM(S): 4; .5 INJECTION, POWDER, LYOPHILIZED, FOR SOLUTION INTRAVENOUS at 05:02

## 2019-01-01 RX ADMIN — AMLODIPINE BESYLATE 10 MILLIGRAM(S): 2.5 TABLET ORAL at 06:10

## 2019-01-01 RX ADMIN — Medication 20 MILLIGRAM(S): at 11:56

## 2019-01-01 RX ADMIN — AMLODIPINE BESYLATE 10 MILLIGRAM(S): 2.5 TABLET ORAL at 06:18

## 2019-01-01 RX ADMIN — Medication 3 MILLILITER(S): at 15:18

## 2019-01-01 RX ADMIN — PIPERACILLIN AND TAZOBACTAM 25 GRAM(S): 4; .5 INJECTION, POWDER, LYOPHILIZED, FOR SOLUTION INTRAVENOUS at 04:57

## 2019-01-01 RX ADMIN — Medication 81 MILLIGRAM(S): at 12:57

## 2019-01-01 RX ADMIN — URSODIOL 250 MILLIGRAM(S): 250 TABLET, FILM COATED ORAL at 18:50

## 2019-01-01 RX ADMIN — Medication 100 MILLIEQUIVALENT(S): at 19:05

## 2019-01-01 RX ADMIN — SODIUM CHLORIDE 75 MILLILITER(S): 9 INJECTION INTRAMUSCULAR; INTRAVENOUS; SUBCUTANEOUS at 12:08

## 2019-01-01 RX ADMIN — Medication 30 MILLIGRAM(S): at 18:15

## 2019-01-01 RX ADMIN — Medication 2000 UNIT(S): at 11:53

## 2019-01-01 RX ADMIN — Medication 3 MILLILITER(S): at 06:10

## 2019-01-01 RX ADMIN — Medication 3 MILLILITER(S): at 18:01

## 2019-01-01 RX ADMIN — Medication 300 MILLIGRAM(S): at 06:10

## 2019-01-01 RX ADMIN — PIPERACILLIN AND TAZOBACTAM 25 GRAM(S): 4; .5 INJECTION, POWDER, LYOPHILIZED, FOR SOLUTION INTRAVENOUS at 13:06

## 2019-01-01 RX ADMIN — Medication 1 TABLET(S): at 12:57

## 2019-01-01 RX ADMIN — Medication 400 MILLIGRAM(S): at 11:54

## 2019-01-01 RX ADMIN — Medication 400 MILLIGRAM(S): at 12:58

## 2019-01-01 RX ADMIN — Medication 250 MILLIGRAM(S): at 20:15

## 2019-01-01 RX ADMIN — Medication 3 MILLILITER(S): at 12:00

## 2019-01-01 RX ADMIN — Medication 3 MILLILITER(S): at 14:40

## 2019-01-01 RX ADMIN — Medication 3 MILLILITER(S): at 12:02

## 2019-01-01 RX ADMIN — Medication 400 MILLIGRAM(S): at 12:12

## 2019-01-01 RX ADMIN — Medication 1 TABLET(S): at 11:56

## 2019-01-01 RX ADMIN — Medication 400 MILLIGRAM(S): at 11:52

## 2019-01-01 RX ADMIN — URSODIOL 250 MILLIGRAM(S): 250 TABLET, FILM COATED ORAL at 04:56

## 2019-01-01 RX ADMIN — SODIUM CHLORIDE 75 MILLILITER(S): 9 INJECTION INTRAMUSCULAR; INTRAVENOUS; SUBCUTANEOUS at 13:18

## 2019-01-01 RX ADMIN — Medication 10 MILLIGRAM(S): at 06:18

## 2019-01-01 RX ADMIN — Medication 650 MILLIGRAM(S): at 13:43

## 2019-01-01 RX ADMIN — Medication 3 MILLILITER(S): at 18:15

## 2019-01-01 RX ADMIN — SODIUM CHLORIDE 100 MILLILITER(S): 9 INJECTION INTRAMUSCULAR; INTRAVENOUS; SUBCUTANEOUS at 22:42

## 2019-01-01 RX ADMIN — AMLODIPINE BESYLATE 10 MILLIGRAM(S): 2.5 TABLET ORAL at 06:15

## 2019-01-01 RX ADMIN — Medication 20 MILLIGRAM(S): at 05:02

## 2019-01-01 RX ADMIN — Medication 3 MILLILITER(S): at 00:04

## 2019-01-01 RX ADMIN — Medication 3 MILLILITER(S): at 04:56

## 2019-01-01 RX ADMIN — Medication 1 PACKET(S): at 15:47

## 2019-01-01 RX ADMIN — Medication 10 MILLIGRAM(S): at 11:56

## 2019-01-01 RX ADMIN — Medication 30 MILLIGRAM(S): at 06:14

## 2019-01-01 RX ADMIN — ENOXAPARIN SODIUM 40 MILLIGRAM(S): 100 INJECTION SUBCUTANEOUS at 12:32

## 2019-01-01 RX ADMIN — Medication 3 MILLILITER(S): at 10:21

## 2019-01-01 RX ADMIN — Medication 1 TABLET(S): at 12:10

## 2019-01-01 RX ADMIN — Medication 250 MILLIGRAM(S): at 09:36

## 2019-01-01 RX ADMIN — Medication 40 MILLIEQUIVALENT(S): at 18:15

## 2019-01-01 RX ADMIN — CEFTRIAXONE 100 MILLIGRAM(S): 500 INJECTION, POWDER, FOR SOLUTION INTRAMUSCULAR; INTRAVENOUS at 21:26

## 2019-01-01 RX ADMIN — Medication 2000 UNIT(S): at 12:09

## 2019-01-01 RX ADMIN — PIPERACILLIN AND TAZOBACTAM 25 GRAM(S): 4; .5 INJECTION, POWDER, LYOPHILIZED, FOR SOLUTION INTRAVENOUS at 12:58

## 2019-01-01 RX ADMIN — Medication 3 MILLILITER(S): at 22:04

## 2019-01-01 RX ADMIN — Medication 300 MILLIGRAM(S): at 18:10

## 2019-01-01 RX ADMIN — URSODIOL 250 MILLIGRAM(S): 250 TABLET, FILM COATED ORAL at 09:37

## 2019-01-01 RX ADMIN — Medication 10 MILLIGRAM(S): at 13:21

## 2019-01-01 RX ADMIN — URSODIOL 250 MILLIGRAM(S): 250 TABLET, FILM COATED ORAL at 06:15

## 2019-01-01 RX ADMIN — Medication 250 MILLIGRAM(S): at 22:56

## 2019-01-01 RX ADMIN — PANTOPRAZOLE SODIUM 40 MILLIGRAM(S): 20 TABLET, DELAYED RELEASE ORAL at 04:56

## 2019-01-01 RX ADMIN — Medication 81 MILLIGRAM(S): at 12:10

## 2019-01-01 RX ADMIN — URSODIOL 250 MILLIGRAM(S): 250 TABLET, FILM COATED ORAL at 06:18

## 2019-01-01 RX ADMIN — POTASSIUM PHOSPHATE, MONOBASIC POTASSIUM PHOSPHATE, DIBASIC 83.33 MILLIMOLE(S): 236; 224 INJECTION, SOLUTION INTRAVENOUS at 18:05

## 2019-01-01 RX ADMIN — Medication 0.25 MILLIGRAM(S): at 04:57

## 2019-01-01 RX ADMIN — URSODIOL 250 MILLIGRAM(S): 250 TABLET, FILM COATED ORAL at 06:10

## 2019-01-01 RX ADMIN — Medication 2000 UNIT(S): at 10:17

## 2019-01-01 RX ADMIN — Medication 0.25 MILLIGRAM(S): at 18:01

## 2019-01-01 RX ADMIN — Medication 1 TABLET(S): at 13:21

## 2019-01-01 RX ADMIN — PANTOPRAZOLE SODIUM 40 MILLIGRAM(S): 20 TABLET, DELAYED RELEASE ORAL at 06:15

## 2019-01-01 RX ADMIN — ENOXAPARIN SODIUM 40 MILLIGRAM(S): 100 INJECTION SUBCUTANEOUS at 12:10

## 2019-01-01 RX ADMIN — Medication 300 MILLIGRAM(S): at 18:15

## 2019-01-01 RX ADMIN — Medication 40 MILLIEQUIVALENT(S): at 09:33

## 2019-01-01 RX ADMIN — SODIUM CHLORIDE 1800 MILLILITER(S): 9 INJECTION INTRAMUSCULAR; INTRAVENOUS; SUBCUTANEOUS at 13:25

## 2019-01-01 RX ADMIN — Medication 30 MILLIGRAM(S): at 14:40

## 2019-01-01 RX ADMIN — Medication 10 MILLIGRAM(S): at 12:10

## 2019-01-01 RX ADMIN — Medication 40 MILLIGRAM(S): at 15:20

## 2019-01-01 RX ADMIN — Medication 40 MILLIGRAM(S): at 22:04

## 2019-01-01 RX ADMIN — PIPERACILLIN AND TAZOBACTAM 25 GRAM(S): 4; .5 INJECTION, POWDER, LYOPHILIZED, FOR SOLUTION INTRAVENOUS at 11:52

## 2019-01-01 RX ADMIN — URSODIOL 250 MILLIGRAM(S): 250 TABLET, FILM COATED ORAL at 18:09

## 2019-01-01 RX ADMIN — Medication 3 MILLILITER(S): at 18:10

## 2019-01-01 RX ADMIN — Medication 40 MILLIGRAM(S): at 04:56

## 2019-01-01 RX ADMIN — Medication 0.25 MILLIGRAM(S): at 06:51

## 2019-01-01 RX ADMIN — URSODIOL 250 MILLIGRAM(S): 250 TABLET, FILM COATED ORAL at 18:01

## 2019-01-01 RX ADMIN — Medication 300 MILLIGRAM(S): at 22:56

## 2019-02-11 ENCOUNTER — APPOINTMENT (OUTPATIENT)
Dept: OPHTHALMOLOGY | Facility: CLINIC | Age: 74
End: 2019-02-11
Payer: MEDICARE

## 2019-02-11 DIAGNOSIS — H25.092 OTHER AGE-RELATED INCIPIENT CATARACT, LEFT EYE: ICD-10-CM

## 2019-02-11 PROCEDURE — 92012 INTRM OPH EXAM EST PATIENT: CPT

## 2019-08-26 ENCOUNTER — NON-APPOINTMENT (OUTPATIENT)
Age: 74
End: 2019-08-26

## 2019-08-26 ENCOUNTER — APPOINTMENT (OUTPATIENT)
Dept: OPHTHALMOLOGY | Facility: CLINIC | Age: 74
End: 2019-08-26
Payer: MEDICARE

## 2019-08-26 PROCEDURE — 92014 COMPRE OPH EXAM EST PT 1/>: CPT

## 2019-08-26 PROCEDURE — 92250 FUNDUS PHOTOGRAPHY W/I&R: CPT

## 2019-08-26 PROCEDURE — 92083 EXTENDED VISUAL FIELD XM: CPT

## 2019-10-26 NOTE — ED PROVIDER NOTE - NS ED ROS FT
GENERAL: + fever, chills  EYES: no vision changes, no discharge.   HEENT: no difficulty swallowing or speaking   CARDIAC: no chest pain/pressure, SOB, lower ex edema  PULMONARY: no cough, SOB  GI: no abdominal pain, + n, + v, no diarrhea.   : no dysuria, frequency, hematuria  SKIN: no rashes, lesions, vesicles  NEURO: + confusion, no lightheadedness, no paraesthesias.   MSK: + chronic joint pain, no myalgia, weakness.

## 2019-10-26 NOTE — ED ADULT NURSE NOTE - CHPI ED NUR SYMPTOMS NEG
no headache/no vomiting/no diarrhea/no abdominal pain/no rash/no shortness of breath/no chills/no cough

## 2019-10-26 NOTE — H&P ADULT - ATTENDING COMMENTS
Patient assigned to me by night hospitalist in charge for management and care for patient for this evening only. Care to be resumed by day hospitalist (Dr Barrett) in the morning and thereafter.

## 2019-10-26 NOTE — H&P ADULT - NSHPPHYSICALEXAM_GEN_ALL_CORE
Vital Signs Last 24 Hrs  T(C): 36.8 (26 Oct 2019 16:31), Max: 38.6 (26 Oct 2019 13:05)  T(F): 98.3 (26 Oct 2019 16:31), Max: 101.5 (26 Oct 2019 13:05)  HR: 98 (26 Oct 2019 21:03) (72 - 98)  BP: 184/85 (26 Oct 2019 21:03) (108/67 - 184/85)  BP(mean): --  RR: 18 (26 Oct 2019 21:03) (18 - 20)  SpO2: 99% (26 Oct 2019 21:03) (96% - 99%)    PHYSICAL EXAM:  GENERAL: NAD, well-developed  HEAD:  Atraumatic, normocephalic  EYES: EOMI, PERRLA, conjunctiva and sclera clear  NECK: Supple, moist MM  CHEST/LUNG: bibasilar crackles, no wheezing, no respiratory distress   HEART: Regular rate and rhythm; no murmurs  ABDOMEN: Soft, nontender, nondistended; bowel sounds present  EXTREMITIES:  2+ Peripheral Pulses, no edema  PSYCH: calm affect, not anxious  NEUROLOGY: non-focal, AAOx2 (name, place)  SKIN: No rashes or lesions  MUSCULOSKELETAL: no back pain, moving all extremities

## 2019-10-26 NOTE — H&P ADULT - PROBLEM SELECTOR PLAN 1
pt with high grade fever, leukocytosis with abdominal complaints in setting of use of multiple immunosuppressants    CT A/P non revealing for pathology, liver cyst noted   no e/o of PNA on CXR, UA negative, no e/o skin infection; ?meningitis, but no meningismus, per ED, family deferring LP   will c/w broad spectrum vanco and zosyn, monitor vanco levels prior to 4th dose   blood and urine cultures sent, will f/u  will check abdominal US to better eval liver/GB    monitor fever curve, tylenol prn

## 2019-10-26 NOTE — H&P ADULT - NSHPLABSRESULTS_GEN_ALL_CORE
Labs, imaging and EKG personally reviewed and interpreted by me - leukocytosis 20, with L shift; mild hypoNa/Cl 133/94, BUN/Cr 16/0.50, AlkP 160 AST/ALT elevated, UA negative. CXR with no focal consolidation. EKG                          11.6   20.00 )-----------( 313      ( 26 Oct 2019 13:19 )             34.8     10-26    133<L>  |  94<L>  |  16  ----------------------------<  168<H>  4.2   |  23  |  0.50    Ca    8.8      26 Oct 2019 13:19    TPro  7.8  /  Alb  3.9  /  TBili  1.1  /  DBili  x   /  AST  73<H>  /  ALT  102<H>  /  AlkPhos  160<H>  10-26    PT/INR - ( 26 Oct 2019 13:19 )   PT: 12.2 sec;   INR: 1.06 ratio    PTT - ( 26 Oct 2019 13:19 )  PTT:29.4 sec    Urinalysis Basic - ( 26 Oct 2019 13:24 )  Color: Dark Yellow / Appearance: Clear / S.024 / pH: x  Gluc: x / Ketone: Moderate  / Bili: Small / Urobili: 4 mg/dL   Blood: x / Protein: 100 mg/dL / Nitrite: Negative   Leuk Esterase: Negative / RBC: 11 /hpf / WBC 2 /HPF   Sq Epi: x / Non Sq Epi: 1 /hpf / Bacteria: Negative    IMPRESSION:   Redemonstration of screw fixation and C1-C2 vertebra, volume loss,   microvascular disease, remote infarcts in left parietal temporal lobe and   right cerebellum, microvascular disease with age indeterminate lacunar   infarcts. No new hemorrhage or midline shift. If symptoms persist   consider follow-up head CT or MR if no contraindications.    < from: CT Abdomen and Pelvis w/ IV Cont (10.26.19 @ 16:09) >  Lung Bases: The lung bases demonstrate trace left effusion.. The   visualized portions of the heart and chest wall are unremarkable.  Liver and Spleen: 2.7 cm water density focus in the liver likely   represents a cyst. Pneumobilia seen on previous exam has resolved. The   gallbladder is present.  Pancreas and Adrenals: Unremarkable  Kidneys: Both kidneys enhance with contrast and show no evidence of   stones, contour-deforming masses, or hydronephrosis.  Retroperitonium: No lymphadenopathy. The retroperitoneal vasculature is   within normal limits.  Appendix: Normal in appearance  Bowel: No evidence of bowelobstruction.  Pelvis:  Pelvic organs are grossly unremarkable. Streak artifact is seen   within the lower pelvis limiting evaluation of this region.  Bones and soft tissues: No focal lytic or blastic lesions. Orthopedic   hardware noted in the hip joints bilaterally. Lucent lesion in the left   hemipelvis is unchanged.    IMPRESSION:   Trace left pleural effusion. No acute intra-abdominal pathology. Labs, imaging and EKG personally reviewed and interpreted by me - leukocytosis 20, with L shift; mild hypoNa/Cl 133/94, BUN/Cr 16/0.50, AlkP 160 AST/ALT elevated, UA negative. CXR with no focal consolidation. EKG NSR, TWI in V2, V3.                           11.6   20.00 )-----------( 313      ( 26 Oct 2019 13:19 )             34.8     10-26    133<L>  |  94<L>  |  16  ----------------------------<  168<H>  4.2   |  23  |  0.50    Ca    8.8      26 Oct 2019 13:19    TPro  7.8  /  Alb  3.9  /  TBili  1.1  /  DBili  x   /  AST  73<H>  /  ALT  102<H>  /  AlkPhos  160<H>  10-26    PT/INR - ( 26 Oct 2019 13:19 )   PT: 12.2 sec;   INR: 1.06 ratio    PTT - ( 26 Oct 2019 13:19 )  PTT:29.4 sec    Urinalysis Basic - ( 26 Oct 2019 13:24 )  Color: Dark Yellow / Appearance: Clear / S.024 / pH: x  Gluc: x / Ketone: Moderate  / Bili: Small / Urobili: 4 mg/dL   Blood: x / Protein: 100 mg/dL / Nitrite: Negative   Leuk Esterase: Negative / RBC: 11 /hpf / WBC 2 /HPF   Sq Epi: x / Non Sq Epi: 1 /hpf / Bacteria: Negative    IMPRESSION:   Redemonstration of screw fixation and C1-C2 vertebra, volume loss,   microvascular disease, remote infarcts in left parietal temporal lobe and   right cerebellum, microvascular disease with age indeterminate lacunar   infarcts. No new hemorrhage or midline shift. If symptoms persist   consider follow-up head CT or MR if no contraindications.    < from: CT Abdomen and Pelvis w/ IV Cont (10.26.19 @ 16:09) >  Lung Bases: The lung bases demonstrate trace left effusion.. The   visualized portions of the heart and chest wall are unremarkable.  Liver and Spleen: 2.7 cm water density focus in the liver likely   represents a cyst. Pneumobilia seen on previous exam has resolved. The   gallbladder is present.  Pancreas and Adrenals: Unremarkable  Kidneys: Both kidneys enhance with contrast and show no evidence of   stones, contour-deforming masses, or hydronephrosis.  Retroperitonium: No lymphadenopathy. The retroperitoneal vasculature is   within normal limits.  Appendix: Normal in appearance  Bowel: No evidence of bowelobstruction.  Pelvis:  Pelvic organs are grossly unremarkable. Streak artifact is seen   within the lower pelvis limiting evaluation of this region.  Bones and soft tissues: No focal lytic or blastic lesions. Orthopedic   hardware noted in the hip joints bilaterally. Lucent lesion in the left   hemipelvis is unchanged.    IMPRESSION:   Trace left pleural effusion. No acute intra-abdominal pathology.

## 2019-10-26 NOTE — ED PROVIDER NOTE - ATTENDING CONTRIBUTION TO CARE
74 F w/ PMH of RA on methotrexate and prednisone, HTN, CVA in the past, pneumobilia, hx of lamectomy, p/w fever. Pt is in an assisted living, and was brought to an urgent care where her temp was 103.5 pt is febrile to 101.5, she reports mild nausea, denies abdominal pain. did have an episode of NBNB emesis x1. She has no headache, no neck pain, +generalized weakness, no cough.   Pt is accompanied w/ daughter and .   They state the patient had an episode where she couldn't distinguish right from left which is why they brought her to an urgent care, the fever was incidentally noted.   On exam, pt is awake and alert sitting in bed, ranging her neck, has a negative kernigs, she has clear lungs, abdomen soft w/ epigastric/ruq tenderness, no rash on arms nor legs.   pt will have labs and sepsis evaluation given broad spectrum antibiotics. given UA is clean, will obtain ct abdomen pelvis  additionally, pt is very weak w/ her trunk unable to fully sit upright w/ her arms and legs. family state this is her baseline and that she is primarily bed bound.  case signed out to Dr. Ramesh for reassessment and ct scan results at 1528.

## 2019-10-26 NOTE — ED PROVIDER NOTE - PHYSICAL EXAMINATION
General: mild lethargy, alert and oriented x2, follows commands.   HEENT: normocephalic, atraumatic, EOMI, no scleral icterus, neck supple, no stiff neck.   Cardiac: RRR, S1, S2, no murmur, rubs, gallop.   LUNGS: CTA B/L no wheeze, rhonchi, rales.   Abdomen: soft NT, ND, no rebound no guarding, no CVA tenderness.   EXT: Moving all extremities, no edema.    Neuro: A&Ox2, no focal neurological deficits, CN 2-12 grossly intact  Skin: warm, dry, no rash, no lesions

## 2019-10-26 NOTE — ED PROVIDER NOTE - SHIFT CHANGE DETAILS
Attending MD Ramesh: mahamed BURNETT pneumobilia on EMR review, pending CT AP, WBC 20s, abnml LFTs, no suspicion for meningitis at this time

## 2019-10-26 NOTE — H&P ADULT - PROBLEM SELECTOR PLAN 3
BP elevated above goal, family states pt has not had any of her home meds today  will given home dose labetalol now  monitor BP q4h   will resume other antiHTN meds cautiously in setting sepsis

## 2019-10-26 NOTE — ED ADULT NURSE NOTE - NSIMPLEMENTINTERV_GEN_ALL_ED
Implemented All Fall with Harm Risk Interventions:  [place] to call system. Call bell, personal items and telephone within reach. Instruct patient to call for assistance. Room bathroom lighting operational. Non-slip footwear when patient is off stretcher. Physically safe environment: no spills, clutter or unnecessary equipment. Stretcher in lowest position, wheels locked, appropriate side rails in place. Provide visual cue, wrist band, yellow gown, etc. Monitor gait and stability. Monitor for mental status changes and reorient to person, place, and time. Review medications for side effects contributing to fall risk. Reinforce activity limits and safety measures with patient and family. Provide visual clues: red socks.

## 2019-10-26 NOTE — ED PROVIDER NOTE - PROGRESS NOTE DETAILS
Valentina Maynard M.D. Resident  UA negative, elevated biliary labs-> CT abd ordered. Now more concerning for cholangitis. Valentina Maynard M.D. Resident  Pt still confused, says she has no PMD and does not recall going to urgent care this AM. Per  she does have a [facility] PMD. Diagnostic utility of LP discussed with pt and , both declined LP at this time. Valentina Maynard M.D. Resident  Pt still confused, says she has no PMD and does not recall going to urgent care this AM. Per  she does have a [facility] PMD. Diagnostic utility of LP discussed with pt and , both declined LP at this time.  inquired about sedation for the procedure or if the procedure can be one outpatient at PMD, Dr Starr's office. Pt's  appears to think that having PMD discuss the procedure with the patient may help her cooperate if procedure is to be performed without sedation.

## 2019-10-26 NOTE — H&P ADULT - HISTORY OF PRESENT ILLNESS
74F with PMH CVA (2016), HTN, RA on prednisone/methotrexate p/w fevers. History obtained from  at bedside 2/2 encephalopathy; pt lives at [facility]. Per , pt was in usual state of health until past 3-4 days; initially noted with nausea and dry heaving with poor PO intake. Per  had fever on 10/24 which subsided, but recurred again the next day. On morning of presentation, pt was very weak, having difficulty standing and appeared very fatigued. Was taken to urgent care and found to be febrile to 103.5 and recommended to come to ED. Per , denies any cough, labored breathing, wheezing, vomiting, no diarrhea, no c/o abdominal pain or dysuria, no rash, no recent falls. +sick contact at assisted living facility.   Pt does not know why she is here, denies any fevers, does no recall visit to urgent care; otherwise, currently denies all complaints. At baseline, per , pt states she is alert and oriented x 2-3, has some minor difficulty with short term memory.

## 2019-10-26 NOTE — H&P ADULT - PROBLEM SELECTOR PLAN 2
pt with mild confusion in setting of fever with likely infection  treatment as above   monitor mental status closely

## 2019-10-26 NOTE — ED PROVIDER NOTE - CLINICAL SUMMARY MEDICAL DECISION MAKING FREE TEXT BOX
75 yo F on immunosuppressive medication for RA, pw fever and AMS. Low suspicion for meningitis due to no stiff neck. More concerning for other causes of infection (PNA, UTI). Sepsis workup, reassess for dispo.

## 2019-10-26 NOTE — H&P ADULT - PROBLEM SELECTOR PLAN 5
mild transaminitis, with normal T bili, less likely obstructive process, possibly 2/2 medication affect   liver cysts noted on CT, but no other pathology   will check RUQ US to further eval   monitor transaminitis

## 2019-10-26 NOTE — H&P ADULT - ASSESSMENT
74F with PMH CVA (2016), HTN, RA on prednisone/methotrexate p/w fevers, admitted for sepsis work-up.

## 2019-10-26 NOTE — ED PROVIDER NOTE - OBJECTIVE STATEMENT
73 yo F with pmhx CVA, pneumobilia, laminectomy, RA on methotrexate and prednisone, HTN, pw 2 day fever and AMS. pt lives in assisted living and per daughter and , pt complained of mild abd discomfort 2 days ago, with mild nausea and 1x vomiting. This AM, pt had episode of confusion, not able to differentiate left from right so pt was seen at urgent care and recorded fever of 103.5. In the ED, pt febrile to 101.5, AOx2, with no abdominal pain, urinary symptoms, stiff neck, no cough.

## 2019-10-26 NOTE — ED ADULT NURSE NOTE - OBJECTIVE STATEMENT
Pt bib family from assisted living for further eval of fever to 103.5 earlier today.  Pt has no c/o cough or urinary sx.  She was straight cath'd for clear orange colored urine.  Oral mucosa dry.  Family reports recent decrease in her appetite.  Pt oriented to person, place.  Skin warm, dry, color good.  Multiple joint deformities noted due to RA.

## 2019-10-26 NOTE — ED ADULT NURSE REASSESSMENT NOTE - NS ED NURSE REASSESS COMMENT FT1
attending at bedside, made aware of elevated bp; states she will be entering orders for meds.
Oral mucosa moist, pt closer to her baseline mental status as per her .

## 2019-10-26 NOTE — H&P ADULT - NSHPREVIEWOFSYSTEMS_GEN_ALL_CORE
REVIEW OF SYSTEMS:    CONSTITUTIONAL: +fevers, no chills  EYES/ENT: No visual changes or throat pain   NECK: No pain or stiffness  RESPIRATORY: No cough, wheezing, no shortness of breath  CARDIOVASCULAR: No chest pain or palpitations  GASTROINTESTINAL: +nausea, no vomiting, no abdominal pain  GENITOURINARY: no hematuria, no dysuria  NEUROLOGICAL: no numbness, no headaches, +confusion   MUSCULOSKELETAL: no back pain, no focal weakness   SKIN: No rashes, or lesions   PSYCH: no anxiety, depression

## 2019-10-26 NOTE — H&P ADULT - NSICDXPASTSURGICALHX_GEN_ALL_CORE_FT
PAST SURGICAL HISTORY:  History of hip replacement, unspecified laterality     History of total knee replacement, unspecified laterality

## 2019-10-27 NOTE — CONSULT NOTE ADULT - ASSESSMENT
74F originally from [facility] with PMH CVA (2016), HTN, RA on prednisone/methotrexate p/w fevers and leukocytosis. other than a few days of nausea associated with fevers no other obv localizing complaints 74F originally from [facility] with PMH CVA (2016), HTN, RA on prednisone/methotrexate p/w fevers and leukocytosis. other than a few days of nausea associated with fevers no other obv localizing complaints (formal read of CXR suggesting new opac but not evident on exam)

## 2019-10-27 NOTE — CHART NOTE - NSCHARTNOTEFT_GEN_A_CORE
TARA ESPINAL  74y Female  Patient is a 74y old  Female who presents with a chief complaint of fever (27 Oct 2019 09:02)     Event Summary:  Received called from Radiologist-Dr. Jaquez, to report patient with WAYLON hazy opacity consistent with PNA. Patient currently being treated on empirical Vanc/Zosyn initially for fever without localizing complaints, now with confirmed source of infection.   -Discussed Radiology result with ID-Dr. Chamorro, who recommends to c/w Vanc/Zosyn for now, and will continue to monitor clinical response to antibx.    Nathaniel Saintus [facility]  #[phone]

## 2019-10-27 NOTE — CONSULT NOTE ADULT - SUBJECTIVE AND OBJECTIVE BOX
HPI:  74F with PMH CVA (2016), HTN, RA on prednisone/methotrexate p/w fevers. History obtained from  at bedside 2/2 encephalopathy; pt lives at [facility]. Per , pt was in usual state of health until past 3-4 days; initially noted with nausea and dry heaving with poor PO intake. Per  had fever on 10/24 which subsided, but recurred again the next day. On morning of presentation, pt was very weak, having difficulty standing and appeared very fatigued. Was taken to urgent care and found to be febrile to 103.5 and recommended to come to ED. Per , denies any cough, labored breathing, wheezing, vomiting, no diarrhea, no c/o abdominal pain or dysuria, no rash, no recent falls. +sick contact at assisted living facility.   Pt does not know why she is here, denies any fevers, does no recall visit to urgent care; otherwise, currently denies all complaints. At baseline, per , pt states she is alert and oriented x 2-3, has some minor difficulty with short term memory. (26 Oct 2019 21:46)      PAST MEDICAL & SURGICAL HISTORY:  Pneumobilia  Leukocytosis  GERD (gastroesophageal reflux disease)  Hypertension  CVA (cerebral vascular accident)  Osteoporosis  Rheumatoid arthritis  History of hip replacement, unspecified laterality  History of total knee replacement, unspecified laterality      Antimicrobials  hydroxychloroquine 400 milliGRAM(s) Oral daily  piperacillin/tazobactam IVPB.. 3.375 Gram(s) IV Intermittent every 8 hours  vancomycin  IVPB 1000 milliGRAM(s) IV Intermittent every 12 hours      Immunological      Other  acetaminophen   Tablet .. 650 milliGRAM(s) Oral every 6 hours PRN  amLODIPine   Tablet 10 milliGRAM(s) Oral daily  aspirin  chewable 81 milliGRAM(s) Oral daily  enoxaparin Injectable 40 milliGRAM(s) SubCutaneous daily  labetalol 300 milliGRAM(s) Oral two times a day  pantoprazole    Tablet 40 milliGRAM(s) Oral before breakfast  PARoxetine 10 milliGRAM(s) Oral daily  predniSONE   Tablet 10 milliGRAM(s) Oral daily  sodium chloride 0.9%. 1000 milliLiter(s) IV Continuous <Continuous>  ursodiol Tablet 250 milliGRAM(s) Oral two times a day      Allergies    Actonel (Hives)  clonidine (Other)  crab meat (Unknown)    Intolerances      SOCIAL HISTORY: no toxic habits    FAMILY HISTORY:  Family history of cerebrovascular accident (CVA) in mother      ROS:    EYES:  Negative  blurry vision or double vision  GASTROINTESTINAL:  Negative for nausea, vomiting, diarrhea  -otherwise negative except for subjective    Vital Signs Last 24 Hrs  T(C): 37.6 (27 Oct 2019 05:18), Max: 38.6 (26 Oct 2019 13:05)  T(F): 99.6 (27 Oct 2019 05:18), Max: 101.5 (26 Oct 2019 13:05)  HR: 92 (27 Oct 2019 05:18) (72 - 100)  BP: 147/86 (27 Oct 2019 05:18) (108/67 - 184/85)  BP(mean): --  RR: 18 (27 Oct 2019 05:18) (18 - 20)  SpO2: 97% (27 Oct 2019 05:18) (95% - 99%)    PE:  WDWN in no distress  HEENT:  NC, PERRL, sclerae anicteric, conjunctivae clear, EOMI.  Sinuses nontender, no nasal exudate.  No buccal or pharyngeal lesions, erythema or exudate  Neck:  Supple, no adenopathy  Lungs:  No accessory muscle use, bilaterally clear to auscultation  Cor:  RRR, S1, S2, no murmur appreciated  Abd:  Symmetric, normoactive BS.  Soft, nontender, no masses, guarding or rebound.  Liver and spleen not enlarged  Extrem:  No cyanosis or edema  Skin:  No rashes.  Neuro: grossly intact  Musc: moving all limbs freely, no focal deficits    LABS:                        10.7   22.82 )-----------( 313      ( 27 Oct 2019 06:36 )             32.0       WBC Count: 22.82 K/uL (10-27-19 @ 06:36)  WBC Count: 20.00 K/uL (10-26-19 @ 13:19)      10-27    135  |  95<L>  |  7   ----------------------------<  119<H>  3.3<L>   |  17<L>  |  0.35<L>    Ca    7.9<L>      27 Oct 2019 06:36  Phos  1.8     10-27  Mg     2.0     10-27    TPro  7.3  /  Alb  3.3  /  TBili  1.1  /  DBili  x   /  AST  88<H>  /  ALT  119<H>  /  AlkPhos  186<H>  10-27      Creatinine, Serum: 0.35 mg/dL (10-27-19 @ 06:36)  Creatinine, Serum: 0.50 mg/dL (10-26-19 @ 13:19)      Urinalysis Basic - ( 26 Oct 2019 13:24 )    Color: Dark Yellow / Appearance: Clear / S.024 / pH: x  Gluc: x / Ketone: Moderate  / Bili: Small / Urobili: 4 mg/dL   Blood: x / Protein: 100 mg/dL / Nitrite: Negative   Leuk Esterase: Negative / RBC: 11 /hpf / WBC 2 /HPF   Sq Epi: x / Non Sq Epi: 1 /hpf / Bacteria: Negative      MICROBIOLOGY:        RADIOLOGY & ADDITIONAL STUDIES:    --< from: CT Abdomen and Pelvis w/ IV Cont (10.26.19 @ 16:09) >    EXAM:  CT ABDOMEN AND PELVIS IC                            PROCEDURE DATE:  10/26/2019            INTERPRETATION:  Reason for exam: fever    Multiple axial sections of the abdomen and pelvis were obtained after the   administration of 100 ml of Optiray-320, (with 0 cc discarded)   intravenous contrast and oral contrast.    Comparison: 2017    Findings:     Lung Bases: The lung bases demonstrate trace left effusion.. The   visualized portions of the heart and chest wall are unremarkable.    Liver and Spleen: 2.7 cm water density focus in the liver likely   represents a cyst. Pneumobilia seen on previous exam has resolved. The   gallbladder is present.    Pancreas and Adrenals: Unremarkable    Kidneys: Both kidneys enhance with contrast and show no evidence of   stones, contour-deforming masses, or hydronephrosis.    Retroperitonium: No lymphadenopathy. The retroperitoneal vasculature is   within normal limits.    Appendix: Normal in appearance    Bowel: No evidence of bowelobstruction.    Pelvis:  Pelvic organs are grossly unremarkable. Streak artifact is seen   within the lower pelvis limiting evaluation of this region.    Bones and soft tissues: No focal lytic or blastic lesions. Orthopedic   hardware noted in the hip joints bilaterally. Lucent lesion in the left   hemipelvis is unchanged.    IMPRESSION:     Trace left pleural effusion. No acute intra-abdominal pathology.      < from: Xray Chest 1 View- PORTABLE-Urgent (10.26.19 @ 17:10) >      PROCEDURE DATE:  10/26/2019      ******PRELIMINARY REPORT******    ******PRELIMINARY REPORT******              INTERPRETATION:  Hazy opacity in the left upper lung, likely reflects   crowding of pulmonary parenchyma and vasculature secondary to patient   rotation.

## 2019-10-28 NOTE — CONSULT NOTE ADULT - ASSESSMENT
WAYLON community acquired pneumonia  Diffuse bronchospasm despite negative ER viral PCR  - unlikely due to pneumonia unless atypical  RA on chronic prdnisone 10 ng qd and methortrexate    REC    Increase solumedrol to 40 q8  Continue duoneb qid  Add pulmicort bid  Repeat viral PCR  [place] Ag]  COntinue abx per ID

## 2019-10-28 NOTE — CONSULT NOTE ADULT - PROBLEM SELECTOR RECOMMENDATION 3
will follow, as above.    Thank you for consulting us and involving us in the management of this most interesting and challenging case.     We will follow along in the care of this patient.
will give k phos  check urine phos and german  trend phos

## 2019-10-28 NOTE — CONSULT NOTE ADULT - ASSESSMENT
74F with PMH CVA (2016), HTN, RA on prednisone/methotrexate p/w fevers found to pneumonia. Renal called for hyponatremia, hypokalemia and hypophosphatemia

## 2019-10-28 NOTE — CONSULT NOTE ADULT - SUBJECTIVE AND OBJECTIVE BOX
[facility] Consult Note Nephrology - CONSULTATION NOTE    74F with PMH CVA (2016), HTN, RA on prednisone/methotrexate p/w fevers. History obtained from  at bedside 2/2 encephalopathy; pt lives at [place]. Per , pt was in usual state of health until past 3-4 days; initially noted with nausea and dry heaving with poor PO intake. Per  had fever on 10/24 which subsided, but recurred again the next day. On morning of presentation, pt was very weak, having difficulty standing and appeared very fatigued. Was taken to urgent care and found to be febrile to 103.5 and recommended to come to ED. Per , denies any cough, labored breathing, wheezing, vomiting, no diarrhea, no c/o abdominal pain or dysuria, no rash, no recent falls. +sick contact at assisted living facility.   Pt does not know why she is here, denies any fevers, does no recall visit to urgent care; otherwise, currently denies all complaints. At baseline, per , pt states she is alert and oriented x 2-3, has some minor difficulty with short term memory.     Patient/ denies prior history of COPD/ ashtma. Patient is non smoker  Viral PCR negtve in ER: she was started on duoneb and solumedrol 20 q 8 for b ronchospasm    Renal consult for hypokalemia, hyponatremia and hypophosphatemia. Na dropped from 135 to 128 in setting of Pneumonia and hypokalemia  Currently on Iv abx for pna and s/p kcl supplementation for low k with kcl  denies any nausea  is currently off IVF      PAST MEDICAL & SURGICAL HISTORY:  Pneumobilia  Leukocytosis  GERD (gastroesophageal reflux disease)  Hypertension  CVA (cerebral vascular accident)  Osteoporosis  Rheumatoid arthritis  History of hip replacement, unspecified laterality  History of total knee replacement, unspecified laterality    Actonel (Hives)  clonidine (Other)  crab meat (Unknown)    Home Medications Reviewed  Hospital Medications:   MEDICATIONS  (STANDING):  albuterol/ipratropium for Nebulization 3 milliLiter(s) Nebulizer every 4 hours  amLODIPine   Tablet 10 milliGRAM(s) Oral daily  aspirin  chewable 81 milliGRAM(s) Oral daily  buDESOnide    Inhalation Suspension 0.25 milliGRAM(s) Inhalation two times a day  cholecalciferol 2000 Unit(s) Oral daily  enoxaparin Injectable 40 milliGRAM(s) SubCutaneous daily  hydroxychloroquine 400 milliGRAM(s) Oral daily  labetalol 300 milliGRAM(s) Oral two times a day  methylPREDNISolone sodium succinate Injectable 40 milliGRAM(s) IV Push three times a day  pantoprazole    Tablet 40 milliGRAM(s) Oral before breakfast  PARoxetine 10 milliGRAM(s) Oral daily  piperacillin/tazobactam IVPB.. 3.375 Gram(s) IV Intermittent every 8 hours  potassium chloride  10 mEq/100 mL IVPB 10 milliEquivalent(s) IV Intermittent every 1 hour  ursodiol Tablet 250 milliGRAM(s) Oral two times a day  vitamin B complex with vitamin C 1 Tablet(s) Oral daily    SOCIAL HISTORY:  Denies ETOh,Smoking,   FAMILY HISTORY:  Family history of cerebrovascular accident (CVA) in mother    REVIEW OF SYSTEMS:  CONSTITUTIONAL: No weakness, fevers or chills  EYES/ENT: No visual changes;  No vertigo or throat pain   NECK: No pain or stiffness  RESPIRATORY: + cough, wheezing,;+ shortness of breath  CARDIOVASCULAR: No chest pain or palpitations.  GASTROINTESTINAL: No abdominal or epigastric pain. No nausea, vomiting, or hematemesis; No diarrhea or constipation. No melena or hematochezia.  GENITOURINARY: No dysuria, frequency, foamy urine, urinary urgency, incontinence or hematuria  NEUROLOGICAL: No numbness or weakness  SKIN: No itching, burning, rashes, or lesions   VASCULAR: No bilateral lower extremity edema.   All other review of systems is negative unless indicated above.    VITALS:  T(F): 97.5 (10-28-19 @ 13:30), Max: 99.9 (10-28-19 @ 01:14)  HR: 84 (10-28-19 @ 13:30)  BP: 134/80 (10-28-19 @ 13:30)  RR: 21 (10-28-19 @ 13:30)  SpO2: 98% (10-28-19 @ 13:30)  Wt(kg): --    10-27 @ 07:01  -  10-28 @ 07:00  --------------------------------------------------------  IN: 900 mL / OUT: 0 mL / NET: 900 mL    10-28 @ 07:01  -  10-28 @ 14:37  --------------------------------------------------------  IN: 900 mL / OUT: 0 mL / NET: 900 mL        PHYSICAL EXAM:  Constitutional: NAD  HEENT: anicteric sclera, oropharynx clear, MMM  Neck: No JVD  Respiratory: b/lrhonchi  Cardiovascular: S1, S2, RRR  Gastrointestinal: BS+, soft, NT/ND  Extremities: No cyanosis or clubbing. No peripheral edema  Neurological: A/O x 3, no focal deficits  Psychiatric: Normal mood, normal affect  : No CVA tenderness. No duncan.   Skin: No rashes      LABS:  10-28    128<L>  |  94<L>  |  11  ----------------------------<  143<H>  2.9<LL>   |  18<L>  |  0.48<L>    Ca    7.9<L>      28 Oct 2019 06:45  Phos  1.8     10-27  Mg     2.0     10-27    TPro  7.3  /  Alb  3.3  /  TBili  1.1  /  DBili      /  AST  88<H>  /  ALT  119<H>  /  AlkPhos  186<H>  10-27    Creatinine Trend: 0.48 <--, 0.35 <--, 0.50 <--                        11.1   27.47 )-----------( 352      ( 28 Oct 2019 06:45 )             30.9     Urine Studies:  Urinalysis Basic - ( 26 Oct 2019 13:24 )    Color: Dark Yellow / Appearance: Clear / S.024 / pH:   Gluc:  / Ketone: Moderate  / Bili: Small / Urobili: 4 mg/dL   Blood:  / Protein: 100 mg/dL / Nitrite: Negative   Leuk Esterase: Negative / RBC: 11 /hpf / WBC 2 /HPF   Sq Epi:  / Non Sq Epi: 1 /hpf / Bacteria: Negative        RADIOLOGY & ADDITIONAL STUDIES:

## 2019-10-28 NOTE — CONSULT NOTE ADULT - SUBJECTIVE AND OBJECTIVE BOX
PULMONARY CONSULT  Gordon Fox MD  [phone]    Initial HPI on admission:  HPI:  74F with PMH CVA (2016), HTN, RA on prednisone/methotrexate p/w fevers. History obtained from  at bedside 2/2 encephalopathy; pt lives at [place]. Per , pt was in usual state of health until past 3-4 days; initially noted with nausea and dry heaving with poor PO intake. Per  had fever on 10/24 which subsided, but recurred again the next day. On morning of presentation, pt was very weak, having difficulty standing and appeared very fatigued. Was taken to urgent care and found to be febrile to 103.5 and recommended to come to ED. Per , denies any cough, labored breathing, wheezing, vomiting, no diarrhea, no c/o abdominal pain or dysuria, no rash, no recent falls. +sick contact at assisted living facility.   Pt does not know why she is here, denies any fevers, does no recall visit to urgent care; otherwise, currently denies all complaints. At baseline, per , pt states she is alert and oriented x 2-3, has some minor difficulty with short term memory.         PAST MEDICAL & SURGICAL HISTORY:  Pneumobilia  Leukocytosis  GERD (gastroesophageal reflux disease)  Hypertension  CVA (cerebral vascular accident)  Osteoporosis  Rheumatoid arthritis  History of hip replacement, unspecified laterality  History of total knee replacement, unspecified laterality    Allergies    Actonel (Hives)  clonidine (Other)  crab meat (Unknown)    FAMILY HISTORY:  Family history of cerebrovascular accident (CVA) in mother    Social History (marital status, living situation, occupation, tobacco use, alcohol and drug use, and sexual history): no smoking, no etoh, no drugs  lives at [facility]	     Tobacco Screening:  · Core Measure Site	No	      Review of Systems: REVIEW OF SYSTEMS:   CONSTITUTIONAL: +fevers, no chills  EYES/ENT: No visual changes or throat pain   NECK: No pain or stiffness  RESPIRATORY: No cough, wheezing, no shortness of breath  CARDIOVASCULAR: No chest pain or palpitations  GASTROINTESTINAL: +nausea, no vomiting, no abdominal pain  GENITOURINARY: no hematuria, no dysuria  NEUROLOGICAL: no numbness, no headaches, +confusion   MUSCULOSKELETAL: no back pain, no focal weakness   SKIN: No rashes, or lesions  PSYCH: no anxiety, depression	  Other Review of Systems: All other review of systems negative, except as noted in HPI	      Allergies and Intolerances:        Allergies:  	Actonel: Drug, Hives  	clonidine: Drug, Other, dry mouth  	crab meat: Food, Unknown, crab meat    Medications:  MEDICATIONS  (STANDING):  albuterol/ipratropium for Nebulization 3 milliLiter(s) Nebulizer every 4 hours  amLODIPine   Tablet 10 milliGRAM(s) Oral daily  aspirin  chewable 81 milliGRAM(s) Oral daily  cholecalciferol 2000 Unit(s) Oral daily  enoxaparin Injectable 40 milliGRAM(s) SubCutaneous daily  hydroxychloroquine 400 milliGRAM(s) Oral daily  labetalol 300 milliGRAM(s) Oral two times a day  methylPREDNISolone sodium succinate Injectable 20 milliGRAM(s) IV Push every 8 hours  pantoprazole    Tablet 40 milliGRAM(s) Oral before breakfast  PARoxetine 10 milliGRAM(s) Oral daily  piperacillin/tazobactam IVPB.. 3.375 Gram(s) IV Intermittent every 8 hours  potassium chloride  10 mEq/100 mL IVPB 10 milliEquivalent(s) IV Intermittent every 1 hour  ursodiol Tablet 250 milliGRAM(s) Oral two times a day  vancomycin  IVPB 1000 milliGRAM(s) IV Intermittent every 12 hours  vitamin B complex with vitamin C 1 Tablet(s) Oral daily    MEDICATIONS  (PRN):  acetaminophen   Tablet .. 650 milliGRAM(s) Oral every 6 hours PRN Temp greater or equal to 38C (100.4F), Mild Pain (1 - 3)    Vital Signs Last 24 Hrs  T(C): 36.4 (28 Oct 2019 13:30), Max: 37.7 (28 Oct 2019 01:14)  T(F): 97.5 (28 Oct 2019 13:30), Max: 99.9 (28 Oct 2019 01:14)  HR: 84 (28 Oct 2019 13:30) (84 - 93)  BP: 134/80 (28 Oct 2019 13:30) (134/80 - 155/90)  BP(mean): --  RR: 21 (28 Oct 2019 13:30) (17 - 21)  SpO2: 98% (28 Oct 2019 13:30) (97% - 99%)          10-27 @ 07:01  -  10-28 @ 07:00  --------------------------------------------------------  IN: 900 mL / OUT: 0 mL / NET: 900 mL      LABS:                        11.1   27.47 )-----------( 352      ( 28 Oct 2019 06:45 )             30.9     10-28    128<L>  |  94<L>  |  11  ----------------------------<  143<H>  2.9<LL>   |  18<L>  |  0.48<L>    Ca    7.9<L>      28 Oct 2019 06:45  Phos  1.8     10-27  Mg     2.0     10-27    TPro  7.3  /  Alb  3.3  /  TBili  1.1  /  DBili  x   /  AST  88<H>  /  ALT  119<H>  /  AlkPhos  186<H>  10-27      Procalcitonin, Serum: 0.44 ng/mL (10-27-19 @ 06:36)    CULTURES:  Culture Results:   No growth to date. (10-26 @ 19:04)  Culture Results:   No growth to date. (10-26 @ 19:04)  Culture Results:   No growth (10-26 @ 18:10)    Physical Examination:    General: No acute distress.      HEENT: Pupils equal, reactive to light.  Symmetric.    PULM: Clear to auscultation bilaterally, no significant sputum production    CVS: Regular rate and rhythm, no murmurs, rubs, or gallops    ABD: Soft, nondistended, nontender, normoactive bowel sounds, no masses    EXT: No edema, nontender    SKIN: Warm and well perfused, no rashes noted.    NEURO: Alert, oriented, interactive, nonfocal    RADIOLOGY REVIEWED PERSONALLY  CXR:    CT chest:    PROCEDURE DATE:  10/27/2019      INTERPRETATION:  CLINICAL INFORMATION: Shortness of breath. Cough.   Tachypnea. Evaluate for pulmonary embolus or pneumonia.    COMPARISON: CT heart 8/4/2016. CT abdomen pelvis 10/26/2019. Chest   radiograph 10/26/2019.    PROCEDURE:   CT Angiography of the Chest.  90 ml of Omnipaque 350 was injected intravenously. 10 ml were discarded.  Sagittal and coronal reformats were performed as well as 3D (MIP)   reconstructions.    FINDINGS:    Evaluation is somewhat limited due to respiratory motion artifact.    No main or right and left main pulmonary embolus. Evaluation of lobar,   segmental and subsegmental pulmonary embolus is limited due to motion.    Left upper lobe peripheral consolidation. Trace left pleural effusion.    Cardiomegaly. No pericardial effusion. Coronary artery calcifications.    Imaged portions of the upper abdomen shows moderate hiatal hernia. Left   hepatic cyst. Interval increase in pneumobilia with new pocket of air   within the gallbladder. Fatty replacement of the pancreas.    T12 vertebroplasty and unchanged L1 severe vertebral body compression   deformity. Moderate T5 vertebral body compression deformity. Degenerative   changes of the spine.    IMPRESSION:     Exam is somewhat limited due to respiratory motion artifact. No main or   right and left main pulmonary embolus is noted. Evaluation of the lobar,   segmental and subsegmental pulmonary artery branches bilaterally is   limited.    Left upper lobe pneumonia.    Interval increase in pneumobilia with new pocket of air within the   gallbladder.       TTE: PULMONARY CONSULT  Gordon Fox MD  [phone]    Initial HPI on admission:  HPI:  74F with PMH CVA (2016), HTN, RA on prednisone/methotrexate p/w fevers. History obtained from  at bedside 2/2 encephalopathy; pt lives at [place]. Per , pt was in usual state of health until past 3-4 days; initially noted with nausea and dry heaving with poor PO intake. Per  had fever on 10/24 which subsided, but recurred again the next day. On morning of presentation, pt was very weak, having difficulty standing and appeared very fatigued. Was taken to urgent care and found to be febrile to 103.5 and recommended to come to ED. Per , denies any cough, labored breathing, wheezing, vomiting, no diarrhea, no c/o abdominal pain or dysuria, no rash, no recent falls. +sick contact at assisted living facility.   Pt does not know why she is here, denies any fevers, does no recall visit to urgent care; otherwise, currently denies all complaints. At baseline, per , pt states she is alert and oriented x 2-3, has some minor difficulty with short term memory.     Patient/ denies prior history of COPD/ ashtma. Patient is non smoker  Viral PCR negtve in ER: she was started on duoneb and solumedrol 20 q 8 for b ronchospasm      PAST MEDICAL & SURGICAL HISTORY:  Pneumobilia  Leukocytosis  GERD (gastroesophageal reflux disease)  Hypertension  CVA (cerebral vascular accident)  Osteoporosis  Rheumatoid arthritis  History of hip replacement, unspecified laterality  History of total knee replacement, unspecified laterality    Allergies    Actonel (Hives)  clonidine (Other)  crab meat (Unknown)    FAMILY HISTORY:  Family history of cerebrovascular accident (CVA) in mother    Social History (marital status, living situation, occupation, tobacco use, alcohol and drug use, and sexual history): no smoking, no etoh, no drugs  lives at [facility]	     Tobacco Screening:  · Core Measure Site	No	      Review of Systems: REVIEW OF SYSTEMS:   CONSTITUTIONAL: +fevers, no chills  EYES/ENT: No visual changes or throat pain   NECK: No pain or stiffness  RESPIRATORY: No cough, wheezing, no shortness of breath  CARDIOVASCULAR: No chest pain or palpitations  GASTROINTESTINAL: +nausea, no vomiting, no abdominal pain  GENITOURINARY: no hematuria, no dysuria  NEUROLOGICAL: no numbness, no headaches, +confusion   MUSCULOSKELETAL: no back pain, no focal weakness   SKIN: No rashes, or lesions  PSYCH: no anxiety, depression	  Other Review of Systems: All other review of systems negative, except as noted in HPI	      Allergies and Intolerances:        Allergies:  	Actonel: Drug, Hives  	clonidine: Drug, Other, dry mouth  	crab meat: Food, Unknown, crab meat    Medications:  MEDICATIONS  (STANDING):  albuterol/ipratropium for Nebulization 3 milliLiter(s) Nebulizer every 4 hours  amLODIPine   Tablet 10 milliGRAM(s) Oral daily  aspirin  chewable 81 milliGRAM(s) Oral daily  cholecalciferol 2000 Unit(s) Oral daily  enoxaparin Injectable 40 milliGRAM(s) SubCutaneous daily  hydroxychloroquine 400 milliGRAM(s) Oral daily  labetalol 300 milliGRAM(s) Oral two times a day  methylPREDNISolone sodium succinate Injectable 20 milliGRAM(s) IV Push every 8 hours  pantoprazole    Tablet 40 milliGRAM(s) Oral before breakfast  PARoxetine 10 milliGRAM(s) Oral daily  piperacillin/tazobactam IVPB.. 3.375 Gram(s) IV Intermittent every 8 hours  potassium chloride  10 mEq/100 mL IVPB 10 milliEquivalent(s) IV Intermittent every 1 hour  ursodiol Tablet 250 milliGRAM(s) Oral two times a day  vancomycin  IVPB 1000 milliGRAM(s) IV Intermittent every 12 hours  vitamin B complex with vitamin C 1 Tablet(s) Oral daily    MEDICATIONS  (PRN):  acetaminophen   Tablet .. 650 milliGRAM(s) Oral every 6 hours PRN Temp greater or equal to 38C (100.4F), Mild Pain (1 - 3)    Vital Signs Last 24 Hrs  T(C): 36.4 (28 Oct 2019 13:30), Max: 37.7 (28 Oct 2019 01:14)  T(F): 97.5 (28 Oct 2019 13:30), Max: 99.9 (28 Oct 2019 01:14)  HR: 84 (28 Oct 2019 13:30) (84 - 93)  BP: 134/80 (28 Oct 2019 13:30) (134/80 - 155/90)  BP(mean): --  RR: 21 (28 Oct 2019 13:30) (17 - 21)  SpO2: 98% (28 Oct 2019 13:30) (97% - 99%)          10-27 @ 07:01  -  10-28 @ 07:00  --------------------------------------------------------  IN: 900 mL / OUT: 0 mL / NET: 900 mL      LABS:                        11.1   27.47 )-----------( 352      ( 28 Oct 2019 06:45 )             30.9     10-28    128<L>  |  94<L>  |  11  ----------------------------<  143<H>  2.9<LL>   |  18<L>  |  0.48<L>    Ca    7.9<L>      28 Oct 2019 06:45  Phos  1.8     10-27  Mg     2.0     10-27    TPro  7.3  /  Alb  3.3  /  TBili  1.1  /  DBili  x   /  AST  88<H>  /  ALT  119<H>  /  AlkPhos  186<H>  10-27      Procalcitonin, Serum: 0.44 ng/mL (10-27-19 @ 06:36)    CULTURES:  Culture Results:   No growth to date. (10-26 @ 19:04)  Culture Results:   No growth to date. (10-26 @ 19:04)  Culture Results:   No growth (10-26 @ 18:10)    Physical Examination:    General: MIldy tachypneic with wheezing.      HEENT: Pupils equal, reactive to light.  Symmetric.    PULM: Bilateral expiratory wheeze    CVS: Regular rate and rhythm, no murmurs, rubs, or gallops    ABD: Soft, nondistended, nontender, normoactive bowel sounds, no masses    EXT: No edema, nontender    SKIN: Warm and well perfused, no rashes noted.    NEURO: Alert, oriented, interactive, nonfocal    RADIOLOGY REVIEWED PERSONALLY  CXR:    CT chest:    PROCEDURE DATE:  10/27/2019      INTERPRETATION:  CLINICAL INFORMATION: Shortness of breath. Cough.   Tachypnea. Evaluate for pulmonary embolus or pneumonia.    COMPARISON: CT heart 8/4/2016. CT abdomen pelvis 10/26/2019. Chest   radiograph 10/26/2019.    PROCEDURE:   CT Angiography of the Chest.  90 ml of Omnipaque 350 was injected intravenously. 10 ml were discarded.  Sagittal and coronal reformats were performed as well as 3D (MIP)   reconstructions.    FINDINGS:    Evaluation is somewhat limited due to respiratory motion artifact.    No main or right and left main pulmonary embolus. Evaluation of lobar,   segmental and subsegmental pulmonary embolus is limited due to motion.    Left upper lobe peripheral consolidation. Trace left pleural effusion.    Cardiomegaly. No pericardial effusion. Coronary artery calcifications.    Imaged portions of the upper abdomen shows moderate hiatal hernia. Left   hepatic cyst. Interval increase in pneumobilia with new pocket of air   within the gallbladder. Fatty replacement of the pancreas.    T12 vertebroplasty and unchanged L1 severe vertebral body compression   deformity. Moderate T5 vertebral body compression deformity. Degenerative   changes of the spine.    IMPRESSION:     Exam is somewhat limited due to respiratory motion artifact. No main or   right and left main pulmonary embolus is noted. Evaluation of the lobar,   segmental and subsegmental pulmonary artery branches bilaterally is   limited.    Left upper lobe pneumonia.    Interval increase in pneumobilia with new pocket of air within the   gallbladder.       TTE:

## 2019-10-28 NOTE — CONSULT NOTE ADULT - PROBLEM SELECTOR RECOMMENDATION 9
certain amount of immune compromise with RA, prednisone, hydroxychloroquine so at increased risk for infection. Presentation very similar to prior admission and with no obv localization concur with broad spectrum abx pending any micro data or clinical developments that clarify clinical picture.
multifactorial  check ua   check urine OSM, na, cl ,cr  check tsh, uric acid , cortisol, serum osm  c/w pna tx  keep off IVF for now  need to replete K and improving hypokalemia  restrict free h20 to 1 liter  trend na

## 2019-10-29 NOTE — PROGRESS NOTE ADULT - NSHPATTENDINGPLANDISCUSS_GEN_ALL_CORE
Patient and the  at bedside. d/w pulm
Patient and the  at bedside. d/w pulm, renal, ID
Patient and the , ID, RN

## 2019-10-29 NOTE — PHYSICAL THERAPY INITIAL EVALUATION ADULT - PERTINENT HX OF CURRENT PROBLEM, REHAB EVAL
74F with PMH CVA (2016), HTN, RA on prednisone/methotrexate p/w fevers. US ABD (-), CTA chest 10/27, left upper lung PNA, CXR 10/26, WAYLON PNA, hiatal hernia, ECG 10/26, minimal voltage criteria for LVH, nonspecific ST/T wave abnormality , CT Head 10/26, no new finding compared to 10/16/17.

## 2019-10-29 NOTE — PHYSICAL THERAPY INITIAL EVALUATION ADULT - ADDITIONAL COMMENTS
as per pt and  at b/s, pt resides in an group home for 3 years, s/p fall 3 months ago, since then, pt was not able to amb, prior to recent fall, pt was able to amb with S and RW for 12-15ft, required assist for ADLs.

## 2019-10-29 NOTE — PHYSICAL THERAPY INITIAL EVALUATION ADULT - DISCHARGE DISPOSITION, PT EVAL
extended care facility/return to [facility] with PT services

## 2019-10-29 NOTE — PROGRESS NOTE ADULT - ATTENDING COMMENTS
- Dr. BRIGIDO Barrett ([facility])  - [phone]
- Dr. BRIGIDO Barrett ([facility])  - [phone]
Glenroy Evans will be covering for me starting 10/30/19. He can be reached at  if needed.     - Dr. BRIGIDO Barrett ([facility])  - [phone]

## 2019-10-30 NOTE — PROGRESS NOTE ADULT - PROBLEM SELECTOR PLAN 6
Severe RA - c/w plaquenil, prednisone 10 mg at home, now on Solumedrol IV for wheezing  c/w oxycodone 5mg 1-2tabs q6h as needed for pain
Severe RA - will c/w plaquenil, prednisone 10 mg at home, now on Solumedrol IV for wheezing  c/w oxycodone 5mg 1-2tabs q6h as needed for pain
Severe RA - c/w plaquenil, prednisone 10 mg at home, now on Solumedrol IV for wheezing  c/w oxycodone 5mg 1-2tabs q6h as needed for pain
severe RA - will c/w plaquenil, prednisone 10 mg at home  c/w oxycodone 5mg 1-2tabs q6h as needed for pain

## 2019-10-30 NOTE — PROGRESS NOTE ADULT - PROBLEM SELECTOR PLAN 5
mild transaminitis, with normal T bili, less likely obstructive process, possibly 2/2 medication affect   liver cysts noted on CT, but no other pathology   RUQ US: Redemonstrated liver cyst. Hepatic steatosis. No evidence of gallbladder or biliary disease.  monitor transaminitis
mild transaminitis, with normal T bili, less likely obstructive process, possibly 2/2 medication affect   liver cysts noted on CT, but no other pathology   will check RUQ US to further eval   monitor transaminitis

## 2019-10-30 NOTE — PROGRESS NOTE ADULT - PROBLEM SELECTOR PLAN 4
Mild hypoNa/Cl, likely 2/2 poor PO intake on admission  Na drop to 128, renal consulted.  stable.  gentle IVF x 24 hrs, Na improved 10/30 to 133
Mild hypoNa/Cl, likely 2/2 poor PO intake on admission  Na drop to 128  renal consulted.
change to ceftriaxone for cap to complete 7-10 days    cont other supportive care per pulm  steroids, nebs, oxygen    will need f/u ct scan in 6-8 weeks
Mild hypoNa/Cl, likely 2/2 poor PO intake on admission  Na drop to 128, renal consulted.  stable.  gentle IVF x 24 hrs, recheck Na in am
change to ceftriaxone for cap to complete 7-10 days til 11/4    cont other supportive care per pulm  steroids, nebs, oxygen    will need f/u ct scan in 6-8 weeks
d/c vancomycin as mrsa neg  check urine legionella  can cont zosyn for now  strict aspiration precaution  cont other supportive care per pulm  steroids, nebs, oxygen    will need f/u ct scan in 6-8 weeks
mild hypoNa/Cl, likely 2/2 poor PO intake   IVF overnight   monitor closely

## 2019-10-31 NOTE — PROGRESS NOTE ADULT - PROBLEM SELECTOR PLAN 3
BP overall relatively stable  will resume other antiHTN meds cautiously in setting of sepsis
BP overall relatively stable  will resume other antiHTN meds cautiously in setting sepsis
cont management per primary team
BP overall relatively stable  will resume other antiHTN meds cautiously in setting sepsis
BP overall relatively stable  will resume other antiHTN meds cautiously in setting sepsis
cont management per primary team
cont management per primary team
s/p k phos  f/u repeat phos
s/p k phos  f/u repeat phos
supp with neutraphos  monitor

## 2019-10-31 NOTE — DISCHARGE NOTE PROVIDER - HOSPITAL COURSE
74 F with PMH CVA (2016), HTN, RA on prednisone/methotrexate p/w fevers, admitted for sepsis 2/2 WAYLON PNA        Pt with high grade fever, leukocytosis with abdominal complaints in setting of use of multiple immunosuppressants      CT A/P non revealing for pathology, liver cyst noted     UA negative, no e/o skin infection; doubt meningitis, but no meningismus    family deferring LP. baseline mental status per the , with short term memory loss, mild dementia    on broad spectrum vanco and zosyn. d/c vanco, no MRSA. DC on oral abx until 11/4    neg urine and blood cultures.     CXR with new left upper lung opacity, likely PNA    CT chest confirmed PNA -> ceftriaxone    Wheezing on admission, solumedrol 20 mg IV q8hr started later increased to Solumedrol 40 mg q8hr    pulm consulted. IV steroids taper per pulm, will DC on prednisone taper        Septic encephalopathy.  Plan: Pt with mild confusion in setting of fever with likely infection, now resolved.     mental status back to baseline.         Essential hypertension.  Plan: BP overall relatively stable    antiHTN meds cautiously resumed in setting of sepsis.         Hyponatremia.  Plan: Mild hypoNa/Cl, likely 2/2 poor PO intake on admission    Na drop to 128, renal consulted.    gentle IVF x 24 hrs, Na improved now 135        Transaminitis.  Plan: mild transaminitis, with normal T bili, less likely obstructive process, possibly 2/2 medication affect     liver cysts noted on CT, but no other pathology     RUQ US: Redemonstrated liver cyst. Hepatic steatosis. No evidence of gallbladder or biliary disease, monitor LFT's        Plan and medications d/w Dr. Naylor and pt cleared for DC back to custodial with PT

## 2019-10-31 NOTE — PROGRESS NOTE ADULT - PROBLEM SELECTOR PLAN 1
Pt with high grade fever, leukocytosis with abdominal complaints in setting of use of multiple immunosuppressants    CT A/P non revealing for pathology, liver cyst noted   UA negative, no e/o skin infection; doubt meningitis, but no meningismus, per ED, family deferring LP. baseline mental status per the , with short term memory loss, mild dementia  on broad spectrum vanco and zosyn. d/c vanco, no MRSA.  urine cultures sent, will f/u  will check abdominal US to better eval liver/GB    monitor fever curve, tylenol prn  CXR with new left upper lung opacity, likely PNA  CT chest ordered, confirmed PNA.  wheezing on Solumedrol 20 mg q8hr, increased to Solumedrol 40 mg q8hr  pulm consulted.
low grade temp yesterday
Likely hypovolemic  U lytes and osm reviewed  pt also with dec po intake  c/w IV nacl @ 75cc  trend na daily  will  monitor  treat hypokalemia
Likely hypovolemic  U lytes and osm reviewed  pt also with dec po intake  start IV nacl @ 75cc x 24 hours and re eval  trend na daily  will  monitor  hypokalemia resolved
Pt with high grade fever, leukocytosis with abdominal complaints in setting of use of multiple immunosuppressants    CT A/P non revealing for pathology, liver cyst noted   UA negative, no e/o skin infection; doubt meningitis, but no meningismus  family deferring LP. baseline mental status per the , with short term memory loss, mild dementia  on broad spectrum vanco and zosyn. d/c vanco, no MRSA.  neg urine and blood cultures.   abdominal US: liver cyst  monitor fever curve, tylenol prn  CXR with new left upper lung opacity, likely PNA  CT chest confirmed PNA.  Wheezing on admission, solumedrol 20 mg IV q8hr started later increased to Solumedrol 40 mg q8hr  pulm consulted. IV steroids taper per pulm.
afebrile   blood cx neg
improved  likely hypovolemic  d/c ivf  outpt na check in 2 to 3 days
pt with high grade fever, leukocytosis with abdominal complaints in setting of use of multiple immunosuppressants    CT A/P non revealing for pathology, liver cyst noted   UA negative, no e/o skin infection; doubt meningitis, but no meningismus, per ED, family deferring LP. baseline mental status per the , with short term memory loss, mild dementia  will c/w broad spectrum vanco and zosyn, monitor vanco levels prior to 4th dose   blood and urine cultures sent, will f/u  will check abdominal US to better eval liver/GB    monitor fever curve, tylenol prn  CXR with new left upper lung opacity, likely PNA  CT chest ordered  wheezing this am, duo nebs ordered. home prednisone 10 mg switched to IV Solumedrol 20 mg q8hr
resolved follow temp curve
Pt with high grade fever, leukocytosis with abdominal complaints in setting of use of multiple immunosuppressants    CT A/P non revealing for pathology, liver cyst noted   UA negative, no e/o skin infection; doubt meningitis, but no meningismus  family deferring LP. baseline mental status per the , with short term memory loss, mild dementia  on broad spectrum vanco and zosyn. d/c vanco, no MRSA.  neg urine and blood cultures.   abdominal US: liver cyst  monitor fever curve, tylenol prn  CXR with new left upper lung opacity, likely PNA  CT chest confirmed PNA -> ceftriaxone  Wheezing on admission, solumedrol 20 mg IV q8hr started later increased to Solumedrol 40 mg q8hr  pulm consulted. IV steroids taper per pulm.

## 2019-10-31 NOTE — PROGRESS NOTE ADULT - ASSESSMENT
74 F with PMH CVA (2016), HTN, RA on prednisone/methotrexate p/w fevers, admitted for sepsis work-up.
74F originally from [facility] with PMH CVA (2016), HTN, RA on prednisone/methotrexate p/w fevers and leukocytosis.   ct chest with halle pna
74 F with PMH CVA (2016), HTN, RA on prednisone/methotrexate p/w fevers, admitted for sepsis work-up.
74 F with PMH CVA (2016), HTN, RA on prednisone/methotrexate p/w fevers, admitted for sepsis work-up.
74F originally from [facility] with PMH CVA (2016), HTN, RA on prednisone/methotrexate p/w fevers and leukocytosis.   ct chest with halle pna
74F originally from [facility] with PMH CVA (2016), HTN, RA on prednisone/methotrexate p/w fevers and leukocytosis.   ct chest with halle pna
74F with PMH CVA (2016), HTN, RA on prednisone/methotrexate p/w fevers found to pneumonia. Renal called for hyponatremia, hypokalemia and hypophosphatemia
WAYLON community acquired pne;umonia  Acute bronchospasm, PCR negative  Mild diastolic dysnfunction on TTE    REC    Begin solumedrol taper  Continue abx per ID  Continue nebulizers  Check RA sats  DC planning few days
WAYLON community acquired pne;umonia  Acute bronchospasm, PCR negative  Mild diastolic dysnfunction on TTE    REC    DC solumedrol: prednisone taper ordered  Continue abx per ID  Continue nebulizers  Check RA sats  Tentative DC home Thurs 10/31  Mobilize - ? PT eval
WAYLON community acquired pne;umonia  Acute bronchospasm, PCR negative  Mild diastolic dysnfunction on TTE    REC    Prednisone taper  Complete abx per ID  DC nebulizer: symbicort 880/ bid for 7-10 days:   Clear for DC from pulmonary POV  F/U: outpatient PMD
74 F with PMH CVA (2016), HTN, RA on prednisone/methotrexate p/w fevers, admitted for sepsis work-up.

## 2019-10-31 NOTE — PROGRESS NOTE ADULT - PROBLEM SELECTOR PROBLEM 3
Essential hypertension
Essential hypertension
Rheumatoid arthritis
Essential hypertension
Essential hypertension
Hypophosphatemia
Rheumatoid arthritis
Rheumatoid arthritis

## 2019-10-31 NOTE — DISCHARGE NOTE PROVIDER - NSDCMRMEDTOKEN_GEN_ALL_CORE_FT
aliskiren 150 mg oral tablet: 1 tab(s) orally once a day  amLODIPine 10 mg oral tablet: 1 tab(s) orally once a day  aspirin 81 mg oral tablet, chewable: 1 tab(s) orally once a day  budesonide-formoterol 80 mcg-4.5 mcg/inh inhalation aerosol: 2 puff(s) inhaled 2 times a day   cefuroxime 500 mg oral tablet: 1 tab(s) orally every 12 hours   hydroxychloroquine 200 mg oral tablet: 2 tab(s) orally once a day  labetalol 100 mg oral tablet: 3 tab(s) orally 2 times a day  lisinopril 20 mg oral tablet: 1 tab(s) orally once a day  methotrexate 2.5 mg oral tablet: 5 tab(s) orally once a week on Saturdays  omeprazole 20 mg oral delayed release capsule: 2 cap(s) orally 2 times a day  oxyCODONE 5 mg oral tablet: 1 tab(s) orally every 6 hours, As Needed -2  tab(s) orally every 4 hours, As Needed MDD:40mg  Paxil 10 mg oral tablet: 1 tab(s) orally once a day  Physical Therapy:   predniSONE 10 mg oral tablet: 2 tab(s) orally twice a day x 3 days  3 tabs orally x 3 days  2 tabs orally x 2 days   1 tab orally x 2 days  predniSONE 10 mg oral tablet: 1 tab(s) orally once a day  Start once prednisone taper for PNA completed  Prolia 60 mg/mL subcutaneous solution: every 6 months   Remicade 100 mg intravenous injection: 450 milliliter(s) intravenous every 6 weeks  ursodiol 250 mg oral tablet: 1 tab(s) orally 2 times a day  Vitamin B Complex oral tablet: 1 tab(s) orally once a day  Vitamin D3 2000 intl units oral tablet: 1 tab(s) orally once a day

## 2019-10-31 NOTE — DISCHARGE NOTE NURSING/CASE MANAGEMENT/SOCIAL WORK - PATIENT PORTAL LINK FT
You can access the FollowMyHealth Patient Portal offered by [facility] by registering at the following website: [email or web address]. By joining [facility]’s FollowMyHealth portal, you will also be able to view your health information using other applications (apps) compatible with our system.

## 2019-10-31 NOTE — PROGRESS NOTE ADULT - PROVIDER SPECIALTY LIST ADULT
Infectious Disease
Infectious Disease
Internal Medicine
Internal Medicine
Nephrology
Pulmonology
Infectious Disease
Internal Medicine
Internal Medicine

## 2019-10-31 NOTE — PROGRESS NOTE ADULT - PROBLEM SELECTOR PROBLEM 2
Leukocytosis, unspecified type
Septic encephalopathy
Hypokalemia
Leukocytosis, unspecified type
Leukocytosis, unspecified type
Septic encephalopathy

## 2019-10-31 NOTE — PROGRESS NOTE ADULT - PROBLEM SELECTOR PLAN 2
Pt with mild confusion in setting of fever with likely infection  treatment as above  mental status is baseline
still elevated but in the setting of IV solumedrol and   steroids for RA will not be able to follow as a marker of clinical improvement
Pt with mild confusion in setting of fever with likely infection, now resolved.   Treatment as above  mental status back to baseline
improved  trend k
low k again  supplement with kcl 40meq po x 1  trend k
pt with mild confusion in setting of fever with likely infection  treatment as above  mental status is baseline
s/p repletion  k improved  trend k
still elevated but in the setting of IV solumedrol and   steroids for RA will not be able to follow as a marker of clinical improvement
still elevated but in the setting of IV solumedrol and   steroids for RA will not be able to follow as a marker of clinical improvement
Pt with mild confusion in setting of fever with likely infection, now resolved.   mental status back to baseline

## 2019-10-31 NOTE — DISCHARGE NOTE PROVIDER - CARE PROVIDER_API CALL
Yancy Starr)  Internal Medicine  [address], [address]  [place] [address]  Phone: [phone]  Fax: [phone]  Follow Up Time:

## 2019-10-31 NOTE — DISCHARGE NOTE PROVIDER - NSDCCPCAREPLAN_GEN_ALL_CORE_FT
PRINCIPAL DISCHARGE DIAGNOSIS  Diagnosis: Sepsis  Assessment and Plan of Treatment: Secondary to pneumonia  Sepsis resolved      SECONDARY DISCHARGE DIAGNOSES  Diagnosis: Pneumonia  Assessment and Plan of Treatment: Continue oral antibiotics as prescribed   Pneumonia is a lung infection that can cause a fever, cough, and trouble breathing.  Continue all antibiotics as ordered until complete.  Nutrition is important, eat small frequent meals.  Get lots of rest and drink fluids.  Call your health care provider upon arrival home from hospital and make a follow up appointment for one week.  If your cough worsens, you develop fever greater than 101', you have shaking chills, a fast heartbeat, trouble breathing and/or feel your are breathing much faster than usual, call your healthcare provider.  Make sure you wash your hands frequently.      Diagnosis: Hyponatremia  Assessment and Plan of Treatment: Received IV fluids  Sodium now within normal limits (135)    Diagnosis: Transaminitis  Assessment and Plan of Treatment: abdominal  US: Redemonstrated liver cyst. Hepatic steatosis. No evidence of gallbladder or biliary disease,    Diagnosis: Hypophosphatemia  Assessment and Plan of Treatment: Repleted today

## 2019-10-31 NOTE — PROGRESS NOTE ADULT - SUBJECTIVE AND OBJECTIVE BOX
Patient is a 74y old  Female who presents with a chief complaint of fever (28 Oct 2019 14:37)      SUBJECTIVE / OVERNIGHT EVENTS:  still wheezing on Solumedrol 20 mg  pulm consulted, steroids increased.  vanco dc, no MRSA  renal consulted for hyponatremia  diet liberalized. removed salt restriction  the  at bedside.  pt has no complaints despite wheezing.  "I want to go home"       Vital Signs Last 24 Hrs  T(C): 36.4 (28 Oct 2019 13:30), Max: 37.7 (28 Oct 2019 01:14)  T(F): 97.5 (28 Oct 2019 13:30), Max: 99.9 (28 Oct 2019 01:14)  HR: 84 (28 Oct 2019 13:30) (84 - 93)  BP: 134/80 (28 Oct 2019 13:30) (134/80 - 155/90)  BP(mean): --  RR: 21 (28 Oct 2019 13:30) (17 - 21)  SpO2: 98% (28 Oct 2019 13:30) (97% - 99%)  I&O's Summary    27 Oct 2019 07:01  -  28 Oct 2019 07:00  --------------------------------------------------------  IN: 900 mL / OUT: 0 mL / NET: 900 mL    28 Oct 2019 07:01  -  28 Oct 2019 16:18  --------------------------------------------------------  IN: 900 mL / OUT: 0 mL / NET: 900 mL        PHYSICAL EXAM:  GENERAL: NAD, Comfortable, elderly  HEAD:  Atraumatic, Normocephalic  EYES: EOMI, PERRLA, conjunctiva and sclera clear  NECK: Supple, No JVD  CHEST/LUNG: mild decrease breath sounds bilaterally; + expiratory wheezing   HEART: Regular rate and rhythm; No murmurs, rubs, or gallops  ABDOMEN: Soft, Nontender, Nondistended; Bowel sounds present  Neuro: AAO x 2, Mandarin and English speaking, no focal deficit   EXTREMITIES:  2+ Peripheral Pulses, No clubbing, cyanosis, or edema  SKIN: No rashes or lesions, multiple ecchymosis from chronic steroid use (chronic bruises per the )      LABS:                        11.1   27.47 )-----------( 352      ( 28 Oct 2019 06:45 )             30.9     10-28    128<L>  |  94<L>  |  11  ----------------------------<  143<H>  2.9<LL>   |  18<L>  |  0.48<L>    Ca    7.9<L>      28 Oct 2019 06:45  Phos  1.8     10-27  Mg     2.0     10-27    TPro  7.3  /  Alb  3.3  /  TBili  1.1  /  DBili  x   /  AST  88<H>  /  ALT  119<H>  /  AlkPhos  186<H>  10-27      CAPILLARY BLOOD GLUCOSE                RADIOLOGY & ADDITIONAL TESTS:    Imaging Personally Reviewed:  [x] YES  [ ] NO    Consultant(s) Notes Reviewed:  [x] YES  [ ] NO      MEDICATIONS  (STANDING):  albuterol/ipratropium for Nebulization 3 milliLiter(s) Nebulizer every 4 hours  amLODIPine   Tablet 10 milliGRAM(s) Oral daily  aspirin  chewable 81 milliGRAM(s) Oral daily  buDESOnide    Inhalation Suspension 0.25 milliGRAM(s) Inhalation two times a day  cholecalciferol 2000 Unit(s) Oral daily  enoxaparin Injectable 40 milliGRAM(s) SubCutaneous daily  hydroxychloroquine 400 milliGRAM(s) Oral daily  labetalol 300 milliGRAM(s) Oral two times a day  methylPREDNISolone sodium succinate Injectable 40 milliGRAM(s) IV Push three times a day  pantoprazole    Tablet 40 milliGRAM(s) Oral before breakfast  PARoxetine 10 milliGRAM(s) Oral daily  piperacillin/tazobactam IVPB.. 3.375 Gram(s) IV Intermittent every 8 hours  potassium chloride  10 mEq/100 mL IVPB 10 milliEquivalent(s) IV Intermittent every 1 hour  potassium phosphate IVPB 30 milliMole(s) IV Intermittent once  ursodiol Tablet 250 milliGRAM(s) Oral two times a day  vitamin B complex with vitamin C 1 Tablet(s) Oral daily    MEDICATIONS  (PRN):  acetaminophen   Tablet .. 650 milliGRAM(s) Oral every 6 hours PRN Temp greater or equal to 38C (100.4F), Mild Pain (1 - 3)      Care Discussed with Consultants/Other Providers [x] YES  [ ] NO    HEALTH ISSUES - PROBLEM Dx:  Hypophosphatemia: Hypophosphatemia  Hypokalemia: Hypokalemia  Pneumonia: Pneumonia  Leukocytosis, unspecified type: Leukocytosis, unspecified type  Depression: Depression  Rheumatoid arthritis: Rheumatoid arthritis  Transaminitis: Transaminitis  Hyponatremia: Hyponatremia  Essential hypertension: Essential hypertension  Septic encephalopathy: Septic encephalopathy  Fever: Fever
[facility], Division of Infectious Diseases  MOISÉS Mae A. Lee  [phone]  Name: TARA ESPINAL  Age: 74y  Gender: Female  MRN: [id number]    Interval History--  Notes reviewed  cough is better  wheezing is better  but still has dyspnea    Past Medical History--  Pneumobilia  Leukocytosis  GERD (gastroesophageal reflux disease)  Hypertension  CVA (cerebral vascular accident)  Osteoporosis  Rheumatoid arthritis  History of hip replacement, unspecified laterality  History of total knee replacement, unspecified laterality      For details regarding the patient's social history, family history, and other miscellaneous elements, please refer the initial infectious diseases consultation and/or the admitting history and physical examination for this admission.    Allergies    Actonel (Hives)  clonidine (Other)  crab meat (Unknown)    Intolerances        Medications--  Antibiotics:  cefTRIAXone   IVPB 1000 milliGRAM(s) IV Intermittent every 24 hours  hydroxychloroquine 400 milliGRAM(s) Oral daily    Immunologic:    Other:  acetaminophen   Tablet .. PRN  albuterol/ipratropium for Nebulization  amLODIPine   Tablet  aspirin  chewable  buDESOnide    Inhalation Suspension  cholecalciferol  enoxaparin Injectable  labetalol  methylPREDNISolone sodium succinate Injectable  methylPREDNISolone sodium succinate Injectable  pantoprazole    Tablet  PARoxetine  sodium chloride 0.9%.  ursodiol Tablet  vitamin B complex with vitamin C      Review of Systems--  A 10-point review of systems was obtained.   mostly unchanged    Review of systems otherwise negative except as previously noted.    Physical Examination--  Vital Signs: T(F): 98.4 (10-29-19 @ 16:50), Max: 99.9 (10-28-19 @ 19:10)  HR: 84 (10-29-19 @ 16:50)  BP: 123/80 (10-29-19 @ 16:50)  RR: 18 (10-29-19 @ 16:50)  SpO2: 96% (10-29-19 @ 16:50)  Wt(kg): --  General: Nontoxic-appearing Female in no acute distress.  HEENT: AT/NC. +NC  Neck: Not rigid. No sense of mass.  Nodes: None palpable.  Lungs: Coarse bs  Heart: Regular rate and rhythm. No Murmur. No rub.   Abdomen: Bowel sounds present and normoactive. Soft. Nondistended. Nontender.   Back: No spinal tenderness. No costovertebral angle tenderness.   Extremities: No cyanosis or clubbing. trace edema.   Skin: Warm. Dry. Good turgor. No rash. No vasculitic stigmata.  Psychiatric: Appropriate affect and mood for situation.         Laboratory Studies--  CBC                        10.8   28.75 )-----------( 379      ( 29 Oct 2019 05:39 )             31.0       Chemistries  10-29    129<L>  |  92<L>  |  8   ----------------------------<  145<H>  3.7   |  18<L>  |  0.43<L>    Ca    8.0<L>      29 Oct 2019 05:39        Culture Data    Culture - Blood (collected 26 Oct 2019 19:04)  Source: .Blood  Preliminary Report (27 Oct 2019 20:00):    No growth to date.    Culture - Blood (collected 26 Oct 2019 19:04)  Source: .Blood  Preliminary Report (27 Oct 2019 20:00):    No growth to date.    Culture - Urine (collected 26 Oct 2019 18:10)  Source: .Urine  Final Report (27 Oct 2019 13:59):    No growth        < from: US Abdomen Complete (10.28.19 @ 09:01) >      INTERPRETATION:  CLINICAL INFORMATION: Fever of unknown origin. Evaluate   for intra-abdominal pathology.    COMPARISON: Comparison is made with CT abdomenand pelvis from 10/26/2019   and abdominal ultrasound from 9/24/2017.    TECHNIQUE: Sonography of the abdomen.     FINDINGS:    Liver: 14 cm. Fatty filtration. Left lobe with simple cyst measuring 2.1   x 2.5 x 2.1 cm.    Bile ducts: Normal caliber. Common bile duct measures 3 mm.     Gallbladder: Within normal limits.        Pancreas: Visualized portions are within normal limits.    Spleen: 6.4 cm. Within normal limits.    Right kidney: 10.1 cm. No hydronephrosis.    Left kidney: 10.7 cm.  No hydronephrosis.    Ascites: None.    Aorta and IVC: Visualized portions are within normal limits.    IMPRESSION:     Redemonstrated liver cyst.     Hepatic steatosis.    No evidence of gallbladder or biliary disease.    < end of copied text >
Follow-up Pulm Progress Note  Gordon Fox MD  [phone]    Afebrile on Ceftriaxone  Dyspnea/wheezing resolved  O2 sat 92% on RA    Vital Signs Last 24 Hrs  T(C): 36.7 (31 Oct 2019 09:17), Max: 37.2 (30 Oct 2019 18:00)  T(F): 98.1 (31 Oct 2019 09:17), Max: 99 (30 Oct 2019 18:00)  HR: 80 (31 Oct 2019 09:17) (76 - 87)  BP: 122/78 (31 Oct 2019 09:17) (122/78 - 147/86)  BP(mean): --  RR: 18 (31 Oct 2019 09:17) (18 - 18)  SpO2: 97% (31 Oct 2019 09:17) (96% - 99%)                        11.7   21.84 )-----------( 439      ( 31 Oct 2019 05:32 )             33.4       10-31    135  |  103  |  8   ----------------------------<  160<H>  4.5   |  22  |  0.37<L>    Ca    8.1<L>      31 Oct 2019 05:32  Phos  1.7     10-31  Mg     2.3     10-31    TPro  x   /  Alb  3.0<L>  /  TBili  x   /  DBili  x   /  AST  x   /  ALT  x   /  AlkPhos  x   10-31          Procalcitonin, Serum: 0.44 ng/mL (10-27-19 @ 06:36)  CULTURES:  Culture Results:   No growth to date. (10-26 @ 19:04)  Culture Results:   No growth to date. (10-26 @ 19:04)  Culture Results:   No growth (10-26 @ 18:10)    Most recent blood culture -- 10-26 @ 19:04   -- -- .Blood 10-26 @ 19:04  Most recent blood culture -- 10-26 @ 18:10   -- -- .Urine 10-26 @ 18:10      Physical Examination:  PULM: Few exp rhonchi, wheeze  CVS: Regular rate and rhythm, no murmurs, rubs, or gallops  ABD: Soft, non-tender  EXT:  No clubbing, cyanosis, or edema    RADIOLOGY REVIEWED  CXR:    CT chest:    TTE:
Follow-up Pulm Progress Note  Gordon Fox MD  [phone]    Afebrile on Zosyn  Legionella Ag negative  Repeat PCR negative  Dyspnea resolved      Medications:  Vital Signs Last 24 Hrs  T(C): 36.9 (29 Oct 2019 10:35), Max: 38 (28 Oct 2019 17:42)  T(F): 98.4 (29 Oct 2019 10:35), Max: 100.4 (28 Oct 2019 17:42)  HR: 82 (29 Oct 2019 10:35) (81 - 95)  BP: 122/76 (29 Oct 2019 10:35) (119/77 - 145/84)  BP(mean): --  RR: 18 (29 Oct 2019 10:35) (17 - 21)  SpO2: 98% (29 Oct 2019 10:35) (95% - 98%)      10-28 @ 07:01  -  10-29 @ 07:00  --------------------------------------------------------  IN: 2240 mL / OUT: 0 mL / NET: 2240 mL        LABS:                        10.8   28.75 )-----------( 379      ( 29 Oct 2019 05:39 )             31.0     10-29    129<L>  |  92<L>  |  8   ----------------------------<  145<H>  3.7   |  18<L>  |  0.43<L>    Ca    8.0<L>      29 Oct 2019 05:39  Procalcitonin, Serum: 0.44 ng/mL (10-27-19 @ 06:36)  CULTURES:  Culture Results:   No growth to date. (10-26 @ 19:04)  Culture Results:   No growth to date. (10-26 @ 19:04)  Culture Results:   No growth (10-26 @ 18:10)    Most recent blood culture -- 10-26 @ 19:04   -- -- .Blood 10-26 @ 19:04  Most recent blood culture -- 10-26 @ 18:10   -- -- .Urine 10-26 @ 18:10      Physical Examination:  PULM: No sign wheeze or rhonchi  CVS: Regular rate and rhythm, no murmurs, rubs, or gallops  ABD: Soft, non-tender  EXT:  No clubbing, cyanosis, or edema    RADIOLOGY REVIEWED  CXR:    CT chest:    TTE:
Follow-up Pulm Progress Note  Gordon Fox MD  [phone]    Afebrile on Ceftriaxone  Dyspnea/wheezing resolved  Wants to go home 10/31    Vital Signs Last 24 Hrs  T(C): 36.9 (30 Oct 2019 10:41), Max: 37.2 (30 Oct 2019 01:27)  T(F): 98.4 (30 Oct 2019 10:41), Max: 98.9 (30 Oct 2019 01:27)  HR: 78 (30 Oct 2019 10:41) (78 - 88)  BP: 125/69 (30 Oct 2019 10:41) (114/74 - 144/78)  BP(mean): --  RR: 18 (30 Oct 2019 10:41) (18 - 20)  SpO2: 97% (30 Oct 2019 10:41) (96% - 98%)                              10.8   28.75 )-----------( 379      ( 29 Oct 2019 05:39 )             31.0       10-30    133<L>  |  100  |  8   ----------------------------<  149<H>  3.1<L>   |  20<L>  |  0.41<L>    Ca    7.9<L>      30 Oct 2019 07:12        Procalcitonin, Serum: 0.44 ng/mL (10-27-19 @ 06:36)  CULTURES:  Culture Results:   No growth to date. (10-26 @ 19:04)  Culture Results:   No growth to date. (10-26 @ 19:04)  Culture Results:   No growth (10-26 @ 18:10)    Most recent blood culture -- 10-26 @ 19:04   -- -- .Blood 10-26 @ 19:04  Most recent blood culture -- 10-26 @ 18:10   -- -- .Urine 10-26 @ 18:10      Physical Examination:  PULM: Few exp rhonchi; no sign wheezing  CVS: Regular rate and rhythm, no murmurs, rubs, or gallops  ABD: Soft, non-tender  EXT:  No clubbing, cyanosis, or edema    RADIOLOGY REVIEWED  CXR:    CT chest:    TTE:
[facility], Division of Infectious Diseases  MOISÉS Mae A. Lee  [phone]    Name: TARA ESPINAL  Age: 74y  Gender: Female  MRN: [id number]    Interval History--  Notes reviewed  states breathing and cough better  no overnight events    Past Medical History--  Pneumobilia  Leukocytosis  GERD (gastroesophageal reflux disease)  Hypertension  CVA (cerebral vascular accident)  Osteoporosis  Rheumatoid arthritis  History of hip replacement, unspecified laterality  History of total knee replacement, unspecified laterality      For details regarding the patient's social history, family history, and other miscellaneous elements, please refer the initial infectious diseases consultation and/or the admitting history and physical examination for this admission.    Allergies    Actonel (Hives)  clonidine (Other)  crab meat (Unknown)    Intolerances        Medications--  Antibiotics:  cefTRIAXone   IVPB 1000 milliGRAM(s) IV Intermittent every 24 hours  hydroxychloroquine 400 milliGRAM(s) Oral daily    Immunologic:    Other:  acetaminophen   Tablet .. PRN  albuterol/ipratropium for Nebulization  amLODIPine   Tablet  aspirin  chewable  buDESOnide    Inhalation Suspension  cholecalciferol  enoxaparin Injectable  labetalol  methylPREDNISolone sodium succinate Injectable  methylPREDNISolone sodium succinate Injectable  pantoprazole    Tablet  PARoxetine  sodium chloride 0.9%.  ursodiol Tablet  vitamin B complex with vitamin C      Review of Systems--  A 10-point review of systems was obtained.     Pertinent positives and negatives--  Constitutional: No fevers. No Chills. No Rigors.   Cardiovascular: No chest pain. No palpitations.  Respiratory: No shortness of breath. No cough.  Gastrointestinal: No nausea or vomiting. No diarrhea or constipation.   Psychiatric: + anxiety    Review of systems otherwise negative except as previously noted.    Physical Examination--  Vital Signs: T(F): 98.4 (10-30-19 @ 10:41), Max: 98.9 (10-30-19 @ 01:27)  HR: 78 (10-30-19 @ 10:41)  BP: 125/69 (10-30-19 @ 10:41)  RR: 18 (10-30-19 @ 10:41)  SpO2: 97% (10-30-19 @ 10:41)  Wt(kg): --  General: Nontoxic-appearing Female in no acute distress.  HEENT: AT/NC.. Anicteric. Conjunctiva pink and moist.+NC  Neck: Not rigid. No sense of mass.  Nodes: None palpable.  Lungs: Clear bilaterally no wheeze  Heart: Regular rate and rhythm. No Murmur. No rub.   Abdomen: Bowel sounds present and normoactive. Soft. nondistended.   Back: No spinal tenderness. No costovertebral angle tenderness.   Extremities: No cyanosis or clubbing. trace edema.   Skin: Warm. Dry. Good turgor. No rash. No vasculitic stigmata.  Psychiatric: Appropriate affect and mood for situation.         Laboratory Studies--  CBC                        10.8   28.75 )-----------( 379      ( 29 Oct 2019 05:39 )             31.0       Chemistries  10-30    133<L>  |  100  |  8   ----------------------------<  149<H>  3.1<L>   |  20<L>  |  0.41<L>    Ca    7.9<L>      30 Oct 2019 07:12        Culture Data    Culture - Blood (collected 26 Oct 2019 19:04)  Source: .Blood  Preliminary Report (27 Oct 2019 20:00):    No growth to date.    Culture - Blood (collected 26 Oct 2019 19:04)  Source: .Blood  Preliminary Report (27 Oct 2019 20:00):    No growth to date.    Culture - Urine (collected 26 Oct 2019 18:10)  Source: .Urine  Final Report (27 Oct 2019 13:59):    No growth
Patient is a 74y old  Female who presents with a chief complaint of fever (27 Oct 2019 09:02)      SUBJECTIVE / OVERNIGHT EVENTS:  Pt seen and examined at bedside.   No overnight event.  fever curve better. no further fever.   no cp, no sob, no n/v/d.   wants to go home.  The  at bedside.  noted wheezing and mild labor breathing. spO2 97%  Prednisone switch to IV Solumedrol.   duonebs ordered      Vital Signs Last 24 Hrs  T(C): 37.6 (27 Oct 2019 05:18), Max: 38.6 (26 Oct 2019 13:05)  T(F): 99.6 (27 Oct 2019 05:18), Max: 101.5 (26 Oct 2019 13:05)  HR: 92 (27 Oct 2019 05:18) (72 - 100)  BP: 147/86 (27 Oct 2019 05:18) (108/67 - 184/85)  BP(mean): --  RR: 18 (27 Oct 2019 05:18) (18 - 20)  SpO2: 97% (27 Oct 2019 05:18) (95% - 99%)  I&O's Summary    26 Oct 2019 07:01  -  27 Oct 2019 07:00  --------------------------------------------------------  IN: 2950 mL / OUT: 550 mL / NET: 2400 mL        PHYSICAL EXAM:  GENERAL: NAD, Comfortable, elderly, anxious  HEAD:  Atraumatic, Normocephalic  EYES: EOMI, PERRLA, conjunctiva and sclera clear  NECK: Supple, No JVD  CHEST/LUNG: mild decrease breath sounds bilaterally; mild wheeze, mild tachypnea   HEART: Regular rate and rhythm; No murmurs, rubs, or gallops  ABDOMEN: Soft, Nontender, Nondistended; Bowel sounds present  Neuro: AAO x 2, Mandarin and English speaking, no focal deficit   EXTREMITIES:  2+ Peripheral Pulses, No clubbing, cyanosis, or edema  SKIN: No rashes or lesions, multiple ecchymosis from chronic steroid use (chronic bruises per the )      LABS:                        10.7   22.82 )-----------( 313      ( 27 Oct 2019 06:36 )             32.0     10-27    135  |  95<L>  |  7   ----------------------------<  119<H>  3.3<L>   |  17<L>  |  0.35<L>    Ca    7.9<L>      27 Oct 2019 06:36  Phos  1.8     10-27  Mg     2.0     10-27    TPro  7.3  /  Alb  3.3  /  TBili  1.1  /  DBili  x   /  AST  88<H>  /  ALT  119<H>  /  AlkPhos  186<H>  10-27    PT/INR - ( 26 Oct 2019 13:19 )   PT: 12.2 sec;   INR: 1.06 ratio         PTT - ( 26 Oct 2019 13:19 )  PTT:29.4 sec  CAPILLARY BLOOD GLUCOSE            Urinalysis Basic - ( 26 Oct 2019 13:24 )    Color: Dark Yellow / Appearance: Clear / S.024 / pH: x  Gluc: x / Ketone: Moderate  / Bili: Small / Urobili: 4 mg/dL   Blood: x / Protein: 100 mg/dL / Nitrite: Negative   Leuk Esterase: Negative / RBC: 11 /hpf / WBC 2 /HPF   Sq Epi: x / Non Sq Epi: 1 /hpf / Bacteria: Negative        RADIOLOGY & ADDITIONAL TESTS:    Imaging Personally Reviewed:  [x] YES  [ ] NO    Consultant(s) Notes Reviewed:  [x] YES  [ ] NO      MEDICATIONS  (STANDING):  amLODIPine   Tablet 10 milliGRAM(s) Oral daily  aspirin  chewable 81 milliGRAM(s) Oral daily  cholecalciferol 2000 Unit(s) Oral daily  enoxaparin Injectable 40 milliGRAM(s) SubCutaneous daily  hydroxychloroquine 400 milliGRAM(s) Oral daily  labetalol 300 milliGRAM(s) Oral two times a day  methylPREDNISolone sodium succinate Injectable 20 milliGRAM(s) IV Push every 8 hours  pantoprazole    Tablet 40 milliGRAM(s) Oral before breakfast  PARoxetine 10 milliGRAM(s) Oral daily  piperacillin/tazobactam IVPB.. 3.375 Gram(s) IV Intermittent every 8 hours  sodium chloride 0.9%. 1000 milliLiter(s) (100 mL/Hr) IV Continuous <Continuous>  ursodiol Tablet 250 milliGRAM(s) Oral two times a day  vancomycin  IVPB 1000 milliGRAM(s) IV Intermittent every 12 hours  vitamin B complex with vitamin C 1 Tablet(s) Oral daily    MEDICATIONS  (PRN):  acetaminophen   Tablet .. 650 milliGRAM(s) Oral every 6 hours PRN Temp greater or equal to 38C (100.4F), Mild Pain (1 - 3)      Care Discussed with Consultants/Other Providers [x] YES  [ ] NO    HEALTH ISSUES - PROBLEM Dx:  Leukocytosis, unspecified type: Leukocytosis, unspecified type  Depression: Depression  Rheumatoid arthritis: Rheumatoid arthritis  Transaminitis: Transaminitis  Hyponatremia: Hyponatremia  Essential hypertension: Essential hypertension  Septic encephalopathy: Septic encephalopathy  Fever: Fever
Patient is a 74y old  Female who presents with a chief complaint of fever (29 Oct 2019 14:29)      SUBJECTIVE / OVERNIGHT EVENTS:  wheezing improving  Na better  "Can I go home?"  no cp, no n/v/d.   steroid tapered.  gentle IVF started by renal.  the  at bedside.        Vital Signs Last 24 Hrs  T(C): 36.9 (29 Oct 2019 14:16), Max: 38 (28 Oct 2019 17:42)  T(F): 98.4 (29 Oct 2019 14:16), Max: 100.4 (28 Oct 2019 17:42)  HR: 88 (29 Oct 2019 14:16) (81 - 95)  BP: 132/77 (29 Oct 2019 14:16) (119/77 - 145/84)  BP(mean): --  RR: 18 (29 Oct 2019 14:16) (17 - 20)  SpO2: 98% (29 Oct 2019 14:16) (95% - 98%)  I&O's Summary    28 Oct 2019 07:  -  29 Oct 2019 07:00  --------------------------------------------------------  IN: 2240 mL / OUT: 0 mL / NET: 2240 mL    29 Oct 2019 07:01  -  29 Oct 2019 14:55  --------------------------------------------------------  IN: 550 mL / OUT: 0 mL / NET: 550 mL        PHYSICAL EXAM:  GENERAL: NAD, Comfortable, elderly lady in bed, awake alert  HEAD:  Atraumatic, Normocephalic  EYES: EOMI, PERRLA, conjunctiva and sclera clear  NECK: Supple, No JVD  CHEST/LUNG: mild decrease breath sounds bilaterally; + expiratory wheezing, much improved but still audible.   HEART: Regular rate and rhythm; No murmurs, rubs, or gallops  ABDOMEN: Soft, Nontender, Nondistended; Bowel sounds present  Neuro: AAO x 2, Mandarin and English speaking, no focal deficit   EXTREMITIES:  2+ Peripheral Pulses, No clubbing, cyanosis, or edema  SKIN: No rashes or lesions, multiple ecchymosis from chronic steroid use (chronic bruises per the )      LABS:                        10.8   28.75 )-----------( 379      ( 29 Oct 2019 05:39 )             31.0     10-29    129<L>  |  92<L>  |  8   ----------------------------<  145<H>  3.7   |  18<L>  |  0.43<L>    Ca    8.0<L>      29 Oct 2019 05:39        CAPILLARY BLOOD GLUCOSE            Urinalysis Basic - ( 28 Oct 2019 17:20 )    Color: Yellow / Appearance: Slightly Turbid / S.026 / pH: x  Gluc: x / Ketone: Moderate  / Bili: Negative / Urobili: Negative   Blood: x / Protein: 100 / Nitrite: Negative   Leuk Esterase: Negative / RBC: 3 /hpf / WBC 14 /HPF   Sq Epi: x / Non Sq Epi: 7 / Bacteria: Negative        RADIOLOGY & ADDITIONAL TESTS:    Imaging Personally Reviewed:  [x] YES  [ ] NO    Consultant(s) Notes Reviewed:  [x] YES  [ ] NO      MEDICATIONS  (STANDING):  albuterol/ipratropium for Nebulization 3 milliLiter(s) Nebulizer every 4 hours  amLODIPine   Tablet 10 milliGRAM(s) Oral daily  aspirin  chewable 81 milliGRAM(s) Oral daily  buDESOnide    Inhalation Suspension 0.25 milliGRAM(s) Inhalation two times a day  cholecalciferol 2000 Unit(s) Oral daily  enoxaparin Injectable 40 milliGRAM(s) SubCutaneous daily  hydroxychloroquine 400 milliGRAM(s) Oral daily  labetalol 300 milliGRAM(s) Oral two times a day  methylPREDNISolone sodium succinate Injectable   IV Push   methylPREDNISolone sodium succinate Injectable 30 milliGRAM(s) IV Push three times a day  pantoprazole    Tablet 40 milliGRAM(s) Oral before breakfast  PARoxetine 10 milliGRAM(s) Oral daily  piperacillin/tazobactam IVPB.. 3.375 Gram(s) IV Intermittent every 8 hours  sodium chloride 0.9%. 1000 milliLiter(s) (75 mL/Hr) IV Continuous <Continuous>  ursodiol Tablet 250 milliGRAM(s) Oral two times a day  vitamin B complex with vitamin C 1 Tablet(s) Oral daily    MEDICATIONS  (PRN):  acetaminophen   Tablet .. 650 milliGRAM(s) Oral every 6 hours PRN Temp greater or equal to 38C (100.4F), Mild Pain (1 - 3)      Care Discussed with Consultants/Other Providers [x] YES  [ ] NO    HEALTH ISSUES - PROBLEM Dx:  Hypophosphatemia: Hypophosphatemia  Hypokalemia: Hypokalemia  Pneumonia: Pneumonia  Leukocytosis, unspecified type: Leukocytosis, unspecified type  Depression: Depression  Rheumatoid arthritis: Rheumatoid arthritis  Transaminitis: Transaminitis  Hyponatremia: Hyponatremia  Essential hypertension: Essential hypertension  Septic encephalopathy: Septic encephalopathy  Fever: Fever
Patient seen and examined  has dec po intake    Actonel (Hives)  clonidine (Other)  crab meat (Unknown)    Hospital Medications:   MEDICATIONS  (STANDING):  albuterol/ipratropium for Nebulization 3 milliLiter(s) Nebulizer every 4 hours  amLODIPine   Tablet 10 milliGRAM(s) Oral daily  aspirin  chewable 81 milliGRAM(s) Oral daily  buDESOnide    Inhalation Suspension 0.25 milliGRAM(s) Inhalation two times a day  cholecalciferol 2000 Unit(s) Oral daily  enoxaparin Injectable 40 milliGRAM(s) SubCutaneous daily  hydroxychloroquine 400 milliGRAM(s) Oral daily  labetalol 300 milliGRAM(s) Oral two times a day  methylPREDNISolone sodium succinate Injectable   IV Push   methylPREDNISolone sodium succinate Injectable 30 milliGRAM(s) IV Push three times a day  pantoprazole    Tablet 40 milliGRAM(s) Oral before breakfast  PARoxetine 10 milliGRAM(s) Oral daily  piperacillin/tazobactam IVPB.. 3.375 Gram(s) IV Intermittent every 8 hours  ursodiol Tablet 250 milliGRAM(s) Oral two times a day  vitamin B complex with vitamin C 1 Tablet(s) Oral daily      VITALS:  T(F): 98.4 (10-29-19 @ 14:16), Max: 100.4 (10-28-19 @ 17:42)  HR: 88 (10-29-19 @ 14:16)  BP: 132/77 (10-29-19 @ 14:16)  RR: 18 (10-29-19 @ 14:16)  SpO2: 98% (10-29-19 @ 14:16)  Wt(kg): --    10-28 @ 07:  -  10-29 @ 07:00  --------------------------------------------------------  IN: 2240 mL / OUT: 0 mL / NET: 2240 mL    10-29 @ 07:  -  10-29 @ 14:29  --------------------------------------------------------  IN: 550 mL / OUT: 0 mL / NET: 550 mL        PHYSICAL EXAM:  Constitutional: NAD  HEENT: anicteric sclera, oropharynx clear, MMM  Neck: No JVD  Respiratory: b/l rhonchi  Cardiovascular: S1, S2, RRR  Gastrointestinal: BS+, soft, NT/ND  Extremities: No cyanosis or clubbing. No peripheral edema  Neurological: A/O x 3, no focal deficits  Psychiatric: Normal mood, normal affect    LABS:  10-29    129<L>  |  92<L>  |  8   ----------------------------<  145<H>  3.7   |  18<L>  |  0.43<L>    Ca    8.0<L>      29 Oct 2019 05:39      Creatinine Trend: 0.43 <--, 0.48 <--, 0.35 <--, 0.50 <--                        10.8   28.75 )-----------( 379      ( 29 Oct 2019 05:39 )             31.0     Urine Studies:  Urinalysis Basic - ( 28 Oct 2019 17:20 )    Color: Yellow / Appearance: Slightly Turbid / S.026 / pH:   Gluc:  / Ketone: Moderate  / Bili: Negative / Urobili: Negative   Blood:  / Protein: 100 / Nitrite: Negative   Leuk Esterase: Negative / RBC: 3 /hpf / WBC 14 /HPF   Sq Epi:  / Non Sq Epi: 7 / Bacteria: Negative      Calcium, Random Urine: 1.6 mg/dL (10-28 @ 19:42)  Potassium, Random Urine: 56 mmol/L (10-28 @ 17:20)  Osmolality, Random Urine: 481 mos/kg (10-28 @ 17:20)  Sodium, Random Urine: <20 mmol/L (10-28 @ 17:20)  Creatinine, Random Urine: 75 mg/dL (10-28 @ 17:20)  Chloride, Random Urine: <35 mmol/L (10-28 @ 17:20)    RADIOLOGY & ADDITIONAL STUDIES:
Patient seen and examined  no complaints      Actonel (Hives)  clonidine (Other)  crab meat (Unknown)    Hospital Medications:   MEDICATIONS  (STANDING):  amLODIPine   Tablet 10 milliGRAM(s) Oral daily  aspirin  chewable 81 milliGRAM(s) Oral daily  budesonide  80 MICROgram(s)/formoterol 4.5 MICROgram(s) Inhaler 2 Puff(s) Inhalation two times a day  cefTRIAXone   IVPB 1000 milliGRAM(s) IV Intermittent every 24 hours  cholecalciferol 2000 Unit(s) Oral daily  enoxaparin Injectable 40 milliGRAM(s) SubCutaneous daily  hydroxychloroquine 400 milliGRAM(s) Oral daily  labetalol 300 milliGRAM(s) Oral two times a day  pantoprazole    Tablet 40 milliGRAM(s) Oral before breakfast  PARoxetine 10 milliGRAM(s) Oral daily  potassium phosphate / sodium phosphate powder 1 Packet(s) Oral two times a day  predniSONE   Tablet 20 milliGRAM(s) Oral two times a day  predniSONE   Tablet   Oral   sodium chloride 0.9%. 1000 milliLiter(s) (75 mL/Hr) IV Continuous <Continuous>  ursodiol Tablet 250 milliGRAM(s) Oral two times a day  vitamin B complex with vitamin C 1 Tablet(s) Oral daily      VITALS:  T(F): 98.7 (10-31-19 @ 14:28), Max: 99 (10-30-19 @ 18:00)  HR: 84 (10-31-19 @ 14:28)  BP: 139/81 (10-31-19 @ 14:28)  RR: 18 (10-31-19 @ 14:28)  SpO2: 95% (10-31-19 @ 14:28)  Wt(kg): --    10-30 @ :  -  10-31 @ 07:00  --------------------------------------------------------  IN: 2100 mL / OUT: 0 mL / NET: 2100 mL    10-31 @ 07:01  -  10-31 @ 15:29  --------------------------------------------------------  IN: 1075 mL / OUT: 0 mL / NET: 1075 mL        PHYSICAL EXAM:  Constitutional: NAD  HEENT: anicteric sclera, oropharynx clear, MMM  Neck: No JVD  Respiratory: b/l rhonchi  Cardiovascular: S1, S2, RRR  Gastrointestinal: BS+, soft, NT/ND  Extremities: No cyanosis or clubbing. No peripheral edema  Neurological: A/O x 3, no focal deficits  Psychiatric: Normal mood, normal affect    LABS:  10-31    135  |  103  |  8   ----------------------------<  160<H>  4.5   |  22  |  0.37<L>    Ca    8.1<L>      31 Oct 2019 05:32  Phos  1.7     10-31  Mg     2.3     10-31    TPro      /  Alb  3.0<L>  /  TBili      /  DBili      /  AST      /  ALT      /  AlkPhos      10-31    Creatinine Trend: 0.37 <--, 0.41 <--, 0.43 <--, 0.48 <--, 0.35 <--, 0.50 <--                        11.7   21.84 )-----------( 439      ( 31 Oct 2019 05:32 )             33.4     Urine Studies:  Urinalysis Basic - ( 28 Oct 2019 17:20 )    Color: Yellow / Appearance: Slightly Turbid / S.026 / pH:   Gluc:  / Ketone: Moderate  / Bili: Negative / Urobili: Negative   Blood:  / Protein: 100 / Nitrite: Negative   Leuk Esterase: Negative / RBC: 3 /hpf / WBC 14 /HPF   Sq Epi:  / Non Sq Epi: 7 / Bacteria: Negative      Calcium, Random Urine: 1.6 mg/dL (10-28 @ 19:42)  Potassium, Random Urine: 56 mmol/L (10-28 @ 17:20)  Osmolality, Random Urine: 481 mos/kg (10-28 @ 17:20)  Sodium, Random Urine: <20 mmol/L (10-28 @ 17:20)  Creatinine, Random Urine: 75 mg/dL (10-28 @ 17:20)  Chloride, Random Urine: <35 mmol/L (10-28 @ 17:20)    RADIOLOGY & ADDITIONAL STUDIES:
Patient seen and examined  still has a cough    Actonel (Hives)  clonidine (Other)  crab meat (Unknown)    Hospital Medications:   MEDICATIONS  (STANDING):  albuterol/ipratropium for Nebulization 3 milliLiter(s) Nebulizer every 4 hours  amLODIPine   Tablet 10 milliGRAM(s) Oral daily  aspirin  chewable 81 milliGRAM(s) Oral daily  buDESOnide    Inhalation Suspension 0.25 milliGRAM(s) Inhalation two times a day  cefTRIAXone   IVPB 1000 milliGRAM(s) IV Intermittent every 24 hours  cholecalciferol 2000 Unit(s) Oral daily  enoxaparin Injectable 40 milliGRAM(s) SubCutaneous daily  hydroxychloroquine 400 milliGRAM(s) Oral daily  labetalol 300 milliGRAM(s) Oral two times a day  methylPREDNISolone sodium succinate Injectable   IV Push   methylPREDNISolone sodium succinate Injectable 30 milliGRAM(s) IV Push two times a day  pantoprazole    Tablet 40 milliGRAM(s) Oral before breakfast  PARoxetine 10 milliGRAM(s) Oral daily  sodium chloride 0.9%. 1000 milliLiter(s) (75 mL/Hr) IV Continuous <Continuous>  ursodiol Tablet 250 milliGRAM(s) Oral two times a day  vitamin B complex with vitamin C 1 Tablet(s) Oral daily        VITALS:  T(F): 98.4 (10-30-19 @ 10:41), Max: 98.9 (10-30-19 @ 01:27)  HR: 78 (10-30-19 @ 10:41)  BP: 125/69 (10-30-19 @ 10:41)  RR: 18 (10-30-19 @ 10:41)  SpO2: 97% (10-30-19 @ 10:41)  Wt(kg): --    10-29 @ 07:  -  10-30 @ 07:00  --------------------------------------------------------  IN: 2415 mL / OUT: 0 mL / NET: 2415 mL    10-30 @ 07:01  -  10-30 @ 13:22  --------------------------------------------------------  IN: 240 mL / OUT: 0 mL / NET: 240 mL        PHYSICAL EXAM:  Constitutional: NAD  HEENT: anicteric sclera, oropharynx clear, MMM  Neck: No JVD  Respiratory: b/l rhonchi  Cardiovascular: S1, S2, RRR  Gastrointestinal: BS+, soft, NT/ND  Extremities: No cyanosis or clubbing. No peripheral edema  Neurological: A/O x 3, no focal deficits  Psychiatric: Normal mood, normal affect    LABS:  10-30    133<L>  |  100  |  8   ----------------------------<  149<H>  3.1<L>   |  20<L>  |  0.41<L>    Ca    7.9<L>      30 Oct 2019 07:12      Creatinine Trend: 0.41 <--, 0.43 <--, 0.48 <--, 0.35 <--, 0.50 <--                        10.8   28.75 )-----------( 379      ( 29 Oct 2019 05:39 )             31.0     Urine Studies:  Urinalysis Basic - ( 28 Oct 2019 17:20 )    Color: Yellow / Appearance: Slightly Turbid / S.026 / pH:   Gluc:  / Ketone: Moderate  / Bili: Negative / Urobili: Negative   Blood:  / Protein: 100 / Nitrite: Negative   Leuk Esterase: Negative / RBC: 3 /hpf / WBC 14 /HPF   Sq Epi:  / Non Sq Epi: 7 / Bacteria: Negative      Calcium, Random Urine: 1.6 mg/dL (10-28 @ 19:42)  Potassium, Random Urine: 56 mmol/L (10-28 @ 17:20)  Osmolality, Random Urine: 481 mos/kg (10-28 @ 17:20)  Sodium, Random Urine: <20 mmol/L (10-28 @ 17:20)  Creatinine, Random Urine: 75 mg/dL (10-28 @ 17:20)  Chloride, Random Urine: <35 mmol/L (10-28 @ 17:20)    RADIOLOGY & ADDITIONAL STUDIES:
[facility], Division of Infectious Diseases  MOISÉS Mae A. Lee  [phone]    Name: TARA ESPINAL  Age: 74y  Gender: Female  MRN: [id number]    Interval History--  Notes reviewed  limited English, but states she is fine.  offers no new complaints  no over night events      Past Medical History--  Pneumobilia  Leukocytosis  GERD (gastroesophageal reflux disease)  Hypertension  CVA (cerebral vascular accident)  Osteoporosis  Rheumatoid arthritis  History of hip replacement, unspecified laterality  History of total knee replacement, unspecified laterality      For details regarding the patient's social history, family history, and other miscellaneous elements, please refer the initial infectious diseases consultation and/or the admitting history and physical examination for this admission.    Allergies    Actonel (Hives)  clonidine (Other)  crab meat (Unknown)    Intolerances        Medications--  Antibiotics:  hydroxychloroquine 400 milliGRAM(s) Oral daily  piperacillin/tazobactam IVPB.. 3.375 Gram(s) IV Intermittent every 8 hours  vancomycin  IVPB 1000 milliGRAM(s) IV Intermittent every 12 hours    Immunologic:    Other:  acetaminophen   Tablet .. PRN  albuterol/ipratropium for Nebulization  amLODIPine   Tablet  aspirin  chewable  cholecalciferol  enoxaparin Injectable  labetalol  methylPREDNISolone sodium succinate Injectable  pantoprazole    Tablet  PARoxetine  potassium chloride  10 mEq/100 mL IVPB  ursodiol Tablet  vitamin B complex with vitamin C      Review of Systems--  A 10-point review of systems was obtained.     unable to obtain    Review of systems otherwise negative except as previously noted.    Physical Examination--  Vital Signs: T(F): 97.5 (10-28-19 @ 13:30), Max: 99.9 (10-28-19 @ 01:14)  HR: 84 (10-28-19 @ 13:30)  BP: 134/80 (10-28-19 @ 13:30)  RR: 21 (10-28-19 @ 13:30)  SpO2: 98% (10-28-19 @ 13:30)  Wt(kg): --  General: Nontoxic-appearing Female in no acute distress.  HEENT: AT/NC.+NC Anicteric.   Neck: Not rigid. No sense of mass.  Nodes: None palpable.  Lungs: Clear bilaterally without rales, some wheezing   Heart: Regular rate and rhythm. No Murmur.  Abdomen: Bowel sounds present and normoactive. Soft. Nondistended. Nontender.   Back: No spinal tenderness. No costovertebral angle tenderness.   Extremities: No cyanosis or clubbing. No edema.   Skin: Warm. Dry. Good turgor. No rash. No vasculitic stigmata.  Psychiatric: Appropriate affect and mood for situation.         Laboratory Studies--  CBC                        11.1   27.47 )-----------( 352      ( 28 Oct 2019 06:45 )             30.9       Chemistries  10-28    128<L>  |  94<L>  |  11  ----------------------------<  143<H>  2.9<LL>   |  18<L>  |  0.48<L>    Ca    7.9<L>      28 Oct 2019 06:45  Phos  1.8     10-27  Mg     2.0     10-27    TPro  7.3  /  Alb  3.3  /  TBili  1.1  /  DBili  x   /  AST  88<H>  /  ALT  119<H>  /  AlkPhos  186<H>  10-27      Culture Data    Culture - Blood (collected 26 Oct 2019 19:04)  Source: .Blood  Preliminary Report (27 Oct 2019 20:00):    No growth to date.    Culture - Blood (collected 26 Oct 2019 19:04)  Source: .Blood  Preliminary Report (27 Oct 2019 20:00):    No growth to date.    Culture - Urine (collected 26 Oct 2019 18:10)  Source: .Urine  Final Report (27 Oct 2019 13:59):    No growth        < from: CT Angio Chest w/ IV Cont (10.27.19 @ 18:10) >  XAM:  CT ANGIO CHEST (W)AW IC                            PROCEDURE DATE:  10/27/2019            INTERPRETATION:  CLINICAL INFORMATION: Shortness of breath. Cough.   Tachypnea. Evaluate for pulmonary embolus or pneumonia.    COMPARISON: CT heart 8/4/2016. CT abdomen pelvis 10/26/2019. Chest   radiograph 10/26/2019.    PROCEDURE:   CT Angiography of the Chest.  90 ml of Omnipaque 350 was injected intravenously. 10 ml were discarded.  Sagittal and coronal reformats were performed as well as 3D (MIP)   reconstructions.    FINDINGS:    Evaluation is somewhat limited due to respiratory motion artifact.    No main or right and left main pulmonary embolus. Evaluation of lobar,   segmental and subsegmental pulmonary embolus is limited due to motion.    Left upper lobe peripheral consolidation. Trace left pleural effusion.    Cardiomegaly. No pericardial effusion. Coronary artery calcifications.    Imaged portions of the upper abdomen shows moderate hiatal hernia. Left   hepatic cyst. Interval increase in pneumobilia with new pocket of air   within the gallbladder. Fatty replacement of the pancreas.    T12 vertebroplasty and unchanged L1 severe vertebral body compression   deformity. Moderate T5 vertebral body compression deformity. Degenerative   changes of the spine.    IMPRESSION:     Exam is somewhat limited due to respiratory motion artifact. No main or   right and left main pulmonary embolus is noted. Evaluation of the lobar,   segmental and subsegmental pulmonary artery branches bilaterally is   limited.    Left upper lobe pneumonia.    Interval increase in pneumobilia with new pocket of air within the   gallbladder.     < end of copied text >        1MRSA/MSSA PCR (10.27.19 @ 13:26)    MRSA PCR Result.: NotDetec: MRSA/MSSA PCR assay is a qualitative in vitro diagnostic test for the  direct detection and differentiation of methicillin-resistant  Staphylococcus aureus (MRSA) from Staphylococcus aureus (SA). The assay  detects DNA from nasal swabs in patients atrisk for nasal colonization.  It is not intended to diagnose MRSA or SA infections nor guide or monitor  treatment for MRSA/SA infections. A negative result does not preclude  nasal colonization. The assay is FDA-approved and its performance has  been established by [employer], [place] and the [facility], [place], [place].    Staph Aureus PCR Result: NotDetec
Patient is a 74y old  Female who presents with a chief complaint of fever (29 Oct 2019 14:29)      SUBJECTIVE / OVERNIGHT EVENTS:  wheezing improving  Na better  unable to expectorate any phlegm  "Can I go home?"  no cp, no n/v/d.   steroid tapered.  wants to work with PT  the  at bedside.    Vital Signs Last 24 Hrs  T(C): 37 (30 Oct 2019 06:07), Max: 37.2 (30 Oct 2019 01:27)  T(F): 98.6 (30 Oct 2019 06:07), Max: 98.9 (30 Oct 2019 01:27)  HR: 84 (30 Oct 2019 06:07) (81 - 88)  BP: 144/78 (30 Oct 2019 06:07) (114/74 - 144/78)  BP(mean): --  RR: 20 (30 Oct 2019 06:07) (18 - 20)  SpO2: 97% (30 Oct 2019 06:07) (96% - 98%)      PHYSICAL EXAM:  GENERAL: NAD, Comfortable, elderly lady in bed, awake alert  HEAD:  Atraumatic, Normocephalic  EYES: EOMI, PERRLA, conjunctiva and sclera clear  NECK: Supple, No JVD  CHEST/LUNG: mild decrease breath sounds bilaterally; + expiratory wheezing, much improved but still audible.   HEART: Regular rate and rhythm; No murmurs, rubs, or gallops  ABDOMEN: Soft, Nontender, Nondistended; Bowel sounds present  Neuro: AAO x 2, Mandarin and English speaking, no focal deficit   EXTREMITIES:  2+ Peripheral Pulses, No clubbing, cyanosis, or edema  SKIN: No rashes or lesions, multiple ecchymosis from chronic steroid use (chronic bruises per the )    LABS:                        10.8   28.75 )-----------( 379      ( 29 Oct 2019 05:39 )             31.0     10-30    133<L>  |  100  |  8   ----------------------------<  149<H>  3.1<L>   |  20<L>  |  0.41<L>    Ca    7.9<L>      30 Oct 2019 07:12        CAPILLARY BLOOD GLUCOSE            Urinalysis Basic - ( 28 Oct 2019 17:20 )    Color: Yellow / Appearance: Slightly Turbid / S.026 / pH: x  Gluc: x / Ketone: Moderate  / Bili: Negative / Urobili: Negative   Blood: x / Protein: 100 / Nitrite: Negative   Leuk Esterase: Negative / RBC: 3 /hpf / WBC 14 /HPF   Sq Epi: x / Non Sq Epi: 7 / Bacteria: Negative          RADIOLOGY & ADDITIONAL TESTS:    Imaging Personally Reviewed:  [x] YES  [ ] NO    Consultant(s) Notes Reviewed:  [x] YES  [ ] NO      MEDICATIONS  (STANDING):  albuterol/ipratropium for Nebulization 3 milliLiter(s) Nebulizer every 4 hours  amLODIPine   Tablet 10 milliGRAM(s) Oral daily  aspirin  chewable 81 milliGRAM(s) Oral daily  buDESOnide    Inhalation Suspension 0.25 milliGRAM(s) Inhalation two times a day  cholecalciferol 2000 Unit(s) Oral daily  enoxaparin Injectable 40 milliGRAM(s) SubCutaneous daily  hydroxychloroquine 400 milliGRAM(s) Oral daily  labetalol 300 milliGRAM(s) Oral two times a day  methylPREDNISolone sodium succinate Injectable   IV Push   methylPREDNISolone sodium succinate Injectable 30 milliGRAM(s) IV Push three times a day  pantoprazole    Tablet 40 milliGRAM(s) Oral before breakfast  PARoxetine 10 milliGRAM(s) Oral daily  piperacillin/tazobactam IVPB.. 3.375 Gram(s) IV Intermittent every 8 hours  sodium chloride 0.9%. 1000 milliLiter(s) (75 mL/Hr) IV Continuous <Continuous>  ursodiol Tablet 250 milliGRAM(s) Oral two times a day  vitamin B complex with vitamin C 1 Tablet(s) Oral daily    MEDICATIONS  (PRN):  acetaminophen   Tablet .. 650 milliGRAM(s) Oral every 6 hours PRN Temp greater or equal to 38C (100.4F), Mild Pain (1 - 3)      Care Discussed with Consultants/Other Providers [x] YES  [ ] NO    HEALTH ISSUES - PROBLEM Dx:  Hypophosphatemia: Hypophosphatemia  Hypokalemia: Hypokalemia  Pneumonia: Pneumonia  Leukocytosis, unspecified type: Leukocytosis, unspecified type  Depression: Depression  Rheumatoid arthritis: Rheumatoid arthritis  Transaminitis: Transaminitis  Hyponatremia: Hyponatremia  Essential hypertension: Essential hypertension  Septic encephalopathy: Septic encephalopathy  Fever: Fever

## 2020-01-01 ENCOUNTER — NON-APPOINTMENT (OUTPATIENT)
Age: 75
End: 2020-01-01

## 2020-01-01 ENCOUNTER — APPOINTMENT (OUTPATIENT)
Dept: OPHTHALMOLOGY | Facility: CLINIC | Age: 75
End: 2020-01-01
Payer: MEDICARE

## 2020-01-01 ENCOUNTER — APPOINTMENT (OUTPATIENT)
Dept: OPHTHALMOLOGY | Facility: CLINIC | Age: 75
End: 2020-01-01

## 2020-01-01 ENCOUNTER — INPATIENT (INPATIENT)
Facility: HOSPITAL | Age: 75
LOS: 23 days | DRG: 871 | End: 2020-10-26
Attending: HOSPITALIST | Admitting: HOSPITALIST
Payer: MEDICARE

## 2020-01-01 VITALS
OXYGEN SATURATION: 93 % | RESPIRATION RATE: 20 BRPM | SYSTOLIC BLOOD PRESSURE: 106 MMHG | TEMPERATURE: 100 F | HEART RATE: 130 BPM | DIASTOLIC BLOOD PRESSURE: 67 MMHG

## 2020-01-01 VITALS
DIASTOLIC BLOOD PRESSURE: 49 MMHG | HEIGHT: 60 IN | TEMPERATURE: 99 F | WEIGHT: 110.01 LBS | OXYGEN SATURATION: 92 % | SYSTOLIC BLOOD PRESSURE: 82 MMHG | RESPIRATION RATE: 24 BRPM | HEART RATE: 71 BPM

## 2020-01-01 DIAGNOSIS — R78.81 BACTEREMIA: ICD-10-CM

## 2020-01-01 DIAGNOSIS — Z96.659 PRESENCE OF UNSPECIFIED ARTIFICIAL KNEE JOINT: Chronic | ICD-10-CM

## 2020-01-01 DIAGNOSIS — U07.1 COVID-19: ICD-10-CM

## 2020-01-01 DIAGNOSIS — R09.02 HYPOXEMIA: ICD-10-CM

## 2020-01-01 DIAGNOSIS — M06.9 RHEUMATOID ARTHRITIS, UNSPECIFIED: ICD-10-CM

## 2020-01-01 DIAGNOSIS — D72.829 ELEVATED WHITE BLOOD CELL COUNT, UNSPECIFIED: ICD-10-CM

## 2020-01-01 DIAGNOSIS — Z71.89 OTHER SPECIFIED COUNSELING: ICD-10-CM

## 2020-01-01 DIAGNOSIS — Z96.649 PRESENCE OF UNSPECIFIED ARTIFICIAL HIP JOINT: Chronic | ICD-10-CM

## 2020-01-01 DIAGNOSIS — I10 ESSENTIAL (PRIMARY) HYPERTENSION: ICD-10-CM

## 2020-01-01 DIAGNOSIS — J96.01 ACUTE RESPIRATORY FAILURE WITH HYPOXIA: ICD-10-CM

## 2020-01-01 DIAGNOSIS — R13.10 DYSPHAGIA, UNSPECIFIED: ICD-10-CM

## 2020-01-01 LAB
-  AMPICILLIN/SULBACTAM: SIGNIFICANT CHANGE UP
-  CEFAZOLIN: SIGNIFICANT CHANGE UP
-  CLINDAMYCIN: SIGNIFICANT CHANGE UP
-  COAGULASE NEGATIVE STAPHYLOCOCCUS: SIGNIFICANT CHANGE UP
-  ERYTHROMYCIN: SIGNIFICANT CHANGE UP
-  GENTAMICIN: SIGNIFICANT CHANGE UP
-  OXACILLIN: SIGNIFICANT CHANGE UP
-  PENICILLIN: SIGNIFICANT CHANGE UP
-  RIFAMPIN: SIGNIFICANT CHANGE UP
-  TETRACYCLINE: SIGNIFICANT CHANGE UP
-  TRIMETHOPRIM/SULFAMETHOXAZOLE: SIGNIFICANT CHANGE UP
-  VANCOMYCIN: SIGNIFICANT CHANGE UP
ALBUMIN SERPL ELPH-MCNC: 2.6 G/DL — LOW (ref 3.3–5)
ALBUMIN SERPL ELPH-MCNC: 2.8 G/DL — LOW (ref 3.3–5)
ALBUMIN SERPL ELPH-MCNC: 2.9 G/DL — LOW (ref 3.3–5)
ALBUMIN SERPL ELPH-MCNC: 2.9 G/DL — LOW (ref 3.3–5)
ALBUMIN SERPL ELPH-MCNC: 3 G/DL — LOW (ref 3.3–5)
ALBUMIN SERPL ELPH-MCNC: 3 G/DL — LOW (ref 3.3–5)
ALBUMIN SERPL ELPH-MCNC: 3.1 G/DL — LOW (ref 3.3–5)
ALBUMIN SERPL ELPH-MCNC: 3.1 G/DL — LOW (ref 3.3–5)
ALBUMIN SERPL ELPH-MCNC: 3.2 G/DL — LOW (ref 3.3–5)
ALBUMIN SERPL ELPH-MCNC: 3.3 G/DL — SIGNIFICANT CHANGE UP (ref 3.3–5)
ALBUMIN SERPL ELPH-MCNC: 3.3 G/DL — SIGNIFICANT CHANGE UP (ref 3.3–5)
ALBUMIN SERPL ELPH-MCNC: 3.5 G/DL — SIGNIFICANT CHANGE UP (ref 3.3–5)
ALBUMIN SERPL ELPH-MCNC: 3.7 G/DL — SIGNIFICANT CHANGE UP (ref 3.3–5)
ALBUMIN SERPL ELPH-MCNC: 3.8 G/DL — SIGNIFICANT CHANGE UP (ref 3.3–5)
ALBUMIN SERPL ELPH-MCNC: 3.8 G/DL — SIGNIFICANT CHANGE UP (ref 3.3–5)
ALP SERPL-CCNC: 100 U/L — SIGNIFICANT CHANGE UP (ref 40–120)
ALP SERPL-CCNC: 102 U/L — SIGNIFICANT CHANGE UP (ref 40–120)
ALP SERPL-CCNC: 115 U/L — SIGNIFICANT CHANGE UP (ref 40–120)
ALP SERPL-CCNC: 118 U/L — SIGNIFICANT CHANGE UP (ref 40–120)
ALP SERPL-CCNC: 138 U/L — HIGH (ref 40–120)
ALP SERPL-CCNC: 144 U/L — HIGH (ref 40–120)
ALP SERPL-CCNC: 145 U/L — HIGH (ref 40–120)
ALP SERPL-CCNC: 66 U/L — SIGNIFICANT CHANGE UP (ref 40–120)
ALP SERPL-CCNC: 72 U/L — SIGNIFICANT CHANGE UP (ref 40–120)
ALP SERPL-CCNC: 73 U/L — SIGNIFICANT CHANGE UP (ref 40–120)
ALP SERPL-CCNC: 75 U/L — SIGNIFICANT CHANGE UP (ref 40–120)
ALP SERPL-CCNC: 81 U/L — SIGNIFICANT CHANGE UP (ref 40–120)
ALP SERPL-CCNC: 83 U/L — SIGNIFICANT CHANGE UP (ref 40–120)
ALP SERPL-CCNC: 91 U/L — SIGNIFICANT CHANGE UP (ref 40–120)
ALP SERPL-CCNC: 91 U/L — SIGNIFICANT CHANGE UP (ref 40–120)
ALP SERPL-CCNC: 92 U/L — SIGNIFICANT CHANGE UP (ref 40–120)
ALP SERPL-CCNC: 98 U/L — SIGNIFICANT CHANGE UP (ref 40–120)
ALT FLD-CCNC: 10 U/L — SIGNIFICANT CHANGE UP (ref 10–45)
ALT FLD-CCNC: 14 U/L — SIGNIFICANT CHANGE UP (ref 10–45)
ALT FLD-CCNC: 16 U/L — SIGNIFICANT CHANGE UP (ref 10–45)
ALT FLD-CCNC: 16 U/L — SIGNIFICANT CHANGE UP (ref 10–45)
ALT FLD-CCNC: 17 U/L — SIGNIFICANT CHANGE UP (ref 10–45)
ALT FLD-CCNC: 17 U/L — SIGNIFICANT CHANGE UP (ref 10–45)
ALT FLD-CCNC: 19 U/L — SIGNIFICANT CHANGE UP (ref 10–45)
ALT FLD-CCNC: 21 U/L — SIGNIFICANT CHANGE UP (ref 10–45)
ALT FLD-CCNC: 23 U/L — SIGNIFICANT CHANGE UP (ref 10–45)
ALT FLD-CCNC: 30 U/L — SIGNIFICANT CHANGE UP (ref 10–45)
ALT FLD-CCNC: 33 U/L — SIGNIFICANT CHANGE UP (ref 10–45)
ALT FLD-CCNC: 33 U/L — SIGNIFICANT CHANGE UP (ref 10–45)
ALT FLD-CCNC: 34 U/L — SIGNIFICANT CHANGE UP (ref 10–45)
ALT FLD-CCNC: 7 U/L — LOW (ref 10–45)
ALT FLD-CCNC: 7 U/L — LOW (ref 10–45)
ALT FLD-CCNC: 8 U/L — LOW (ref 10–45)
ALT FLD-CCNC: 9 U/L — LOW (ref 10–45)
ANION GAP SERPL CALC-SCNC: 10 MMOL/L — SIGNIFICANT CHANGE UP (ref 5–17)
ANION GAP SERPL CALC-SCNC: 11 MMOL/L — SIGNIFICANT CHANGE UP (ref 5–17)
ANION GAP SERPL CALC-SCNC: 12 MMOL/L — SIGNIFICANT CHANGE UP (ref 5–17)
ANION GAP SERPL CALC-SCNC: 13 MMOL/L — SIGNIFICANT CHANGE UP (ref 5–17)
ANION GAP SERPL CALC-SCNC: 14 MMOL/L — SIGNIFICANT CHANGE UP (ref 5–17)
ANION GAP SERPL CALC-SCNC: 15 MMOL/L — SIGNIFICANT CHANGE UP (ref 5–17)
ANION GAP SERPL CALC-SCNC: 16 MMOL/L — SIGNIFICANT CHANGE UP (ref 5–17)
ANION GAP SERPL CALC-SCNC: 17 MMOL/L — SIGNIFICANT CHANGE UP (ref 5–17)
ANION GAP SERPL CALC-SCNC: 17 MMOL/L — SIGNIFICANT CHANGE UP (ref 5–17)
ANION GAP SERPL CALC-SCNC: 8 MMOL/L — SIGNIFICANT CHANGE UP (ref 5–17)
ANION GAP SERPL CALC-SCNC: 9 MMOL/L — SIGNIFICANT CHANGE UP (ref 5–17)
AST SERPL-CCNC: 18 U/L — SIGNIFICANT CHANGE UP (ref 10–40)
AST SERPL-CCNC: 21 U/L — SIGNIFICANT CHANGE UP (ref 10–40)
AST SERPL-CCNC: 22 U/L — SIGNIFICANT CHANGE UP (ref 10–40)
AST SERPL-CCNC: 23 U/L — SIGNIFICANT CHANGE UP (ref 10–40)
AST SERPL-CCNC: 24 U/L — SIGNIFICANT CHANGE UP (ref 10–40)
AST SERPL-CCNC: 26 U/L — SIGNIFICANT CHANGE UP (ref 10–40)
AST SERPL-CCNC: 29 U/L — SIGNIFICANT CHANGE UP (ref 10–40)
AST SERPL-CCNC: 31 U/L — SIGNIFICANT CHANGE UP (ref 10–40)
AST SERPL-CCNC: 33 U/L — SIGNIFICANT CHANGE UP (ref 10–40)
AST SERPL-CCNC: 45 U/L — HIGH (ref 10–40)
AST SERPL-CCNC: 55 U/L — HIGH (ref 10–40)
AST SERPL-CCNC: 56 U/L — HIGH (ref 10–40)
AST SERPL-CCNC: 58 U/L — HIGH (ref 10–40)
AST SERPL-CCNC: 59 U/L — HIGH (ref 10–40)
AST SERPL-CCNC: 61 U/L — HIGH (ref 10–40)
BASOPHILS # BLD AUTO: 0 K/UL — SIGNIFICANT CHANGE UP (ref 0–0.2)
BASOPHILS # BLD AUTO: 0.01 K/UL — SIGNIFICANT CHANGE UP (ref 0–0.2)
BASOPHILS # BLD AUTO: 0.01 K/UL — SIGNIFICANT CHANGE UP (ref 0–0.2)
BASOPHILS # BLD AUTO: 0.02 K/UL — SIGNIFICANT CHANGE UP (ref 0–0.2)
BASOPHILS # BLD AUTO: 0.03 K/UL — SIGNIFICANT CHANGE UP (ref 0–0.2)
BASOPHILS # BLD AUTO: 0.05 K/UL — SIGNIFICANT CHANGE UP (ref 0–0.2)
BASOPHILS NFR BLD AUTO: 0 % — SIGNIFICANT CHANGE UP (ref 0–2)
BASOPHILS NFR BLD AUTO: 0.1 % — SIGNIFICANT CHANGE UP (ref 0–2)
BASOPHILS NFR BLD AUTO: 0.3 % — SIGNIFICANT CHANGE UP (ref 0–2)
BILIRUB DIRECT SERPL-MCNC: <0.1 MG/DL — SIGNIFICANT CHANGE UP (ref 0–0.2)
BILIRUB INDIRECT FLD-MCNC: >0.2 MG/DL — SIGNIFICANT CHANGE UP (ref 0.2–1)
BILIRUB INDIRECT FLD-MCNC: >0.3 MG/DL — SIGNIFICANT CHANGE UP (ref 0.2–1)
BILIRUB SERPL-MCNC: 0.3 MG/DL — SIGNIFICANT CHANGE UP (ref 0.2–1.2)
BILIRUB SERPL-MCNC: 0.4 MG/DL — SIGNIFICANT CHANGE UP (ref 0.2–1.2)
BILIRUB SERPL-MCNC: 0.5 MG/DL — SIGNIFICANT CHANGE UP (ref 0.2–1.2)
BILIRUB SERPL-MCNC: 0.7 MG/DL — SIGNIFICANT CHANGE UP (ref 0.2–1.2)
BILIRUB SERPL-MCNC: 0.8 MG/DL — SIGNIFICANT CHANGE UP (ref 0.2–1.2)
BUN SERPL-MCNC: 12 MG/DL — SIGNIFICANT CHANGE UP (ref 7–23)
BUN SERPL-MCNC: 12 MG/DL — SIGNIFICANT CHANGE UP (ref 7–23)
BUN SERPL-MCNC: 13 MG/DL — SIGNIFICANT CHANGE UP (ref 7–23)
BUN SERPL-MCNC: 15 MG/DL — SIGNIFICANT CHANGE UP (ref 7–23)
BUN SERPL-MCNC: 16 MG/DL — SIGNIFICANT CHANGE UP (ref 7–23)
BUN SERPL-MCNC: 16 MG/DL — SIGNIFICANT CHANGE UP (ref 7–23)
BUN SERPL-MCNC: 19 MG/DL — SIGNIFICANT CHANGE UP (ref 7–23)
BUN SERPL-MCNC: 20 MG/DL — SIGNIFICANT CHANGE UP (ref 7–23)
BUN SERPL-MCNC: 20 MG/DL — SIGNIFICANT CHANGE UP (ref 7–23)
BUN SERPL-MCNC: 21 MG/DL — SIGNIFICANT CHANGE UP (ref 7–23)
BUN SERPL-MCNC: 22 MG/DL — SIGNIFICANT CHANGE UP (ref 7–23)
BUN SERPL-MCNC: 29 MG/DL — HIGH (ref 7–23)
BUN SERPL-MCNC: 31 MG/DL — HIGH (ref 7–23)
BUN SERPL-MCNC: 36 MG/DL — HIGH (ref 7–23)
BUN SERPL-MCNC: 9 MG/DL — SIGNIFICANT CHANGE UP (ref 7–23)
CALCIUM SERPL-MCNC: 6.8 MG/DL — LOW (ref 8.4–10.5)
CALCIUM SERPL-MCNC: 7.9 MG/DL — LOW (ref 8.4–10.5)
CALCIUM SERPL-MCNC: 8 MG/DL — LOW (ref 8.4–10.5)
CALCIUM SERPL-MCNC: 8.2 MG/DL — LOW (ref 8.4–10.5)
CALCIUM SERPL-MCNC: 8.3 MG/DL — LOW (ref 8.4–10.5)
CALCIUM SERPL-MCNC: 8.5 MG/DL — SIGNIFICANT CHANGE UP (ref 8.4–10.5)
CALCIUM SERPL-MCNC: 8.6 MG/DL — SIGNIFICANT CHANGE UP (ref 8.4–10.5)
CALCIUM SERPL-MCNC: 8.6 MG/DL — SIGNIFICANT CHANGE UP (ref 8.4–10.5)
CALCIUM SERPL-MCNC: 8.7 MG/DL — SIGNIFICANT CHANGE UP (ref 8.4–10.5)
CALCIUM SERPL-MCNC: 8.7 MG/DL — SIGNIFICANT CHANGE UP (ref 8.4–10.5)
CALCIUM SERPL-MCNC: 8.8 MG/DL — SIGNIFICANT CHANGE UP (ref 8.4–10.5)
CALCIUM SERPL-MCNC: 8.8 MG/DL — SIGNIFICANT CHANGE UP (ref 8.4–10.5)
CALCIUM SERPL-MCNC: 8.9 MG/DL — SIGNIFICANT CHANGE UP (ref 8.4–10.5)
CALCIUM SERPL-MCNC: 9 MG/DL — SIGNIFICANT CHANGE UP (ref 8.4–10.5)
CALCIUM SERPL-MCNC: 9.3 MG/DL — SIGNIFICANT CHANGE UP (ref 8.4–10.5)
CHLORIDE SERPL-SCNC: 100 MMOL/L — SIGNIFICANT CHANGE UP (ref 96–108)
CHLORIDE SERPL-SCNC: 101 MMOL/L — SIGNIFICANT CHANGE UP (ref 96–108)
CHLORIDE SERPL-SCNC: 102 MMOL/L — SIGNIFICANT CHANGE UP (ref 96–108)
CHLORIDE SERPL-SCNC: 104 MMOL/L — SIGNIFICANT CHANGE UP (ref 96–108)
CHLORIDE SERPL-SCNC: 105 MMOL/L — SIGNIFICANT CHANGE UP (ref 96–108)
CHLORIDE SERPL-SCNC: 107 MMOL/L — SIGNIFICANT CHANGE UP (ref 96–108)
CHLORIDE SERPL-SCNC: 109 MMOL/L — HIGH (ref 96–108)
CHLORIDE SERPL-SCNC: 109 MMOL/L — HIGH (ref 96–108)
CHLORIDE SERPL-SCNC: 114 MMOL/L — HIGH (ref 96–108)
CHLORIDE SERPL-SCNC: 99 MMOL/L — SIGNIFICANT CHANGE UP (ref 96–108)
CO2 SERPL-SCNC: 15 MMOL/L — LOW (ref 22–31)
CO2 SERPL-SCNC: 15 MMOL/L — LOW (ref 22–31)
CO2 SERPL-SCNC: 16 MMOL/L — LOW (ref 22–31)
CO2 SERPL-SCNC: 17 MMOL/L — LOW (ref 22–31)
CO2 SERPL-SCNC: 19 MMOL/L — LOW (ref 22–31)
CO2 SERPL-SCNC: 19 MMOL/L — LOW (ref 22–31)
CO2 SERPL-SCNC: 20 MMOL/L — LOW (ref 22–31)
CO2 SERPL-SCNC: 21 MMOL/L — LOW (ref 22–31)
CO2 SERPL-SCNC: 22 MMOL/L — SIGNIFICANT CHANGE UP (ref 22–31)
CO2 SERPL-SCNC: 22 MMOL/L — SIGNIFICANT CHANGE UP (ref 22–31)
CO2 SERPL-SCNC: 23 MMOL/L — SIGNIFICANT CHANGE UP (ref 22–31)
CO2 SERPL-SCNC: 23 MMOL/L — SIGNIFICANT CHANGE UP (ref 22–31)
CO2 SERPL-SCNC: 24 MMOL/L — SIGNIFICANT CHANGE UP (ref 22–31)
CO2 SERPL-SCNC: 25 MMOL/L — SIGNIFICANT CHANGE UP (ref 22–31)
CREAT SERPL-MCNC: 0.3 MG/DL — LOW (ref 0.5–1.3)
CREAT SERPL-MCNC: 0.31 MG/DL — LOW (ref 0.5–1.3)
CREAT SERPL-MCNC: 0.32 MG/DL — LOW (ref 0.5–1.3)
CREAT SERPL-MCNC: 0.34 MG/DL — LOW (ref 0.5–1.3)
CREAT SERPL-MCNC: 0.34 MG/DL — LOW (ref 0.5–1.3)
CREAT SERPL-MCNC: 0.35 MG/DL — LOW (ref 0.5–1.3)
CREAT SERPL-MCNC: 0.35 MG/DL — LOW (ref 0.5–1.3)
CREAT SERPL-MCNC: 0.36 MG/DL — LOW (ref 0.5–1.3)
CREAT SERPL-MCNC: 0.36 MG/DL — LOW (ref 0.5–1.3)
CREAT SERPL-MCNC: 0.37 MG/DL — LOW (ref 0.5–1.3)
CREAT SERPL-MCNC: 0.39 MG/DL — LOW (ref 0.5–1.3)
CREAT SERPL-MCNC: 0.39 MG/DL — LOW (ref 0.5–1.3)
CREAT SERPL-MCNC: 0.4 MG/DL — LOW (ref 0.5–1.3)
CREAT SERPL-MCNC: 0.41 MG/DL — LOW (ref 0.5–1.3)
CREAT SERPL-MCNC: 0.46 MG/DL — LOW (ref 0.5–1.3)
CREAT SERPL-MCNC: 0.59 MG/DL — SIGNIFICANT CHANGE UP (ref 0.5–1.3)
CREAT SERPL-MCNC: <0.3 MG/DL — LOW (ref 0.5–1.3)
CRP SERPL-MCNC: 0.45 MG/DL — HIGH (ref 0–0.4)
CRP SERPL-MCNC: 0.74 MG/DL — HIGH (ref 0–0.4)
CRP SERPL-MCNC: 1.15 MG/DL — HIGH (ref 0–0.4)
CRP SERPL-MCNC: 1.39 MG/DL — HIGH (ref 0–0.4)
CRP SERPL-MCNC: 17.07 MG/DL — HIGH (ref 0–0.4)
CRP SERPL-MCNC: 2 MG/DL — HIGH (ref 0–0.4)
CRP SERPL-MCNC: 3.44 MG/DL — HIGH (ref 0–0.4)
CRP SERPL-MCNC: 3.84 MG/DL — HIGH (ref 0–0.4)
CULTURE RESULTS: SIGNIFICANT CHANGE UP
D DIMER BLD IA.RAPID-MCNC: 223 NG/ML DDU — SIGNIFICANT CHANGE UP
D DIMER BLD IA.RAPID-MCNC: 254 NG/ML DDU — HIGH
D DIMER BLD IA.RAPID-MCNC: 259 NG/ML DDU — HIGH
D DIMER BLD IA.RAPID-MCNC: 260 NG/ML DDU — HIGH
D DIMER BLD IA.RAPID-MCNC: 294 NG/ML DDU — HIGH
D DIMER BLD IA.RAPID-MCNC: 334 NG/ML DDU — HIGH
D DIMER BLD IA.RAPID-MCNC: 401 NG/ML DDU — HIGH
D DIMER BLD IA.RAPID-MCNC: 426 NG/ML DDU — HIGH
EOSINOPHIL # BLD AUTO: 0 K/UL — SIGNIFICANT CHANGE UP (ref 0–0.5)
EOSINOPHIL # BLD AUTO: 0 K/UL — SIGNIFICANT CHANGE UP (ref 0–0.5)
EOSINOPHIL # BLD AUTO: 0.01 K/UL — SIGNIFICANT CHANGE UP (ref 0–0.5)
EOSINOPHIL # BLD AUTO: 0.01 K/UL — SIGNIFICANT CHANGE UP (ref 0–0.5)
EOSINOPHIL # BLD AUTO: 0.02 K/UL — SIGNIFICANT CHANGE UP (ref 0–0.5)
EOSINOPHIL # BLD AUTO: 0.02 K/UL — SIGNIFICANT CHANGE UP (ref 0–0.5)
EOSINOPHIL # BLD AUTO: 0.03 K/UL — SIGNIFICANT CHANGE UP (ref 0–0.5)
EOSINOPHIL # BLD AUTO: 0.35 K/UL — SIGNIFICANT CHANGE UP (ref 0–0.5)
EOSINOPHIL # BLD AUTO: 0.41 K/UL — SIGNIFICANT CHANGE UP (ref 0–0.5)
EOSINOPHIL # BLD AUTO: 0.44 K/UL — SIGNIFICANT CHANGE UP (ref 0–0.5)
EOSINOPHIL # BLD AUTO: 0.48 K/UL — SIGNIFICANT CHANGE UP (ref 0–0.5)
EOSINOPHIL NFR BLD AUTO: 0 % — SIGNIFICANT CHANGE UP (ref 0–6)
EOSINOPHIL NFR BLD AUTO: 0 % — SIGNIFICANT CHANGE UP (ref 0–6)
EOSINOPHIL NFR BLD AUTO: 0.1 % — SIGNIFICANT CHANGE UP (ref 0–6)
EOSINOPHIL NFR BLD AUTO: 2.3 % — SIGNIFICANT CHANGE UP (ref 0–6)
EOSINOPHIL NFR BLD AUTO: 2.8 % — SIGNIFICANT CHANGE UP (ref 0–6)
EOSINOPHIL NFR BLD AUTO: 2.8 % — SIGNIFICANT CHANGE UP (ref 0–6)
EOSINOPHIL NFR BLD AUTO: 3 % — SIGNIFICANT CHANGE UP (ref 0–6)
FERRITIN SERPL-MCNC: 1095 NG/ML — HIGH (ref 15–150)
FERRITIN SERPL-MCNC: 1652 NG/ML — HIGH (ref 15–150)
FERRITIN SERPL-MCNC: 1764 NG/ML — HIGH (ref 15–150)
FERRITIN SERPL-MCNC: 2645 NG/ML — HIGH (ref 15–150)
FERRITIN SERPL-MCNC: 3525 NG/ML — HIGH (ref 15–150)
FERRITIN SERPL-MCNC: 4710 NG/ML — HIGH (ref 15–150)
FERRITIN SERPL-MCNC: 5573 NG/ML — HIGH (ref 15–150)
FERRITIN SERPL-MCNC: 6426 NG/ML — HIGH (ref 15–150)
GAS PNL BLDV: SIGNIFICANT CHANGE UP
GAS PNL BLDV: SIGNIFICANT CHANGE UP
GIANT PLATELETS BLD QL SMEAR: PRESENT — SIGNIFICANT CHANGE UP
GLUCOSE SERPL-MCNC: 100 MG/DL — HIGH (ref 70–99)
GLUCOSE SERPL-MCNC: 104 MG/DL — HIGH (ref 70–99)
GLUCOSE SERPL-MCNC: 104 MG/DL — HIGH (ref 70–99)
GLUCOSE SERPL-MCNC: 107 MG/DL — HIGH (ref 70–99)
GLUCOSE SERPL-MCNC: 108 MG/DL — HIGH (ref 70–99)
GLUCOSE SERPL-MCNC: 108 MG/DL — HIGH (ref 70–99)
GLUCOSE SERPL-MCNC: 131 MG/DL — HIGH (ref 70–99)
GLUCOSE SERPL-MCNC: 141 MG/DL — HIGH (ref 70–99)
GLUCOSE SERPL-MCNC: 189 MG/DL — HIGH (ref 70–99)
GLUCOSE SERPL-MCNC: 44 MG/DL — CRITICAL LOW (ref 70–99)
GLUCOSE SERPL-MCNC: 64 MG/DL — LOW (ref 70–99)
GLUCOSE SERPL-MCNC: 72 MG/DL — SIGNIFICANT CHANGE UP (ref 70–99)
GLUCOSE SERPL-MCNC: 81 MG/DL — SIGNIFICANT CHANGE UP (ref 70–99)
GLUCOSE SERPL-MCNC: 89 MG/DL — SIGNIFICANT CHANGE UP (ref 70–99)
GLUCOSE SERPL-MCNC: 90 MG/DL — SIGNIFICANT CHANGE UP (ref 70–99)
GLUCOSE SERPL-MCNC: 94 MG/DL — SIGNIFICANT CHANGE UP (ref 70–99)
GLUCOSE SERPL-MCNC: 98 MG/DL — SIGNIFICANT CHANGE UP (ref 70–99)
GRAM STN FLD: SIGNIFICANT CHANGE UP
HCT VFR BLD CALC: 31.4 % — LOW (ref 34.5–45)
HCT VFR BLD CALC: 32.5 % — LOW (ref 34.5–45)
HCT VFR BLD CALC: 32.9 % — LOW (ref 34.5–45)
HCT VFR BLD CALC: 32.9 % — LOW (ref 34.5–45)
HCT VFR BLD CALC: 33.1 % — LOW (ref 34.5–45)
HCT VFR BLD CALC: 33.1 % — LOW (ref 34.5–45)
HCT VFR BLD CALC: 33.4 % — LOW (ref 34.5–45)
HCT VFR BLD CALC: 33.4 % — LOW (ref 34.5–45)
HCT VFR BLD CALC: 33.9 % — LOW (ref 34.5–45)
HCT VFR BLD CALC: 34 % — LOW (ref 34.5–45)
HCT VFR BLD CALC: 34.3 % — LOW (ref 34.5–45)
HCT VFR BLD CALC: 34.5 % — SIGNIFICANT CHANGE UP (ref 34.5–45)
HCT VFR BLD CALC: 34.8 % — SIGNIFICANT CHANGE UP (ref 34.5–45)
HCT VFR BLD CALC: 36.1 % — SIGNIFICANT CHANGE UP (ref 34.5–45)
HCT VFR BLD CALC: 36.5 % — SIGNIFICANT CHANGE UP (ref 34.5–45)
HCT VFR BLD CALC: 37.4 % — SIGNIFICANT CHANGE UP (ref 34.5–45)
HCT VFR BLD CALC: 37.8 % — SIGNIFICANT CHANGE UP (ref 34.5–45)
HCT VFR BLD CALC: 38.3 % — SIGNIFICANT CHANGE UP (ref 34.5–45)
HCT VFR BLD CALC: 39.7 % — SIGNIFICANT CHANGE UP (ref 34.5–45)
HCT VFR BLD CALC: 41.4 % — SIGNIFICANT CHANGE UP (ref 34.5–45)
HGB BLD-MCNC: 10.6 G/DL — LOW (ref 11.5–15.5)
HGB BLD-MCNC: 11 G/DL — LOW (ref 11.5–15.5)
HGB BLD-MCNC: 11.1 G/DL — LOW (ref 11.5–15.5)
HGB BLD-MCNC: 11.3 G/DL — LOW (ref 11.5–15.5)
HGB BLD-MCNC: 11.4 G/DL — LOW (ref 11.5–15.5)
HGB BLD-MCNC: 11.4 G/DL — LOW (ref 11.5–15.5)
HGB BLD-MCNC: 11.5 G/DL — SIGNIFICANT CHANGE UP (ref 11.5–15.5)
HGB BLD-MCNC: 11.5 G/DL — SIGNIFICANT CHANGE UP (ref 11.5–15.5)
HGB BLD-MCNC: 11.7 G/DL — SIGNIFICANT CHANGE UP (ref 11.5–15.5)
HGB BLD-MCNC: 11.7 G/DL — SIGNIFICANT CHANGE UP (ref 11.5–15.5)
HGB BLD-MCNC: 11.9 G/DL — SIGNIFICANT CHANGE UP (ref 11.5–15.5)
HGB BLD-MCNC: 12.3 G/DL — SIGNIFICANT CHANGE UP (ref 11.5–15.5)
HGB BLD-MCNC: 12.4 G/DL — SIGNIFICANT CHANGE UP (ref 11.5–15.5)
HGB BLD-MCNC: 12.6 G/DL — SIGNIFICANT CHANGE UP (ref 11.5–15.5)
HGB BLD-MCNC: 13.2 G/DL — SIGNIFICANT CHANGE UP (ref 11.5–15.5)
HGB BLD-MCNC: 13.3 G/DL — SIGNIFICANT CHANGE UP (ref 11.5–15.5)
HGB BLD-MCNC: 13.9 G/DL — SIGNIFICANT CHANGE UP (ref 11.5–15.5)
IMM GRANULOCYTES NFR BLD AUTO: 0.6 % — SIGNIFICANT CHANGE UP (ref 0–1.5)
IMM GRANULOCYTES NFR BLD AUTO: 0.8 % — SIGNIFICANT CHANGE UP (ref 0–1.5)
IMM GRANULOCYTES NFR BLD AUTO: 1.1 % — SIGNIFICANT CHANGE UP (ref 0–1.5)
IMM GRANULOCYTES NFR BLD AUTO: 1.2 % — SIGNIFICANT CHANGE UP (ref 0–1.5)
IMM GRANULOCYTES NFR BLD AUTO: 1.2 % — SIGNIFICANT CHANGE UP (ref 0–1.5)
IMM GRANULOCYTES NFR BLD AUTO: 1.3 % — SIGNIFICANT CHANGE UP (ref 0–1.5)
IMM GRANULOCYTES NFR BLD AUTO: 1.4 % — SIGNIFICANT CHANGE UP (ref 0–1.5)
IMM GRANULOCYTES NFR BLD AUTO: 1.4 % — SIGNIFICANT CHANGE UP (ref 0–1.5)
IMM GRANULOCYTES NFR BLD AUTO: 1.5 % — SIGNIFICANT CHANGE UP (ref 0–1.5)
IMM GRANULOCYTES NFR BLD AUTO: 1.6 % — HIGH (ref 0–1.5)
LDH SERPL L TO P-CCNC: 428 U/L — HIGH (ref 50–242)
LDH SERPL L TO P-CCNC: 490 U/L — HIGH (ref 50–242)
LDH SERPL L TO P-CCNC: 773 U/L — HIGH (ref 50–242)
LYMPHOCYTES # BLD AUTO: 0.51 K/UL — LOW (ref 1–3.3)
LYMPHOCYTES # BLD AUTO: 0.59 K/UL — LOW (ref 1–3.3)
LYMPHOCYTES # BLD AUTO: 0.61 K/UL — LOW (ref 1–3.3)
LYMPHOCYTES # BLD AUTO: 0.68 K/UL — LOW (ref 1–3.3)
LYMPHOCYTES # BLD AUTO: 0.75 K/UL — LOW (ref 1–3.3)
LYMPHOCYTES # BLD AUTO: 0.83 K/UL — LOW (ref 1–3.3)
LYMPHOCYTES # BLD AUTO: 0.84 K/UL — LOW (ref 1–3.3)
LYMPHOCYTES # BLD AUTO: 0.86 K/UL — LOW (ref 1–3.3)
LYMPHOCYTES # BLD AUTO: 0.86 K/UL — LOW (ref 1–3.3)
LYMPHOCYTES # BLD AUTO: 1.04 K/UL — SIGNIFICANT CHANGE UP (ref 1–3.3)
LYMPHOCYTES # BLD AUTO: 1.69 K/UL — SIGNIFICANT CHANGE UP (ref 1–3.3)
LYMPHOCYTES # BLD AUTO: 2.6 % — LOW (ref 13–44)
LYMPHOCYTES # BLD AUTO: 20.7 % — SIGNIFICANT CHANGE UP (ref 13–44)
LYMPHOCYTES # BLD AUTO: 3.4 % — LOW (ref 13–44)
LYMPHOCYTES # BLD AUTO: 3.9 % — LOW (ref 13–44)
LYMPHOCYTES # BLD AUTO: 4 % — LOW (ref 13–44)
LYMPHOCYTES # BLD AUTO: 4 % — LOW (ref 13–44)
LYMPHOCYTES # BLD AUTO: 4.3 % — LOW (ref 13–44)
LYMPHOCYTES # BLD AUTO: 5.1 % — LOW (ref 13–44)
LYMPHOCYTES # BLD AUTO: 5.5 % — LOW (ref 13–44)
LYMPHOCYTES # BLD AUTO: 5.8 % — LOW (ref 13–44)
LYMPHOCYTES # BLD AUTO: 6.5 % — LOW (ref 13–44)
MAGNESIUM SERPL-MCNC: 1.6 MG/DL — SIGNIFICANT CHANGE UP (ref 1.6–2.6)
MAGNESIUM SERPL-MCNC: 2.2 MG/DL — SIGNIFICANT CHANGE UP (ref 1.6–2.6)
MANUAL SMEAR VERIFICATION: SIGNIFICANT CHANGE UP
MCHC RBC-ENTMCNC: 30.9 PG — SIGNIFICANT CHANGE UP (ref 27–34)
MCHC RBC-ENTMCNC: 30.9 PG — SIGNIFICANT CHANGE UP (ref 27–34)
MCHC RBC-ENTMCNC: 31.2 PG — SIGNIFICANT CHANGE UP (ref 27–34)
MCHC RBC-ENTMCNC: 31.3 PG — SIGNIFICANT CHANGE UP (ref 27–34)
MCHC RBC-ENTMCNC: 31.4 PG — SIGNIFICANT CHANGE UP (ref 27–34)
MCHC RBC-ENTMCNC: 31.6 PG — SIGNIFICANT CHANGE UP (ref 27–34)
MCHC RBC-ENTMCNC: 31.6 PG — SIGNIFICANT CHANGE UP (ref 27–34)
MCHC RBC-ENTMCNC: 31.7 PG — SIGNIFICANT CHANGE UP (ref 27–34)
MCHC RBC-ENTMCNC: 31.9 PG — SIGNIFICANT CHANGE UP (ref 27–34)
MCHC RBC-ENTMCNC: 32.2 PG — SIGNIFICANT CHANGE UP (ref 27–34)
MCHC RBC-ENTMCNC: 32.4 GM/DL — SIGNIFICANT CHANGE UP (ref 32–36)
MCHC RBC-ENTMCNC: 32.8 PG — SIGNIFICANT CHANGE UP (ref 27–34)
MCHC RBC-ENTMCNC: 32.9 GM/DL — SIGNIFICANT CHANGE UP (ref 32–36)
MCHC RBC-ENTMCNC: 32.9 PG — SIGNIFICANT CHANGE UP (ref 27–34)
MCHC RBC-ENTMCNC: 32.9 PG — SIGNIFICANT CHANGE UP (ref 27–34)
MCHC RBC-ENTMCNC: 33 GM/DL — SIGNIFICANT CHANGE UP (ref 32–36)
MCHC RBC-ENTMCNC: 33 PG — SIGNIFICANT CHANGE UP (ref 27–34)
MCHC RBC-ENTMCNC: 33 PG — SIGNIFICANT CHANGE UP (ref 27–34)
MCHC RBC-ENTMCNC: 33.1 PG — SIGNIFICANT CHANGE UP (ref 27–34)
MCHC RBC-ENTMCNC: 33.1 PG — SIGNIFICANT CHANGE UP (ref 27–34)
MCHC RBC-ENTMCNC: 33.2 GM/DL — SIGNIFICANT CHANGE UP (ref 32–36)
MCHC RBC-ENTMCNC: 33.2 PG — SIGNIFICANT CHANGE UP (ref 27–34)
MCHC RBC-ENTMCNC: 33.3 GM/DL — SIGNIFICANT CHANGE UP (ref 32–36)
MCHC RBC-ENTMCNC: 33.5 GM/DL — SIGNIFICANT CHANGE UP (ref 32–36)
MCHC RBC-ENTMCNC: 33.6 GM/DL — SIGNIFICANT CHANGE UP (ref 32–36)
MCHC RBC-ENTMCNC: 33.7 GM/DL — SIGNIFICANT CHANGE UP (ref 32–36)
MCHC RBC-ENTMCNC: 33.8 GM/DL — SIGNIFICANT CHANGE UP (ref 32–36)
MCHC RBC-ENTMCNC: 34.1 GM/DL — SIGNIFICANT CHANGE UP (ref 32–36)
MCHC RBC-ENTMCNC: 34.2 GM/DL — SIGNIFICANT CHANGE UP (ref 32–36)
MCHC RBC-ENTMCNC: 34.4 GM/DL — SIGNIFICANT CHANGE UP (ref 32–36)
MCHC RBC-ENTMCNC: 34.7 GM/DL — SIGNIFICANT CHANGE UP (ref 32–36)
MCHC RBC-ENTMCNC: 34.9 GM/DL — SIGNIFICANT CHANGE UP (ref 32–36)
MCV RBC AUTO: 100.3 FL — HIGH (ref 80–100)
MCV RBC AUTO: 90.4 FL — SIGNIFICANT CHANGE UP (ref 80–100)
MCV RBC AUTO: 91.5 FL — SIGNIFICANT CHANGE UP (ref 80–100)
MCV RBC AUTO: 91.8 FL — SIGNIFICANT CHANGE UP (ref 80–100)
MCV RBC AUTO: 92 FL — SIGNIFICANT CHANGE UP (ref 80–100)
MCV RBC AUTO: 92.2 FL — SIGNIFICANT CHANGE UP (ref 80–100)
MCV RBC AUTO: 92.8 FL — SIGNIFICANT CHANGE UP (ref 80–100)
MCV RBC AUTO: 93 FL — SIGNIFICANT CHANGE UP (ref 80–100)
MCV RBC AUTO: 93.9 FL — SIGNIFICANT CHANGE UP (ref 80–100)
MCV RBC AUTO: 93.9 FL — SIGNIFICANT CHANGE UP (ref 80–100)
MCV RBC AUTO: 94.2 FL — SIGNIFICANT CHANGE UP (ref 80–100)
MCV RBC AUTO: 95.5 FL — SIGNIFICANT CHANGE UP (ref 80–100)
MCV RBC AUTO: 95.9 FL — SIGNIFICANT CHANGE UP (ref 80–100)
MCV RBC AUTO: 96.6 FL — SIGNIFICANT CHANGE UP (ref 80–100)
MCV RBC AUTO: 97.3 FL — SIGNIFICANT CHANGE UP (ref 80–100)
MCV RBC AUTO: 98.1 FL — SIGNIFICANT CHANGE UP (ref 80–100)
MCV RBC AUTO: 98.2 FL — SIGNIFICANT CHANGE UP (ref 80–100)
MCV RBC AUTO: 98.6 FL — SIGNIFICANT CHANGE UP (ref 80–100)
MCV RBC AUTO: 98.6 FL — SIGNIFICANT CHANGE UP (ref 80–100)
MCV RBC AUTO: 99.7 FL — SIGNIFICANT CHANGE UP (ref 80–100)
METHOD TYPE: SIGNIFICANT CHANGE UP
METHOD TYPE: SIGNIFICANT CHANGE UP
MONOCYTES # BLD AUTO: 0.52 K/UL — SIGNIFICANT CHANGE UP (ref 0–0.9)
MONOCYTES # BLD AUTO: 0.76 K/UL — SIGNIFICANT CHANGE UP (ref 0–0.9)
MONOCYTES # BLD AUTO: 1.12 K/UL — HIGH (ref 0–0.9)
MONOCYTES # BLD AUTO: 1.19 K/UL — HIGH (ref 0–0.9)
MONOCYTES # BLD AUTO: 1.25 K/UL — HIGH (ref 0–0.9)
MONOCYTES # BLD AUTO: 1.25 K/UL — HIGH (ref 0–0.9)
MONOCYTES # BLD AUTO: 1.26 K/UL — HIGH (ref 0–0.9)
MONOCYTES # BLD AUTO: 1.43 K/UL — HIGH (ref 0–0.9)
MONOCYTES # BLD AUTO: 1.44 K/UL — HIGH (ref 0–0.9)
MONOCYTES # BLD AUTO: 1.69 K/UL — HIGH (ref 0–0.9)
MONOCYTES # BLD AUTO: 1.97 K/UL — HIGH (ref 0–0.9)
MONOCYTES NFR BLD AUTO: 10.3 % — SIGNIFICANT CHANGE UP (ref 2–14)
MONOCYTES NFR BLD AUTO: 10.5 % — SIGNIFICANT CHANGE UP (ref 2–14)
MONOCYTES NFR BLD AUTO: 2.9 % — SIGNIFICANT CHANGE UP (ref 2–14)
MONOCYTES NFR BLD AUTO: 5.8 % — SIGNIFICANT CHANGE UP (ref 2–14)
MONOCYTES NFR BLD AUTO: 6.1 % — SIGNIFICANT CHANGE UP (ref 2–14)
MONOCYTES NFR BLD AUTO: 7.6 % — SIGNIFICANT CHANGE UP (ref 2–14)
MONOCYTES NFR BLD AUTO: 8.2 % — SIGNIFICANT CHANGE UP (ref 2–14)
MONOCYTES NFR BLD AUTO: 8.3 % — SIGNIFICANT CHANGE UP (ref 2–14)
MONOCYTES NFR BLD AUTO: 8.5 % — SIGNIFICANT CHANGE UP (ref 2–14)
MONOCYTES NFR BLD AUTO: 9.3 % — SIGNIFICANT CHANGE UP (ref 2–14)
MONOCYTES NFR BLD AUTO: 9.4 % — SIGNIFICANT CHANGE UP (ref 2–14)
NEUTROPHILS # BLD AUTO: 12.1 K/UL — HIGH (ref 1.8–7.4)
NEUTROPHILS # BLD AUTO: 12.2 K/UL — HIGH (ref 1.8–7.4)
NEUTROPHILS # BLD AUTO: 12.87 K/UL — HIGH (ref 1.8–7.4)
NEUTROPHILS # BLD AUTO: 12.89 K/UL — HIGH (ref 1.8–7.4)
NEUTROPHILS # BLD AUTO: 13.07 K/UL — HIGH (ref 1.8–7.4)
NEUTROPHILS # BLD AUTO: 14.39 K/UL — HIGH (ref 1.8–7.4)
NEUTROPHILS # BLD AUTO: 16 K/UL — HIGH (ref 1.8–7.4)
NEUTROPHILS # BLD AUTO: 16.55 K/UL — HIGH (ref 1.8–7.4)
NEUTROPHILS # BLD AUTO: 17.15 K/UL — HIGH (ref 1.8–7.4)
NEUTROPHILS # BLD AUTO: 18.96 K/UL — HIGH (ref 1.8–7.4)
NEUTROPHILS # BLD AUTO: 5.64 K/UL — SIGNIFICANT CHANGE UP (ref 1.8–7.4)
NEUTROPHILS NFR BLD AUTO: 69.2 % — SIGNIFICANT CHANGE UP (ref 43–77)
NEUTROPHILS NFR BLD AUTO: 81.3 % — HIGH (ref 43–77)
NEUTROPHILS NFR BLD AUTO: 81.5 % — HIGH (ref 43–77)
NEUTROPHILS NFR BLD AUTO: 82.8 % — HIGH (ref 43–77)
NEUTROPHILS NFR BLD AUTO: 83.5 % — HIGH (ref 43–77)
NEUTROPHILS NFR BLD AUTO: 83.6 % — HIGH (ref 43–77)
NEUTROPHILS NFR BLD AUTO: 83.8 % — HIGH (ref 43–77)
NEUTROPHILS NFR BLD AUTO: 84.3 % — HIGH (ref 43–77)
NEUTROPHILS NFR BLD AUTO: 85.2 % — HIGH (ref 43–77)
NEUTROPHILS NFR BLD AUTO: 88.8 % — HIGH (ref 43–77)
NEUTROPHILS NFR BLD AUTO: 92.5 % — HIGH (ref 43–77)
NEUTS BAND # BLD: 2.6 % — SIGNIFICANT CHANGE UP (ref 0–8)
NRBC # BLD: 0 /100 WBCS — SIGNIFICANT CHANGE UP (ref 0–0)
ORGANISM # SPEC MICROSCOPIC CNT: SIGNIFICANT CHANGE UP
PHOSPHATE SERPL-MCNC: 1.9 MG/DL — LOW (ref 2.5–4.5)
PHOSPHATE SERPL-MCNC: 2.7 MG/DL — SIGNIFICANT CHANGE UP (ref 2.5–4.5)
PLAT MORPH BLD: NORMAL — SIGNIFICANT CHANGE UP
PLATELET # BLD AUTO: 212 K/UL — SIGNIFICANT CHANGE UP (ref 150–400)
PLATELET # BLD AUTO: 214 K/UL — SIGNIFICANT CHANGE UP (ref 150–400)
PLATELET # BLD AUTO: 217 K/UL — SIGNIFICANT CHANGE UP (ref 150–400)
PLATELET # BLD AUTO: 226 K/UL — SIGNIFICANT CHANGE UP (ref 150–400)
PLATELET # BLD AUTO: 250 K/UL — SIGNIFICANT CHANGE UP (ref 150–400)
PLATELET # BLD AUTO: 273 K/UL — SIGNIFICANT CHANGE UP (ref 150–400)
PLATELET # BLD AUTO: 302 K/UL — SIGNIFICANT CHANGE UP (ref 150–400)
PLATELET # BLD AUTO: 302 K/UL — SIGNIFICANT CHANGE UP (ref 150–400)
PLATELET # BLD AUTO: 335 K/UL — SIGNIFICANT CHANGE UP (ref 150–400)
PLATELET # BLD AUTO: 382 K/UL — SIGNIFICANT CHANGE UP (ref 150–400)
PLATELET # BLD AUTO: 385 K/UL — SIGNIFICANT CHANGE UP (ref 150–400)
PLATELET # BLD AUTO: 401 K/UL — HIGH (ref 150–400)
PLATELET # BLD AUTO: 410 K/UL — HIGH (ref 150–400)
PLATELET # BLD AUTO: 416 K/UL — HIGH (ref 150–400)
PLATELET # BLD AUTO: 422 K/UL — HIGH (ref 150–400)
PLATELET # BLD AUTO: 426 K/UL — HIGH (ref 150–400)
PLATELET # BLD AUTO: 448 K/UL — HIGH (ref 150–400)
PLATELET # BLD AUTO: 451 K/UL — HIGH (ref 150–400)
PLATELET # BLD AUTO: 486 K/UL — HIGH (ref 150–400)
PLATELET # BLD AUTO: 488 K/UL — HIGH (ref 150–400)
POIKILOCYTOSIS BLD QL AUTO: SLIGHT — SIGNIFICANT CHANGE UP
POTASSIUM SERPL-MCNC: 3.4 MMOL/L — LOW (ref 3.5–5.3)
POTASSIUM SERPL-MCNC: 3.6 MMOL/L — SIGNIFICANT CHANGE UP (ref 3.5–5.3)
POTASSIUM SERPL-MCNC: 3.7 MMOL/L — SIGNIFICANT CHANGE UP (ref 3.5–5.3)
POTASSIUM SERPL-MCNC: 3.9 MMOL/L — SIGNIFICANT CHANGE UP (ref 3.5–5.3)
POTASSIUM SERPL-MCNC: 4 MMOL/L — SIGNIFICANT CHANGE UP (ref 3.5–5.3)
POTASSIUM SERPL-MCNC: 4.2 MMOL/L — SIGNIFICANT CHANGE UP (ref 3.5–5.3)
POTASSIUM SERPL-MCNC: 4.3 MMOL/L — SIGNIFICANT CHANGE UP (ref 3.5–5.3)
POTASSIUM SERPL-MCNC: 4.3 MMOL/L — SIGNIFICANT CHANGE UP (ref 3.5–5.3)
POTASSIUM SERPL-MCNC: 4.4 MMOL/L — SIGNIFICANT CHANGE UP (ref 3.5–5.3)
POTASSIUM SERPL-MCNC: 4.5 MMOL/L — SIGNIFICANT CHANGE UP (ref 3.5–5.3)
POTASSIUM SERPL-MCNC: 4.9 MMOL/L — SIGNIFICANT CHANGE UP (ref 3.5–5.3)
POTASSIUM SERPL-SCNC: 3.4 MMOL/L — LOW (ref 3.5–5.3)
POTASSIUM SERPL-SCNC: 3.6 MMOL/L — SIGNIFICANT CHANGE UP (ref 3.5–5.3)
POTASSIUM SERPL-SCNC: 3.7 MMOL/L — SIGNIFICANT CHANGE UP (ref 3.5–5.3)
POTASSIUM SERPL-SCNC: 3.9 MMOL/L — SIGNIFICANT CHANGE UP (ref 3.5–5.3)
POTASSIUM SERPL-SCNC: 4 MMOL/L — SIGNIFICANT CHANGE UP (ref 3.5–5.3)
POTASSIUM SERPL-SCNC: 4.2 MMOL/L — SIGNIFICANT CHANGE UP (ref 3.5–5.3)
POTASSIUM SERPL-SCNC: 4.3 MMOL/L — SIGNIFICANT CHANGE UP (ref 3.5–5.3)
POTASSIUM SERPL-SCNC: 4.3 MMOL/L — SIGNIFICANT CHANGE UP (ref 3.5–5.3)
POTASSIUM SERPL-SCNC: 4.4 MMOL/L — SIGNIFICANT CHANGE UP (ref 3.5–5.3)
POTASSIUM SERPL-SCNC: 4.5 MMOL/L — SIGNIFICANT CHANGE UP (ref 3.5–5.3)
POTASSIUM SERPL-SCNC: 4.9 MMOL/L — SIGNIFICANT CHANGE UP (ref 3.5–5.3)
PROCALCITONIN SERPL-MCNC: 0.04 NG/ML — SIGNIFICANT CHANGE UP (ref 0.02–0.1)
PROCALCITONIN SERPL-MCNC: 0.05 NG/ML — SIGNIFICANT CHANGE UP (ref 0.02–0.1)
PROCALCITONIN SERPL-MCNC: 0.09 NG/ML — SIGNIFICANT CHANGE UP (ref 0.02–0.1)
PROCALCITONIN SERPL-MCNC: 0.12 NG/ML — HIGH (ref 0.02–0.1)
PROCALCITONIN SERPL-MCNC: <0.03 NG/ML — SIGNIFICANT CHANGE UP (ref 0.02–0.1)
PROT SERPL-MCNC: 5.4 G/DL — LOW (ref 6–8.3)
PROT SERPL-MCNC: 5.6 G/DL — LOW (ref 6–8.3)
PROT SERPL-MCNC: 5.7 G/DL — LOW (ref 6–8.3)
PROT SERPL-MCNC: 5.9 G/DL — LOW (ref 6–8.3)
PROT SERPL-MCNC: 6 G/DL — SIGNIFICANT CHANGE UP (ref 6–8.3)
PROT SERPL-MCNC: 6 G/DL — SIGNIFICANT CHANGE UP (ref 6–8.3)
PROT SERPL-MCNC: 6.1 G/DL — SIGNIFICANT CHANGE UP (ref 6–8.3)
PROT SERPL-MCNC: 6.2 G/DL — SIGNIFICANT CHANGE UP (ref 6–8.3)
PROT SERPL-MCNC: 6.3 G/DL — SIGNIFICANT CHANGE UP (ref 6–8.3)
PROT SERPL-MCNC: 6.4 G/DL — SIGNIFICANT CHANGE UP (ref 6–8.3)
PROT SERPL-MCNC: 6.5 G/DL — SIGNIFICANT CHANGE UP (ref 6–8.3)
PROT SERPL-MCNC: 7.1 G/DL — SIGNIFICANT CHANGE UP (ref 6–8.3)
PROT SERPL-MCNC: 7.2 G/DL — SIGNIFICANT CHANGE UP (ref 6–8.3)
RAPID RVP RESULT: DETECTED
RBC # BLD: 3.2 M/UL — LOW (ref 3.8–5.2)
RBC # BLD: 3.33 M/UL — LOW (ref 3.8–5.2)
RBC # BLD: 3.38 M/UL — LOW (ref 3.8–5.2)
RBC # BLD: 3.45 M/UL — LOW (ref 3.8–5.2)
RBC # BLD: 3.5 M/UL — LOW (ref 3.8–5.2)
RBC # BLD: 3.55 M/UL — LOW (ref 3.8–5.2)
RBC # BLD: 3.55 M/UL — LOW (ref 3.8–5.2)
RBC # BLD: 3.56 M/UL — LOW (ref 3.8–5.2)
RBC # BLD: 3.57 M/UL — LOW (ref 3.8–5.2)
RBC # BLD: 3.6 M/UL — LOW (ref 3.8–5.2)
RBC # BLD: 3.62 M/UL — LOW (ref 3.8–5.2)
RBC # BLD: 3.62 M/UL — LOW (ref 3.8–5.2)
RBC # BLD: 3.64 M/UL — LOW (ref 3.8–5.2)
RBC # BLD: 3.7 M/UL — LOW (ref 3.8–5.2)
RBC # BLD: 3.75 M/UL — LOW (ref 3.8–5.2)
RBC # BLD: 3.81 M/UL — SIGNIFICANT CHANGE UP (ref 3.8–5.2)
RBC # BLD: 4.01 M/UL — SIGNIFICANT CHANGE UP (ref 3.8–5.2)
RBC # BLD: 4.18 M/UL — SIGNIFICANT CHANGE UP (ref 3.8–5.2)
RBC # BLD: 4.23 M/UL — SIGNIFICANT CHANGE UP (ref 3.8–5.2)
RBC # BLD: 4.5 M/UL — SIGNIFICANT CHANGE UP (ref 3.8–5.2)
RBC # FLD: 14 % — SIGNIFICANT CHANGE UP (ref 10.3–14.5)
RBC # FLD: 14 % — SIGNIFICANT CHANGE UP (ref 10.3–14.5)
RBC # FLD: 14.1 % — SIGNIFICANT CHANGE UP (ref 10.3–14.5)
RBC # FLD: 14.2 % — SIGNIFICANT CHANGE UP (ref 10.3–14.5)
RBC # FLD: 14.3 % — SIGNIFICANT CHANGE UP (ref 10.3–14.5)
RBC # FLD: 14.4 % — SIGNIFICANT CHANGE UP (ref 10.3–14.5)
RBC # FLD: 14.4 % — SIGNIFICANT CHANGE UP (ref 10.3–14.5)
RBC # FLD: 14.6 % — HIGH (ref 10.3–14.5)
RBC # FLD: 14.6 % — HIGH (ref 10.3–14.5)
RBC # FLD: 14.7 % — HIGH (ref 10.3–14.5)
RBC # FLD: 14.8 % — HIGH (ref 10.3–14.5)
RBC # FLD: 14.9 % — HIGH (ref 10.3–14.5)
RBC # FLD: 14.9 % — HIGH (ref 10.3–14.5)
RBC # FLD: 15.4 % — HIGH (ref 10.3–14.5)
RBC BLD AUTO: SIGNIFICANT CHANGE UP
SARS-COV-2 IGG SERPL QL IA: NEGATIVE — SIGNIFICANT CHANGE UP
SARS-COV-2 IGM SERPL IA-ACNC: 0.09 INDEX — SIGNIFICANT CHANGE UP
SARS-COV-2 RNA SPEC QL NAA+PROBE: DETECTED
SODIUM SERPL-SCNC: 132 MMOL/L — LOW (ref 135–145)
SODIUM SERPL-SCNC: 132 MMOL/L — LOW (ref 135–145)
SODIUM SERPL-SCNC: 134 MMOL/L — LOW (ref 135–145)
SODIUM SERPL-SCNC: 135 MMOL/L — SIGNIFICANT CHANGE UP (ref 135–145)
SODIUM SERPL-SCNC: 136 MMOL/L — SIGNIFICANT CHANGE UP (ref 135–145)
SODIUM SERPL-SCNC: 137 MMOL/L — SIGNIFICANT CHANGE UP (ref 135–145)
SODIUM SERPL-SCNC: 138 MMOL/L — SIGNIFICANT CHANGE UP (ref 135–145)
SODIUM SERPL-SCNC: 138 MMOL/L — SIGNIFICANT CHANGE UP (ref 135–145)
SODIUM SERPL-SCNC: 139 MMOL/L — SIGNIFICANT CHANGE UP (ref 135–145)
SODIUM SERPL-SCNC: 140 MMOL/L — SIGNIFICANT CHANGE UP (ref 135–145)
SPECIMEN SOURCE: SIGNIFICANT CHANGE UP
VANCOMYCIN TROUGH SERPL-MCNC: 6.1 UG/ML — LOW (ref 10–20)
VANCOMYCIN TROUGH SERPL-MCNC: 8.7 UG/ML — LOW (ref 10–20)
VARIANT LYMPHS # BLD: 3.5 % — SIGNIFICANT CHANGE UP (ref 0–6)
WBC # BLD: 12.14 K/UL — HIGH (ref 3.8–10.5)
WBC # BLD: 13.1 K/UL — HIGH (ref 3.8–10.5)
WBC # BLD: 13.67 K/UL — HIGH (ref 3.8–10.5)
WBC # BLD: 13.75 K/UL — HIGH (ref 3.8–10.5)
WBC # BLD: 14.34 K/UL — HIGH (ref 3.8–10.5)
WBC # BLD: 14.49 K/UL — HIGH (ref 3.8–10.5)
WBC # BLD: 14.74 K/UL — HIGH (ref 3.8–10.5)
WBC # BLD: 14.87 K/UL — HIGH (ref 3.8–10.5)
WBC # BLD: 15.29 K/UL — HIGH (ref 3.8–10.5)
WBC # BLD: 15.4 K/UL — HIGH (ref 3.8–10.5)
WBC # BLD: 15.55 K/UL — HIGH (ref 3.8–10.5)
WBC # BLD: 16.04 K/UL — HIGH (ref 3.8–10.5)
WBC # BLD: 17.16 K/UL — HIGH (ref 3.8–10.5)
WBC # BLD: 17.88 K/UL — HIGH (ref 3.8–10.5)
WBC # BLD: 19.14 K/UL — HIGH (ref 3.8–10.5)
WBC # BLD: 19.53 K/UL — HIGH (ref 3.8–10.5)
WBC # BLD: 21.38 K/UL — HIGH (ref 3.8–10.5)
WBC # BLD: 4.29 K/UL — SIGNIFICANT CHANGE UP (ref 3.8–10.5)
WBC # BLD: 6.07 K/UL — SIGNIFICANT CHANGE UP (ref 3.8–10.5)
WBC # BLD: 8.16 K/UL — SIGNIFICANT CHANGE UP (ref 3.8–10.5)
WBC # FLD AUTO: 12.14 K/UL — HIGH (ref 3.8–10.5)
WBC # FLD AUTO: 13.1 K/UL — HIGH (ref 3.8–10.5)
WBC # FLD AUTO: 13.67 K/UL — HIGH (ref 3.8–10.5)
WBC # FLD AUTO: 13.75 K/UL — HIGH (ref 3.8–10.5)
WBC # FLD AUTO: 14.34 K/UL — HIGH (ref 3.8–10.5)
WBC # FLD AUTO: 14.49 K/UL — HIGH (ref 3.8–10.5)
WBC # FLD AUTO: 14.74 K/UL — HIGH (ref 3.8–10.5)
WBC # FLD AUTO: 14.87 K/UL — HIGH (ref 3.8–10.5)
WBC # FLD AUTO: 15.29 K/UL — HIGH (ref 3.8–10.5)
WBC # FLD AUTO: 15.4 K/UL — HIGH (ref 3.8–10.5)
WBC # FLD AUTO: 15.55 K/UL — HIGH (ref 3.8–10.5)
WBC # FLD AUTO: 16.04 K/UL — HIGH (ref 3.8–10.5)
WBC # FLD AUTO: 17.16 K/UL — HIGH (ref 3.8–10.5)
WBC # FLD AUTO: 17.88 K/UL — HIGH (ref 3.8–10.5)
WBC # FLD AUTO: 19.14 K/UL — HIGH (ref 3.8–10.5)
WBC # FLD AUTO: 19.53 K/UL — HIGH (ref 3.8–10.5)
WBC # FLD AUTO: 21.38 K/UL — HIGH (ref 3.8–10.5)
WBC # FLD AUTO: 4.29 K/UL — SIGNIFICANT CHANGE UP (ref 3.8–10.5)
WBC # FLD AUTO: 6.07 K/UL — SIGNIFICANT CHANGE UP (ref 3.8–10.5)
WBC # FLD AUTO: 8.16 K/UL — SIGNIFICANT CHANGE UP (ref 3.8–10.5)

## 2020-01-01 PROCEDURE — 99285 EMERGENCY DEPT VISIT HI MDM: CPT | Mod: 25

## 2020-01-01 PROCEDURE — 97162 PT EVAL MOD COMPLEX 30 MIN: CPT

## 2020-01-01 PROCEDURE — 80202 ASSAY OF VANCOMYCIN: CPT

## 2020-01-01 PROCEDURE — 97530 THERAPEUTIC ACTIVITIES: CPT

## 2020-01-01 PROCEDURE — 87150 DNA/RNA AMPLIFIED PROBE: CPT

## 2020-01-01 PROCEDURE — 85027 COMPLETE CBC AUTOMATED: CPT

## 2020-01-01 PROCEDURE — 82435 ASSAY OF BLOOD CHLORIDE: CPT

## 2020-01-01 PROCEDURE — 92014 COMPRE OPH EXAM EST PT 1/>: CPT

## 2020-01-01 PROCEDURE — 82728 ASSAY OF FERRITIN: CPT

## 2020-01-01 PROCEDURE — 86769 SARS-COV-2 COVID-19 ANTIBODY: CPT

## 2020-01-01 PROCEDURE — 85018 HEMOGLOBIN: CPT

## 2020-01-01 PROCEDURE — 71275 CT ANGIOGRAPHY CHEST: CPT

## 2020-01-01 PROCEDURE — 82962 GLUCOSE BLOOD TEST: CPT

## 2020-01-01 PROCEDURE — 92083 EXTENDED VISUAL FIELD XM: CPT

## 2020-01-01 PROCEDURE — 84145 PROCALCITONIN (PCT): CPT

## 2020-01-01 PROCEDURE — 0225U NFCT DS DNA&RNA 21 SARSCOV2: CPT

## 2020-01-01 PROCEDURE — 86140 C-REACTIVE PROTEIN: CPT

## 2020-01-01 PROCEDURE — 92134 CPTRZ OPH DX IMG PST SGM RTA: CPT

## 2020-01-01 PROCEDURE — 84100 ASSAY OF PHOSPHORUS: CPT

## 2020-01-01 PROCEDURE — 96375 TX/PRO/DX INJ NEW DRUG ADDON: CPT | Mod: XU

## 2020-01-01 PROCEDURE — 87186 SC STD MICRODIL/AGAR DIL: CPT

## 2020-01-01 PROCEDURE — 84484 ASSAY OF TROPONIN QUANT: CPT

## 2020-01-01 PROCEDURE — 71045 X-RAY EXAM CHEST 1 VIEW: CPT | Mod: 26

## 2020-01-01 PROCEDURE — 71045 X-RAY EXAM CHEST 1 VIEW: CPT

## 2020-01-01 PROCEDURE — 92250 FUNDUS PHOTOGRAPHY W/I&R: CPT

## 2020-01-01 PROCEDURE — 99284 EMERGENCY DEPT VISIT MOD MDM: CPT | Mod: CS,GC

## 2020-01-01 PROCEDURE — 85379 FIBRIN DEGRADATION QUANT: CPT

## 2020-01-01 PROCEDURE — 99223 1ST HOSP IP/OBS HIGH 75: CPT | Mod: CS

## 2020-01-01 PROCEDURE — 82803 BLOOD GASES ANY COMBINATION: CPT

## 2020-01-01 PROCEDURE — 96361 HYDRATE IV INFUSION ADD-ON: CPT

## 2020-01-01 PROCEDURE — 80053 COMPREHEN METABOLIC PANEL: CPT

## 2020-01-01 PROCEDURE — 71275 CT ANGIOGRAPHY CHEST: CPT | Mod: 26

## 2020-01-01 PROCEDURE — 83605 ASSAY OF LACTIC ACID: CPT

## 2020-01-01 PROCEDURE — 93010 ELECTROCARDIOGRAM REPORT: CPT

## 2020-01-01 PROCEDURE — ZZZZZ: CPT

## 2020-01-01 PROCEDURE — 96365 THER/PROPH/DIAG IV INF INIT: CPT | Mod: XU

## 2020-01-01 PROCEDURE — 84132 ASSAY OF SERUM POTASSIUM: CPT

## 2020-01-01 PROCEDURE — 85014 HEMATOCRIT: CPT

## 2020-01-01 PROCEDURE — 97110 THERAPEUTIC EXERCISES: CPT

## 2020-01-01 PROCEDURE — 82330 ASSAY OF CALCIUM: CPT

## 2020-01-01 PROCEDURE — U0003: CPT

## 2020-01-01 PROCEDURE — 93005 ELECTROCARDIOGRAM TRACING: CPT

## 2020-01-01 PROCEDURE — 92526 ORAL FUNCTION THERAPY: CPT

## 2020-01-01 PROCEDURE — 82565 ASSAY OF CREATININE: CPT

## 2020-01-01 PROCEDURE — 92610 EVALUATE SWALLOWING FUNCTION: CPT

## 2020-01-01 PROCEDURE — 83735 ASSAY OF MAGNESIUM: CPT

## 2020-01-01 PROCEDURE — 83615 LACTATE (LD) (LDH) ENZYME: CPT

## 2020-01-01 PROCEDURE — 82248 BILIRUBIN DIRECT: CPT

## 2020-01-01 PROCEDURE — 80076 HEPATIC FUNCTION PANEL: CPT

## 2020-01-01 PROCEDURE — 82947 ASSAY GLUCOSE BLOOD QUANT: CPT

## 2020-01-01 PROCEDURE — 85025 COMPLETE CBC W/AUTO DIFF WBC: CPT

## 2020-01-01 PROCEDURE — 87040 BLOOD CULTURE FOR BACTERIA: CPT

## 2020-01-01 PROCEDURE — 84295 ASSAY OF SERUM SODIUM: CPT

## 2020-01-01 PROCEDURE — 80048 BASIC METABOLIC PNL TOTAL CA: CPT

## 2020-01-01 RX ORDER — CEFAZOLIN SODIUM 1 G
2000 VIAL (EA) INJECTION EVERY 8 HOURS
Refills: 0 | Status: DISCONTINUED | OUTPATIENT
Start: 2020-01-01 | End: 2020-01-01

## 2020-01-01 RX ORDER — MAGNESIUM SULFATE 500 MG/ML
1 VIAL (ML) INJECTION ONCE
Refills: 0 | Status: COMPLETED | OUTPATIENT
Start: 2020-01-01 | End: 2020-01-01

## 2020-01-01 RX ORDER — CEFEPIME 1 G/1
500 INJECTION, POWDER, FOR SOLUTION INTRAMUSCULAR; INTRAVENOUS ONCE
Refills: 0 | Status: COMPLETED | OUTPATIENT
Start: 2020-01-01 | End: 2020-01-01

## 2020-01-01 RX ORDER — ASPIRIN/CALCIUM CARB/MAGNESIUM 324 MG
81 TABLET ORAL DAILY
Refills: 0 | Status: DISCONTINUED | OUTPATIENT
Start: 2020-01-01 | End: 2020-01-01

## 2020-01-01 RX ORDER — DEXAMETHASONE 0.5 MG/5ML
6 ELIXIR ORAL DAILY
Refills: 0 | Status: DISCONTINUED | OUTPATIENT
Start: 2020-01-01 | End: 2020-01-01

## 2020-01-01 RX ORDER — ENOXAPARIN SODIUM 100 MG/ML
40 INJECTION SUBCUTANEOUS DAILY
Refills: 0 | Status: DISCONTINUED | OUTPATIENT
Start: 2020-01-01 | End: 2020-01-01

## 2020-01-01 RX ORDER — METHIMAZOLE 10 MG/1
1 TABLET ORAL
Qty: 0 | Refills: 0 | DISCHARGE

## 2020-01-01 RX ORDER — DEXAMETHASONE 0.5 MG/5ML
6 ELIXIR ORAL DAILY
Refills: 0 | Status: COMPLETED | OUTPATIENT
Start: 2020-01-01 | End: 2020-01-01

## 2020-01-01 RX ORDER — SODIUM CHLORIDE 9 MG/ML
250 INJECTION, SOLUTION INTRAVENOUS ONCE
Refills: 0 | Status: COMPLETED | OUTPATIENT
Start: 2020-01-01 | End: 2020-01-01

## 2020-01-01 RX ORDER — REMDESIVIR 5 MG/ML
200 INJECTION INTRAVENOUS EVERY 24 HOURS
Refills: 0 | Status: COMPLETED | OUTPATIENT
Start: 2020-01-01 | End: 2020-01-01

## 2020-01-01 RX ORDER — POTASSIUM CHLORIDE 20 MEQ
20 PACKET (EA) ORAL
Refills: 0 | Status: COMPLETED | OUTPATIENT
Start: 2020-01-01 | End: 2020-01-01

## 2020-01-01 RX ORDER — DEXAMETHASONE 0.5 MG/5ML
4 ELIXIR ORAL DAILY
Refills: 0 | Status: COMPLETED | OUTPATIENT
Start: 2020-01-01 | End: 2020-01-01

## 2020-01-01 RX ORDER — DEXAMETHASONE 0.5 MG/5ML
2 ELIXIR ORAL DAILY
Refills: 0 | Status: COMPLETED | OUTPATIENT
Start: 2020-01-01 | End: 2020-01-01

## 2020-01-01 RX ORDER — LABETALOL HCL 100 MG
2 TABLET ORAL
Qty: 0 | Refills: 0 | DISCHARGE

## 2020-01-01 RX ORDER — OXYCODONE HYDROCHLORIDE 5 MG/1
10 TABLET ORAL EVERY 6 HOURS
Refills: 0 | Status: DISCONTINUED | OUTPATIENT
Start: 2020-01-01 | End: 2020-01-01

## 2020-01-01 RX ORDER — DEXAMETHASONE 0.5 MG/5ML
4 ELIXIR ORAL DAILY
Refills: 0 | Status: DISCONTINUED | OUTPATIENT
Start: 2020-01-01 | End: 2020-01-01

## 2020-01-01 RX ORDER — URSODIOL 250 MG/1
250 TABLET, FILM COATED ORAL
Refills: 0 | Status: DISCONTINUED | OUTPATIENT
Start: 2020-01-01 | End: 2020-01-01

## 2020-01-01 RX ORDER — HYDROXYCHLOROQUINE SULFATE 200 MG
400 TABLET ORAL
Refills: 0 | Status: DISCONTINUED | OUTPATIENT
Start: 2020-01-01 | End: 2020-01-01

## 2020-01-01 RX ORDER — ALISKIREN HEMIFUMARATE 300 MG/1
1 TABLET, FILM COATED ORAL
Qty: 0 | Refills: 0 | DISCHARGE

## 2020-01-01 RX ORDER — ACETAMINOPHEN 500 MG
650 TABLET ORAL EVERY 8 HOURS
Refills: 0 | Status: DISCONTINUED | OUTPATIENT
Start: 2020-01-01 | End: 2020-01-01

## 2020-01-01 RX ORDER — NYSTATIN CREAM 100000 [USP'U]/G
1 CREAM TOPICAL
Refills: 0 | Status: DISCONTINUED | OUTPATIENT
Start: 2020-01-01 | End: 2020-01-01

## 2020-01-01 RX ORDER — HYDROXYCHLOROQUINE SULFATE 200 MG
2 TABLET ORAL
Qty: 0 | Refills: 0 | DISCHARGE

## 2020-01-01 RX ORDER — PANTOPRAZOLE SODIUM 20 MG/1
40 TABLET, DELAYED RELEASE ORAL ONCE
Refills: 0 | Status: COMPLETED | OUTPATIENT
Start: 2020-01-01 | End: 2020-01-01

## 2020-01-01 RX ORDER — VANCOMYCIN HCL 1 G
1000 VIAL (EA) INTRAVENOUS EVERY 24 HOURS
Refills: 0 | Status: DISCONTINUED | OUTPATIENT
Start: 2020-01-01 | End: 2020-01-01

## 2020-01-01 RX ORDER — INFLIXIMAB-DYYB 120 MG/ML
450 INJECTION SUBCUTANEOUS
Qty: 0 | Refills: 0 | DISCHARGE

## 2020-01-01 RX ORDER — LABETALOL HCL 100 MG
200 TABLET ORAL
Refills: 0 | Status: DISCONTINUED | OUTPATIENT
Start: 2020-01-01 | End: 2020-01-01

## 2020-01-01 RX ORDER — OXYCODONE HYDROCHLORIDE 5 MG/1
10 TABLET ORAL EVERY 12 HOURS
Refills: 0 | Status: DISCONTINUED | OUTPATIENT
Start: 2020-01-01 | End: 2020-01-01

## 2020-01-01 RX ORDER — LABETALOL HCL 100 MG
3 TABLET ORAL
Qty: 0 | Refills: 0 | DISCHARGE

## 2020-01-01 RX ORDER — OMEPRAZOLE 10 MG/1
2 CAPSULE, DELAYED RELEASE ORAL
Qty: 0 | Refills: 0 | DISCHARGE

## 2020-01-01 RX ORDER — POTASSIUM CHLORIDE 20 MEQ
40 PACKET (EA) ORAL EVERY 4 HOURS
Refills: 0 | Status: COMPLETED | OUTPATIENT
Start: 2020-01-01 | End: 2020-01-01

## 2020-01-01 RX ORDER — REMDESIVIR 5 MG/ML
100 INJECTION INTRAVENOUS EVERY 24 HOURS
Refills: 0 | Status: COMPLETED | OUTPATIENT
Start: 2020-01-01 | End: 2020-01-01

## 2020-01-01 RX ORDER — AMLODIPINE BESYLATE 2.5 MG/1
1 TABLET ORAL
Qty: 0 | Refills: 0 | DISCHARGE

## 2020-01-01 RX ORDER — CEFTRIAXONE 500 MG/1
1000 INJECTION, POWDER, FOR SOLUTION INTRAMUSCULAR; INTRAVENOUS ONCE
Refills: 0 | Status: COMPLETED | OUTPATIENT
Start: 2020-01-01 | End: 2020-01-01

## 2020-01-01 RX ORDER — AZITHROMYCIN 500 MG/1
500 TABLET, FILM COATED ORAL ONCE
Refills: 0 | Status: COMPLETED | OUTPATIENT
Start: 2020-01-01 | End: 2020-01-01

## 2020-01-01 RX ORDER — ASPIRIN/CALCIUM CARB/MAGNESIUM 324 MG
1 TABLET ORAL
Qty: 0 | Refills: 0 | DISCHARGE

## 2020-01-01 RX ORDER — METHOTREXATE 2.5 MG/1
5 TABLET ORAL
Qty: 0 | Refills: 0 | DISCHARGE

## 2020-01-01 RX ORDER — CHOLECALCIFEROL (VITAMIN D3) 125 MCG
1 CAPSULE ORAL
Qty: 0 | Refills: 0 | DISCHARGE

## 2020-01-01 RX ORDER — REMDESIVIR 5 MG/ML
INJECTION INTRAVENOUS
Refills: 0 | Status: COMPLETED | OUTPATIENT
Start: 2020-01-01 | End: 2020-01-01

## 2020-01-01 RX ORDER — HYDROCORTISONE 20 MG
100 TABLET ORAL ONCE
Refills: 0 | Status: COMPLETED | OUTPATIENT
Start: 2020-01-01 | End: 2020-01-01

## 2020-01-01 RX ORDER — ACETAMINOPHEN 500 MG
650 TABLET ORAL EVERY 6 HOURS
Refills: 0 | Status: DISCONTINUED | OUTPATIENT
Start: 2020-01-01 | End: 2020-01-01

## 2020-01-01 RX ORDER — LISINOPRIL 2.5 MG/1
1 TABLET ORAL
Qty: 0 | Refills: 0 | DISCHARGE

## 2020-01-01 RX ORDER — PANTOPRAZOLE SODIUM 20 MG/1
40 TABLET, DELAYED RELEASE ORAL
Refills: 0 | Status: DISCONTINUED | OUTPATIENT
Start: 2020-01-01 | End: 2020-01-01

## 2020-01-01 RX ORDER — SODIUM CHLORIDE 9 MG/ML
500 INJECTION INTRAMUSCULAR; INTRAVENOUS; SUBCUTANEOUS ONCE
Refills: 0 | Status: COMPLETED | OUTPATIENT
Start: 2020-01-01 | End: 2020-01-01

## 2020-01-01 RX ORDER — PIPERACILLIN AND TAZOBACTAM 4; .5 G/20ML; G/20ML
3.38 INJECTION, POWDER, LYOPHILIZED, FOR SOLUTION INTRAVENOUS EVERY 8 HOURS
Refills: 0 | Status: DISCONTINUED | OUTPATIENT
Start: 2020-01-01 | End: 2020-01-01

## 2020-01-01 RX ORDER — POTASSIUM CHLORIDE 20 MEQ
40 PACKET (EA) ORAL EVERY 6 HOURS
Refills: 0 | Status: COMPLETED | OUTPATIENT
Start: 2020-01-01 | End: 2020-01-01

## 2020-01-01 RX ORDER — DEXAMETHASONE 0.5 MG/5ML
ELIXIR ORAL
Refills: 0 | Status: COMPLETED | OUTPATIENT
Start: 2020-01-01 | End: 2020-01-01

## 2020-01-01 RX ORDER — ACETAMINOPHEN 500 MG
750 TABLET ORAL ONCE
Refills: 0 | Status: COMPLETED | OUTPATIENT
Start: 2020-01-01 | End: 2020-01-01

## 2020-01-01 RX ORDER — LABETALOL HCL 100 MG
10 TABLET ORAL EVERY 8 HOURS
Refills: 0 | Status: DISCONTINUED | OUTPATIENT
Start: 2020-01-01 | End: 2020-01-01

## 2020-01-01 RX ORDER — POTASSIUM CHLORIDE 20 MEQ
20 PACKET (EA) ORAL ONCE
Refills: 0 | Status: COMPLETED | OUTPATIENT
Start: 2020-01-01 | End: 2020-01-01

## 2020-01-01 RX ORDER — PANTOPRAZOLE SODIUM 20 MG/1
40 TABLET, DELAYED RELEASE ORAL DAILY
Refills: 0 | Status: DISCONTINUED | OUTPATIENT
Start: 2020-01-01 | End: 2020-01-01

## 2020-01-01 RX ORDER — PIPERACILLIN AND TAZOBACTAM 4; .5 G/20ML; G/20ML
3.38 INJECTION, POWDER, LYOPHILIZED, FOR SOLUTION INTRAVENOUS ONCE
Refills: 0 | Status: COMPLETED | OUTPATIENT
Start: 2020-01-01 | End: 2020-01-01

## 2020-01-01 RX ORDER — DENOSUMAB 60 MG/ML
0 INJECTION SUBCUTANEOUS
Qty: 0 | Refills: 0 | DISCHARGE

## 2020-01-01 RX ORDER — URSODIOL 250 MG/1
1 TABLET, FILM COATED ORAL
Qty: 0 | Refills: 0 | DISCHARGE

## 2020-01-01 RX ORDER — CHOLECALCIFEROL (VITAMIN D3) 125 MCG
2000 CAPSULE ORAL DAILY
Refills: 0 | Status: DISCONTINUED | OUTPATIENT
Start: 2020-01-01 | End: 2020-01-01

## 2020-01-01 RX ORDER — VANCOMYCIN HCL 1 G
1000 VIAL (EA) INTRAVENOUS ONCE
Refills: 0 | Status: COMPLETED | OUTPATIENT
Start: 2020-01-01 | End: 2020-01-01

## 2020-01-01 RX ORDER — DEXAMETHASONE 0.5 MG/5ML
2 ELIXIR ORAL ONCE
Refills: 0 | Status: COMPLETED | OUTPATIENT
Start: 2020-01-01 | End: 2020-01-01

## 2020-01-01 RX ORDER — OMEPRAZOLE 10 MG/1
1 CAPSULE, DELAYED RELEASE ORAL
Qty: 0 | Refills: 0 | DISCHARGE

## 2020-01-01 RX ORDER — SODIUM CHLORIDE 9 MG/ML
1000 INJECTION INTRAMUSCULAR; INTRAVENOUS; SUBCUTANEOUS
Refills: 0 | Status: DISCONTINUED | OUTPATIENT
Start: 2020-01-01 | End: 2020-01-01

## 2020-01-01 RX ORDER — LIDOCAINE 4 G/100G
1 CREAM TOPICAL ONCE
Refills: 0 | Status: COMPLETED | OUTPATIENT
Start: 2020-01-01 | End: 2020-01-01

## 2020-01-01 RX ADMIN — OXYCODONE HYDROCHLORIDE 10 MILLIGRAM(S): 5 TABLET ORAL at 20:24

## 2020-01-01 RX ADMIN — Medication 650 MILLIGRAM(S): at 08:07

## 2020-01-01 RX ADMIN — Medication 10 MILLIGRAM(S): at 11:58

## 2020-01-01 RX ADMIN — PANTOPRAZOLE SODIUM 40 MILLIGRAM(S): 20 TABLET, DELAYED RELEASE ORAL at 05:32

## 2020-01-01 RX ADMIN — PANTOPRAZOLE SODIUM 40 MILLIGRAM(S): 20 TABLET, DELAYED RELEASE ORAL at 06:33

## 2020-01-01 RX ADMIN — Medication 1 TABLET(S): at 13:04

## 2020-01-01 RX ADMIN — NYSTATIN CREAM 1 APPLICATION(S): 100000 CREAM TOPICAL at 17:29

## 2020-01-01 RX ADMIN — OXYCODONE HYDROCHLORIDE 10 MILLIGRAM(S): 5 TABLET ORAL at 04:45

## 2020-01-01 RX ADMIN — Medication 650 MILLIGRAM(S): at 20:26

## 2020-01-01 RX ADMIN — Medication 650 MILLIGRAM(S): at 12:52

## 2020-01-01 RX ADMIN — Medication 200 MILLIGRAM(S): at 12:43

## 2020-01-01 RX ADMIN — Medication 200 MILLIGRAM(S): at 06:17

## 2020-01-01 RX ADMIN — Medication 200 MILLIGRAM(S): at 11:54

## 2020-01-01 RX ADMIN — Medication 10 MILLIGRAM(S): at 18:30

## 2020-01-01 RX ADMIN — URSODIOL 250 MILLIGRAM(S): 250 TABLET, FILM COATED ORAL at 17:29

## 2020-01-01 RX ADMIN — Medication 1 TABLET(S): at 11:38

## 2020-01-01 RX ADMIN — ENOXAPARIN SODIUM 40 MILLIGRAM(S): 100 INJECTION SUBCUTANEOUS at 11:31

## 2020-01-01 RX ADMIN — Medication 100 MILLIGRAM(S): at 14:32

## 2020-01-01 RX ADMIN — PANTOPRAZOLE SODIUM 40 MILLIGRAM(S): 20 TABLET, DELAYED RELEASE ORAL at 13:05

## 2020-01-01 RX ADMIN — Medication 10 MILLIGRAM(S): at 14:12

## 2020-01-01 RX ADMIN — ENOXAPARIN SODIUM 40 MILLIGRAM(S): 100 INJECTION SUBCUTANEOUS at 12:32

## 2020-01-01 RX ADMIN — Medication 1 TABLET(S): at 11:17

## 2020-01-01 RX ADMIN — ENOXAPARIN SODIUM 40 MILLIGRAM(S): 100 INJECTION SUBCUTANEOUS at 12:53

## 2020-01-01 RX ADMIN — Medication 650 MILLIGRAM(S): at 00:06

## 2020-01-01 RX ADMIN — PANTOPRAZOLE SODIUM 40 MILLIGRAM(S): 20 TABLET, DELAYED RELEASE ORAL at 08:32

## 2020-01-01 RX ADMIN — Medication 200 MILLIGRAM(S): at 18:18

## 2020-01-01 RX ADMIN — NYSTATIN CREAM 1 APPLICATION(S): 100000 CREAM TOPICAL at 18:09

## 2020-01-01 RX ADMIN — Medication 100 MILLIGRAM(S): at 13:57

## 2020-01-01 RX ADMIN — ENOXAPARIN SODIUM 40 MILLIGRAM(S): 100 INJECTION SUBCUTANEOUS at 11:40

## 2020-01-01 RX ADMIN — Medication 400 MILLIGRAM(S): at 12:32

## 2020-01-01 RX ADMIN — NYSTATIN CREAM 1 APPLICATION(S): 100000 CREAM TOPICAL at 05:26

## 2020-01-01 RX ADMIN — Medication 200 MILLIGRAM(S): at 05:27

## 2020-01-01 RX ADMIN — Medication 200 MILLIGRAM(S): at 06:06

## 2020-01-01 RX ADMIN — URSODIOL 250 MILLIGRAM(S): 250 TABLET, FILM COATED ORAL at 17:26

## 2020-01-01 RX ADMIN — PANTOPRAZOLE SODIUM 40 MILLIGRAM(S): 20 TABLET, DELAYED RELEASE ORAL at 05:08

## 2020-01-01 RX ADMIN — Medication 200 MILLIGRAM(S): at 17:01

## 2020-01-01 RX ADMIN — Medication 650 MILLIGRAM(S): at 17:36

## 2020-01-01 RX ADMIN — Medication 400 MILLIGRAM(S): at 12:53

## 2020-01-01 RX ADMIN — Medication 200 MILLIGRAM(S): at 18:59

## 2020-01-01 RX ADMIN — Medication 6 MILLIGRAM(S): at 05:31

## 2020-01-01 RX ADMIN — OXYCODONE HYDROCHLORIDE 10 MILLIGRAM(S): 5 TABLET ORAL at 15:00

## 2020-01-01 RX ADMIN — PIPERACILLIN AND TAZOBACTAM 25 GRAM(S): 4; .5 INJECTION, POWDER, LYOPHILIZED, FOR SOLUTION INTRAVENOUS at 21:01

## 2020-01-01 RX ADMIN — Medication 650 MILLIGRAM(S): at 15:40

## 2020-01-01 RX ADMIN — Medication 650 MILLIGRAM(S): at 14:15

## 2020-01-01 RX ADMIN — Medication 10 MILLIGRAM(S): at 11:38

## 2020-01-01 RX ADMIN — PANTOPRAZOLE SODIUM 40 MILLIGRAM(S): 20 TABLET, DELAYED RELEASE ORAL at 06:25

## 2020-01-01 RX ADMIN — REMDESIVIR 500 MILLIGRAM(S): 5 INJECTION INTRAVENOUS at 18:06

## 2020-01-01 RX ADMIN — Medication 100 MILLIGRAM(S): at 21:22

## 2020-01-01 RX ADMIN — ENOXAPARIN SODIUM 40 MILLIGRAM(S): 100 INJECTION SUBCUTANEOUS at 13:04

## 2020-01-01 RX ADMIN — URSODIOL 250 MILLIGRAM(S): 250 TABLET, FILM COATED ORAL at 17:22

## 2020-01-01 RX ADMIN — Medication 650 MILLIGRAM(S): at 22:31

## 2020-01-01 RX ADMIN — Medication 200 MILLIGRAM(S): at 05:24

## 2020-01-01 RX ADMIN — Medication 200 MILLIGRAM(S): at 17:29

## 2020-01-01 RX ADMIN — Medication 650 MILLIGRAM(S): at 15:07

## 2020-01-01 RX ADMIN — OXYCODONE HYDROCHLORIDE 10 MILLIGRAM(S): 5 TABLET ORAL at 05:30

## 2020-01-01 RX ADMIN — Medication 81 MILLIGRAM(S): at 11:31

## 2020-01-01 RX ADMIN — URSODIOL 250 MILLIGRAM(S): 250 TABLET, FILM COATED ORAL at 05:24

## 2020-01-01 RX ADMIN — Medication 200 MILLIGRAM(S): at 05:08

## 2020-01-01 RX ADMIN — Medication 200 MILLIGRAM(S): at 05:40

## 2020-01-01 RX ADMIN — Medication 200 MILLIGRAM(S): at 17:36

## 2020-01-01 RX ADMIN — SODIUM CHLORIDE 500 MILLILITER(S): 9 INJECTION INTRAMUSCULAR; INTRAVENOUS; SUBCUTANEOUS at 14:00

## 2020-01-01 RX ADMIN — Medication 400 MILLIGRAM(S): at 13:16

## 2020-01-01 RX ADMIN — Medication 100 MILLIGRAM(S): at 22:35

## 2020-01-01 RX ADMIN — Medication 650 MILLIGRAM(S): at 13:45

## 2020-01-01 RX ADMIN — Medication 2000 UNIT(S): at 12:21

## 2020-01-01 RX ADMIN — URSODIOL 250 MILLIGRAM(S): 250 TABLET, FILM COATED ORAL at 05:08

## 2020-01-01 RX ADMIN — URSODIOL 250 MILLIGRAM(S): 250 TABLET, FILM COATED ORAL at 17:10

## 2020-01-01 RX ADMIN — PIPERACILLIN AND TAZOBACTAM 200 GRAM(S): 4; .5 INJECTION, POWDER, LYOPHILIZED, FOR SOLUTION INTRAVENOUS at 08:28

## 2020-01-01 RX ADMIN — Medication 200 MILLIGRAM(S): at 05:30

## 2020-01-01 RX ADMIN — Medication 2000 UNIT(S): at 13:00

## 2020-01-01 RX ADMIN — URSODIOL 250 MILLIGRAM(S): 250 TABLET, FILM COATED ORAL at 20:09

## 2020-01-01 RX ADMIN — Medication 100 MILLIGRAM(S): at 21:45

## 2020-01-01 RX ADMIN — NYSTATIN CREAM 1 APPLICATION(S): 100000 CREAM TOPICAL at 05:53

## 2020-01-01 RX ADMIN — Medication 81 MILLIGRAM(S): at 11:58

## 2020-01-01 RX ADMIN — Medication 200 MILLIGRAM(S): at 17:33

## 2020-01-01 RX ADMIN — PANTOPRAZOLE SODIUM 40 MILLIGRAM(S): 20 TABLET, DELAYED RELEASE ORAL at 05:11

## 2020-01-01 RX ADMIN — Medication 4 MILLIGRAM(S): at 08:30

## 2020-01-01 RX ADMIN — OXYCODONE HYDROCHLORIDE 10 MILLIGRAM(S): 5 TABLET ORAL at 11:51

## 2020-01-01 RX ADMIN — LIDOCAINE 1 PATCH: 4 CREAM TOPICAL at 15:26

## 2020-01-01 RX ADMIN — URSODIOL 250 MILLIGRAM(S): 250 TABLET, FILM COATED ORAL at 06:50

## 2020-01-01 RX ADMIN — PANTOPRAZOLE SODIUM 40 MILLIGRAM(S): 20 TABLET, DELAYED RELEASE ORAL at 12:06

## 2020-01-01 RX ADMIN — OXYCODONE HYDROCHLORIDE 10 MILLIGRAM(S): 5 TABLET ORAL at 11:33

## 2020-01-01 RX ADMIN — PIPERACILLIN AND TAZOBACTAM 25 GRAM(S): 4; .5 INJECTION, POWDER, LYOPHILIZED, FOR SOLUTION INTRAVENOUS at 15:55

## 2020-01-01 RX ADMIN — Medication 81 MILLIGRAM(S): at 13:03

## 2020-01-01 RX ADMIN — Medication 20 MILLIEQUIVALENT(S): at 20:10

## 2020-01-01 RX ADMIN — Medication 2000 UNIT(S): at 11:53

## 2020-01-01 RX ADMIN — PANTOPRAZOLE SODIUM 40 MILLIGRAM(S): 20 TABLET, DELAYED RELEASE ORAL at 05:23

## 2020-01-01 RX ADMIN — Medication 100 MILLIGRAM(S): at 05:08

## 2020-01-01 RX ADMIN — OXYCODONE HYDROCHLORIDE 10 MILLIGRAM(S): 5 TABLET ORAL at 05:39

## 2020-01-01 RX ADMIN — Medication 200 MILLIGRAM(S): at 06:50

## 2020-01-01 RX ADMIN — Medication 20 MILLIEQUIVALENT(S): at 18:06

## 2020-01-01 RX ADMIN — Medication 81 MILLIGRAM(S): at 11:53

## 2020-01-01 RX ADMIN — URSODIOL 250 MILLIGRAM(S): 250 TABLET, FILM COATED ORAL at 05:31

## 2020-01-01 RX ADMIN — Medication 10 MILLIGRAM(S): at 12:52

## 2020-01-01 RX ADMIN — Medication 650 MILLIGRAM(S): at 07:15

## 2020-01-01 RX ADMIN — Medication 200 MILLIGRAM(S): at 17:35

## 2020-01-01 RX ADMIN — ENOXAPARIN SODIUM 40 MILLIGRAM(S): 100 INJECTION SUBCUTANEOUS at 12:36

## 2020-01-01 RX ADMIN — NYSTATIN CREAM 1 APPLICATION(S): 100000 CREAM TOPICAL at 18:12

## 2020-01-01 RX ADMIN — Medication 100 MILLIGRAM(S): at 05:31

## 2020-01-01 RX ADMIN — ENOXAPARIN SODIUM 40 MILLIGRAM(S): 100 INJECTION SUBCUTANEOUS at 11:37

## 2020-01-01 RX ADMIN — Medication 100 MILLIGRAM(S): at 22:04

## 2020-01-01 RX ADMIN — NYSTATIN CREAM 1 APPLICATION(S): 100000 CREAM TOPICAL at 18:18

## 2020-01-01 RX ADMIN — Medication 100 MILLIGRAM(S): at 06:50

## 2020-01-01 RX ADMIN — Medication 81 MILLIGRAM(S): at 11:37

## 2020-01-01 RX ADMIN — Medication 6 MILLIGRAM(S): at 06:06

## 2020-01-01 RX ADMIN — Medication 650 MILLIGRAM(S): at 09:09

## 2020-01-01 RX ADMIN — Medication 200 MILLIGRAM(S): at 06:02

## 2020-01-01 RX ADMIN — Medication 300 MILLIGRAM(S): at 00:38

## 2020-01-01 RX ADMIN — Medication 81 MILLIGRAM(S): at 11:32

## 2020-01-01 RX ADMIN — NYSTATIN CREAM 1 APPLICATION(S): 100000 CREAM TOPICAL at 05:24

## 2020-01-01 RX ADMIN — SODIUM CHLORIDE 50 MILLILITER(S): 9 INJECTION INTRAMUSCULAR; INTRAVENOUS; SUBCUTANEOUS at 19:30

## 2020-01-01 RX ADMIN — Medication 10 MILLIGRAM(S): at 11:18

## 2020-01-01 RX ADMIN — URSODIOL 250 MILLIGRAM(S): 250 TABLET, FILM COATED ORAL at 18:19

## 2020-01-01 RX ADMIN — Medication 100 MILLIGRAM(S): at 22:00

## 2020-01-01 RX ADMIN — OXYCODONE HYDROCHLORIDE 10 MILLIGRAM(S): 5 TABLET ORAL at 09:45

## 2020-01-01 RX ADMIN — Medication 100 MILLIGRAM(S): at 14:28

## 2020-01-01 RX ADMIN — Medication 650 MILLIGRAM(S): at 00:15

## 2020-01-01 RX ADMIN — URSODIOL 250 MILLIGRAM(S): 250 TABLET, FILM COATED ORAL at 18:27

## 2020-01-01 RX ADMIN — Medication 100 MILLIGRAM(S): at 05:59

## 2020-01-01 RX ADMIN — Medication 650 MILLIGRAM(S): at 22:04

## 2020-01-01 RX ADMIN — OXYCODONE HYDROCHLORIDE 10 MILLIGRAM(S): 5 TABLET ORAL at 12:45

## 2020-01-01 RX ADMIN — Medication 1 TABLET(S): at 17:10

## 2020-01-01 RX ADMIN — NYSTATIN CREAM 1 APPLICATION(S): 100000 CREAM TOPICAL at 08:31

## 2020-01-01 RX ADMIN — SODIUM CHLORIDE 500 MILLILITER(S): 9 INJECTION INTRAMUSCULAR; INTRAVENOUS; SUBCUTANEOUS at 12:00

## 2020-01-01 RX ADMIN — NYSTATIN CREAM 1 APPLICATION(S): 100000 CREAM TOPICAL at 17:01

## 2020-01-01 RX ADMIN — Medication 650 MILLIGRAM(S): at 18:29

## 2020-01-01 RX ADMIN — ENOXAPARIN SODIUM 40 MILLIGRAM(S): 100 INJECTION SUBCUTANEOUS at 12:21

## 2020-01-01 RX ADMIN — Medication 650 MILLIGRAM(S): at 06:13

## 2020-01-01 RX ADMIN — Medication 10 MILLIGRAM(S): at 11:49

## 2020-01-01 RX ADMIN — Medication 400 MILLIGRAM(S): at 14:12

## 2020-01-01 RX ADMIN — URSODIOL 250 MILLIGRAM(S): 250 TABLET, FILM COATED ORAL at 05:47

## 2020-01-01 RX ADMIN — Medication 100 MILLIGRAM(S): at 13:11

## 2020-01-01 RX ADMIN — Medication 1 TABLET(S): at 13:17

## 2020-01-01 RX ADMIN — Medication 100 MILLIGRAM(S): at 13:04

## 2020-01-01 RX ADMIN — PANTOPRAZOLE SODIUM 40 MILLIGRAM(S): 20 TABLET, DELAYED RELEASE ORAL at 05:39

## 2020-01-01 RX ADMIN — OXYCODONE HYDROCHLORIDE 10 MILLIGRAM(S): 5 TABLET ORAL at 18:08

## 2020-01-01 RX ADMIN — PANTOPRAZOLE SODIUM 40 MILLIGRAM(S): 20 TABLET, DELAYED RELEASE ORAL at 05:47

## 2020-01-01 RX ADMIN — OXYCODONE HYDROCHLORIDE 10 MILLIGRAM(S): 5 TABLET ORAL at 12:20

## 2020-01-01 RX ADMIN — Medication 10 MILLIGRAM(S): at 12:32

## 2020-01-01 RX ADMIN — Medication 650 MILLIGRAM(S): at 12:31

## 2020-01-01 RX ADMIN — Medication 2 MILLIGRAM(S): at 06:17

## 2020-01-01 RX ADMIN — OXYCODONE HYDROCHLORIDE 10 MILLIGRAM(S): 5 TABLET ORAL at 20:25

## 2020-01-01 RX ADMIN — SODIUM CHLORIDE 125 MILLILITER(S): 9 INJECTION, SOLUTION INTRAVENOUS at 01:54

## 2020-01-01 RX ADMIN — Medication 6 MILLIGRAM(S): at 06:25

## 2020-01-01 RX ADMIN — Medication 10 MILLIGRAM(S): at 11:31

## 2020-01-01 RX ADMIN — PANTOPRAZOLE SODIUM 40 MILLIGRAM(S): 20 TABLET, DELAYED RELEASE ORAL at 06:06

## 2020-01-01 RX ADMIN — Medication 100 MILLIGRAM(S): at 05:27

## 2020-01-01 RX ADMIN — Medication 2 MILLIGRAM(S): at 05:10

## 2020-01-01 RX ADMIN — OXYCODONE HYDROCHLORIDE 10 MILLIGRAM(S): 5 TABLET ORAL at 12:00

## 2020-01-01 RX ADMIN — Medication 200 MILLIGRAM(S): at 17:22

## 2020-01-01 RX ADMIN — Medication 100 MILLIGRAM(S): at 06:14

## 2020-01-01 RX ADMIN — Medication 81 MILLIGRAM(S): at 11:49

## 2020-01-01 RX ADMIN — Medication 2000 UNIT(S): at 14:12

## 2020-01-01 RX ADMIN — Medication 650 MILLIGRAM(S): at 16:00

## 2020-01-01 RX ADMIN — Medication 1 TABLET(S): at 11:51

## 2020-01-01 RX ADMIN — ENOXAPARIN SODIUM 40 MILLIGRAM(S): 100 INJECTION SUBCUTANEOUS at 11:50

## 2020-01-01 RX ADMIN — Medication 10 MILLIGRAM(S): at 11:40

## 2020-01-01 RX ADMIN — Medication 650 MILLIGRAM(S): at 22:45

## 2020-01-01 RX ADMIN — Medication 100 MILLIGRAM(S): at 05:53

## 2020-01-01 RX ADMIN — PANTOPRAZOLE SODIUM 40 MILLIGRAM(S): 20 TABLET, DELAYED RELEASE ORAL at 05:57

## 2020-01-01 RX ADMIN — Medication 2000 UNIT(S): at 13:01

## 2020-01-01 RX ADMIN — NYSTATIN CREAM 1 APPLICATION(S): 100000 CREAM TOPICAL at 17:33

## 2020-01-01 RX ADMIN — Medication 81 MILLIGRAM(S): at 12:21

## 2020-01-01 RX ADMIN — Medication 400 MILLIGRAM(S): at 12:21

## 2020-01-01 RX ADMIN — Medication 650 MILLIGRAM(S): at 21:07

## 2020-01-01 RX ADMIN — OXYCODONE HYDROCHLORIDE 10 MILLIGRAM(S): 5 TABLET ORAL at 06:05

## 2020-01-01 RX ADMIN — Medication 650 MILLIGRAM(S): at 13:57

## 2020-01-01 RX ADMIN — ENOXAPARIN SODIUM 40 MILLIGRAM(S): 100 INJECTION SUBCUTANEOUS at 12:05

## 2020-01-01 RX ADMIN — NYSTATIN CREAM 1 APPLICATION(S): 100000 CREAM TOPICAL at 17:15

## 2020-01-01 RX ADMIN — Medication 81 MILLIGRAM(S): at 11:17

## 2020-01-01 RX ADMIN — Medication 200 MILLIGRAM(S): at 06:33

## 2020-01-01 RX ADMIN — Medication 4 MILLIGRAM(S): at 05:08

## 2020-01-01 RX ADMIN — Medication 100 MILLIGRAM(S): at 13:34

## 2020-01-01 RX ADMIN — Medication 40 MILLIEQUIVALENT(S): at 18:09

## 2020-01-01 RX ADMIN — PANTOPRAZOLE SODIUM 40 MILLIGRAM(S): 20 TABLET, DELAYED RELEASE ORAL at 13:00

## 2020-01-01 RX ADMIN — ENOXAPARIN SODIUM 40 MILLIGRAM(S): 100 INJECTION SUBCUTANEOUS at 13:00

## 2020-01-01 RX ADMIN — Medication 100 MILLIGRAM(S): at 21:24

## 2020-01-01 RX ADMIN — Medication 2000 UNIT(S): at 11:17

## 2020-01-01 RX ADMIN — Medication 100 MILLIGRAM(S): at 14:13

## 2020-01-01 RX ADMIN — Medication 100 MILLIGRAM(S): at 14:27

## 2020-01-01 RX ADMIN — Medication 20 MILLIEQUIVALENT(S): at 17:26

## 2020-01-01 RX ADMIN — NYSTATIN CREAM 1 APPLICATION(S): 100000 CREAM TOPICAL at 18:59

## 2020-01-01 RX ADMIN — Medication 10 MILLIGRAM(S): at 12:21

## 2020-01-01 RX ADMIN — URSODIOL 250 MILLIGRAM(S): 250 TABLET, FILM COATED ORAL at 05:27

## 2020-01-01 RX ADMIN — Medication 100 MILLIGRAM(S): at 22:05

## 2020-01-01 RX ADMIN — Medication 400 MILLIGRAM(S): at 11:17

## 2020-01-01 RX ADMIN — Medication 650 MILLIGRAM(S): at 08:44

## 2020-01-01 RX ADMIN — OXYCODONE HYDROCHLORIDE 10 MILLIGRAM(S): 5 TABLET ORAL at 21:30

## 2020-01-01 RX ADMIN — Medication 81 MILLIGRAM(S): at 11:47

## 2020-01-01 RX ADMIN — Medication 200 MILLIGRAM(S): at 18:29

## 2020-01-01 RX ADMIN — OXYCODONE HYDROCHLORIDE 10 MILLIGRAM(S): 5 TABLET ORAL at 12:30

## 2020-01-01 RX ADMIN — Medication 250 MILLIGRAM(S): at 12:20

## 2020-01-01 RX ADMIN — Medication 650 MILLIGRAM(S): at 06:24

## 2020-01-01 RX ADMIN — Medication 100 MILLIGRAM(S): at 06:02

## 2020-01-01 RX ADMIN — Medication 650 MILLIGRAM(S): at 21:08

## 2020-01-01 RX ADMIN — NYSTATIN CREAM 1 APPLICATION(S): 100000 CREAM TOPICAL at 05:27

## 2020-01-01 RX ADMIN — ENOXAPARIN SODIUM 40 MILLIGRAM(S): 100 INJECTION SUBCUTANEOUS at 11:58

## 2020-01-01 RX ADMIN — Medication 650 MILLIGRAM(S): at 06:45

## 2020-01-01 RX ADMIN — Medication 6 MILLIGRAM(S): at 23:04

## 2020-01-01 RX ADMIN — Medication 40 MILLIEQUIVALENT(S): at 13:02

## 2020-01-01 RX ADMIN — Medication 10 MILLIGRAM(S): at 11:51

## 2020-01-01 RX ADMIN — PANTOPRAZOLE SODIUM 40 MILLIGRAM(S): 20 TABLET, DELAYED RELEASE ORAL at 06:18

## 2020-01-01 RX ADMIN — Medication 650 MILLIGRAM(S): at 04:45

## 2020-01-01 RX ADMIN — Medication 100 MILLIGRAM(S): at 05:24

## 2020-01-01 RX ADMIN — URSODIOL 250 MILLIGRAM(S): 250 TABLET, FILM COATED ORAL at 06:06

## 2020-01-01 RX ADMIN — URSODIOL 250 MILLIGRAM(S): 250 TABLET, FILM COATED ORAL at 06:02

## 2020-01-01 RX ADMIN — URSODIOL 250 MILLIGRAM(S): 250 TABLET, FILM COATED ORAL at 18:06

## 2020-01-01 RX ADMIN — AZITHROMYCIN 500 MILLIGRAM(S): 500 TABLET, FILM COATED ORAL at 16:00

## 2020-01-01 RX ADMIN — Medication 6 MILLIGRAM(S): at 05:57

## 2020-01-01 RX ADMIN — Medication 650 MILLIGRAM(S): at 01:12

## 2020-01-01 RX ADMIN — ENOXAPARIN SODIUM 40 MILLIGRAM(S): 100 INJECTION SUBCUTANEOUS at 11:33

## 2020-01-01 RX ADMIN — Medication 650 MILLIGRAM(S): at 08:40

## 2020-01-01 RX ADMIN — NYSTATIN CREAM 1 APPLICATION(S): 100000 CREAM TOPICAL at 05:30

## 2020-01-01 RX ADMIN — Medication 200 MILLIGRAM(S): at 03:16

## 2020-01-01 RX ADMIN — URSODIOL 250 MILLIGRAM(S): 250 TABLET, FILM COATED ORAL at 05:53

## 2020-01-01 RX ADMIN — Medication 81 MILLIGRAM(S): at 12:52

## 2020-01-01 RX ADMIN — URSODIOL 250 MILLIGRAM(S): 250 TABLET, FILM COATED ORAL at 19:00

## 2020-01-01 RX ADMIN — NYSTATIN CREAM 1 APPLICATION(S): 100000 CREAM TOPICAL at 17:00

## 2020-01-01 RX ADMIN — Medication 40 MILLIEQUIVALENT(S): at 18:59

## 2020-01-01 RX ADMIN — Medication 250 MILLIGRAM(S): at 11:30

## 2020-01-01 RX ADMIN — Medication 6 MILLIGRAM(S): at 06:02

## 2020-01-01 RX ADMIN — URSODIOL 250 MILLIGRAM(S): 250 TABLET, FILM COATED ORAL at 05:11

## 2020-01-01 RX ADMIN — Medication 200 MILLIGRAM(S): at 18:09

## 2020-01-01 RX ADMIN — URSODIOL 250 MILLIGRAM(S): 250 TABLET, FILM COATED ORAL at 06:18

## 2020-01-01 RX ADMIN — URSODIOL 250 MILLIGRAM(S): 250 TABLET, FILM COATED ORAL at 17:19

## 2020-01-01 RX ADMIN — Medication 400 MILLIGRAM(S): at 11:58

## 2020-01-01 RX ADMIN — Medication 200 MILLIGRAM(S): at 05:47

## 2020-01-01 RX ADMIN — PANTOPRAZOLE SODIUM 40 MILLIGRAM(S): 20 TABLET, DELAYED RELEASE ORAL at 13:02

## 2020-01-01 RX ADMIN — Medication 200 MILLIGRAM(S): at 21:07

## 2020-01-01 RX ADMIN — Medication 400 MILLIGRAM(S): at 11:37

## 2020-01-01 RX ADMIN — Medication 10 MILLIGRAM(S): at 11:21

## 2020-01-01 RX ADMIN — Medication 200 MILLIGRAM(S): at 05:57

## 2020-01-01 RX ADMIN — AZITHROMYCIN 250 MILLIGRAM(S): 500 TABLET, FILM COATED ORAL at 14:43

## 2020-01-01 RX ADMIN — Medication 81 MILLIGRAM(S): at 11:50

## 2020-01-01 RX ADMIN — URSODIOL 250 MILLIGRAM(S): 250 TABLET, FILM COATED ORAL at 17:25

## 2020-01-01 RX ADMIN — Medication 10 MILLIGRAM(S): at 11:54

## 2020-01-01 RX ADMIN — Medication 4 MILLIGRAM(S): at 18:22

## 2020-01-01 RX ADMIN — Medication 200 MILLIGRAM(S): at 05:53

## 2020-01-01 RX ADMIN — URSODIOL 250 MILLIGRAM(S): 250 TABLET, FILM COATED ORAL at 08:32

## 2020-01-01 RX ADMIN — Medication 100 MILLIGRAM(S): at 06:25

## 2020-01-01 RX ADMIN — Medication 100 MILLIGRAM(S): at 13:02

## 2020-01-01 RX ADMIN — Medication 2000 UNIT(S): at 11:33

## 2020-01-01 RX ADMIN — PANTOPRAZOLE SODIUM 40 MILLIGRAM(S): 20 TABLET, DELAYED RELEASE ORAL at 06:50

## 2020-01-01 RX ADMIN — Medication 6 MILLIGRAM(S): at 06:33

## 2020-01-01 RX ADMIN — Medication 200 MILLIGRAM(S): at 17:15

## 2020-01-01 RX ADMIN — Medication 81 MILLIGRAM(S): at 12:31

## 2020-01-01 RX ADMIN — SODIUM CHLORIDE 50 MILLILITER(S): 9 INJECTION INTRAMUSCULAR; INTRAVENOUS; SUBCUTANEOUS at 12:38

## 2020-01-01 RX ADMIN — OXYCODONE HYDROCHLORIDE 10 MILLIGRAM(S): 5 TABLET ORAL at 05:47

## 2020-01-01 RX ADMIN — Medication 650 MILLIGRAM(S): at 06:07

## 2020-01-01 RX ADMIN — Medication 200 MILLIGRAM(S): at 17:49

## 2020-01-01 RX ADMIN — Medication 750 MILLIGRAM(S): at 01:10

## 2020-01-01 RX ADMIN — Medication 2000 UNIT(S): at 12:32

## 2020-01-01 RX ADMIN — Medication 100 MILLIGRAM(S): at 08:29

## 2020-01-01 RX ADMIN — Medication 6 MILLIGRAM(S): at 05:11

## 2020-01-01 RX ADMIN — Medication 6 MILLIGRAM(S): at 05:47

## 2020-01-01 RX ADMIN — Medication 2000 UNIT(S): at 12:11

## 2020-01-01 RX ADMIN — Medication 100 MILLIGRAM(S): at 21:48

## 2020-01-01 RX ADMIN — Medication 400 MILLIGRAM(S): at 12:10

## 2020-01-01 RX ADMIN — Medication 100 MILLIGRAM(S): at 13:16

## 2020-01-01 RX ADMIN — URSODIOL 250 MILLIGRAM(S): 250 TABLET, FILM COATED ORAL at 17:01

## 2020-01-01 RX ADMIN — Medication 200 MILLIGRAM(S): at 06:25

## 2020-01-01 RX ADMIN — Medication 650 MILLIGRAM(S): at 21:50

## 2020-01-01 RX ADMIN — SODIUM CHLORIDE 50 MILLILITER(S): 9 INJECTION INTRAMUSCULAR; INTRAVENOUS; SUBCUTANEOUS at 06:49

## 2020-01-01 RX ADMIN — SODIUM CHLORIDE 50 MILLILITER(S): 9 INJECTION INTRAMUSCULAR; INTRAVENOUS; SUBCUTANEOUS at 21:31

## 2020-01-01 RX ADMIN — Medication 650 MILLIGRAM(S): at 07:45

## 2020-01-01 RX ADMIN — ENOXAPARIN SODIUM 40 MILLIGRAM(S): 100 INJECTION SUBCUTANEOUS at 11:21

## 2020-01-01 RX ADMIN — URSODIOL 250 MILLIGRAM(S): 250 TABLET, FILM COATED ORAL at 18:10

## 2020-01-01 RX ADMIN — Medication 650 MILLIGRAM(S): at 06:50

## 2020-01-01 RX ADMIN — Medication 200 MILLIGRAM(S): at 08:30

## 2020-01-01 RX ADMIN — Medication 650 MILLIGRAM(S): at 06:59

## 2020-01-01 RX ADMIN — Medication 250 MILLIGRAM(S): at 11:40

## 2020-01-01 RX ADMIN — URSODIOL 250 MILLIGRAM(S): 250 TABLET, FILM COATED ORAL at 05:41

## 2020-01-01 RX ADMIN — Medication 650 MILLIGRAM(S): at 10:11

## 2020-01-01 RX ADMIN — Medication 650 MILLIGRAM(S): at 18:59

## 2020-01-01 RX ADMIN — NYSTATIN CREAM 1 APPLICATION(S): 100000 CREAM TOPICAL at 05:10

## 2020-01-01 RX ADMIN — URSODIOL 250 MILLIGRAM(S): 250 TABLET, FILM COATED ORAL at 06:14

## 2020-01-01 RX ADMIN — OXYCODONE HYDROCHLORIDE 10 MILLIGRAM(S): 5 TABLET ORAL at 06:41

## 2020-01-01 RX ADMIN — Medication 650 MILLIGRAM(S): at 06:14

## 2020-01-01 RX ADMIN — Medication 10 MILLIGRAM(S): at 13:01

## 2020-01-01 RX ADMIN — Medication 10 MILLIGRAM(S): at 13:05

## 2020-01-01 RX ADMIN — Medication 650 MILLIGRAM(S): at 16:29

## 2020-01-01 RX ADMIN — Medication 100 MILLIGRAM(S): at 21:06

## 2020-01-01 RX ADMIN — Medication 100 MILLIGRAM(S): at 14:15

## 2020-01-01 RX ADMIN — Medication 650 MILLIGRAM(S): at 11:49

## 2020-01-01 RX ADMIN — OXYCODONE HYDROCHLORIDE 10 MILLIGRAM(S): 5 TABLET ORAL at 18:29

## 2020-01-01 RX ADMIN — OXYCODONE HYDROCHLORIDE 10 MILLIGRAM(S): 5 TABLET ORAL at 19:18

## 2020-01-01 RX ADMIN — Medication 200 MILLIGRAM(S): at 17:25

## 2020-01-01 RX ADMIN — Medication 400 MILLIGRAM(S): at 11:48

## 2020-01-01 RX ADMIN — OXYCODONE HYDROCHLORIDE 10 MILLIGRAM(S): 5 TABLET ORAL at 11:21

## 2020-01-01 RX ADMIN — Medication 2000 UNIT(S): at 13:04

## 2020-01-01 RX ADMIN — Medication 100 MILLIGRAM(S): at 21:08

## 2020-01-01 RX ADMIN — Medication 40 MILLIEQUIVALENT(S): at 21:51

## 2020-01-01 RX ADMIN — ENOXAPARIN SODIUM 40 MILLIGRAM(S): 100 INJECTION SUBCUTANEOUS at 11:49

## 2020-01-01 RX ADMIN — OXYCODONE HYDROCHLORIDE 10 MILLIGRAM(S): 5 TABLET ORAL at 14:21

## 2020-01-01 RX ADMIN — URSODIOL 250 MILLIGRAM(S): 250 TABLET, FILM COATED ORAL at 17:15

## 2020-01-01 RX ADMIN — Medication 200 MILLIGRAM(S): at 05:31

## 2020-01-01 RX ADMIN — OXYCODONE HYDROCHLORIDE 10 MILLIGRAM(S): 5 TABLET ORAL at 06:16

## 2020-01-01 RX ADMIN — Medication 650 MILLIGRAM(S): at 18:05

## 2020-01-01 RX ADMIN — OXYCODONE HYDROCHLORIDE 10 MILLIGRAM(S): 5 TABLET ORAL at 11:42

## 2020-01-01 RX ADMIN — Medication 650 MILLIGRAM(S): at 15:15

## 2020-01-01 RX ADMIN — Medication 6 MILLIGRAM(S): at 06:50

## 2020-01-01 RX ADMIN — URSODIOL 250 MILLIGRAM(S): 250 TABLET, FILM COATED ORAL at 18:17

## 2020-01-01 RX ADMIN — Medication 400 MILLIGRAM(S): at 13:01

## 2020-01-01 RX ADMIN — CEFTRIAXONE 100 MILLIGRAM(S): 500 INJECTION, POWDER, FOR SOLUTION INTRAMUSCULAR; INTRAVENOUS at 14:16

## 2020-01-01 RX ADMIN — Medication 100 MILLIGRAM(S): at 21:33

## 2020-01-01 RX ADMIN — URSODIOL 250 MILLIGRAM(S): 250 TABLET, FILM COATED ORAL at 06:25

## 2020-01-01 RX ADMIN — Medication 100 GRAM(S): at 09:54

## 2020-01-01 RX ADMIN — Medication 400 MILLIGRAM(S): at 11:50

## 2020-01-01 RX ADMIN — PANTOPRAZOLE SODIUM 40 MILLIGRAM(S): 20 TABLET, DELAYED RELEASE ORAL at 06:02

## 2020-01-01 RX ADMIN — Medication 100 MILLIGRAM(S): at 05:47

## 2020-01-01 RX ADMIN — Medication 2000 UNIT(S): at 11:40

## 2020-01-01 RX ADMIN — Medication 100 MILLIGRAM(S): at 21:51

## 2020-01-01 RX ADMIN — REMDESIVIR 500 MILLIGRAM(S): 5 INJECTION INTRAVENOUS at 17:37

## 2020-01-01 RX ADMIN — SODIUM CHLORIDE 50 MILLILITER(S): 9 INJECTION INTRAMUSCULAR; INTRAVENOUS; SUBCUTANEOUS at 13:17

## 2020-01-01 RX ADMIN — ENOXAPARIN SODIUM 40 MILLIGRAM(S): 100 INJECTION SUBCUTANEOUS at 11:48

## 2020-01-01 RX ADMIN — OXYCODONE HYDROCHLORIDE 10 MILLIGRAM(S): 5 TABLET ORAL at 06:08

## 2020-01-01 RX ADMIN — Medication 650 MILLIGRAM(S): at 23:42

## 2020-01-01 RX ADMIN — Medication 400 MILLIGRAM(S): at 11:40

## 2020-01-01 RX ADMIN — Medication 6 MILLIGRAM(S): at 06:14

## 2020-01-01 RX ADMIN — PANTOPRAZOLE SODIUM 40 MILLIGRAM(S): 20 TABLET, DELAYED RELEASE ORAL at 13:17

## 2020-01-01 RX ADMIN — Medication 100 MILLIGRAM(S): at 06:15

## 2020-01-01 RX ADMIN — ENOXAPARIN SODIUM 40 MILLIGRAM(S): 100 INJECTION SUBCUTANEOUS at 13:16

## 2020-01-01 RX ADMIN — Medication 2000 UNIT(S): at 12:52

## 2020-01-01 RX ADMIN — Medication 1 TABLET(S): at 13:01

## 2020-01-01 RX ADMIN — Medication 2000 UNIT(S): at 11:58

## 2020-01-01 RX ADMIN — Medication 100 MILLIGRAM(S): at 05:58

## 2020-01-01 RX ADMIN — Medication 100 MILLIGRAM(S): at 21:46

## 2020-01-01 RX ADMIN — Medication 2000 UNIT(S): at 11:21

## 2020-01-01 RX ADMIN — Medication 400 MILLIGRAM(S): at 11:49

## 2020-01-01 RX ADMIN — Medication 200 MILLIGRAM(S): at 06:14

## 2020-01-01 RX ADMIN — Medication 100 MILLIGRAM(S): at 05:57

## 2020-01-01 RX ADMIN — ENOXAPARIN SODIUM 40 MILLIGRAM(S): 100 INJECTION SUBCUTANEOUS at 13:01

## 2020-01-01 RX ADMIN — Medication 200 MILLIGRAM(S): at 17:19

## 2020-01-01 RX ADMIN — NYSTATIN CREAM 1 APPLICATION(S): 100000 CREAM TOPICAL at 17:35

## 2020-01-01 RX ADMIN — CEFEPIME 100 MILLIGRAM(S): 1 INJECTION, POWDER, FOR SOLUTION INTRAMUSCULAR; INTRAVENOUS at 14:16

## 2020-01-01 RX ADMIN — ENOXAPARIN SODIUM 40 MILLIGRAM(S): 100 INJECTION SUBCUTANEOUS at 14:12

## 2020-01-01 RX ADMIN — Medication 1 TABLET(S): at 11:58

## 2020-01-01 RX ADMIN — PANTOPRAZOLE SODIUM 40 MILLIGRAM(S): 20 TABLET, DELAYED RELEASE ORAL at 06:04

## 2020-01-01 RX ADMIN — Medication 100 MILLIGRAM(S): at 14:16

## 2020-01-01 RX ADMIN — Medication 81 MILLIGRAM(S): at 11:40

## 2020-01-01 RX ADMIN — PANTOPRAZOLE SODIUM 40 MILLIGRAM(S): 20 TABLET, DELAYED RELEASE ORAL at 06:14

## 2020-01-01 RX ADMIN — REMDESIVIR 500 MILLIGRAM(S): 5 INJECTION INTRAVENOUS at 18:38

## 2020-01-01 RX ADMIN — URSODIOL 250 MILLIGRAM(S): 250 TABLET, FILM COATED ORAL at 06:17

## 2020-01-01 RX ADMIN — Medication 2000 UNIT(S): at 11:48

## 2020-01-01 RX ADMIN — Medication 650 MILLIGRAM(S): at 05:39

## 2020-01-01 RX ADMIN — Medication 100 MILLIGRAM(S): at 13:03

## 2020-01-01 RX ADMIN — Medication 2000 UNIT(S): at 13:16

## 2020-01-01 RX ADMIN — OXYCODONE HYDROCHLORIDE 10 MILLIGRAM(S): 5 TABLET ORAL at 18:22

## 2020-01-01 RX ADMIN — Medication 1 TABLET(S): at 11:33

## 2020-01-01 RX ADMIN — Medication 20 MILLIEQUIVALENT(S): at 21:55

## 2020-01-01 RX ADMIN — Medication 650 MILLIGRAM(S): at 11:54

## 2020-01-01 RX ADMIN — Medication 400 MILLIGRAM(S): at 11:33

## 2020-01-01 RX ADMIN — Medication 200 MILLIGRAM(S): at 06:18

## 2020-01-01 RX ADMIN — Medication 2000 UNIT(S): at 11:50

## 2020-01-01 RX ADMIN — Medication 200 MILLIGRAM(S): at 17:26

## 2020-01-01 RX ADMIN — Medication 400 MILLIGRAM(S): at 11:31

## 2020-01-01 RX ADMIN — Medication 81 MILLIGRAM(S): at 13:00

## 2020-01-01 RX ADMIN — Medication 81 MILLIGRAM(S): at 13:01

## 2020-01-01 RX ADMIN — OXYCODONE HYDROCHLORIDE 10 MILLIGRAM(S): 5 TABLET ORAL at 18:48

## 2020-01-01 RX ADMIN — Medication 2000 UNIT(S): at 11:31

## 2020-01-01 RX ADMIN — Medication 100 MILLIGRAM(S): at 15:10

## 2020-01-01 RX ADMIN — Medication 81 MILLIGRAM(S): at 13:16

## 2020-01-01 RX ADMIN — ENOXAPARIN SODIUM 40 MILLIGRAM(S): 100 INJECTION SUBCUTANEOUS at 12:10

## 2020-01-01 RX ADMIN — NYSTATIN CREAM 1 APPLICATION(S): 100000 CREAM TOPICAL at 05:08

## 2020-01-01 RX ADMIN — URSODIOL 250 MILLIGRAM(S): 250 TABLET, FILM COATED ORAL at 17:37

## 2020-01-01 RX ADMIN — REMDESIVIR 500 MILLIGRAM(S): 5 INJECTION INTRAVENOUS at 17:26

## 2020-01-01 RX ADMIN — Medication 200 MILLIGRAM(S): at 17:10

## 2020-01-01 RX ADMIN — CEFTRIAXONE 1000 MILLIGRAM(S): 500 INJECTION, POWDER, FOR SOLUTION INTRAMUSCULAR; INTRAVENOUS at 14:40

## 2020-01-01 RX ADMIN — Medication 100 MILLIGRAM(S): at 05:23

## 2020-01-01 RX ADMIN — Medication 10 MILLIGRAM(S): at 12:10

## 2020-01-01 RX ADMIN — URSODIOL 250 MILLIGRAM(S): 250 TABLET, FILM COATED ORAL at 17:49

## 2020-01-01 RX ADMIN — Medication 650 MILLIGRAM(S): at 21:00

## 2020-01-01 RX ADMIN — OXYCODONE HYDROCHLORIDE 10 MILLIGRAM(S): 5 TABLET ORAL at 17:30

## 2020-01-01 RX ADMIN — OXYCODONE HYDROCHLORIDE 10 MILLIGRAM(S): 5 TABLET ORAL at 08:53

## 2020-01-01 RX ADMIN — Medication 81 MILLIGRAM(S): at 11:20

## 2020-01-01 RX ADMIN — URSODIOL 250 MILLIGRAM(S): 250 TABLET, FILM COATED ORAL at 17:35

## 2020-01-01 RX ADMIN — Medication 6 MILLIGRAM(S): at 05:39

## 2020-01-01 RX ADMIN — ENOXAPARIN SODIUM 40 MILLIGRAM(S): 100 INJECTION SUBCUTANEOUS at 11:54

## 2020-01-01 RX ADMIN — Medication 200 MILLIGRAM(S): at 18:11

## 2020-01-01 RX ADMIN — Medication 400 MILLIGRAM(S): at 11:21

## 2020-01-01 RX ADMIN — Medication 81 MILLIGRAM(S): at 14:12

## 2020-01-01 RX ADMIN — URSODIOL 250 MILLIGRAM(S): 250 TABLET, FILM COATED ORAL at 06:33

## 2020-01-01 RX ADMIN — URSODIOL 250 MILLIGRAM(S): 250 TABLET, FILM COATED ORAL at 18:28

## 2020-01-01 RX ADMIN — URSODIOL 250 MILLIGRAM(S): 250 TABLET, FILM COATED ORAL at 05:39

## 2020-01-01 RX ADMIN — Medication 6 MILLIGRAM(S): at 05:23

## 2020-01-01 RX ADMIN — Medication 1 TABLET(S): at 13:00

## 2020-01-01 RX ADMIN — Medication 100 MILLIGRAM(S): at 05:26

## 2020-01-01 RX ADMIN — Medication 100 MILLIGRAM(S): at 13:19

## 2020-01-01 RX ADMIN — Medication 100 MILLIGRAM(S): at 14:37

## 2020-01-01 RX ADMIN — Medication 2000 UNIT(S): at 11:49

## 2020-01-01 RX ADMIN — Medication 100 MILLIGRAM(S): at 14:21

## 2020-01-01 RX ADMIN — Medication 6 MILLIGRAM(S): at 06:18

## 2020-01-01 RX ADMIN — Medication 10 MILLIGRAM(S): at 11:33

## 2020-01-01 RX ADMIN — REMDESIVIR 500 MILLIGRAM(S): 5 INJECTION INTRAVENOUS at 17:19

## 2020-01-01 RX ADMIN — NYSTATIN CREAM 1 APPLICATION(S): 100000 CREAM TOPICAL at 06:17

## 2020-01-01 RX ADMIN — LIDOCAINE 1 PATCH: 4 CREAM TOPICAL at 03:58

## 2020-01-01 RX ADMIN — SODIUM CHLORIDE 50 MILLILITER(S): 9 INJECTION INTRAMUSCULAR; INTRAVENOUS; SUBCUTANEOUS at 13:05

## 2020-01-01 RX ADMIN — ENOXAPARIN SODIUM 40 MILLIGRAM(S): 100 INJECTION SUBCUTANEOUS at 11:17

## 2020-01-01 RX ADMIN — Medication 10 MILLIGRAM(S): at 13:17

## 2020-01-01 RX ADMIN — Medication 2000 UNIT(S): at 11:37

## 2020-01-01 RX ADMIN — URSODIOL 250 MILLIGRAM(S): 250 TABLET, FILM COATED ORAL at 05:57

## 2020-01-01 RX ADMIN — Medication 100 MILLIGRAM(S): at 21:40

## 2020-01-01 RX ADMIN — LIDOCAINE 1 PATCH: 4 CREAM TOPICAL at 07:06

## 2020-01-01 RX ADMIN — OXYCODONE HYDROCHLORIDE 10 MILLIGRAM(S): 5 TABLET ORAL at 21:10

## 2020-01-01 RX ADMIN — Medication 250 MILLIGRAM(S): at 12:11

## 2020-01-01 RX ADMIN — Medication 400 MILLIGRAM(S): at 13:04

## 2020-01-01 RX ADMIN — Medication 100 MILLIGRAM(S): at 15:07

## 2020-01-01 RX ADMIN — OXYCODONE HYDROCHLORIDE 10 MILLIGRAM(S): 5 TABLET ORAL at 05:23

## 2020-01-01 RX ADMIN — Medication 400 MILLIGRAM(S): at 11:54

## 2020-01-01 RX ADMIN — Medication 81 MILLIGRAM(S): at 12:10

## 2020-01-01 RX ADMIN — URSODIOL 250 MILLIGRAM(S): 250 TABLET, FILM COATED ORAL at 17:33

## 2020-01-01 RX ADMIN — Medication 400 MILLIGRAM(S): at 13:00

## 2020-01-01 RX ADMIN — URSODIOL 250 MILLIGRAM(S): 250 TABLET, FILM COATED ORAL at 18:12

## 2020-10-01 NOTE — DISCHARGE NOTE ADULT - FUNCTIONAL SCREEN CURRENT LEVEL: AMBULATION, MLM
Venipuncture performed with 23 gauge butterfly, x's 1 attempt,  to R Antecubital vein.  Specimens collected per orders.      Pressure dressing applied to site, instructed patient to remove dressing in 10-15 minutes, OK to re-adjust dressing if pressure causing any discomfort, to observe closely for numbness and/or discoloration to hand or fingers, and to notify provider if bleeding persists after applying constant pressure lasting 30 minutes.                     (3) assistive equipment and person

## 2020-10-02 NOTE — ED ADULT NURSE REASSESSMENT NOTE - NS ED NURSE REASSESS COMMENT FT1
Pt had episode of diarrhea. Pt cleaned, new diaper/linens applied. Pt admitted, awaiting transport to [address] [place] [address]. Safety and comfort maintained. Call bell within reach.

## 2020-10-02 NOTE — ED PROVIDER NOTE - PHYSICAL EXAMINATION
PHYSICAL EXAM:   General: not in extremis  HEENT: NC/AT, airway patent  Cardiovascular: regular rate   Respiratory: +diffusely diminished breath sounds with LLL crackles, nonlabored respirations  Abdominal: soft, nontender, nondistended, no rebound, guarding or rigidity  Neuro: awake, alert, interactive  -Maria Luisa May PGY-3 PHYSICAL EXAM:   General: not in extremis  HEENT: NC/AT, airway patent  Cardiovascular: regular rate   Respiratory: +diffusely diminished breath sounds with LLL crackles, nonlabored respirations  Abdominal: soft, nontender, nondistended, no rebound, guarding or rigidity  Neuro: awake, alert, interactive  MSK: Sequelae of RA noted to BL UE with radial deviation of fingers and slight contractures to BL elbows  Skin: no rashes   -Maria Luisa May PGY-3

## 2020-10-02 NOTE — H&P ADULT - PROBLEM SELECTOR PLAN 4
- at home, patient on Lisinopril 10mg daily, Labetalol 200mg BID, and Amlodipine 10mg daily  - holding all antihypertensive agents at this time given presenting hypotension in setting of above infection; close monitoring of hemodynamics w/ resumption of agents as required w/ clinical improvement  - holding home Tekturn[employer] as well, not on formulary

## 2020-10-02 NOTE — H&P ADULT - PROBLEM SELECTOR PLAN 2
- COVID19 PCR detected + imaging e/o PNA  - supportive management including for fever/cough  - monitor respiratory status closely and serial inflammatory markers to monitor for severity progression  - maintain oxygenation and monitoring as above  - Lovenox 40mg SubQ for high risk of microthrombi a/w COVID-19  - will initiate Remdesivir under EUA as Spo2 < 94% on RA (pt requiring O2 supp via NC currently), eGFR = 90 (>30), ALT = 33 (WNL), and pt does not meet exclusion criteria -- given weight = 49.9kg, dose: 200mg IV x1 followed by 100mg IV Q24 daily x4days; discussed fact sheet extensively with patient's  via telephone  - will also initiate steroids: as patient on daily Prednisone and received Solucortef 100mg IV x1 in ED at 1:40pm, will initiate Dexamethasone 6mg daily at 11pm (Solucortef should wear off in 8-10hours) tonight x4days - COVID19 PCR detected + imaging e/o PNA  - supportive management including for fever/cough  - monitor respiratory status closely and serial inflammatory markers to monitor for severity progression  - maintain oxygenation and monitoring as above  - Lovenox 40mg SubQ for high risk of microthrombi a/w COVID-19  - will initiate Remdesivir under EUA as Spo2 < 94% on RA (pt requiring O2 supp via NC currently), eGFR = 90 (>30), ALT = 33 (WNL), and pt does not meet exclusion criteria -- given weight = 49.9kg, dose: 200mg IV x1 followed by 100mg IV Q24 daily x4days; discussed fact sheet extensively with patient's  via telephone  - will also initiate steroids: as patient on daily Prednisone and received Solucortef 100mg IV x1 in ED at 1:40pm, will initiate Dexamethasone 6mg daily at 11pm (Solucortef should wear off in 8-10hours) tonight x4days  - will not continue Abx as initiated by ED (and will avoid Azithromycin as pt on chronic home Hydroxychloroquine for RA as below)

## 2020-10-02 NOTE — ED ADULT NURSE REASSESSMENT NOTE - NS ED NURSE REASSESS COMMENT FT1
Patient had small BM and was turned, cleaned and repositioned.  Prima Fit in place and bed in lowest position.

## 2020-10-02 NOTE — ED ADULT NURSE NOTE - NSIMPLEMENTINTERV_GEN_ALL_ED
Implemented All Fall Risk Interventions:  [place] to call system. Call bell, personal items and telephone within reach. Instruct patient to call for assistance. Room bathroom lighting operational. Non-slip footwear when patient is off stretcher. Physically safe environment: no spills, clutter or unnecessary equipment. Stretcher in lowest position, wheels locked, appropriate side rails in place. Provide visual cue, wrist band, yellow gown, etc. Monitor gait and stability. Monitor for mental status changes and reorient to person, place, and time. Review medications for side effects contributing to fall risk. Reinforce activity limits and safety measures with patient and family.

## 2020-10-02 NOTE — ED PROVIDER NOTE - PROGRESS NOTE DETAILS
Pt dosed with hydrocortisone x1 in ED given hx of chronic steroids, immunosuppression and presenting with multifocal pna, likely due to COVID 19. Will also dose with IV abx to cover for possible CAP superinfection w immunosuppressed state.

## 2020-10-02 NOTE — H&P ADULT - NSICDXPASTMEDICALHX_GEN_ALL_CORE_FT
PAST MEDICAL HISTORY:  CVA (cerebral vascular accident)     GERD (gastroesophageal reflux disease)     Hypertension     Leukocytosis     Osteoporosis     Pneumobilia     Rheumatoid arthritis

## 2020-10-02 NOTE — H&P ADULT - NEGATIVE ENMT SYMPTOMS
no sinus symptoms/no throat pain/no nasal discharge/no hearing difficulty/no dysphagia/no nasal congestion/no dry mouth

## 2020-10-02 NOTE — ED PROVIDER NOTE - NS ED ROS FT
REVIEW OF SYSTEMS:  Cardiac: +chest discomfort  Respiratory: + cough,+ shortness of breath  Gastrointestinal: no abdominal pain, + vomiting, + diarrhea  -Maria Luisa May PGY-3

## 2020-10-02 NOTE — H&P ADULT - NSHPPHYSICALEXAM_GEN_ALL_CORE
Vital Signs Last 24 Hrs  T(F): 98.4 (02 Oct 2020 14:02), Max: 99.7 (02 Oct 2020 12:04)  HR: 73 (02 Oct 2020 15:40) (66 - 78)  BP: 128/65 (02 Oct 2020 15:40) (82/49 - 129/68)  RR: 25 (02 Oct 2020 15:40) (23 - 26)  SpO2: 98% (02 Oct 2020 15:40) (92% - 100%) Vital Signs Last 24 Hrs  T(F): 98.4 (02 Oct 2020 14:02), Max: 99.7 (02 Oct 2020 12:04)  HR: 73 (02 Oct 2020 15:40) (66 - 78)  BP: 128/65 (02 Oct 2020 15:40) (82/49 - 129/68)  RR: 25 (02 Oct 2020 15:40) (23 - 26)  SpO2: 98% (02 Oct 2020 15:40) (92% - 100%)    GEN: elderly woman, laying in stretcher in NAD  PSYCH: A&Ox3, mood and affect appear appropriate   SKIN: intact, no e/o rash  NEURO: no focal neurologic deficits appreciated  EYES: PERRL, anicteric  HEAD: NC, AT  NECK: supple  RESPI: no accessory muscle use, diminished breath sounds, LLL crackles   CARDIO: regular rate/rhythm, no LE edema B/L  ABD: soft, NT, ND, +BS  EXT: patient able to move all extremities spontaneously  VASC: peripheral pulses palpated

## 2020-10-02 NOTE — ED ADULT NURSE NOTE - OBJECTIVE STATEMENT
Pt is a 75 Y A&O x3 F with hx of GERD, HTN, rheumatoid arthritis presenting to ED with c/o SOB. Pt states she tested + for COVID-19 at Urgent Care this AM. Pt endorses N+V x3-4 days, diarrhea x1 day. Pt arrives to ED slightly tachypneic, 98% on RA. Abd soft, non-tender, non-distended. Skin is warm and dry. No diaphoresis noted. No edema noted to LE b/l. Pt BALTAZAR spontaneously. IV established. Warm blanket provided. Safety and comfort maintained. Airborne and contact precautions in place.

## 2020-10-02 NOTE — ED PROVIDER NOTE - OBJECTIVE STATEMENT
COVID 19 test pos at 9AM [facility] today. +Cough for past few days. No fever. No SOB or dyspnea. NBNB Vomiting 3-4 days ago. Diarrhea beginning today. No abd pain. Poor appetite for last few months. Dx with hyperthyroidism for past 3 months.   PCP [facility] 76 yo F with PMH hyperthyroidism (x last few months), RA on steroids, MTX, Tekturna and Remicade infusions, prior stroke w/o residual deficit, memory impairment here presents with cough and SOB. Patient testing COVID 19 test pos at 9AM [facility] today. +Cough for past few days. No fever. No SOB or dyspnea. Also endorses NBNB Vomiting 3-4 days ago and diarrhea beginning today. No abd pain. Poor appetite for last few months. Dx with hyperthyroidism for past 3 months.

## 2020-10-02 NOTE — ED PROVIDER NOTE - ATTENDING CONTRIBUTION TO CARE
75y F hx RA on steroids, MTX, Tekturna and Remicade infusions, prior stroke w/o residual deficit, memory impairment here from home with +COVID test as OP, cough, dec PO, diarrhea. Pt hypoxic on arrival but comes up with supp O2, not tachy, soft BP. No inc WOB. L base w crackles. abd soft ntnd. ext wwp. Suspect acute covid infection with O2 requirement, elevated dimer plus covid will get CTA. Pt will need admission given requiring O2. D/w . 75y F hx RA on steroids, MTX, Tekturna and Remicade infusions, prior stroke w/o residual deficit, memory impairment here from home with +COVID test as OP, cough, dec PO, diarrhea. Pt hypoxic on arrival but comes up with supp O2, not tachy, soft BP- but not hypotensive as indicated by triage VS. No inc WOB. L base w crackles. abd soft ntnd. ext wwp. Suspect acute covid infection with O2 requirement, Poss CAP as well, if elevated dimer plus covid will get CTA. Pt will need admission given requiring O2. D/w . Sepsis labs/eval sent.

## 2020-10-02 NOTE — ED PROVIDER NOTE - CLINICAL SUMMARY MEDICAL DECISION MAKING FREE TEXT BOX
74 yo F with PMH hyperthyroidism (x last few months), RA on steroids, MTX, Tekturna and Remicade infusions, prior stroke w/o residual deficit, memory impairment here presents with cough and SOB, in setting of COVID+. Will get CTA to eval for PE or underlying PNA. Doubt ACS but will screen with trop/ekg

## 2020-10-02 NOTE — H&P ADULT - PROBLEM SELECTOR PLAN 3
- chronic condition  - continuing home Hydroxychloroquine (patient on it for RA, QTc on admission = 470)  - at home patient on Prednisone 10mg daily; will continue steroids as above in setting of COVID-19 hypoxic respiratory failure/PNA  - at home on weekly MTX on Saturdays and Q6week Remicade

## 2020-10-02 NOTE — H&P ADULT - PROBLEM SELECTOR PLAN 1
- maintain SpO2 > 92%, pt on NC currently  - wean as tolerated, or if needed progress from NC to NRB (avoid BiLevel)

## 2020-10-02 NOTE — H&P ADULT - HISTORY OF PRESENT ILLNESS
ED Presenting Vitals: 99F, 71bpm, 82/49 --> [birth year], 24br/m, 92% on RA --> 100% on 2L via NC  ED Course: s/p NS 500cc x1, Solucortef 100mg IVP x1, IV Ceftriaxone 1g x1, IV Azithromycin 500mg x1; BCx/UCx sent by ED 75yoF w/ PMHx significant for recently diagnosed hyperthyroidism (3mo ago, on Methimazole), rheumatoid arthritis (on MTX weekly, Remicade [id number], Hydroxychloroquine, and daily steroids w/ Prednisone 10mg daily), HTN (on multiple anti-hypertensive agents), s/p CVA w/o residual deficits, memory impairment, who presents from  after presenting from [facility] where she resides, w/ c/o nonproductive cough over the past few days, and SOB, along w/ NBNB vomiting 3-4days ago and NB diarrhea beginning today, found to be COVID-19 positive.     ED Presenting Vitals: 99F, 71bpm, 82/49 --> [birth year], 24br/m, 92% on RA --> 100% on 2L via NC  ED Course: s/p NS 500cc x1, Solucortef 100mg IVP x1, IV Ceftriaxone 1g x1, IV Azithromycin 500mg x1; BCx/UCx sent by ED

## 2020-10-02 NOTE — H&P ADULT - NSHPLABSRESULTS_GEN_ALL_CORE
Labs and imaging data obtained by the ED personally reviewed. Pertinent findings include:   - COVID 19 PCR detected  - WNL CBC, BMP, LFTs except for mild hyperglycemia = 107 and mildly elevated AST = 55  - elevated DDimer = 260  - elevated CRP = 3.44  - WNL procalcitonin = 0.09  - WNL hsTrop = 32  - VBG lactate = 3.6 --> 1.0  - CXR as reported: Mid left lung infiltrate.  - CTA Chest w/ IV as reported: No pulmonary embolism. Bilateral lung opacities may represent Covid-19.

## 2020-10-02 NOTE — ED PROVIDER NOTE - CARE PLAN
Principal Discharge DX:	Hypoxia  Secondary Diagnosis:	COVID-19   Breathing spontaneous and unlabored. Breath sounds clear and equal bilaterally with regular rhythm.

## 2020-10-02 NOTE — H&P ADULT - ASSESSMENT
75yoF w/ PMHx significant for recently diagnosed hyperthyroidism (3mo ago, on Methimazole), rheumatoid arthritis (on MTX weekly, Remicade [id number], Hydroxychloroquine, and daily steroids w/ Prednisone 10mg daily), HTN (on multiple anti-hypertensive agents), s/p CVA w/o residual deficits, memory impairment, who presents from  after presenting from [facility] where she resides, w/ c/o nonproductive cough over the past few days, and SOB, along w/ NBNB vomiting 3-4days ago and NB diarrhea beginning today, found to be COVID-19 positive.

## 2020-10-03 NOTE — PROGRESS NOTE ADULT - PROBLEM SELECTOR PLAN 2
- COVID19 PCR detected    - supportive management including for fever/cough  - monitor respiratory status closely and serial inflammatory markers to monitor for severity progression  - maintain oxygenation and monitoring as above  - Lovenox 40mg SubQ for high risk of microthrombi a/w COVID-19  - will initiate Remdesivir under EUA as Spo2 < 94% on RA (pt requiring O2 supp via NC currently), eGFR = 90 (>30), ALT = 33 (WNL), and pt does not meet exclusion criteria -- given weight = 49.9kg, dose: 200mg IV x1 followed by 100mg IV Q24 daily x4days; discussed fact sheet extensively with patient's  via telephone  - will also initiate steroids: as patient on daily Prednisone and received Solucortef 100mg IV x1 in ED at 1:40pm, will initiate Dexamethasone 6mg daily at 11pm (Solucortef should wear off in 8-10hours) tonight x4days  - blood cultures 4/4 bottles positive. will empirically treat with Vanco  - TTE to r/o endocarditis  - will not continued Azithromycin as pt on chronic home Hydroxychloroquine for RA as below, and risk of Qtc prolongation

## 2020-10-03 NOTE — PROGRESS NOTE ADULT - PROBLEM SELECTOR PLAN 4
- at home, patient on Lisinopril 10mg daily, Labetalol 200mg BID, and Amlodipine 10mg daily  - holding all antihypertensive agents at this time given presenting hypotension in setting of above infection; close monitoring of hemodynamics w/ resumption of agents as required w/ clinical improvement  - holding home Tekturna as well, not on formulary  - will resume BP meds as needed.

## 2020-10-03 NOTE — CONSULT NOTE ADULT - ASSESSMENT
75F with RA on Remicaid, chronic prednisone 10 mg qd, plaquenil now admitted with fever, cough, and multi-lobar pneumonia on CT in setting of + COVID 19 PCR.   ACute hypoxemic respirtory failure  CT s/w viral pneumonia due to COVID; however, patient severely immunocompromised and blood cultures reportedly + for gram + cocci  Patient with elevated Ferritin and mildly elevated D-dimer. She received one dose of Cedtriaxone/Azithromycin in ER  Multilobar pneumonia on CT likely due to COVID, but would favor coverage for bacterial pneumonia given immunocompromised state and pending ID of gram + bacteremia    REC    Continue Decadron/Remdesevir  Vanco/Cefepime X1 pending ID evaluation  Monitor oxygen sats: titrate nasal cannula to maintain 90-92%

## 2020-10-03 NOTE — CONSULT NOTE ADULT - ASSESSMENT
75yoF w/ PMHx significant for recently diagnosed hyperthyroidism (3mo ago, on Methimazole), rheumatoid arthritis (on MTX weekly, Remicade [id number], Hydroxychloroquine, and daily steroids w/ Prednisone 10mg daily), HTN (on multiple anti-hypertensive agents), s/p CVA w/o residual deficits, memory impairment, who presents from  after presenting from [facility] where she resides, w/ c/o nonproductive cough over the past few days, and SOB, along w/ NBNB vomiting 3-4days ago and NB diarrhea beginning today, found to be COVID-19 positive.     ED Presenting Vitals: 99F, 71bpm, 82/49 --> [birth year], 24br/m, 92% on RA --> 100% on 2L via NC  ED Course: s/p NS 500cc x1, Solucortef 100mg IVP x1, IV Ceftriaxone 1g x1, IV Azithromycin 500mg x1; BCx/UCx sent by ED (02 Oct 2020 17:12)    CT angiogram performed, findings s/o Covid-19 pna.  No PE.  , CRP 3.8, Ferritin 4710.  PCT 0.09.  Lact 3.6 --> 1.0.  LFTs mild elevation.  Pt started on remdesivir, Lovenox Qdaily, dexamethasone.  On cefepime.  s/p vanco x 1 and azithro x 1.  BCX CoNS 4/4 bottles.      ID consulted for further recommendations.     Covid-19 Pna:    - Covid-19 pcr (+), CT angio chest with peripheral opacities s/o covid lung changes.  ? Underlying superimposed pna.  Pt on cefepime    , CRP 3.8, Ferritin 4710 on admission.      - Cont remdesivir x 5 day course.  Monitor daily LFTs and renal function.  CrCl >30.  LFTs < 5x ULN    - Cont dexamethasone x 5 days.     - On lovenox subq Qdaily    - Cont to monitor pulse ox, cont supp O2 to maintain oxygenation 92 to 96%.    - Check repeat inflammatory markers in 2 days.       CoNS bacteremia:    - 4/4 bottles CoNS.  ?contaminant, given all 4 bottles (+) will treat.  Cont vancomycin.  Check trough    - Check repeat bcx x 2.      - Consider echocardiogram.  No evidence for skin infection, no presence of long term indwelling lines, no h/o hardware.  Pt on remicade and methotrexate and likely with underlying immune compromised state.   Will cont broad spectrum abx to treat bacteremia and possible pna until further culture results available.       Will follow,    Marisela Bradley  [phone]       75yoF w/ PMHx significant for recently diagnosed hyperthyroidism (3mo ago, on Methimazole), rheumatoid arthritis (on MTX weekly, Remicade [id number], Hydroxychloroquine, and daily steroids w/ Prednisone 10mg daily), HTN (on multiple anti-hypertensive agents), s/p CVA w/o residual deficits, memory impairment, who presents from  after presenting from [facility] where she resides, w/ c/o nonproductive cough over the past few days, and SOB, along w/ NBNB vomiting 3-4days ago and NB diarrhea beginning today, found to be COVID-19 positive.     ED Presenting Vitals: 99F, 71bpm, 82/49 --> [birth year], 24br/m, 92% on RA --> 100% on 2L via NC  ED Course: s/p NS 500cc x1, Solucortef 100mg IVP x1, IV Ceftriaxone 1g x1, IV Azithromycin 500mg x1; BCx/UCx sent by ED (02 Oct 2020 17:12)    CT angiogram performed, findings s/o Covid-19 pna.  No PE.  , CRP 3.8, Ferritin 4710.  PCT 0.09.  Lact 3.6 --> 1.0.  LFTs mild elevation.  Pt started on remdesivir, Lovenox Qdaily, dexamethasone.  On cefepime.  s/p vanco x 1 and azithro x 1.  BCX CoNS 4/4 bottles.      ID consulted for further recommendations.     Covid-19 Pna:    - Covid-19 pcr (+), CT angio chest with peripheral opacities s/o covid lung changes.    Will d/c cefepime, pt w/o productive sputum, no fever.      , CRP 3.8, Ferritin 4710 on admission.      - Cont remdesivir x 5 day course.  Monitor daily LFTs and renal function.  CrCl >30.  LFTs < 5x ULN    - Cont dexamethasone x 5 days.     - On lovenox subq Qdaily    - Cont to monitor pulse ox, cont supp O2 to maintain oxygenation 92 to 96%.    - Check repeat inflammatory markers in 2 days.       CoNS bacteremia:    - 4/4 bottles CoNS.  ?contaminant, given all 4 bottles (+) will treat.  Cont vancomycin.  Check trough    - Check repeat bcx x 2.      - Consider echocardiogram.  No evidence for skin infection, no presence of long term indwelling lines, no h/o hardware.  Pt on remicade and methotrexate and likely with underlying immune compromised state.   Will cont broad spectrum abx to treat bacteremia  until further culture results available.       Will follow,    Marisela Bradley  [phone]

## 2020-10-03 NOTE — CONSULT NOTE ADULT - SUBJECTIVE AND OBJECTIVE BOX
Patient is a 75y old  Female who presents with a chief complaint of COVID-19 PNA, acute respiratory failure (03 Oct 2020 11:26)      HPI:    75yoF w/ PMHx significant for recently diagnosed hyperthyroidism (3mo ago, on Methimazole), rheumatoid arthritis (on MTX weekly, Remicade [id number], Hydroxychloroquine, and daily steroids w/ Prednisone 10mg daily), HTN (on multiple anti-hypertensive agents), s/p CVA w/o residual deficits, memory impairment, who presents from  after presenting from [facility] where she resides, w/ c/o nonproductive cough over the past few days, and SOB, along w/ NBNB vomiting 3-4days ago and NB diarrhea beginning today, found to be COVID-19 positive.     ED Presenting Vitals: 99F, 71bpm, 82/49 --> [birth year], 24br/m, 92% on RA --> 100% on 2L via NC  ED Course: s/p NS 500cc x1, Solucortef 100mg IVP x1, IV Ceftriaxone 1g x1, IV Azithromycin 500mg x1; BCx/UCx sent by ED (02 Oct 2020 17:12)    CT angiogram performed, findings s/o Covid-19 pna.  No PE.  , CRP 3.8, Ferritin 4710.  PCT 0.09.  Lact 3.6 --> 1.0.  LFTs mild elevation.  Pt started on remdesivir, Lovenox Qdaily, dexamethasone.  On cefepime.  s/p vanco x 1 and azithro x 1.  BCX CoNS 4/4 bottles.      ID consulted for further recommendations.       REVIEW OF SYSTEMS:    CONSTITUTIONAL: No fever, weight loss, or fatigue  EYES: No eye pain, visual disturbances, or discharge  ENMT:  No sore throat  NECK: No pain or stiffness  RESPIRATORY: No cough, wheezing, chills or hemoptysis; No shortness of breath  CARDIOVASCULAR: No chest pain, palpitations, dizziness, or leg swelling  GASTROINTESTINAL: No abdominal or epigastric pain. No nausea, vomiting, or hematemesis; No diarrhea or constipation. No melena or hematochezia.  GENITOURINARY: No dysuria, frequency, hematuria, or incontinence  NEUROLOGICAL: No headaches, memory loss, loss of strength, numbness, or tremors  SKIN: No itching, burning, rashes, or lesions   LYMPH NODES: No enlarged glands  MUSCULOSKELETAL: No joint pain or swelling; No muscle, back, or extremity pain      PAST MEDICAL & SURGICAL HISTORY:  Pneumobilia    Leukocytosis    GERD (gastroesophageal reflux disease)    Hypertension    CVA (cerebral vascular accident)    Osteoporosis    Rheumatoid arthritis    History of hip replacement, unspecified laterality    History of total knee replacement, unspecified laterality        Allergies    Actonel (Hives)  clonidine (Other)  crab meat (Unknown)    Intolerances        FAMILY HISTORY:  Family history of cerebrovascular accident (CVA) in mother        SOCIAL HISTORY:    From assisted living.  No smoking/ivdu/etoh    MEDICATIONS  (STANDING):  aspirin  chewable 81 milliGRAM(s) Oral daily  cefepime   IVPB 500 milliGRAM(s) IV Intermittent once  cholecalciferol 2000 Unit(s) Oral daily  dexAMETHasone     Tablet 6 milliGRAM(s) Oral daily  enoxaparin Injectable 40 milliGRAM(s) SubCutaneous daily  hydroxychloroquine 400 milliGRAM(s) Oral <User Schedule>  methimazole 5 milliGRAM(s) Oral daily  pantoprazole    Tablet 40 milliGRAM(s) Oral before breakfast  PARoxetine 10 milliGRAM(s) Oral daily  remdesivir  IVPB   IV Intermittent   remdesivir  IVPB 100 milliGRAM(s) IV Intermittent every 24 hours  ursodiol Tablet 250 milliGRAM(s) Oral two times a day    MEDICATIONS  (PRN):  acetaminophen   Tablet .. 650 milliGRAM(s) Oral every 8 hours PRN Temp greater or equal to 38.5C (101.3F)  acetaminophen   Tablet .. 650 milliGRAM(s) Oral every 6 hours PRN Moderate Pain (4 - 6)      Vital Signs Last 24 Hrs  T(C): 37.3 (03 Oct 2020 11:32), Max: 37.7 (03 Oct 2020 05:20)  T(F): 99.1 (03 Oct 2020 11:32), Max: 99.8 (03 Oct 2020 05:20)  HR: 102 (03 Oct 2020 11:32) (70 - 102)  BP: 128/71 (03 Oct 2020 11:32) (126/88 - 161/67)  BP(mean): 82 (02 Oct 2020 15:40) (82 - 87)  RR: 18 (03 Oct 2020 11:32) (18 - 25)  SpO2: 92% (03 Oct 2020 11:32) (92% - 100%)    PHYSICAL EXAM:    GENERAL: NAD, well-groomed  HEAD:  Atraumatic, Normocephalic  EYES: EOMI, PERRLA, conjunctiva and sclera clear  ENMT: No tonsillar erythema, exudates, or enlargement; Moist mucous membranes  NECK: Supple, No JVD  CHEST/LUNG: Clear to percussion bilaterally; No rales, rhonchi, wheezing, or rubs  HEART: Regular rate and rhythm; No murmurs, rubs, or gallops  ABDOMEN: Soft, Nontender, Nondistended; Bowel sounds present  EXTREMITIES:  2+ Peripheral Pulses, No clubbing, cyanosis, or edema  LYMPH: No lymphadenopathy noted  SKIN: No rashes or lesions    LABS:  CBC Full  -  ( 03 Oct 2020 11:01 )  WBC Count : 4.29 K/uL  RBC Count : 4.23 M/uL  Hemoglobin : 13.3 g/dL  Hematocrit : 39.7 %  Platelet Count - Automated : 335 K/uL  Mean Cell Volume : 93.9 fl  Mean Cell Hemoglobin : 31.4 pg  Mean Cell Hemoglobin Concentration : 33.5 gm/dL  Auto Neutrophil # : x  Auto Lymphocyte # : x  Auto Monocyte # : x  Auto Eosinophil # : x  Auto Basophil # : x  Auto Neutrophil % : x  Auto Lymphocyte % : x  Auto Monocyte % : x  Auto Eosinophil % : x  Auto Basophil % : x      10-03    x   |  x   |  x   ----------------------------<  x   x    |  x   |  0.40<L>    Ca    8.8      03 Oct 2020 11:01    TPro  7.2  /  Alb  3.8  /  TBili  0.3  /  DBili  <0.1  /  AST  59<H>  /  ALT  33  /  AlkPhos  145<H>  10-03      LIVER FUNCTIONS - ( 03 Oct 2020 11:12 )  Alb: 3.8 g/dL / Pro: 7.2 g/dL / ALK PHOS: 145 U/L / ALT: 33 U/L / AST: 59 U/L / GGT: x                               MICROBIOLOGY:      Culture - Blood (10.02.20 @ 14:52)   Gram Stain:   Growth in aerobic bottle: Gram Positive Cocci in Clusters   Growth in anaerobic bottle: Gram Positive Cocci in Clusters   Specimen Source: .Blood Blood-Peripheral   Culture Results:   Growth in aerobic bottle: Gram Positive Cocci in Clusters   Growth in anaerobic bottle: Gram Positive Cocci in Clusters       Culture - Blood (10.02.20 @ 14:52)   - Coagulase negative Staphylococcus: Detec   Gram Stain:   Growth in aerobic bottle: Gram Positive Cocci in Clusters   Growth in anaerobic bottle: Gram Positive Cocci in Clusters   Specimen Source: .Blood Blood-Peripheral   Organism: Blood Culture PCR   Culture Results:   Growth in anaerobic bottle: Gram Positive Cocci in Clusters   Growth in aerobic bottle: Gram Positive Cocci in Clusters   "Due to technical problems, Proteus sp. will Not be reported as part of   the BCID panel until further notice"   ***Blood Panel PCR results on this specimen are available   approximately 3 hours after the Gram stain result.***   Gram stain, PCR, and/or culture results may not always   correspond due to difference in methodologies.   ************************************************************   This PCR assay was performed using [employer].   The following targets are tested for: Enterococcus,   vancomycin resistant enterococci, Listeria monocytogenes,   coagulase negative staphylococci, S. aureus,   methicillin resistant S. aureus, Streptococcus agalactiae   (Group B), S. pneumoniae, S. pyogenes (Group A),   Acinetobacter baumannii, Enterobacter cloacae, E. coli,   Klebsiella oxytoca, K. pneumoniae, Proteus sp.,   Serratia marcescens, Haemophilus influenzae,   Neisseria meningitidis, Pseudomonas aeruginosa, Candida   albicans, C. glabrata, C krusei, C parapsilosis,   C. tropicalis and the KPC resistance gene.   Organism Identification: Blood Culture PCR   Method Type: PCR       COVID-19 PCR . (10.02.20 @ 11:16)   COVID-19 PCR: Detected: Testing is performed using polymerase chain reaction (PCR) or   transcription mediated amplification (TMA). This COVID-19 (SARS-CoV-2)   nucleic acid amplification test was validated by [employer] and is   in use under the FDA Emergency Use Authorization (EUA) for clinical labs   CLIA-certified to perform high complexity testing. Test results should be   correlated with clinical presentation, patient history, and epidemiology.       RADIOLOGY:      < from: CT Angio Chest w/ IV Cont (10.02.20 @ 13:30) >    EXAM:  CT ANGIO CHEST (W)AW IC                            PROCEDURE DATE:  10/02/2020            INTERPRETATION:  EXAMINATION: CT ANGIO CHEST WITHOUT AND OR WITH IV CONTRAST    CLINICAL INDICATION: Dyspnea    TECHNIQUE: CTA of the chest was performed for evaluation of pulmonary embolism after administration of 90 ml of Omnipaque-350, 10 ml discarded.  MIP images were reconstructed.    COMPARISON: 10/27/2019.    FINDINGS:    PULMONARY ARTERIES: There is no pulmonary embolism    AIRWAYS AND LUNGS: The central tracheobronchial tree is patent.  Bilateral groundglass opacities and consolidations, predominantly peripherally    MEDIASTINUM AND PLEURA: There are no enlarged mediastinal, hilar or axillary lymph nodes. The visualized portion of the thyroid gland is unremarkable. There is no pleural effusion. There is no pneumothorax.    HEART AND VESSELS: There is mild cardiomegaly. There are atherosclerotic calcifications of the aorta and coronary arteries.  There is no pericardial effusion.    UPPER ABDOMEN: Images of the upper abdomen demonstrate no abnormality.    BONES AND SOFT TISSUES: Unchanged T12 vertebral plasty and L1 vertebral body severe compression deformity.  The soft tissues are unremarkable.    TUBES/LINES: None.    IMPRESSION:  No pulmonary embolism. Bilateral lung opacities may represent Covid-19.    < end of copied text >             Patient is a 75y old  Female who presents with a chief complaint of COVID-19 PNA, acute respiratory failure (03 Oct 2020 11:26)      HPI:    75yoF w/ PMHx significant for recently diagnosed hyperthyroidism (3mo ago, on Methimazole), rheumatoid arthritis (on MTX weekly, Remicade [id number], Hydroxychloroquine, and daily steroids w/ Prednisone 10mg daily), HTN (on multiple anti-hypertensive agents), s/p CVA w/o residual deficits, memory impairment, who presents from  after presenting from [facility] where she resides, w/ c/o nonproductive cough over the past few days, and SOB, along w/ NBNB vomiting 3-4days ago and NB diarrhea beginning today, found to be COVID-19 positive.     ED Presenting Vitals: 99F, 71bpm, 82/49 --> [birth year], 24br/m, 92% on RA --> 100% on 2L via NC  ED Course: s/p NS 500cc x1, Solucortef 100mg IVP x1, IV Ceftriaxone 1g x1, IV Azithromycin 500mg x1; BCx/UCx sent by ED (02 Oct 2020 17:12)    CT angiogram performed, findings s/o Covid-19 pna.  No PE.  , CRP 3.8, Ferritin 4710.  PCT 0.09.  Lact 3.6 --> 1.0.  LFTs mild elevation.  Pt started on remdesivir, Lovenox Qdaily, dexamethasone.  On cefepime.  s/p vanco x 1 and azithro x 1.  BCX CoNS 4/4 bottles.      ID consulted for further recommendations.       REVIEW OF SYSTEMS:    CONSTITUTIONAL: No fever, weight loss, or fatigue  EYES: No eye pain, visual disturbances, or discharge  ENMT:  No sore throat  NECK: No pain or stiffness  RESPIRATORY: No cough, wheezing, chills or hemoptysis; No shortness of breath  CARDIOVASCULAR: No chest pain, palpitations, dizziness, or leg swelling  GASTROINTESTINAL: No abdominal or epigastric pain. No nausea, vomiting, or hematemesis; No diarrhea or constipation. No melena or hematochezia.  GENITOURINARY: No dysuria, frequency, hematuria, or incontinence  NEUROLOGICAL: No headaches, memory loss, loss of strength, numbness, or tremors  SKIN: No itching, burning, rashes, or lesions   LYMPH NODES: No enlarged glands  MUSCULOSKELETAL: No joint pain or swelling; No muscle, back, or extremity pain      PAST MEDICAL & SURGICAL HISTORY:  Pneumobilia    Leukocytosis    GERD (gastroesophageal reflux disease)    Hypertension    CVA (cerebral vascular accident)    Osteoporosis    Rheumatoid arthritis    History of hip replacement, unspecified laterality    History of total knee replacement, unspecified laterality        Allergies    Actonel (Hives)  clonidine (Other)  crab meat (Unknown)    Intolerances        FAMILY HISTORY:  Family history of cerebrovascular accident (CVA) in mother        SOCIAL HISTORY:    From assisted living.  No smoking/ivdu/etoh    MEDICATIONS  (STANDING):  aspirin  chewable 81 milliGRAM(s) Oral daily  cefepime   IVPB 500 milliGRAM(s) IV Intermittent once  cholecalciferol 2000 Unit(s) Oral daily  dexAMETHasone     Tablet 6 milliGRAM(s) Oral daily  enoxaparin Injectable 40 milliGRAM(s) SubCutaneous daily  hydroxychloroquine 400 milliGRAM(s) Oral <User Schedule>  methimazole 5 milliGRAM(s) Oral daily  pantoprazole    Tablet 40 milliGRAM(s) Oral before breakfast  PARoxetine 10 milliGRAM(s) Oral daily  remdesivir  IVPB   IV Intermittent   remdesivir  IVPB 100 milliGRAM(s) IV Intermittent every 24 hours  ursodiol Tablet 250 milliGRAM(s) Oral two times a day    MEDICATIONS  (PRN):  acetaminophen   Tablet .. 650 milliGRAM(s) Oral every 8 hours PRN Temp greater or equal to 38.5C (101.3F)  acetaminophen   Tablet .. 650 milliGRAM(s) Oral every 6 hours PRN Moderate Pain (4 - 6)      Vital Signs Last 24 Hrs  T(C): 37.3 (03 Oct 2020 11:32), Max: 37.7 (03 Oct 2020 05:20)  T(F): 99.1 (03 Oct 2020 11:32), Max: 99.8 (03 Oct 2020 05:20)  HR: 102 (03 Oct 2020 11:32) (70 - 102)  BP: 128/71 (03 Oct 2020 11:32) (126/88 - 161/67)  BP(mean): 82 (02 Oct 2020 15:40) (82 - 87)  RR: 18 (03 Oct 2020 11:32) (18 - 25)  SpO2: 92% (03 Oct 2020 11:32) (92% - 100%)    PHYSICAL EXAM:    GENERAL: NAD, well-groomed  HEAD:  Atraumatic, Normocephalic  EYES: EOMI, PERRLA, conjunctiva and sclera clear  ENMT: No tonsillar erythema, exudates, or enlargement; Moist mucous membranes  NECK: Supple, No JVD  CHEST/LUNG: Clear to percussion bilaterally; No rales, rhonchi, wheezing, or rubs  HEART: Regular rate and rhythm; No murmurs, rubs, or gallops  ABDOMEN: Soft, Nontender, Nondistended; Bowel sounds present  EXTREMITIES:  2+ Peripheral Pulses,  arthric changes of b/l fingers.  b/l LE venous stasis changes/bruising  LYMPH: No lymphadenopathy noted  SKIN: No rashes or lesions    LABS:  CBC Full  -  ( 03 Oct 2020 11:01 )  WBC Count : 4.29 K/uL  RBC Count : 4.23 M/uL  Hemoglobin : 13.3 g/dL  Hematocrit : 39.7 %  Platelet Count - Automated : 335 K/uL  Mean Cell Volume : 93.9 fl  Mean Cell Hemoglobin : 31.4 pg  Mean Cell Hemoglobin Concentration : 33.5 gm/dL  Auto Neutrophil # : x  Auto Lymphocyte # : x  Auto Monocyte # : x  Auto Eosinophil # : x  Auto Basophil # : x  Auto Neutrophil % : x  Auto Lymphocyte % : x  Auto Monocyte % : x  Auto Eosinophil % : x  Auto Basophil % : x      10-03    x   |  x   |  x   ----------------------------<  x   x    |  x   |  0.40<L>    Ca    8.8      03 Oct 2020 11:01    TPro  7.2  /  Alb  3.8  /  TBili  0.3  /  DBili  <0.1  /  AST  59<H>  /  ALT  33  /  AlkPhos  145<H>  10-03      LIVER FUNCTIONS - ( 03 Oct 2020 11:12 )  Alb: 3.8 g/dL / Pro: 7.2 g/dL / ALK PHOS: 145 U/L / ALT: 33 U/L / AST: 59 U/L / GGT: x                               MICROBIOLOGY:      Culture - Blood (10.02.20 @ 14:52)   Gram Stain:   Growth in aerobic bottle: Gram Positive Cocci in Clusters   Growth in anaerobic bottle: Gram Positive Cocci in Clusters   Specimen Source: .Blood Blood-Peripheral   Culture Results:   Growth in aerobic bottle: Gram Positive Cocci in Clusters   Growth in anaerobic bottle: Gram Positive Cocci in Clusters       Culture - Blood (10.02.20 @ 14:52)   - Coagulase negative Staphylococcus: Detec   Gram Stain:   Growth in aerobic bottle: Gram Positive Cocci in Clusters   Growth in anaerobic bottle: Gram Positive Cocci in Clusters   Specimen Source: .Blood Blood-Peripheral   Organism: Blood Culture PCR   Culture Results:   Growth in anaerobic bottle: Gram Positive Cocci in Clusters   Growth in aerobic bottle: Gram Positive Cocci in Clusters   "Due to technical problems, Proteus sp. will Not be reported as part of   the BCID panel until further notice"   ***Blood Panel PCR results on this specimen are available   approximately 3 hours after the Gram stain result.***   Gram stain, PCR, and/or culture results may not always   correspond due to difference in methodologies.   ************************************************************   This PCR assay was performed using [email or web address].   The following targets are tested for: Enterococcus,   vancomycin resistant enterococci, Listeria monocytogenes,   coagulase negative staphylococci, S. aureus,   methicillin resistant S. aureus, Streptococcus agalactiae   (Group B), S. pneumoniae, S. pyogenes (Group A),   Acinetobacter baumannii, Enterobacter cloacae, E. coli,   Klebsiella oxytoca, K. pneumoniae, Proteus sp.,   Serratia marcescens, Haemophilus influenzae,   Neisseria meningitidis, Pseudomonas aeruginosa, Candida   albicans, C. glabrata, C krusei, C parapsilosis,   C. tropicalis and the KPC resistance gene.   Organism Identification: Blood Culture PCR   Method Type: PCR       COVID-19 PCR . (10.02.20 @ 11:16)   COVID-19 PCR: Detected: Testing is performed using polymerase chain reaction (PCR) or   transcription mediated amplification (TMA). This COVID-19 (SARS-CoV-2)   nucleic acid amplification test was validated by [employer] and is   in use under the FDA Emergency Use Authorization (EUA) for clinical labs   CLIA-certified to perform high complexity testing. Test results should be   correlated with clinical presentation, patient history, and epidemiology.       RADIOLOGY:      < from: CT Angio Chest w/ IV Cont (10.02.20 @ 13:30) >    EXAM:  CT ANGIO CHEST (W)AW IC                            PROCEDURE DATE:  10/02/2020            INTERPRETATION:  EXAMINATION: CT ANGIO CHEST WITHOUT AND OR WITH IV CONTRAST    CLINICAL INDICATION: Dyspnea    TECHNIQUE: CTA of the chest was performed for evaluation of pulmonary embolism after administration of 90 ml of Omnipaque-350, 10 ml discarded.  MIP images were reconstructed.    COMPARISON: 10/27/2019.    FINDINGS:    PULMONARY ARTERIES: There is no pulmonary embolism    AIRWAYS AND LUNGS: The central tracheobronchial tree is patent.  Bilateral groundglass opacities and consolidations, predominantly peripherally    MEDIASTINUM AND PLEURA: There are no enlarged mediastinal, hilar or axillary lymph nodes. The visualized portion of the thyroid gland is unremarkable. There is no pleural effusion. There is no pneumothorax.    HEART AND VESSELS: There is mild cardiomegaly. There are atherosclerotic calcifications of the aorta and coronary arteries.  There is no pericardial effusion.    UPPER ABDOMEN: Images of the upper abdomen demonstrate no abnormality.    BONES AND SOFT TISSUES: Unchanged T12 vertebral plasty and L1 vertebral body severe compression deformity.  The soft tissues are unremarkable.    TUBES/LINES: None.    IMPRESSION:  No pulmonary embolism. Bilateral lung opacities may represent Covid-19.    < end of copied text >

## 2020-10-03 NOTE — CONSULT NOTE ADULT - SUBJECTIVE AND OBJECTIVE BOX
PULMONARY CONSULT  Gordon Fox MD  [phone]    Initial HPI on admission:  HPI:  75yoF w/ PMHx significant for recently diagnosed hyperthyroidism (3mo ago, on Methimazole), rheumatoid arthritis (on MTX weekly, Remicade [id number], Hydroxychloroquine, and daily steroids w/ Prednisone 10mg daily), HTN (on multiple anti-hypertensive agents), s/p CVA w/o residual deficits, memory impairment, who presents from  after presenting from [facility] where she resides, w/ c/o nonproductive cough over the past few days, and SOB, along w/ NBNB vomiting 3-4days ago and NB diarrhea beginning today, found to be COVID-19 positive. ED Presenting Vitals: 99F, 71bpm, 82/49 --> [birth year], 24br/m, 92% on RA --> 100% on 2L via NC. ED Course: s/p NS 500cc x1, Solucortef 100mg IVP x1, IV Ceftriaxone 1g x1, IV Azithromycin 500mg x1; BCx/UCx sent by ED     PAST MEDICAL & SURGICAL HISTORY:  Pneumobilia    Leukocytosis    GERD (gastroesophageal reflux disease)    Hypertension    CVA (cerebral vascular accident)    Osteoporosis    Rheumatoid arthritis    History of hip replacement, unspecified laterality    History of total knee replacement, unspecified laterality      Allergies    Actonel (Hives)  clonidine (Other)  crab meat (Unknown)    Intolerances      FAMILY HISTORY:  Family history of cerebrovascular accident (CVA) in mother    Social History (marital status, living situation, occupation, tobacco use, alcohol and drug use, and sexual history): Patient lives at [facility] w/ private aides 2hours BID.     Tobacco Screening:  · Core Measure Site	No    Risk Assessment:    Present on Admission:  Deep Venous Thrombosis	no  Pulmonary Embolus	no       Review of Systems:  · Negative General Symptoms	no fever; no chills  · General Symptoms	malaise; fatigue  · Negative Skin Symptoms	no rash; no itching  · Negative Ophthalmologic Symptoms	no diplopia; no loss of vision L; no loss of vision R  · Negative ENMT Symptoms	no hearing difficulty; no sinus symptoms; no nasal congestion; no nasal discharge; no dry mouth; no throat pain; no dysphagia  · Negative Respiratory and Thorax Symptoms	no wheezing; no pleuritic chest pain  · Respiratory and Thorax Symptoms	dyspnea  cough  · Negative Cardiovascular Symptoms	no chest pain; no palpitations; no peripheral edema  · Cardiovascular Symptoms	chest discomfort  · Negative Gastrointestinal Symptoms	no constipation; no abdominal pain; no hiccoughs  · Gastrointestinal Symptoms	vomiting; diarrhea  · Negative General Genitourinary Symptoms	no hematuria; no dysuria; normal urinary frequency  · Musculoskeletal	negative  · Neurological	negative  · Psychiatric	negative      Allergies and Intolerances:        Allergies:  	Actonel: Drug, Hives    Medications:  MEDICATIONS  (STANDING):  aspirin  chewable 81 milliGRAM(s) Oral daily  cholecalciferol 2000 Unit(s) Oral daily  dexAMETHasone     Tablet 6 milliGRAM(s) Oral daily  enoxaparin Injectable 40 milliGRAM(s) SubCutaneous daily  hydroxychloroquine 400 milliGRAM(s) Oral <User Schedule>  methimazole 5 milliGRAM(s) Oral daily  pantoprazole    Tablet 40 milliGRAM(s) Oral before breakfast  PARoxetine 10 milliGRAM(s) Oral daily  remdesivir  IVPB 100 milliGRAM(s) IV Intermittent every 24 hours  remdesivir  IVPB   IV Intermittent   ursodiol Tablet 250 milliGRAM(s) Oral two times a day    MEDICATIONS  (PRN):  acetaminophen   Tablet .. 650 milliGRAM(s) Oral every 8 hours PRN Temp greater or equal to 38.5C (101.3F)  acetaminophen   Tablet .. 650 milliGRAM(s) Oral every 6 hours PRN Moderate Pain (4 - 6)    Vital Signs Last 24 Hrs  T(C): 37.7 (03 Oct 2020 05:20), Max: 37.7 (03 Oct 2020 05:20)  T(F): 99.8 (03 Oct 2020 05:20), Max: 99.8 (03 Oct 2020 05:20)  HR: 91 (03 Oct 2020 05:20) (67 - 91)  BP: 142/73 (03 Oct 2020 05:20) (102/59 - 161/67)  BP(mean): 82 (02 Oct 2020 15:40) (79 - 87)  RR: 19 (03 Oct 2020 05:20) (18 - 26)  SpO2: 94% (03 Oct 2020 05:20) (93% - 100%)      10-02 @ 07:01  -  10-03 @ 07:00  --------------------------------------------------------  IN: 240 mL / OUT: 500 mL / NET: -260 mL    LABS:                        12.4   8.16  )-----------( 302      ( 02 Oct 2020 11:19 )             38.3     10-02    137  |  101  |  15  ----------------------------<  107<H>  3.6   |  19<L>  |  0.59    Ca    8.7      02 Oct 2020 11:19    TPro  7.2  /  Alb  3.7  /  TBili  0.3  /  DBili  x   /  AST  55<H>  /  ALT  33  /  AlkPhos  118  10-02    Procalcitonin, Serum: 0.09 ng/mL (10-02-20 @ 11:19)    CULTURES:  Culture Results:   Growth in anaerobic bottle: Gram Positive Cocci in Clusters  Growth in aerobic bottle: Gram Positive Cocci in Clusters  "Due to technical problems, Proteus sp. will Not be reported as part of  the BCID panel until further notice"  ***Blood Panel PCR results on this specimen are available  approximately 3 hours after the Gram stain result.***  Gram stain, PCR, and/or culture results may not always  correspond due to difference in methodologies.  ************************************************************  This PCR assay was performed using [employer].  The following targets are tested for: Enterococcus,  vancomycin resistant enterococci, Listeria monocytogenes,  coagulase negative staphylococci, S. aureus,  methicillin resistant S. aureus, Streptococcus agalactiae  (Group B), S. pneumoniae, S. pyogenes (Group A),  Acinetobacter baumannii, Enterobacter cloacae, E. coli,  Klebsiella oxytoca, K. pneumoniae, Proteus sp.,  Serratia marcescens, Haemophilus influenzae,  Neisseria meningitidis, Pseudomonas aeruginosa, Candida  albicans, C. glabrata, C krusei, C parapsilosis,  C. tropicalis and the KPC resistance gene. (10-02 @ 14:52)  Culture Results:   Growth in aerobic bottle: Gram Positive Cocci in Clusters (10-02 @ 14:52)    Physical Examination:    General: No acute distress.      HEENT: Pupils equal, reactive to light.  Symmetric.    PULM: Clear to auscultation bilaterally, no significant sputum production    CVS: Regular rate and rhythm, no murmurs, rubs, or gallops    ABD: Soft, nondistended, nontender, normoactive bowel sounds, no masses    EXT: No edema, nontender    SKIN: Warm and well perfused, no rashes noted.    NEURO: Alert, oriented, interactive, nonfocal    RADIOLOGY REVIEWED PERSONALLY  CXR:    CT chest:    PROCEDURE DATE:  10/02/2020        INTERPRETATION:  EXAMINATION: CT ANGIO CHEST WITHOUT AND OR WITH IV CONTRAST    CLINICAL INDICATION: Dyspnea    TECHNIQUE: CTA of the chest was performed for evaluation of pulmonary embolism after administration of 90 ml of Omnipaque-350, 10 ml discarded.  MIP images were reconstructed.    COMPARISON: 10/27/2019.    FINDINGS:    PULMONARY ARTERIES: There is no pulmonary embolism    AIRWAYS AND LUNGS: The central tracheobronchial tree is patent.  Bilateral groundglass opacities and consolidations, predominantly peripherally    MEDIASTINUM AND PLEURA: There are no enlarged mediastinal, hilar or axillary lymph nodes. The visualized portion of the thyroid gland is unremarkable. There is no pleural effusion. There is no pneumothorax.    HEART AND VESSELS: There is mild cardiomegaly. There are atherosclerotic calcifications of the aorta and coronary arteries.  There is no pericardial effusion.    UPPER ABDOMEN: Images of the upper abdomen demonstrate no abnormality.    BONES AND SOFT TISSUES: Unchanged T12 vertebral plasty and L1 vertebral body severe compression deformity.  The soft tissues are unremarkable.    TUBES/LINES: None.    IMPRESSION:  No pulmonary embolism. Bilateral lung opacities may represent Covid-19.    TTE:   PULMONARY CONSULT  Gordon Fox MD  [phone]    Initial HPI on admission:  HPI:  75yoF w/ PMHx significant for recently diagnosed hyperthyroidism (3mo ago, on Methimazole), rheumatoid arthritis (on MTX weekly, Remicade [id number], Hydroxychloroquine, and daily steroids w/ Prednisone 10mg daily), HTN (on multiple anti-hypertensive agents), s/p CVA w/o residual deficits, memory impairment, who presents from  after presenting from [facility] where she resides, w/ c/o nonproductive cough over the past few days, and SOB, along w/ NBNB vomiting 3-4days ago and NB diarrhea beginning today, found to be COVID-19 positive. ED Presenting Vitals: 99F, 71bpm, 82/49 --> [birth year], 24br/m, 92% on RA --> 100% on 2L via NC. ED Course: s/p NS 500cc x1, Solucortef 100mg IVP x1, IV Ceftriaxone 1g x1, IV Azithromycin 500mg x1; BCx/UCx sent by ED     PAST MEDICAL & SURGICAL HISTORY:  Pneumobilia    Leukocytosis    GERD (gastroesophageal reflux disease)    Hypertension    CVA (cerebral vascular accident)    Osteoporosis    Rheumatoid arthritis    History of hip replacement, unspecified laterality    History of total knee replacement, unspecified laterality      Allergies    Actonel (Hives)  clonidine (Other)  crab meat (Unknown)    Intolerances      FAMILY HISTORY:  Family history of cerebrovascular accident (CVA) in mother    Social History (marital status, living situation, occupation, tobacco use, alcohol and drug use, and sexual history): Patient lives at [facility] w/ private aides 2hours BID.     Tobacco Screening:  · Core Measure Site	No    Risk Assessment:    Present on Admission:  Deep Venous Thrombosis	no  Pulmonary Embolus	no       Review of Systems:  · Negative General Symptoms	no fever; no chills  · General Symptoms	malaise; fatigue  · Negative Skin Symptoms	no rash; no itching  · Negative Ophthalmologic Symptoms	no diplopia; no loss of vision L; no loss of vision R  · Negative ENMT Symptoms	no hearing difficulty; no sinus symptoms; no nasal congestion; no nasal discharge; no dry mouth; no throat pain; no dysphagia  · Negative Respiratory and Thorax Symptoms	no wheezing; no pleuritic chest pain  · Respiratory and Thorax Symptoms	dyspnea  cough  · Negative Cardiovascular Symptoms	no chest pain; no palpitations; no peripheral edema  · Cardiovascular Symptoms	chest discomfort  · Negative Gastrointestinal Symptoms	no constipation; no abdominal pain; no hiccoughs  · Gastrointestinal Symptoms	vomiting; diarrhea  · Negative General Genitourinary Symptoms	no hematuria; no dysuria; normal urinary frequency  · Musculoskeletal	negative  · Neurological	negative  · Psychiatric	negative      Allergies and Intolerances:        Allergies:  	Actonel: Drug, Hives    Medications:  MEDICATIONS  (STANDING):  aspirin  chewable 81 milliGRAM(s) Oral daily  cholecalciferol 2000 Unit(s) Oral daily  dexAMETHasone     Tablet 6 milliGRAM(s) Oral daily  enoxaparin Injectable 40 milliGRAM(s) SubCutaneous daily  hydroxychloroquine 400 milliGRAM(s) Oral <User Schedule>  methimazole 5 milliGRAM(s) Oral daily  pantoprazole    Tablet 40 milliGRAM(s) Oral before breakfast  PARoxetine 10 milliGRAM(s) Oral daily  remdesivir  IVPB 100 milliGRAM(s) IV Intermittent every 24 hours  remdesivir  IVPB   IV Intermittent   ursodiol Tablet 250 milliGRAM(s) Oral two times a day    MEDICATIONS  (PRN):  acetaminophen   Tablet .. 650 milliGRAM(s) Oral every 8 hours PRN Temp greater or equal to 38.5C (101.3F)  acetaminophen   Tablet .. 650 milliGRAM(s) Oral every 6 hours PRN Moderate Pain (4 - 6)    Vital Signs Last 24 Hrs  T(C): 37.7 (03 Oct 2020 05:20), Max: 37.7 (03 Oct 2020 05:20)  T(F): 99.8 (03 Oct 2020 05:20), Max: 99.8 (03 Oct 2020 05:20)  HR: 91 (03 Oct 2020 05:20) (67 - 91)  BP: 142/73 (03 Oct 2020 05:20) (102/59 - 161/67)  BP(mean): 82 (02 Oct 2020 15:40) (79 - 87)  RR: 19 (03 Oct 2020 05:20) (18 - 26)  SpO2: 94% (03 Oct 2020 05:20) (93% - 100%)      10-02 @ 07:01  -  10-03 @ 07:00  --------------------------------------------------------  IN: 240 mL / OUT: 500 mL / NET: -260 mL    LABS:                        12.4   8.16  )-----------( 302      ( 02 Oct 2020 11:19 )             38.3     10-02    137  |  101  |  15  ----------------------------<  107<H>  3.6   |  19<L>  |  0.59    Ca    8.7      02 Oct 2020 11:19    TPro  7.2  /  Alb  3.7  /  TBili  0.3  /  DBili  x   /  AST  55<H>  /  ALT  33  /  AlkPhos  118  10-02    Procalcitonin, Serum: 0.09 ng/mL (10-02-20 @ 11:19)    CULTURES:  Culture Results:   Growth in anaerobic bottle: Gram Positive Cocci in Clusters  Growth in aerobic bottle: Gram Positive Cocci in Clusters  "Due to technical problems, Proteus sp. will Not be reported as part of  the BCID panel until further notice"  ***Blood Panel PCR results on this specimen are available  approximately 3 hours after the Gram stain result.***  Gram stain, PCR, and/or culture results may not always  correspond due to difference in methodologies.  ************************************************************  This PCR assay was performed using [employer].  The following targets are tested for: Enterococcus,  vancomycin resistant enterococci, Listeria monocytogenes,  coagulase negative staphylococci, S. aureus,  methicillin resistant S. aureus, Streptococcus agalactiae  (Group B), S. pneumoniae, S. pyogenes (Group A),  Acinetobacter baumannii, Enterobacter cloacae, E. coli,  Klebsiella oxytoca, K. pneumoniae, Proteus sp.,  Serratia marcescens, Haemophilus influenzae,  Neisseria meningitidis, Pseudomonas aeruginosa, Candida  albicans, C. glabrata, C krusei, C parapsilosis,  C. tropicalis and the KPC resistance gene. (10-02 @ 14:52)  Culture Results:   Growth in aerobic bottle: Gram Positive Cocci in Clusters (10-02 @ 14:52)    Physical Examination:    General: Non toxic, mildy tachypneic on 1 liter nasal cannula with sat 92%.      HEENT: Pupils equal, reactive to light.  Symmetric.    PULM: Scattered basilar crackles, no wheeze    CVS: Regular rate and rhythm, no murmurs, rubs, or gallops    ABD: Soft, nondistended, nontender, normoactive bowel sounds, no masses    EXT: No edema, nontender    SKIN: Warm and well perfused, no rashes noted.    NEURO: Alert, oriented, interactive, nonfocal    RADIOLOGY REVIEWED PERSONALLY  CXR:    CT chest:    PROCEDURE DATE:  10/02/2020        INTERPRETATION:  EXAMINATION: CT ANGIO CHEST WITHOUT AND OR WITH IV CONTRAST    CLINICAL INDICATION: Dyspnea    TECHNIQUE: CTA of the chest was performed for evaluation of pulmonary embolism after administration of 90 ml of Omnipaque-350, 10 ml discarded.  MIP images were reconstructed.    COMPARISON: 10/27/2019.    FINDINGS:    PULMONARY ARTERIES: There is no pulmonary embolism    AIRWAYS AND LUNGS: The central tracheobronchial tree is patent.  Bilateral groundglass opacities and consolidations, predominantly peripherally    MEDIASTINUM AND PLEURA: There are no enlarged mediastinal, hilar or axillary lymph nodes. The visualized portion of the thyroid gland is unremarkable. There is no pleural effusion. There is no pneumothorax.    HEART AND VESSELS: There is mild cardiomegaly. There are atherosclerotic calcifications of the aorta and coronary arteries.  There is no pericardial effusion.    UPPER ABDOMEN: Images of the upper abdomen demonstrate no abnormality.    BONES AND SOFT TISSUES: Unchanged T12 vertebral plasty and L1 vertebral body severe compression deformity.  The soft tissues are unremarkable.    TUBES/LINES: None.    IMPRESSION:  No pulmonary embolism. Bilateral lung opacities may represent Covid-19.    TTE:

## 2020-10-03 NOTE — PROGRESS NOTE ADULT - ASSESSMENT
74 yo F w/ PMHx significant for recently diagnosed hyperthyroidism (3mo ago, on Methimazole), rheumatoid arthritis (on MTX weekly, Remicade [id number], Hydroxychloroquine, and daily steroids w/ Prednisone 10mg daily), HTN (on multiple anti-hypertensive agents), s/p CVA w/o residual deficits, memory impairment, who presents from  after presenting from [facility] where she resides, w/ c/o nonproductive cough over the past few days, and SOB, along w/ NBNB vomiting 3-4days ago and NB diarrhea beginning today, found to be COVID-19 positive.

## 2020-10-04 NOTE — PROGRESS NOTE ADULT - ASSESSMENT
Acute hypoxemic resp failure due to multilobar viral pneumonia  COVID 19 viral infection  CNS bacteremia: unclear significance  RA on immunosupressive regimen  Inflammatory markers largely stable    REC    Complete Remdesevir  Complete 5 day course decadron, re-assess  Continue DVT prophylaxis  Monitor O2 sats and titrate to maintain 90-92% with nasal oxygen

## 2020-10-04 NOTE — PROGRESS NOTE ADULT - SUBJECTIVE AND OBJECTIVE BOX
Patient is a 75y old  Female who presents with a chief complaint of COVID-19 PNA, acute respiratory failure (04 Oct 2020 12:57)      SUBJECTIVE / OVERNIGHT EVENTS:  feels okay  stable respiratory status  report loose stools  not watery  likely viral covid related.  no cp, no sob, no n/v, no abdominal pain.  no headache, no dizziness.       Vital Signs Last 24 Hrs  T(C): 36.8 (04 Oct 2020 12:30), Max: 36.9 (03 Oct 2020 20:50)  T(F): 98.3 (04 Oct 2020 12:30), Max: 98.4 (03 Oct 2020 20:50)  HR: 104 (04 Oct 2020 12:30) (99 - 104)  BP: 149/79 (04 Oct 2020 12:30) (129/80 - 149/79)  BP(mean): --  RR: 18 (04 Oct 2020 12:30) (18 - 18)  SpO2: 92% (04 Oct 2020 12:30) (92% - 92%)  I&O's Summary    03 Oct 2020 07:01  -  04 Oct 2020 07:00  --------------------------------------------------------  IN: 1020 mL / OUT: 0 mL / NET: 1020 mL    04 Oct 2020 07:01  -  04 Oct 2020 16:00  --------------------------------------------------------  IN: 250 mL / OUT: 0 mL / NET: 250 mL        PHYSICAL EXAM:  GENERAL: NAD, Comfortable, on nasal canula  HEAD:  Atraumatic, Normocephalic  EYES: EOMI, PERRLA, conjunctiva and sclera clear  NECK: Supple, No JVD  CHEST/LUNG: mild decrease breath sounds bilaterally; No wheeze   HEART: Regular rate and rhythm; No murmurs, rubs, or gallops  ABDOMEN: Soft, Nontender, Nondistended; Bowel sounds present  Neuro: AAO x 2-3, no focal deficit, 5/5 b/l extremities  EXTREMITIES:  2+ Peripheral Pulses, No clubbing, cyanosis, or edema  SKIN: No rashes or lesions    LABS:                        13.9   6.07  )-----------( 385      ( 04 Oct 2020 07:31 )             41.4     10-04    135  |  104  |  21  ----------------------------<  131<H>  3.6   |  15<L>  |  0.37<L>    Ca    9.3      04 Oct 2020 07:42    TPro  7.2  /  Alb  3.5  /  TBili  0.4  /  DBili  <0.1  /  AST  56<H>  /  ALT  30  /  AlkPhos  138<H>  10-04      CAPILLARY BLOOD GLUCOSE                RADIOLOGY & ADDITIONAL TESTS:    Imaging Personally Reviewed:  [x] YES  [ ] NO    Consultant(s) Notes Reviewed:  [x] YES  [ ] NO      MEDICATIONS  (STANDING):  aspirin  chewable 81 milliGRAM(s) Oral daily  cholecalciferol 2000 Unit(s) Oral daily  dexAMETHasone     Tablet 6 milliGRAM(s) Oral daily  enoxaparin Injectable 40 milliGRAM(s) SubCutaneous daily  hydroxychloroquine 400 milliGRAM(s) Oral <User Schedule>  methimazole 5 milliGRAM(s) Oral daily  pantoprazole    Tablet 40 milliGRAM(s) Oral before breakfast  PARoxetine 10 milliGRAM(s) Oral daily  remdesivir  IVPB   IV Intermittent   remdesivir  IVPB 100 milliGRAM(s) IV Intermittent every 24 hours  ursodiol Tablet 250 milliGRAM(s) Oral two times a day  vancomycin  IVPB 1000 milliGRAM(s) IV Intermittent every 24 hours    MEDICATIONS  (PRN):  acetaminophen   Tablet .. 650 milliGRAM(s) Oral every 8 hours PRN Temp greater or equal to 38.5C (101.3F)  acetaminophen   Tablet .. 650 milliGRAM(s) Oral every 6 hours PRN Moderate Pain (4 - 6)      Care Discussed with Consultants/Other Providers [x] YES  [ ] NO

## 2020-10-04 NOTE — PROGRESS NOTE ADULT - ASSESSMENT
76 yo F w/ PMHx significant for recently diagnosed hyperthyroidism (3mo ago, on Methimazole), rheumatoid arthritis (on MTX weekly, Remicade [id number], Hydroxychloroquine, and daily steroids w/ Prednisone 10mg daily), HTN (on multiple anti-hypertensive agents), s/p CVA w/o residual deficits, memory impairment, who presents from  after presenting from [facility] where she resides, w/ c/o nonproductive cough over the past few days, and SOB, along w/ NBNB vomiting 3-4days ago and NB diarrhea beginning today, found to be COVID-19 positive.

## 2020-10-04 NOTE — PROGRESS NOTE ADULT - SUBJECTIVE AND OBJECTIVE BOX
Infectious Diseases progress note:    Subjective: Events noted.  Afebrile.  Bcx growing 4/4 CoNS.  Satting 91% on 4L NC    ROS:  CONSTITUTIONAL:  No fever, chills, rigors  CARDIOVASCULAR:  No chest pain or palpitations  RESPIRATORY:   No SOB, cough, dyspnea on exertion.  No wheezing  GASTROINTESTINAL:  No abd pain, N/V, diarrhea/constipation  EXTREMITIES:  No swelling or joint pain  GENITOURINARY:  No burning on urination, increased frequency or urgency.  No flank pain  NEUROLOGIC:  No HA, visual disturbances  SKIN: No rashes    Allergies    Actonel (Hives)  clonidine (Other)  crab meat (Unknown)    Intolerances        ANTIBIOTICS/RELEVANT:  antimicrobials  hydroxychloroquine 400 milliGRAM(s) Oral <User Schedule>  remdesivir  IVPB   IV Intermittent   remdesivir  IVPB 100 milliGRAM(s) IV Intermittent every 24 hours  vancomycin  IVPB 1000 milliGRAM(s) IV Intermittent every 24 hours    immunologic:    OTHER:  acetaminophen   Tablet .. 650 milliGRAM(s) Oral every 8 hours PRN  acetaminophen   Tablet .. 650 milliGRAM(s) Oral every 6 hours PRN  aspirin  chewable 81 milliGRAM(s) Oral daily  cholecalciferol 2000 Unit(s) Oral daily  dexAMETHasone     Tablet 6 milliGRAM(s) Oral daily  enoxaparin Injectable 40 milliGRAM(s) SubCutaneous daily  labetalol 200 milliGRAM(s) Oral two times a day  methimazole 5 milliGRAM(s) Oral daily  pantoprazole    Tablet 40 milliGRAM(s) Oral before breakfast  PARoxetine 10 milliGRAM(s) Oral daily  ursodiol Tablet 250 milliGRAM(s) Oral two times a day      Objective:  Vital Signs Last 24 Hrs  T(C): 36.8 (04 Oct 2020 12:30), Max: 36.9 (03 Oct 2020 20:50)  T(F): 98.3 (04 Oct 2020 12:30), Max: 98.4 (03 Oct 2020 20:50)  HR: 108 (04 Oct 2020 17:51) (99 - 108)  BP: 123/85 (04 Oct 2020 17:51) (123/85 - 149/79)  BP(mean): --  RR: 18 (04 Oct 2020 12:30) (18 - 18)  SpO2: 91% (04 Oct 2020 18:16) (91% - 92%)    PHYSICAL EXAM:  Constitutional:NAD  Eyes:ALEJANDRO, EOMI  Ear/Nose/Throat: no thrush, mucositis.  Moist mucous membranes	  Neck:no JVD, no lymphadenopathy, supple  Respiratory: CTA gustabo  Cardiovascular: S1S2 RRR, no murmurs  Gastrointestinal:soft, nontender,  nondistended (+) BS  Extremities:no e/e/c  Skin:  no rashes, open wounds or ulcerations        LABS:                        13.9   6.07  )-----------( 385      ( 04 Oct 2020 07:31 )             41.4     10-04    135  |  104  |  21  ----------------------------<  131<H>  3.6   |  15<L>  |  0.37<L>    Ca    9.3      04 Oct 2020 07:42    TPro  7.2  /  Alb  3.5  /  TBili  0.4  /  DBili  <0.1  /  AST  56<H>  /  ALT  30  /  AlkPhos  138<H>  10-04          Procalcitonin, Serum: 0.08 (10-03 @ 11:12)  Procalcitonin, Serum: 0.09 (10-02 @ 11:19)                    MICROBIOLOGY:      Culture - Blood (10.02.20 @ 14:52)   Gram Stain:   Growth in aerobic bottle: Gram Positive Cocci in Clusters   Growth in anaerobic bottle: Gram Positive Cocci in Clusters   Specimen Source: .Blood Blood-Peripheral   Culture Results:   Growth in aerobic and anaerobic bottles: Staphylococcus epidermidis   "Susceptibilities not performed"     Culture - Blood (10.02.20 @ 14:52)   Gram Stain:   Growth in aerobic bottle: Gram Positive Cocci in Clusters   Growth in anaerobic bottle: Gram Positive Cocci in Clusters   - Coagulase negative Staphylococcus: Detec   Specimen Source: .Blood Blood-Peripheral   Organism: Blood Culture PCR   Culture Results:   Growth in aerobic and anaerobic bottles: Staphylococcus epidermidis   Susceptibility to follow.   "Due to technical problems, Proteus sp. will Not be reported as part of   the BCID panel until further notice"   ***Blood Panel PCR results on this specimen are available   approximately 3 hours after the Gram stain result.***   Gram stain, PCR, and/or culture results may not always   correspond due to difference in methodologies.   ************************************************************   This PCR assay was performed using [employer].   The following targets are tested for: Enterococcus,   vancomycin resistant enterococci, Listeria monocytogenes,   coagulase negative staphylococci, S. aureus,   methicillin resistant S. aureus, Streptococcus agalactiae   (Group B), S. pneumoniae, S. pyogenes (Group A),   Acinetobacter baumannii, Enterobacter cloacae, E. coli,   Klebsiella oxytoca, K. pneumoniae, Proteus sp.,   Serratia marcescens, Haemophilus influenzae,   Neisseria meningitidis, Pseudomonas aeruginosa, Candida   albicans, C. glabrata, C krusei, C parapsilosis,   C. tropicalis and the KPC resistance gene.   Organism Identification: Blood Culture PCR   Method Type: PCR     RADIOLOGY & ADDITIONAL STUDIES:

## 2020-10-04 NOTE — PROGRESS NOTE ADULT - PROBLEM SELECTOR PLAN 2
- COVID19 PCR detected    - supportive management including for fever/cough  - monitor respiratory status closely and serial inflammatory markers to monitor for severity progression  - maintain oxygenation and monitoring as above  - Lovenox 40mg SubQ for high risk of microthrombi a/w COVID-19  - c/w Remdesivir under EUA as Spo2 < 94% on RA (pt requiring O2 supp via NC currently), eGFR = 90 (>30), ALT = 33 (WNL), and pt does not meet exclusion criteria -- given weight = 49.9kg, dose: 200mg IV x1 followed by 100mg IV Q24 daily x4days; discussed fact sheet extensively with patient's  via telephone  - will also initiate steroids: as patient on daily Prednisone and received Solucortef 100mg IV x1 in ED at 1:40pm, will initiate Dexamethasone 6mg daily at 11pm (Solucortef should wear off in 8-10hours) tonight x4days  - blood cultures 4/4 bottles positive. will empirically treat with Vanco  - TTE to r/o endocarditis  - will not continued Azithromycin as pt on chronic home Hydroxychloroquine for RA as below, and risk of Qtc prolongation

## 2020-10-04 NOTE — PROGRESS NOTE ADULT - PROBLEM SELECTOR PLAN 1
- maintain SpO2 > 92%, pt on NC currently  - wean as tolerated, or if needed progress from NC to NRB (avoid BiLevel given covid status)  - Diarrhea likely due to COVID19. if more watery, will consider c.diff PCR  - Cogas neg strep bacteremia, 4/4, on empiric vanco. TTE to r/o endocarditis

## 2020-10-04 NOTE — PROGRESS NOTE ADULT - SUBJECTIVE AND OBJECTIVE BOX
Follow-up Pulm Progress Note  Gordon Fox MD  [phone]    Afebrile on Vancomycin for 4/4 blood cultures + for CNS - possible pathogenic  Stable repsiratory status on 1-2 liters nasal cannula    Medications:  Vital Signs Last 24 Hrs  T(C): 36.8 (04 Oct 2020 12:30), Max: 36.9 (03 Oct 2020 20:50)  T(F): 98.3 (04 Oct 2020 12:30), Max: 98.4 (03 Oct 2020 20:50)  HR: 104 (04 Oct 2020 12:30) (99 - 104)  BP: 149/79 (04 Oct 2020 12:30) (129/80 - 149/79)  BP(mean): --  RR: 18 (04 Oct 2020 12:30) (18 - 18)  SpO2: 92% (04 Oct 2020 12:30) (92% - 92%)      10-03 @ 07:01  -  10-04 @ 07:00  --------------------------------------------------------  IN: 1020 mL / OUT: 0 mL / NET: 1020 mL    LABS:                        13.9   6.07  )-----------( 385      ( 04 Oct 2020 07:31 )             41.4     10-04    135  |  104  |  21  ----------------------------<  131<H>  3.6   |  15<L>  |  0.37<L>    Ca    9.3      04 Oct 2020 07:42    TPro  7.2  /  Alb  3.5  /  TBili  0.4  /  DBili  <0.1  /  AST  56<H>  /  ALT  30  /  AlkPhos  138<H>  10-04        Procalcitonin, Serum: 0.08 ng/mL (10-03-20 @ 11:12)  Procalcitonin, Serum: 0.09 ng/mL (10-02-20 @ 11:19)        CULTURES:  Culture Results:   Growth in aerobic and anaerobic bottles: Staphylococcus epidermidis  Susceptibility to follow.  "Due to technical problems, Proteus sp. will Not be reported as part of  the BCID panel until further notice"  ***Blood Panel PCR results on this specimen are available  approximately 3 hours after the Gram stain result.***  Gram stain, PCR, and/or culture results may not always  correspond due to difference in methodologies.  ************************************************************  This PCR assay was performed using [employer].  The following targets are tested for: Enterococcus,  vancomycin resistant enterococci, Listeria monocytogenes,  coagulase negative staphylococci, S. aureus,  methicillin resistant S. aureus, Streptococcus agalactiae  (Group B), S. pneumoniae, S. pyogenes (Group A),  Acinetobacter baumannii, Enterobacter cloacae, E. coli,  Klebsiella oxytoca, K. pneumoniae, Proteus sp.,  Serratia marcescens, Haemophilus influenzae,  Neisseria meningitidis, Pseudomonas aeruginosa, Candida  albicans, C. glabrata, C krusei, C parapsilosis,  C. tropicalis and the KPC resistance gene. (10-02 @ 14:52)  Culture Results:   Growth in aerobic and anaerobic bottles: Staphylococcus epidermidis  "Susceptibilities not performed" (10-02 @ 14:52)    Most recent blood culture -- 10-02 @ 14:52   Blood Culture PCR Blood Culture PCR .Blood Blood-Peripheral 10-02 @ 14:52      Physical Examination:  PULM:   CVS: Regular rate and rhythm, no murmurs, rubs, or gallops  ABD: Soft, non-tender  EXT:  No clubbing, cyanosis, or edema    RADIOLOGY REVIEWED  CXR:    CT chest:    TTE:

## 2020-10-04 NOTE — PROGRESS NOTE ADULT - PROBLEM SELECTOR PLAN 4
- at home, patient on Lisinopril 10mg daily, Labetalol 200mg BID, and Amlodipine 10mg daily  - holding all antihypertensive agents on admission given presenting hypotension in setting of above infection; close monitoring of hemodynamics w/ resumption of agents as required w/ clinical improvement  - bp better, resumed home Labetalol 200 mg BID with hold parameters  - holding home Tekturn[employer] as well, not on formulary  - will resume BP meds as needed.

## 2020-10-04 NOTE — PROGRESS NOTE ADULT - ASSESSMENT
75yoF w/ PMHx significant for recently diagnosed hyperthyroidism (3mo ago, on Methimazole), rheumatoid arthritis (on MTX weekly, Remicade [id number], Hydroxychloroquine, and daily steroids w/ Prednisone 10mg daily), HTN (on multiple anti-hypertensive agents), s/p CVA w/o residual deficits, memory impairment, who presents from  after presenting from [facility] where she resides, w/ c/o nonproductive cough over the past few days, and SOB, along w/ NBNB vomiting 3-4days ago and NB diarrhea beginning today, found to be COVID-19 positive.     ED Presenting Vitals: 99F, 71bpm, 82/49 --> 10[birth year], 24br/m, 92% on RA --> 100% on 2L via NC  ED Course: s/p NS 500cc x1, Solucortef 100mg IVP x1, IV Ceftriaxone 1g x1, IV Azithromycin 500mg x1; BCx/UCx sent by ED (02 Oct 2020 17:12)    CT angiogram performed, findings s/o Covid-19 pna.  No PE.  , CRP 3.8, Ferritin 4710.  PCT 0.09.  Lact 3.6 --> 1.0.  LFTs mild elevation.  Pt started on remdesivir, Lovenox Qdaily, dexamethasone.  On cefepime.  s/p vanco x 1 and azithro x 1.  BCX CoNS 4/4 bottles.      ID consulted for further recommendations.     Covid-19 Pna:    - Covid-19 pcr (+), CT angio chest with peripheral opacities s/o covid lung changes.    Will d/c cefepime, pt w/o productive sputum, no fever.      , CRP 3.8, Ferritin 4710 on admission.      - Cont remdesivir x 5 day course.  Monitor daily LFTs and renal function.  CrCl >30.  LFTs < 5x ULN    - Cont dexamethasone x 5 days.     - On lovenox subq Qdaily    - Cont to monitor pulse ox, cont supp O2 to maintain oxygenation 92 to 96%.    - Check repeat inflammatory markers.  DD:  294 <-- 259 <-- 260  Ferritin: 6426 <-- 5573 <-- 4710  CRP:  2.0 <-- 3.84 <-- 3.44      CoNS bacteremia:    - 4/4 bottles CoNS/staph epidermidis.  ?contaminant, given all 4 bottles (+) will treat.  Cont vancomycin.  Check trough    - Check repeat bcx x 2 (sent 10/4)    - Consider echocardiogram.  No evidence for skin infection, no presence of long term indwelling lines, no h/o hardware.  Pt on remicade and methotrexate and likely with underlying immune compromised state.   Will cont broad spectrum abx to treat bacteremia  until further culture results available.       Will follow,    Marisela Bradley  [phone]

## 2020-10-05 NOTE — PHARMACOTHERAPY INTERVENTION NOTE - COMMENTS
Patient currently on vancomycin 1000 mg q24h (10/3-) for CONs bacteremia (estimated trough on regimen ~15.1). Level prior to third dose today = 6.1. As drug not yet at steady state, recommend to obtain vancomycin trough prior to 4th dose tomorrow AM @11:00 (ordered).     Vesna Fontanez, Pharm.D., [facility]  Clinical Pharmacy Coordinator  [phone]

## 2020-10-05 NOTE — PROGRESS NOTE ADULT - SUBJECTIVE AND OBJECTIVE BOX
Patient is a 75y old  Female who presents with a chief complaint of COVID-19 PNA, acute respiratory failure (05 Oct 2020 12:41)      SUBJECTIVE / OVERNIGHT EVENTS:  No overnight events.  No cp/sob/n/v.  No HA/dizziness.  No abdominal pain.   report lose stools      Vital Signs Last 24 Hrs  T(C): 36.4 (05 Oct 2020 10:59), Max: 37.6 (05 Oct 2020 08:22)  T(F): 97.5 (05 Oct 2020 10:59), Max: 99.7 (05 Oct 2020 08:22)  HR: 74 (05 Oct 2020 17:38) (68 - 88)  BP: 134/76 (05 Oct 2020 17:38) (107/71 - 134/76)  BP(mean): --  RR: 18 (05 Oct 2020 17:38) (18 - 18)  SpO2: 92% (05 Oct 2020 17:38) (90% - 93%)  I&O's Summary    04 Oct 2020 07:01  -  05 Oct 2020 07:00  --------------------------------------------------------  IN: 860 mL / OUT: 0 mL / NET: 860 mL    05 Oct 2020 07:01  -  05 Oct 2020 20:38  --------------------------------------------------------  IN: 1020 mL / OUT: 0 mL / NET: 1020 mL        PHYSICAL EXAM:  GENERAL: NAD, Comfortable, on nasal canula  HEAD:  Atraumatic, Normocephalic  EYES: EOMI, PERRLA, conjunctiva and sclera clear  NECK: Supple, No JVD  CHEST/LUNG: mild decrease breath sounds bilaterally; No wheeze   HEART: Regular rate and rhythm; No murmurs, rubs, or gallops  ABDOMEN: Soft, Nontender, Nondistended; Bowel sounds present  Neuro: AAO x 2-3, no focal deficit, 5/5 b/l extremities  EXTREMITIES:  2+ Peripheral Pulses, No clubbing, cyanosis, or edema  SKIN: No rashes or lesions    LABS:                        13.2   12.14 )-----------( 426      ( 05 Oct 2020 06:05 )             37.8     10-05    139  |  106  |  27<H>  ----------------------------<  143<H>  3.2<L>   |  16<L>  |  0.46<L>    Ca    8.7      05 Oct 2020 06:08    TPro  6.4  /  Alb  3.3  /  TBili  0.3  /  DBili  <0.1  /  AST  45<H>  /  ALT  23  /  AlkPhos  115  10-05      CAPILLARY BLOOD GLUCOSE                RADIOLOGY & ADDITIONAL TESTS:    Imaging Personally Reviewed:  [x] YES  [ ] NO    Consultant(s) Notes Reviewed:  [x] YES  [ ] NO      MEDICATIONS  (STANDING):  aspirin  chewable 81 milliGRAM(s) Oral daily  cholecalciferol 2000 Unit(s) Oral daily  dexAMETHasone     Tablet 6 milliGRAM(s) Oral daily  enoxaparin Injectable 40 milliGRAM(s) SubCutaneous daily  hydroxychloroquine 400 milliGRAM(s) Oral <User Schedule>  labetalol 200 milliGRAM(s) Oral two times a day  methimazole 5 milliGRAM(s) Oral daily  pantoprazole    Tablet 40 milliGRAM(s) Oral before breakfast  PARoxetine 10 milliGRAM(s) Oral daily  potassium chloride    Tablet ER 20 milliEquivalent(s) Oral every 2 hours  remdesivir  IVPB   IV Intermittent   remdesivir  IVPB 100 milliGRAM(s) IV Intermittent every 24 hours  ursodiol Tablet 250 milliGRAM(s) Oral two times a day  vancomycin  IVPB 1000 milliGRAM(s) IV Intermittent every 24 hours    MEDICATIONS  (PRN):  acetaminophen   Tablet .. 650 milliGRAM(s) Oral every 8 hours PRN Temp greater or equal to 38.5C (101.3F)  acetaminophen   Tablet .. 650 milliGRAM(s) Oral every 6 hours PRN Moderate Pain (4 - 6)      Care Discussed with Consultants/Other Providers [x] YES  [ ] NO

## 2020-10-05 NOTE — PROGRESS NOTE ADULT - PROBLEM SELECTOR PLAN 4
on immunosuppressive therapy - methotrexate weekly, Remicade every 6 weeks, hydroxychloroquine and daily steroids

## 2020-10-05 NOTE — PROGRESS NOTE ADULT - PROBLEM SELECTOR PLAN 1
COVID-19 PCR positive  CTA chest with peripheral opacities consistent with COVID lung changes  Inflammatory markers elevated but stable    Continue on Remdesivir for 5 day course  Continue on Dexamethasone for 5 day course  On lovenox subq daily  Continue to monitor pulse ox and cont supplemental O2 for O2 90-92%  Continue to follow inflammatory markers   Pulmonary following

## 2020-10-05 NOTE — PROGRESS NOTE ADULT - SUBJECTIVE AND OBJECTIVE BOX
Follow-up Pulm Progress Note  Gordon Fox MD  [phone]    Afebrile on Vancomycin, remedesevir, decadron  Increasing nasal cannula oxgyen requirements noted: patient now on 5 liters with O2 sats in mid 90's  Respiratory status, howver, stable: breathing without c/o dyspnea, no tachypnea. Primarily c/o cough.    Vital Signs Last 24 Hrs  T(C): 36.4 (05 Oct 2020 10:59), Max: 37.6 (05 Oct 2020 08:22)  T(F): 97.5 (05 Oct 2020 10:59), Max: 99.7 (05 Oct 2020 08:22)  HR: 68 (05 Oct 2020 10:59) (68 - 108)  BP: 123/72 (05 Oct 2020 10:59) (107/71 - 149/79)  BP(mean): --  RR: 18 (05 Oct 2020 10:59) (18 - 18)  SpO2: 90% (05 Oct 2020 10:59) (90% - 93%)                          13.2   12.14 )-----------( 426      ( 05 Oct 2020 06:05 )             37.8     10-05    139  |  106  |  27<H>  ----------------------------<  143<H>  3.2<L>   |  16<L>  |  0.46<L>    Ca    8.7      05 Oct 2020 06:08    TPro  6.4  /  Alb  3.3  /  TBili  0.3  /  DBili  <0.1  /  AST  45<H>  /  ALT  23  /  AlkPhos  115  10-05      CULTURES:  Culture Results:   Growth in aerobic and anaerobic bottles: Staphylococcus epidermidis  Susceptibility to follow.  "Due to technical problems, Proteus sp. will Not be reported as part of  the BCID panel until further notice"  ***Blood Panel PCR results on this specimen are available  approximately 3 hours after the Gram stain result.***  Gram stain, PCR, and/or culture results may not always  correspond due to difference in methodologies.  ************************************************************  This PCR assay was performed using [employer].  The following targets are tested for: Enterococcus,  vancomycin resistant enterococci, Listeria monocytogenes,  coagulase negative staphylococci, S. aureus,  methicillin resistant S. aureus, Streptococcus agalactiae  (Group B), S. pneumoniae, S. pyogenes (Group A),  Acinetobacter baumannii, Enterobacter cloacae, E. coli,  Klebsiella oxytoca, K. pneumoniae, Proteus sp.,  Serratia marcescens, Haemophilus influenzae,  Neisseria meningitidis, Pseudomonas aeruginosa, Candida  albicans, C. glabrata, C krusei, C parapsilosis,  C. tropicalis and the KPC resistance gene. (10-02 @ 14:52)  Culture Results:   Growth in aerobic and anaerobic bottles: Staphylococcus epidermidis  "Susceptibilities not performed" (10-02 @ 14:52)    Most recent blood culture -- 10-02 @ 14:52   Blood Culture PCR Blood Culture PCR .Blood Blood-Peripheral 10-02 @ 14:52      Physical Examination:  PULM: Few scattered basilar crackles; no wheeze  CVS: Regular rate and rhythm, no murmurs, rubs, or gallops  ABD: Soft, non-tender  EXT:  No clubbing, cyanosis, or edema    RADIOLOGY REVIEWED  CXR:    CT chest:    PROCEDURE DATE:  10/02/2020        INTERPRETATION:  EXAMINATION: CT ANGIO CHEST WITHOUT AND OR WITH IV CONTRAST    CLINICAL INDICATION: Dyspnea    TECHNIQUE: CTA of the chest was performed for evaluation of pulmonary embolism after administration of 90 ml of Omnipaque-350, 10 ml discarded.  MIP images were reconstructed.    COMPARISON: 10/27/2019.    FINDINGS:    PULMONARY ARTERIES: There is no pulmonary embolism    AIRWAYS AND LUNGS: The central tracheobronchial tree is patent.  Bilateral groundglass opacities and consolidations, predominantly peripherally    MEDIASTINUM AND PLEURA: There are no enlarged mediastinal, hilar or axillary lymph nodes. The visualized portion of the thyroid gland is unremarkable. There is no pleural effusion. There is no pneumothorax.    HEART AND VESSELS: There is mild cardiomegaly. There are atherosclerotic calcifications of the aorta and coronary arteries.  There is no pericardial effusion.    UPPER ABDOMEN: Images of the upper abdomen demonstrate no abnormality.    BONES AND SOFT TISSUES: Unchanged T12 vertebral plasty and L1 vertebral body severe compression deformity.  The soft tissues are unremarkable.    TUBES/LINES: None.    IMPRESSION:  No pulmonary embolism. Bilateral lung opacities may represent Covid-19.    TTE:

## 2020-10-05 NOTE — PROGRESS NOTE ADULT - PROBLEM SELECTOR PLAN 2
- COVID19 PCR detected    - supportive management including for fever/cough  - monitor respiratory status closely and serial inflammatory markers to monitor for severity progression  - maintain oxygenation and monitoring as above  - Lovenox 40mg SubQ for high risk of microthrombi a/w COVID-19  - c/w Remdesivir under EUA as Spo2 < 94% on RA (pt requiring O2 supp via NC currently), eGFR = 90 (>30), ALT = 33 (WNL), and pt does not meet exclusion criteria -- given weight = 49.9kg, dose: 200mg IV x1 followed by 100mg IV Q24 daily x4days; discussed fact sheet extensively with patient's  via telephone  - will also initiate steroids: as patient on daily Prednisone and received Solucortef 100mg IV x1 in ED at 1:40pm, will initiate Dexamethasone 6mg daily at 11pm (Solucortef should wear off in 8-10hours) tonight x4days  - blood cultures 4/4 bottles positive. will empirically treat with Vanco  (will likely switched once sensitivities available)   - TTE to r/o endocarditis  - will not continued Azithromycin as pt on chronic home Hydroxychloroquine for RA as below, and risk of Qtc prolongation  - check inflammatory markers in am, if remain high, will prolong course of steroids

## 2020-10-05 NOTE — PROGRESS NOTE ADULT - ASSESSMENT
74 yo F w/ PMHx significant for recently diagnosed hyperthyroidism (3mo ago, on Methimazole), rheumatoid arthritis (on MTX weekly, Remicade [id number], Hydroxychloroquine, and daily steroids w/ Prednisone 10mg daily), HTN (on multiple anti-hypertensive agents), s/p CVA w/o residual deficits, memory impairment, who presents from  after presenting from [facility] where she resides, w/ c/o nonproductive cough over the past few days, and SOB, along w/ NBNB vomiting 3-4days ago and NB diarrhea beginning today, found to be COVID-19 positive.

## 2020-10-05 NOTE — PROGRESS NOTE ADULT - PROBLEM SELECTOR PLAN 2
CoNS - Staph epi - grew in both sets of blood cultures - ?contaminant - unclear if drawn from 2 separate venipunctures vs true bacteremia  No evidence of skin infection, long term indwelling lines, or hardware.     Repeat blood cultures NGTD  Follow up sensitivities for S. epi  Follow for 2D echo  Continue on vancomycin for now  Monitor vancomycin trough level, goal 15-20

## 2020-10-05 NOTE — PROGRESS NOTE ADULT - ASSESSMENT
Patient is a 75 year old female with PMH of significant for recently diagnosed hyperthyroidism (3mo ago, on Methimazole), RA (on MTX weekly, Remicade E4sdada, Hydroxychloroquine, and daily steroids w/ Prednisone 10mg daily), HTN, CVA, memory impairment who presented from [facility] with nonproductive cough and dyspnea with nausea and diarrhea and was found to be COVID-19 positive.

## 2020-10-05 NOTE — PROGRESS NOTE ADULT - SUBJECTIVE AND OBJECTIVE BOX
[facility], Division of Infectious Diseases  ROSALINA Cali Y. Patel, S. Shah  [phone]  ([phone] - weekdays after 5pm and weekends)    Name: TARA ESPINAL  Age: 75y  Gender: Female  MRN: [id number]    Interval History:  Notes reviewed.  Patient reports intermittent cough with sputum sometimes, mild pleuritic chest pain, no dyspnea.   Denies fever, chills, abdominal pain, n/v/d, dysuria.   Remains afebrile. Remains on nasal cannula now at 5L/min with saturation in mid 90s.   ROS reviewed, pertinent positives and negatives as above.     Allergies: Actonel (Hives)  clonidine (Other)  crab meat (Unknown)    Objective:  Vitals:   T(F): 97.5 (10-05-20 @ 10:59), Max: 99.7 (10-05-20 @ 08:22)  HR: 68 (10-05-20 @ 10:59) (68 - 108)  BP: 123/72 (10-05-20 @ 10:59) (107/71 - 123/85)  RR: 18 (10-05-20 @ 10:59) (18 - 18)  SpO2: 90% (10-05-20 @ 10:59) (90% - 93%)    Physical Examination:  General: no acute distress  HEENT: NC/AT, EOMI, anicteric, neck supple  Cardio: S1, S2 heard, RRR, no murmurs  Resp: breath sounds heard bilaterally, few crackles at bases, no wheezes  Abd: soft, NT, ND, + bowel sounds  Neuro: AAOx3, no obvious focal deficits  Ext: no edema, moving extremities  Skin: warm, dry, no visible rash  Lines: PIV    Laboratory Studies:  CBC:                       13.2   12.14 )-----------( 426      ( 05 Oct 2020 06:05 )             37.8     CMP: 10-05    139  |  106  |  27<H>  ----------------------------<  143<H>  3.2<L>   |  16<L>  |  0.46<L>    Ca    8.7      05 Oct 2020 06:08    TPro  6.4  /  Alb  3.3  /  TBili  0.3  /  DBili  <0.1  /  AST  45<H>  /  ALT  23  /  AlkPhos  115  10-05    DDimer:  294 <-- 259 <-- 260  Ferritin: 6426 <-- 5573 <-- 4710  CRP:  2.0 <-- 3.84 <-- 3.44    LIVER FUNCTIONS - ( 05 Oct 2020 06:08 )  Alb: 3.3 g/dL / Pro: 6.4 g/dL / ALK PHOS: 115 U/L / ALT: 23 U/L / AST: 45 U/L / GGT: x           Microbiology:  10/4 - blood cultures - NGTD  10/3 - COVID ab - negative   10/2 - blood cultures - 2/2 - CoNS- S. epidermidis  10/2 - COVID-19 PCR - positive    Radiology:  CT Angio Chest w/ IV Cont (10.02.20 @ 13:30) > IMPRESSION: No pulmonary embolism. Bilateral lung opacities may represent Covid-19.    Xray Chest 1 View-PORTABLE IMMEDIATE (10.02.20 @ 11:26) > IMPRESSION: Mid left lung infiltrate.    Medications:  acetaminophen   Tablet .. 650 milliGRAM(s) Oral every 8 hours PRN  acetaminophen   Tablet .. 650 milliGRAM(s) Oral every 6 hours PRN  aspirin  chewable 81 milliGRAM(s) Oral daily  cholecalciferol 2000 Unit(s) Oral daily  dexAMETHasone     Tablet 6 milliGRAM(s) Oral daily  enoxaparin Injectable 40 milliGRAM(s) SubCutaneous daily  hydroxychloroquine 400 milliGRAM(s) Oral <User Schedule>  labetalol 200 milliGRAM(s) Oral two times a day  methimazole 5 milliGRAM(s) Oral daily  pantoprazole    Tablet 40 milliGRAM(s) Oral before breakfast  PARoxetine 10 milliGRAM(s) Oral daily  remdesivir  IVPB   IV Intermittent   remdesivir  IVPB 100 milliGRAM(s) IV Intermittent every 24 hours  ursodiol Tablet 250 milliGRAM(s) Oral two times a day  vancomycin  IVPB 1000 milliGRAM(s) IV Intermittent every 24 hours    Antimicrobials:  hydroxychloroquine 400 milliGRAM(s) Oral <User Schedule>  remdesivir  IVPB 100 milliGRAM(s) IV Intermittent every 24 hours  vancomycin  IVPB 1000 milliGRAM(s) IV Intermittent every 24 hours

## 2020-10-05 NOTE — PROGRESS NOTE ADULT - PROBLEM SELECTOR PLAN 3
secondary to COVID-19 pneumonia  On NC 5L/min, has some cough with no dyspnea or tachypnea  Pulmonary following, repeat chest x-ray ordered today

## 2020-10-05 NOTE — PROGRESS NOTE ADULT - ASSESSMENT
Acute hypoxemic resp failure due to multilobar viral pneumonia  COVID 19 viral infection  CNS bacteremia: unclear significance  RA on immunosupressive regimen  Inflammatory markers largely stable  10/5: Clinically stable, no tachypnea; howver increase in nasal O2 to 5 liters noted. Uptrending inflammatory markers noted    REC    Complete Remdesevir  Complete 5 day course decadron, re-assess  Continue DVT prophylaxis  Monitor O2 sats and titrate to maintain 90-92% with nasal oxygen   CXR ordered today  If inflammatory markers still increase associated with dteriorating oxygenation, will consider continuation of steroids.

## 2020-10-06 NOTE — PROGRESS NOTE ADULT - PROBLEM SELECTOR PLAN 1
- maintain SpO2 > 92%, pt on NC currently  - wean as tolerated, or if needed progress from NC to NRB (avoid BiLevel given covid status)  - Diarrhea likely due to COVID19. if more watery, will consider c.diff PCR  - Cogas neg strep bacteremia, 4/4, s/p empiric vanco. TTE to r/o endocarditis  - pansensitive, switched to IV Ancef. repeat cultures neg  - monitor inflammatory markers, if remain high, will prolong course of steroids

## 2020-10-06 NOTE — PROGRESS NOTE ADULT - PROBLEM SELECTOR PLAN 2
CoNS - Staph epi - grew in both sets of blood cultures - ?contaminant - unclear if drawn from 2 separate venipunctures vs true bacteremia  No evidence of skin infection, long term indwelling lines, or hardware.     Repeat blood cultures - NGTD  Follow for 2D echo, ordered  Discontinue vancomycin  Start on Cefazolin 2g IV Q8h - tentatively plan for 10 day course given TTE negative

## 2020-10-06 NOTE — PROGRESS NOTE ADULT - PROBLEM SELECTOR PLAN 4
- at home, patient on Lisinopril 10mg daily, Labetalol 200mg BID, and Amlodipine 10mg daily  - holding all antihypertensive agents on admission given presenting hypotension in setting of above infection; close monitoring of hemodynamics w/ resumption of agents as required w/ clinical improvement  - bp better, resumed home Labetalol 200 mg BID with hold parameters  - holding home Tekturna as well, not on formulary  - resume BP meds as needed.

## 2020-10-06 NOTE — PROGRESS NOTE ADULT - ASSESSMENT
Acute hypoxemic resp failure due to multilobar viral pneumonia  COVID 19 viral infection  CNS bacteremia: unclear significance  RA on immunosupressive regimen  Inflammatory markers largely stable  10/5: Clinically stable, Mild tachypnea; howver increase in nasal O2 to 5 liters noted. Uptrending inflammatory markers noted  10/6:  CXR progressed vs. admission; still requires 5 liters nasal cannula with sat 91%; Ferritin decreased    REC    Complete Remdesevir  Would continue Decadron 10/7 if oxygen requirement still 5 liters or increasing  Continue DVT prophylaxis  Monitor O2 sats and titrate to maintain 90-92% with nasal oxygen

## 2020-10-06 NOTE — PROGRESS NOTE ADULT - PROBLEM SELECTOR PLAN 2
- COVID19 PCR detected    - supportive management including for fever/cough  - monitor respiratory status closely and serial inflammatory markers to monitor for severity progression  - maintain oxygenation and monitoring as above  - Lovenox 40mg SubQ for high risk of microthrombi a/w COVID-19  - c/w Remdesivir under EUA as Spo2 < 94% on RA (pt requiring O2 supp via NC currently), eGFR = 90 (>30), ALT = 33 (WNL), and pt does not meet exclusion criteria -- given weight = 49.9kg, dose: 200mg IV x1 followed by 100mg IV Q24 daily x4days; discussed fact sheet extensively with patient's  via telephone  - also initiated steroids: as patient on daily Prednisone and received Solucortef 100mg IV x1 in ED at 1:40pm, will initiate Dexamethasone 6mg daily at 11pm (Solucortef should wear off in 8-10hours) tonight x4days  - blood cultures 4/4 bottles positive. see above for treatement  - TTE r/o endocarditis  - stopped Azithromycin as pt on chronic home Hydroxychloroquine for RA as below, and risk of Qtc prolongation

## 2020-10-06 NOTE — PROGRESS NOTE ADULT - SUBJECTIVE AND OBJECTIVE BOX
Follow-up Pulm Progress Note  Gordon Fox MD  [phone]    Afebrile on Cefazolin, remedesevir, decadron day #5  F/U CXR on 10/5: some increased prominance of L predom opcities  No change in resp status: mild tachypnea, O2 requirements still at 5 liters/min with O2 sats 91% today    Vital Signs Last 24 Hrs  T(C): 36.8 (06 Oct 2020 11:18), Max: 37.1 (05 Oct 2020 21:22)  T(F): 98.3 (06 Oct 2020 11:18), Max: 98.7 (05 Oct 2020 21:22)  HR: 66 (06 Oct 2020 11:18) (66 - 81)  BP: 135/72 (06 Oct 2020 11:18) (134/76 - 146/75)  BP(mean): --  RR: 18 (06 Oct 2020 11:18) (18 - 18)  SpO2: 91% (06 Oct 2020 11:18) (91% - 94%)                          11.1   19.53 )-----------( 410      ( 06 Oct 2020 05:52 )             32.5       10-06    137  |  109<H>  |  19  ----------------------------<  108<H>  3.9   |  17<L>  |  0.41<L>    Ca    8.5      06 Oct 2020 05:53    TPro  6.1  /  Alb  3.0<L>  /  TBili  0.4  /  DBili  0.1  /  AST  33  /  ALT  19  /  AlkPhos  102  10-06      CULTURES:  Culture Results:   Growth in aerobic and anaerobic bottles: Staphylococcus epidermidis  Susceptibility to follow.  "Due to technical problems, Proteus sp. will Not be reported as part of  the BCID panel until further notice"  ***Blood Panel PCR results on this specimen are available  approximately 3 hours after the Gram stain result.***  Gram stain, PCR, and/or culture results may not always  correspond due to difference in methodologies.  ************************************************************  This PCR assay was performed using [employer].  The following targets are tested for: Enterococcus,  vancomycin resistant enterococci, Listeria monocytogenes,  coagulase negative staphylococci, S. aureus,  methicillin resistant S. aureus, Streptococcus agalactiae  (Group B), S. pneumoniae, S. pyogenes (Group A),  Acinetobacter baumannii, Enterobacter cloacae, E. coli,  Klebsiella oxytoca, K. pneumoniae, Proteus sp.,  Serratia marcescens, Haemophilus influenzae,  Neisseria meningitidis, Pseudomonas aeruginosa, Candida  albicans, C. glabrata, C krusei, C parapsilosis,  C. tropicalis and the KPC resistance gene. (10-02 @ 14:52)  Culture Results:   Growth in aerobic and anaerobic bottles: Staphylococcus epidermidis  "Susceptibilities not performed" (10-02 @ 14:52)    Most recent blood culture -- 10-02 @ 14:52   Blood Culture PCR Blood Culture PCR .Blood Blood-Peripheral 10-02 @ 14:52      Physical Examination:  PULM: Few scattered basilar crackles; no wheeze  CVS: Regular rate and rhythm, no murmurs, rubs, or gallops  ABD: Soft, non-tender  EXT:  No clubbing, cyanosis, or edema    RADIOLOGY REVIEWED  CXR:    CT chest:    PROCEDURE DATE:  10/02/2020        INTERPRETATION:  EXAMINATION: CT ANGIO CHEST WITHOUT AND OR WITH IV CONTRAST    CLINICAL INDICATION: Dyspnea    TECHNIQUE: CTA of the chest was performed for evaluation of pulmonary embolism after administration of 90 ml of Omnipaque-350, 10 ml discarded.  MIP images were reconstructed.    COMPARISON: 10/27/2019.    FINDINGS:    PULMONARY ARTERIES: There is no pulmonary embolism    AIRWAYS AND LUNGS: The central tracheobronchial tree is patent.  Bilateral groundglass opacities and consolidations, predominantly peripherally    MEDIASTINUM AND PLEURA: There are no enlarged mediastinal, hilar or axillary lymph nodes. The visualized portion of the thyroid gland is unremarkable. There is no pleural effusion. There is no pneumothorax.    HEART AND VESSELS: There is mild cardiomegaly. There are atherosclerotic calcifications of the aorta and coronary arteries.  There is no pericardial effusion.    UPPER ABDOMEN: Images of the upper abdomen demonstrate no abnormality.    BONES AND SOFT TISSUES: Unchanged T12 vertebral plasty and L1 vertebral body severe compression deformity.  The soft tissues are unremarkable.    TUBES/LINES: None.    IMPRESSION:  No pulmonary embolism. Bilateral lung opacities may represent Covid-19.    TTE:

## 2020-10-06 NOTE — PROGRESS NOTE ADULT - PROBLEM SELECTOR PLAN 1
COVID-19 PCR positive  CTA chest with peripheral opacities consistent with COVID lung changes. CXR with no significant changes.   Inflammatory markers elevated, trended down.    Continue on Remdesivir for 5 day course - last day today  Continue on Dexamethasone for 5 day course, pulmonary following, will decide if course needs to be extended  On lovenox subq daily  Continue to monitor pulse ox and cont supplemental O2 for O2 90-92%

## 2020-10-06 NOTE — PROGRESS NOTE ADULT - SUBJECTIVE AND OBJECTIVE BOX
[facility], Division of Infectious Diseases  ROSALINA Cali Y. Patel, S. Shah  [phone]  ([phone] - weekdays after 5pm and weekends)    Name: TARA ESPINAL  Age: 75y  Gender: Female  MRN: [id number]    Interval History:  Patient remains afebrile, on nasal cannula, saturating 90-94%.   Intermittent cough with sputum at times, no dyspnea, fever, chills.   ROS reviewed, pertinent positives and negatives as above.   Notes reviewed.    Allergies: Actonel (Hives), clonidine (Other), crab meat (Unknown)    Objective:  T(F): 98.3 (10-06-20 @ 11:18), Max: 98.7 (10-05-20 @ 21:22)  HR: 66 (10-06-20 @ 11:18) (66 - 81)  BP: 135/72 (10-06-20 @ 11:18) (134/76 - 146/75)  RR: 18 (10-06-20 @ 11:18) (18 - 18)  SpO2: 91% (10-06-20 @ 11:18) (91% - 94%)    Physical Examination:  General: no acute distress, resting comfortably, on NC  HEENT: NC/AT, EOMI, anicteric, neck supple  Cardio: S1, S2 heard, RRR, no murmurs  Resp: clear to auscultation bilaterally  Abd: soft, NT, ND, + bowel sounds  Neuro: AAOx3, no obvious focal deficits  Ext: no edema, moving extremities  Skin: warm, dry, no visible rash  Lines: PIV    Laboratory Studies:  CBC:                       11.1   19.53 )-----------( 410      ( 06 Oct 2020 05:52 )             32.5     CMP: 10-06    137  |  109<H>  |  19  ----------------------------<  108<H>  3.9   |  17<L>  |  0.41<L>    Ca    8.5      06 Oct 2020 05:53    TPro  6.1  /  Alb  3.0<L>  /  TBili  0.4  /  DBili  0.1  /  AST  33  /  ALT  19  /  AlkPhos  102  10-06    LIVER FUNCTIONS - ( 06 Oct 2020 05:53 )  Alb: 3.0 g/dL / Pro: 6.1 g/dL / ALK PHOS: 102 U/L / ALT: 19 U/L / AST: 33 U/L / GGT: x           Ferritin 3525 < 6426 < 5573 < 4710  CRP 0.74 < 2.00  D-Dimer 254 <294    Microbiology:  10/4 - blood cultures - NGTD x2  10/3 - COVID ab - negative   10/2 - blood cultures - 2/2 - CoNS- S. epidermidis - S to A/S, cefazolin, gent, oxacillin, tetracyclines, bactrim, vancomycin; R o clindamycin, erythromycin, penicillin  10/2 - COVID-19 PCR - positive    Radiology:  Xray Chest 1 View- PORTABLE-Urgent (Xray Chest 1 View- PORTABLE-Urgent .) (10.05.20 @ 12:41) > Impression: The heart is normal insize. Poor inspiratory effort. Diffuse airspace opacities are seen from both lungs compatible with pneumonia status post seen in COVID 19., Correlate with CT scan that was performed on October 2, 2020.    CT Angio Chest w/ IV Cont (10.02.20 @ 13:30) > IMPRESSION: No pulmonary embolism. Bilateral lung opacities may represent Covid-19.    Xray Chest 1 View-PORTABLE IMMEDIATE (10.02.20 @ 11:26) > IMPRESSION: Mid left lung infiltrate.    Medications:  MEDICATIONS  (STANDING):  aspirin  chewable 81 milliGRAM(s) Oral daily  cholecalciferol 2000 Unit(s) Oral daily  dexAMETHasone     Tablet 6 milliGRAM(s) Oral daily  enoxaparin Injectable 40 milliGRAM(s) SubCutaneous daily  hydroxychloroquine 400 milliGRAM(s) Oral <User Schedule>  labetalol 200 milliGRAM(s) Oral two times a day  methimazole 5 milliGRAM(s) Oral daily  pantoprazole    Tablet 40 milliGRAM(s) Oral before breakfast  PARoxetine 10 milliGRAM(s) Oral daily  remdesivir  IVPB   IV Intermittent   remdesivir  IVPB 100 milliGRAM(s) IV Intermittent every 24 hours  ursodiol Tablet 250 milliGRAM(s) Oral two times a day  vancomycin  IVPB 1000 milliGRAM(s) IV Intermittent every 24 hours    Antimicrobials:  remdesivir  IVPB 100 milliGRAM(s) IV Intermittent every 24 hours - started 10/2  vancomycin  IVPB 1000 milliGRAM(s) IV Intermittent every 24 hours - started 10/2

## 2020-10-06 NOTE — PROGRESS NOTE ADULT - SUBJECTIVE AND OBJECTIVE BOX
Patient is a 75y old  Female who presents with a chief complaint of COVID-19 PNA, acute respiratory failure (06 Oct 2020 16:40)      SUBJECTIVE / OVERNIGHT EVENTS:  stable  still on 5L NC  diarrhea resolved.  overall feeling better  no cp, no sob, no n/v/d. no abdominal pain.  no headache, no dizziness.       Vital Signs Last 24 Hrs  T(C): 36.8 (06 Oct 2020 11:18), Max: 37.1 (05 Oct 2020 21:22)  T(F): 98.3 (06 Oct 2020 11:18), Max: 98.7 (05 Oct 2020 21:22)  HR: 73 (06 Oct 2020 17:18) (66 - 81)  BP: 121/70 (06 Oct 2020 17:18) (121/70 - 146/75)  BP(mean): --  RR: 18 (06 Oct 2020 11:18) (18 - 18)  SpO2: 91% (06 Oct 2020 11:18) (91% - 94%)  I&O's Summary    05 Oct 2020 07:01  -  06 Oct 2020 07:00  --------------------------------------------------------  IN: 1080 mL / OUT: 0 mL / NET: 1080 mL    06 Oct 2020 07:01  -  06 Oct 2020 19:28  --------------------------------------------------------  IN: 600 mL / OUT: 0 mL / NET: 600 mL      PHYSICAL EXAM:  GENERAL: NAD, Comfortable, on nasal canula  HEAD:  Atraumatic, Normocephalic  EYES: EOMI, PERRLA, conjunctiva and sclera clear  NECK: Supple, No JVD  CHEST/LUNG: mild decrease breath sounds bilaterally; No wheeze   HEART: Regular rate and rhythm; No murmurs, rubs, or gallops  ABDOMEN: Soft, Nontender, Nondistended; Bowel sounds present  Neuro: AAO x 2-3, no focal deficit, 5/5 b/l extremities  EXTREMITIES:  2+ Peripheral Pulses, No clubbing, cyanosis, or edema  SKIN: No rashes or lesions    LABS:                        11.1   19.53 )-----------( 410      ( 06 Oct 2020 05:52 )             32.5     10-06    137  |  109<H>  |  19  ----------------------------<  108<H>  3.9   |  17<L>  |  0.41<L>    Ca    8.5      06 Oct 2020 05:53    TPro  6.1  /  Alb  3.0<L>  /  TBili  0.4  /  DBili  0.1  /  AST  33  /  ALT  19  /  AlkPhos  102  10-06      CAPILLARY BLOOD GLUCOSE                RADIOLOGY & ADDITIONAL TESTS:    Imaging Personally Reviewed:  [x] YES  [ ] NO    Consultant(s) Notes Reviewed:  [x] YES  [ ] NO      MEDICATIONS  (STANDING):  aspirin  chewable 81 milliGRAM(s) Oral daily  ceFAZolin   IVPB 2000 milliGRAM(s) IV Intermittent every 8 hours  cholecalciferol 2000 Unit(s) Oral daily  dexAMETHasone     Tablet 6 milliGRAM(s) Oral daily  enoxaparin Injectable 40 milliGRAM(s) SubCutaneous daily  hydroxychloroquine 400 milliGRAM(s) Oral <User Schedule>  labetalol 200 milliGRAM(s) Oral two times a day  methimazole 5 milliGRAM(s) Oral daily  pantoprazole    Tablet 40 milliGRAM(s) Oral before breakfast  PARoxetine 10 milliGRAM(s) Oral daily  ursodiol Tablet 250 milliGRAM(s) Oral two times a day    MEDICATIONS  (PRN):  acetaminophen   Tablet .. 650 milliGRAM(s) Oral every 8 hours PRN Temp greater or equal to 38.5C (101.3F)  acetaminophen   Tablet .. 650 milliGRAM(s) Oral every 6 hours PRN Moderate Pain (4 - 6)      Care Discussed with Consultants/Other Providers [x] YES  [ ] NO

## 2020-10-06 NOTE — PROGRESS NOTE ADULT - ASSESSMENT
Patient is a 75 year old female with PMH of significant for recently diagnosed hyperthyroidism (3mo ago, on Methimazole), RA (on MTX weekly, Remicade [id number], Hydroxychloroquine, and daily steroids w/ Prednisone 10mg daily), HTN, CVA, memory impairment who presented from [facility] with nonproductive cough and dyspnea with nausea and diarrhea and was found to be COVID-19 positive. Blood cultures positive for Staph epi in both sets, unclear if true or contaminant.

## 2020-10-06 NOTE — PROGRESS NOTE ADULT - ASSESSMENT
74 yo F w/ PMHx significant for recently diagnosed hyperthyroidism (3mo ago, on Methimazole), rheumatoid arthritis (on MTX weekly, Remicade [id number], Hydroxychloroquine, and daily steroids w/ Prednisone 10mg daily), HTN (on multiple anti-hypertensive agents), s/p CVA w/o residual deficits, memory impairment, who presents from  after presenting from [facility] where she resides, w/ c/o nonproductive cough over the past few days, and SOB, along w/ NBNB vomiting 3-4days ago and NB diarrhea beginning today, found to be COVID-19 positive.

## 2020-10-06 NOTE — PROGRESS NOTE ADULT - PROBLEM SELECTOR PLAN 3
secondary to COVID-19 pneumonia  On NC 5L/min, has some cough with no dyspnea or tachypnea  Pulmonary following

## 2020-10-07 NOTE — PROGRESS NOTE ADULT - PROBLEM SELECTOR PLAN 1
- maintain SpO2 > 92%, pt on NC currently  - wean as tolerated, or if needed progress from NC to NRB (avoid BiLevel given covid status)  - Diarrhea likely due to COVID19. if more watery, will consider c.diff PCR  - Cogas neg strep bacteremia, 4/4, s/p empiric vanco. TTE to r/o endocarditis  - pansensitive, switched to IV Ancef. repeat cultures neg  - monitor inflammatory markers, if remain high, decadron course extended owing to her persistently high O2 requirements

## 2020-10-07 NOTE — PROGRESS NOTE ADULT - PROBLEM SELECTOR PLAN 3
secondary to COVID-19 pneumonia  On NC 6L/min, has some cough with no dyspnea or tachypnea  Continue to monitor O2 saturation  Pulmonary following

## 2020-10-07 NOTE — PROGRESS NOTE ADULT - SUBJECTIVE AND OBJECTIVE BOX
[facility], Division of Infectious Diseases  ROSALINA Cali Y. Patel, S. Shah  [phone]  ([phone] - weekdays after 5pm and weekends)    Name: TARA ESPINAL  Age: 75y  Gender: Female  MRN: [id number]    Interval History:  Patient remains afebrile, on nasal cannula 6L this morning, saturating 91-94%.   Intermittent cough, no dyspnea, chest pain, fever, chills.   ROS reviewed, pertinent positives and negatives as above.   Notes reviewed.    Allergies: Actonel (Hives), clonidine (Other), crab meat (Unknown)    Objective:  T(F): 98.4 (10-07-20 @ 11:18), Max: 98.4 (10-07-20 @ 11:18)  HR: 70 (10-07-20 @ 11:18) (70 - 86)  BP: 148/71 (10-07-20 @ 11:18) (121/70 - 148/80)  RR: 18 (10-07-20 @ 11:18) (18 - 19)  SpO2: 91% (10-07-20 @ 11:18) (90% - 94%)    Physical Examination:  General: no acute distress, resting comfortably, on NC  HEENT: NC/AT, EOMI, anicteric, neck supple  Cardio: S1, S2 heard, RRR, no murmurs  Resp: clear to auscultation bilaterally  Abd: soft, NT, ND, + bowel sounds  Neuro: AAOx3, no obvious focal deficits  Ext: no edema, moving extremities  Skin: warm, dry, no visible rash  Lines: PIV    Laboratory Studies:  CBC:                       11.5   17.88 )-----------( 416      ( 07 Oct 2020 10:03 )             33.4     CMP: 10-07    132<L>  |  102  |  16  ----------------------------<  189<H>  3.9   |  17<L>  |  0.36<L>    Ca    8.2<L>      07 Oct 2020 10:03    TPro  5.9<L>  /  Alb  2.8<L>  /  TBili  0.4  /  DBili  x   /  AST  29  /  ALT  16  /  AlkPhos  98  10-07    LIVER FUNCTIONS - ( 07 Oct 2020 10:03 )  Alb: 2.8 g/dL / Pro: 5.9 g/dL / ALK PHOS: 98 U/L / ALT: 16 U/L / AST: 29 U/L / GGT: x           Ferritin 3525 < 6426 < 5573 < 4710  CRP 0.74 < 2.00  D-Dimer 254 <294    Microbiology:  10/4 - blood cultures - NGTD x2  10/3 - COVID ab - negative   10/2 - blood cultures - 2/2 - CoNS- S. epidermidis - S to A/S, cefazolin, gent, oxacillin, tetracyclines, bactrim, vancomycin; R o clindamycin, erythromycin, penicillin  10/2 - COVID-19 PCR - positive    Radiology:    Xray Chest 1 View- PORTABLE-Urgent (Xray Chest 1 View- PORTABLE-Urgent .) (10.05.20 @ 12:41) > Impression: The heart is normal insize. Poor inspiratory effort. Diffuse airspace opacities are seen from both lungs compatible with pneumonia status post seen in COVID 19., Correlate with CT scan that was performed on October 2, 2020.    CT Angio Chest w/ IV Cont (10.02.20 @ 13:30) > IMPRESSION: No pulmonary embolism. Bilateral lung opacities may represent Covid-19.    Xray Chest 1 View-PORTABLE IMMEDIATE (10.02.20 @ 11:26) > IMPRESSION: Mid left lung infiltrate.    Medications:  MEDICATIONS  (STANDING):  aspirin  chewable 81 milliGRAM(s) Oral daily  ceFAZolin   IVPB 2000 milliGRAM(s) IV Intermittent every 8 hours  cholecalciferol 2000 Unit(s) Oral daily  dexAMETHasone     Tablet 6 milliGRAM(s) Oral daily  enoxaparin Injectable 40 milliGRAM(s) SubCutaneous daily  hydroxychloroquine 400 milliGRAM(s) Oral <User Schedule>  labetalol 200 milliGRAM(s) Oral two times a day  methimazole 5 milliGRAM(s) Oral daily  pantoprazole    Tablet 40 milliGRAM(s) Oral before breakfast  PARoxetine 10 milliGRAM(s) Oral daily  ursodiol Tablet 250 milliGRAM(s) Oral two times a day    Antimicrobials:  ceFAZolin   IVPB 2000 milliGRAM(s) IV Intermittent every 8 hours - started 10/6  hydroxychloroquine 400 milliGRAM(s) Oral <User Schedule>  S/p remdesivir 10/2-10/6  S/p vancomycin 10/2-10/6

## 2020-10-07 NOTE — DIETITIAN INITIAL EVALUATION ADULT. - PERTINENT MEDS FT
MEDICATIONS  (STANDING):  aspirin  chewable 81 milliGRAM(s) Oral daily  ceFAZolin   IVPB 2000 milliGRAM(s) IV Intermittent every 8 hours  cholecalciferol 2000 Unit(s) Oral daily  dexAMETHasone     Tablet 6 milliGRAM(s) Oral daily  enoxaparin Injectable 40 milliGRAM(s) SubCutaneous daily  hydroxychloroquine 400 milliGRAM(s) Oral <User Schedule>  labetalol 200 milliGRAM(s) Oral two times a day  methimazole 5 milliGRAM(s) Oral daily  pantoprazole    Tablet 40 milliGRAM(s) Oral before breakfast  PARoxetine 10 milliGRAM(s) Oral daily  ursodiol Tablet 250 milliGRAM(s) Oral two times a day    MEDICATIONS  (PRN):  acetaminophen   Tablet .. 650 milliGRAM(s) Oral every 8 hours PRN Temp greater or equal to 38.5C (101.3F)  acetaminophen   Tablet .. 650 milliGRAM(s) Oral every 6 hours PRN Moderate Pain (4 - 6)  guaiFENesin   Syrup  (Sugar-Free) 200 milliGRAM(s) Oral every 6 hours PRN Cough

## 2020-10-07 NOTE — PROGRESS NOTE ADULT - ASSESSMENT
Acute hypoxemic resp failure due to multilobar viral pneumonia  COVID 19 viral infection  CNS bacteremia: unclear significance  RA on immunosupressive regimen  Inflammatory markers largely stable  10/5: Clinically stable, Mild tachypnea; howver increase in nasal O2 to 5 liters noted. Uptrending inflammatory markers noted  10/6:  CXR progressed vs. admission; still requires 5 liters nasal cannula with sat 91%; Ferritin decreased  10/7: Still requires 6 liters nasal oxygen; infl markers downtrending  ; no gross improvement in status    REC    Continue decadron for now  Patient at risk of decompensation: monitor inflammatory markers and oxygen requirements  Continue DVT prophylaxis  Monitor O2 sats and titrate to maintain 90-92% with nasal oxygen

## 2020-10-07 NOTE — DIETITIAN INITIAL EVALUATION ADULT. - ETIOLOGY
inadequate protein energy intake in setting of decreased appetite and increased physiological demand for nutrients with hyperthyroidism as well as acute COVID-19 infection on current admission

## 2020-10-07 NOTE — PROGRESS NOTE ADULT - ASSESSMENT
76 yo F w/ PMHx significant for recently diagnosed hyperthyroidism (3mo ago, on Methimazole), rheumatoid arthritis (on MTX weekly, Remicade [id number], Hydroxychloroquine, and daily steroids w/ Prednisone 10mg daily), HTN (on multiple anti-hypertensive agents), s/p CVA w/o residual deficits, memory impairment, who presents from  after presenting from [facility] where she resides, w/ c/o nonproductive cough over the past few days, and SOB, along w/ NBNB vomiting 3-4days ago and NB diarrhea beginning today, found to be COVID-19 positive.

## 2020-10-07 NOTE — PROGRESS NOTE ADULT - SUBJECTIVE AND OBJECTIVE BOX
Follow-up Pulm Progress Note  Gordon Fox MD  [phone]    Afebrile on Cefazolin, remedesevir, decadron day #6  No change in resp status: m;ild tachypnea at rest, though overall comfortable  Nasal O2 increased back to 6 liters today  Inflammatory markers (Ferritin, CRP) downtrending    Vital Signs Last 24 Hrs  T(C): 36.9 (07 Oct 2020 11:18), Max: 36.9 (07 Oct 2020 11:18)  T(F): 98.4 (07 Oct 2020 11:18), Max: 98.4 (07 Oct 2020 11:18)  HR: 70 (07 Oct 2020 11:18) (70 - 86)  BP: 148/71 (07 Oct 2020 11:18) (121/70 - 148/80)  BP(mean): --  RR: 18 (07 Oct 2020 11:18) (18 - 19)  SpO2: 91% (07 Oct 2020 11:18) (90% - 94%)                          11.5   17.88 )-----------( 416      ( 07 Oct 2020 10:03 )             33.4     10-07    132<L>  |  102  |  16  ----------------------------<  189<H>  3.9   |  17<L>  |  0.36<L>    Ca    8.2<L>      07 Oct 2020 10:03    TPro  5.9<L>  /  Alb  2.8<L>  /  TBili  0.4  /  DBili  x   /  AST  29  /  ALT  16  /  AlkPhos  98  10-07      CULTURES:  Culture Results:   Growth in aerobic and anaerobic bottles: Staphylococcus epidermidis  Susceptibility to follow.  "Due to technical problems, Proteus sp. will Not be reported as part of  the BCID panel until further notice"  ***Blood Panel PCR results on this specimen are available  approximately 3 hours after the Gram stain result.***  Gram stain, PCR, and/or culture results may not always  correspond due to difference in methodologies.  ************************************************************  This PCR assay was performed using [employer].  The following targets are tested for: Enterococcus,  vancomycin resistant enterococci, Listeria monocytogenes,  coagulase negative staphylococci, S. aureus,  methicillin resistant S. aureus, Streptococcus agalactiae  (Group B), S. pneumoniae, S. pyogenes (Group A),  Acinetobacter baumannii, Enterobacter cloacae, E. coli,  Klebsiella oxytoca, K. pneumoniae, Proteus sp.,  Serratia marcescens, Haemophilus influenzae,  Neisseria meningitidis, Pseudomonas aeruginosa, Candida  albicans, C. glabrata, C krusei, C parapsilosis,  C. tropicalis and the KPC resistance gene. (10-02 @ 14:52)  Culture Results:   Growth in aerobic and anaerobic bottles: Staphylococcus epidermidis  "Susceptibilities not performed" (10-02 @ 14:52)    Most recent blood culture -- 10-02 @ 14:52   Blood Culture PCR Blood Culture PCR .Blood Blood-Peripheral 10-02 @ 14:52      Physical Examination:  PULM: Few scattered basilar crackles; no wheeze  CVS: Regular rate and rhythm, no murmurs, rubs, or gallops  ABD: Soft, non-tender  EXT:  No clubbing, cyanosis, or edema    RADIOLOGY REVIEWED  CXR:    CT chest:    PROCEDURE DATE:  10/02/2020        INTERPRETATION:  EXAMINATION: CT ANGIO CHEST WITHOUT AND OR WITH IV CONTRAST    CLINICAL INDICATION: Dyspnea    TECHNIQUE: CTA of the chest was performed for evaluation of pulmonary embolism after administration of 90 ml of Omnipaque-350, 10 ml discarded.  MIP images were reconstructed.    COMPARISON: 10/27/2019.    FINDINGS:    PULMONARY ARTERIES: There is no pulmonary embolism    AIRWAYS AND LUNGS: The central tracheobronchial tree is patent.  Bilateral groundglass opacities and consolidations, predominantly peripherally    MEDIASTINUM AND PLEURA: There are no enlarged mediastinal, hilar or axillary lymph nodes. The visualized portion of the thyroid gland is unremarkable. There is no pleural effusion. There is no pneumothorax.    HEART AND VESSELS: There is mild cardiomegaly. There are atherosclerotic calcifications of the aorta and coronary arteries.  There is no pericardial effusion.    UPPER ABDOMEN: Images of the upper abdomen demonstrate no abnormality.    BONES AND SOFT TISSUES: Unchanged T12 vertebral plasty and L1 vertebral body severe compression deformity.  The soft tissues are unremarkable.    TUBES/LINES: None.    IMPRESSION:  No pulmonary embolism. Bilateral lung opacities may represent Covid-19.    TTE:

## 2020-10-07 NOTE — DIETITIAN INITIAL EVALUATION ADULT. - REASON INDICATOR FOR ASSESSMENT
Unable to conduct a face to face interview or nutrition-focused physical exam due to limited contact restrictions related to pt's medical condition and isolation precautions. Information obtained via phone call with pt  (Kenneth) and from EMR.   Pt noted as A&Ox3, Cantonese speaking, unavailable to take RD phone call at this time. Attempted to reach RN- unavailable at this time.

## 2020-10-07 NOTE — PROGRESS NOTE ADULT - PROBLEM SELECTOR PLAN 1
COVID-19 PCR positive  CTA chest with peripheral opacities consistent with COVID lung changes. CXR with some progression, diffuse airspace opacities. Requiring 5-6L, on 6L since this morning. Pulmonary following.  Inflammatory markers trended down.  S/p 5 days of remdesivir  Continue on Dexamethasone - extending course for now given continued oxygen requirements and risk for decompensation  On lovenox subq daily  Continue to monitor pulse ox and cont supplemental O2 for O2 90-92%

## 2020-10-07 NOTE — PROGRESS NOTE ADULT - PROBLEM SELECTOR PLAN 2
- COVID19 PCR detected    - supportive management including for fever/cough  - monitor respiratory status closely and serial inflammatory markers to monitor for severity progression  - maintain oxygenation and monitoring as above  - Lovenox 40mg SubQ for high risk of microthrombi a/w COVID-19  - Completed course of Remdesivir under EUA as Spo2 < 94% on RA (pt requiring O2 supp via NC currently), eGFR = 90 (>30), ALT = 33 (WNL), and pt does not meet exclusion criteria -- given weight = 49.9kg, dose: 200mg IV x1 followed by 100mg IV Q24 daily x4days; discussed fact sheet extensively with patient's  via telephone  - also initiated steroids: as patient on daily Prednisone and received Solucortef 100mg IV x1 in ED at 1:40pm, remains on extended course of Dexamethasone 6mg daily   - blood cultures 4/4 bottles positive. see above for treatement  - TTE r/o endocarditis  - stopped Azithromycin as pt on chronic home Hydroxychloroquine for RA as below, and risk of Qtc prolongation

## 2020-10-07 NOTE — DIETITIAN INITIAL EVALUATION ADULT. - OTHER INFO
Pt is "76 yo F w/ PMHx significant for recently diagnosed hyperthyroidism (3mo ago, on Methimazole), rheumatoid arthritis (on MTX weekly, Remicade [id number], Hydroxychloroquine, and daily steroids w/ Prednisone 10mg daily), HTN (on multiple anti-hypertensive agents), s/p CVA w/o residual deficits, memory impairment, who presents from  after presenting from [facility] where she resides, w/ c/o nonproductive cough over the past few days, and SOB, along w/ NBNB vomiting 3-4days ago and NB diarrhea beginning today, found to be COVID-19 positive."    Pt  reports pt has been living at assisted living facility for the past 4 years. She is provided her meals there.  Therapeutic Diet PTA: none, pt  had also been bringing pt take out food 3-4x/weekly where she resides as she hadn't been eating well. Pt with low appetite for the past 3-4 months pt  reports. Pt had been drinking ensure supplements PTA as well to supplement her po intake.  Nutrition Supplements PTA: B complex and vitamin D3  Pt is allergic to alcohol and crab meat per pt .    Pt UBW: ~124 lbs per pt  and wt with recent wt loss over the past 3 months. Besides pt low appetite note, pt  states wt loss occurred within context of recently diagnosed hyperthyroidism as well.  Pt weight noted as 126.7 lbs on 10/30/19. Her current dosing wt is noted as 110.6 lbs (10/2), and would indicate ~16 lb wt loss x 3 months PTA per pt .    Pt noted with variable po intake in-patient, % of meals. Pt family unaware that they can bring in food for pt- encouraged family to bring in pt favorite foods, however encouraged low sodium foods as able. Pt  is happy that pt is receiving ensure enlive supplements- these were added yesterday. Monitor for pt acceptance.  Pt with no known chewing/swallowing difficulties.  Pt has no c/o nausea, vomiting, diarrhea, or constipation to note. Diarrhea PTA has resolved per chart and per pt .     Extensive nutrition education for low sodium nutrition therapy deferred at this time given pt medical status. Will monitor for appropriateness.

## 2020-10-07 NOTE — CHART NOTE - TREATMENT: THE FOLLOWING DIET HAS BEEN RECOMMENDED
Diet, Regular:   Low Sodium  Supplement Feeding Modality:  Oral  Ensure Enlive Cans or Servings Per Day:  2       Frequency:  Daily (10-07-20 @ 15:11) [Pending Verification By Attending]  Diet, Regular:   DASH/TLC {Sodium & Cholesterol Restricted} (DASH)  Supplement Feeding Modality:  Oral  Ensure Enlive Cans or Servings Per Day:  1       Frequency:  Two Times a day (10-03-20 @ 14:15) [Active]

## 2020-10-07 NOTE — PROGRESS NOTE ADULT - PROBLEM SELECTOR PLAN 2
CoNS - Staph epi - grew in both sets of blood cultures - ?contaminant - unclear if drawn from 2 separate venipunctures vs true bacteremia  No evidence of skin infection, long term indwelling lines, or hardware.     Repeat blood cultures - NGTD  Follow for 2D echo, ordered  Continue Cefazolin 2g IV Q8h - tentatively plan for 10 day course

## 2020-10-07 NOTE — PROGRESS NOTE ADULT - SUBJECTIVE AND OBJECTIVE BOX
She is still requiring 5-6LNC to maintain her O2 sats minimally above low 90s  diarrhea resolved.  cough remains  generalized arthralgia    Vital Signs Last 24 Hrs  T(C): 36.9 (07 Oct 2020 11:18), Max: 36.9 (07 Oct 2020 11:18)  T(F): 98.4 (07 Oct 2020 11:18), Max: 98.4 (07 Oct 2020 11:18)  HR: 70 (07 Oct 2020 11:18) (70 - 86)  BP: 148/71 (07 Oct 2020 11:18) (121/70 - 148/80)  BP(mean): --  RR: 18 (07 Oct 2020 11:18) (18 - 19)  SpO2: 91% (07 Oct 2020 11:18) (90% - 94%)    I&O's Summary    10-06-20 @ 07:01  -  10-07-20 @ 07:00  --------------------------------------------------------  IN: 600 mL / OUT: 0 mL / NET: 600 mL    10-07-20 @ 07:01  -  10-07-20 @ 13:14  --------------------------------------------------------  IN: 360 mL / OUT: 0 mL / NET: 360 mL          PHYSICAL EXAM:  GENERAL: NAD, Comfortable, on nasal canula  HEAD:  Atraumatic, Normocephalic  EYES: EOMI, PERRLA, conjunctiva and sclera clear  NECK: Supple, No JVD  CHEST/LUNG: mild decrease breath sounds bilaterally; No wheeze   HEART: Regular rate and rhythm; No murmurs, rubs, or gallops  ABDOMEN: Soft, Nontender, Nondistended; Bowel sounds present  Neuro: AAO x 2-3, no focal deficit, 5/5 b/l extremities  EXTREMITIES:  2+ Peripheral Pulses, No clubbing, cyanosis, or edema  SKIN: No rashes or lesions    LABS:                        11.5   17.88 )-----------( 416      ( 07 Oct 2020 10:03 )             33.4     10-07    132<L>  |  102  |  16  ----------------------------<  189<H>  3.9   |  17<L>  |  0.36<L>    Ca    8.2<L>      07 Oct 2020 10:03    TPro  5.9<L>  /  Alb  2.8<L>  /  TBili  0.4  /  DBili  x   /  AST  29  /  ALT  16  /  AlkPhos  98  10-07      CAPILLARY BLOOD GLUCOSE                RADIOLOGY & ADDITIONAL TESTS:    Imaging Personally Reviewed:  [x] YES  [ ] NO    Consultant(s) Notes Reviewed:  [x] YES  [ ] NO

## 2020-10-07 NOTE — DIETITIAN INITIAL EVALUATION ADULT. - PROBLEM SELECTOR PLAN 2
- COVID19 PCR detected + imaging e/o PNA  - supportive management including for fever/cough  - monitor respiratory status closely and serial inflammatory markers to monitor for severity progression  - maintain oxygenation and monitoring as above  - Lovenox 40mg SubQ for high risk of microthrombi a/w COVID-19  - will initiate Remdesivir under EUA as Spo2 < 94% on RA (pt requiring O2 supp via NC currently), eGFR = 90 (>30), ALT = 33 (WNL), and pt does not meet exclusion criteria -- given weight = 49.9kg, dose: 200mg IV x1 followed by 100mg IV Q24 daily x4days; discussed fact sheet extensively with patient's  via telephone  - will also initiate steroids: as patient on daily Prednisone and received Solucortef 100mg IV x1 in ED at 1:40pm, will initiate Dexamethasone 6mg daily at 11pm (Solucortef should wear off in 8-10hours) tonight x4days  - will not continue Abx as initiated by ED (and will avoid Azithromycin as pt on chronic home Hydroxychloroquine for RA as below)

## 2020-10-07 NOTE — DIETITIAN INITIAL EVALUATION ADULT. - PHYSICAL APPEARANCE
n/a, unable to observe/other (specify) Height: 60 inches, Weight: 110.6 pounds (10/2)  BMI: 21.6  kg/m2 IBW: 100 pounds (+/-10%), %IBW: 111%  Pertinent Info: No edema noted, no pressure injuries noted at this time in nursing flow sheet.

## 2020-10-07 NOTE — PROGRESS NOTE ADULT - ASSESSMENT
Patient is a 75 year old female with PMH of significant for recently diagnosed hyperthyroidism (3mo ago, on Methimazole), RA (on MTX weekly, Remicade [id number], Hydroxychloroquine, and daily steroids w/ Prednisone 10mg daily), HTN, CVA, memory impairment who presented from [facility] with nonproductive cough and dyspnea with nausea and diarrhea and was found to be COVID-19 positive. Blood cultures positive for Staph epi in both sets, unclear if true or contaminant.

## 2020-10-08 NOTE — PROGRESS NOTE ADULT - PROBLEM SELECTOR PLAN 1
COVID-19 PCR positive  CTA chest with peripheral opacities consistent with COVID lung changes. CXR with some progression, diffuse airspace opacities. Remains on NC 6L/min. Pulmonary following.  S/p 5 days of remdesivir  Inflammatory markers trended up slightly today  Continue on Dexamethasone - extending course for now given continued oxygen requirements and risk for decompensation  On lovenox subq daily  Continue to monitor pulse ox and cont supplemental O2 for O2 90-92%  Continue to monitor inflammatory markers

## 2020-10-08 NOTE — PROGRESS NOTE ADULT - SUBJECTIVE AND OBJECTIVE BOX
[facility], Division of Infectious Diseases  ROSALINA Cali Y. Patel, S. Shah  [phone]  ([phone] - weekdays after 5pm and weekends)    Name: TARA ESPINAL  Age: 75y  Gender: Female  MRN: [id number]    Interval History:  Patient remains afebrile, on nasal cannula 6L this morning, saturating 91-94%.  Continues to complaint of cough, feeling hungry. Denies dyspnea, chest pain, fever, chills.   ROS reviewed, pertinent positives and negatives as above.   Notes reviewed. Afebrile, remains on dexamethasone.     Allergies: Actonel (Hives), clonidine (Other), crab meat (Unknown)    Objective:  T(F): 98.5 (10-08-20 @ 11:07), Max: 98.8 (10-07-20 @ 21:35)  HR: 73 (10-08-20 @ 11:07) (71 - 75)  BP: 123/79 (10-08-20 @ 11:07) (123/79 - 137/75)  RR: 18 (10-08-20 @ 11:07) (17 - 18)  SpO2: 91% (10-08-20 @ 11:07) (91% - 95%)    Physical Examination:  General: no acute distress, comfortable, on NC 6L/min  HEENT: NC/AT, EOMI, anicteric, neck supple  Cardio: S1, S2 heard, RRR, no murmurs  Resp: decreased breath sounds bilaterally  Abd: soft, NT, ND, + bowel sounds  Neuro: AAOx3, no obvious focal deficits  Ext: no edema, moving extremities  Skin: warm, dry, no visible rash  Lines: PIV    Laboratory Studies:  CBC:                       11.1   21.38 )-----------( 448      ( 08 Oct 2020 08:38 )             33.1     CMP: 10-08    132<L>  |  99  |  15  ----------------------------<  98  4.9   |  20<L>  |  0.34<L>    Ca    8.6      08 Oct 2020 06:39    TPro  6.5  /  Alb  2.9<L>  /  TBili  0.4  /  DBili  x   /  AST  61<H>  /  ALT  16  /  AlkPhos  91  10-08    LIVER FUNCTIONS - ( 08 Oct 2020 06:39 )  Alb: 2.9 g/dL / Pro: 6.5 g/dL / ALK PHOS: 91 U/L / ALT: 16 U/L / AST: 61 U/L / GGT: x    Ferritin 3525 < 6426 < 5573 < 4710  CRP 1.39 < 0.74 < 2.00  D-Dimer 426< 254 <294    Microbiology:  10/4 - blood cultures - NGTD x2  10/3 - COVID ab - negative   10/2 - blood cultures - 2/2 - CoNS- S. epidermidis - S to A/S, cefazolin, gent, oxacillin, tetracyclines, bactrim, vancomycin; R to clindamycin, erythromycin, penicillin  10/2 - COVID-19 PCR - positive    Radiology:  Xray Chest 1 View- PORTABLE-Urgent (Xray Chest 1 View- PORTABLE-Urgent .) (10.05.20 @ 12:41) > Impression: The heart is normal insize. Poor inspiratory effort. Diffuse airspace opacities are seen from both lungs compatible with pneumonia status post seen in COVID 19., Correlate with CT scan that was performed on October 2, 2020.    CT Angio Chest w/ IV Cont (10.02.20 @ 13:30) > IMPRESSION: No pulmonary embolism. Bilateral lung opacities may represent Covid-19.    Xray Chest 1 View-PORTABLE IMMEDIATE (10.02.20 @ 11:26) > IMPRESSION: Mid left lung infiltrate.    Medications:  MEDICATIONS  (STANDING):  aspirin  chewable 81 milliGRAM(s) Oral daily  ceFAZolin   IVPB 2000 milliGRAM(s) IV Intermittent every 8 hours  cholecalciferol 2000 Unit(s) Oral daily  dexAMETHasone     Tablet 6 milliGRAM(s) Oral daily  enoxaparin Injectable 40 milliGRAM(s) SubCutaneous daily  hydroxychloroquine 400 milliGRAM(s) Oral <User Schedule>  labetalol 200 milliGRAM(s) Oral two times a day  methimazole 5 milliGRAM(s) Oral daily  pantoprazole    Tablet 40 milliGRAM(s) Oral before breakfast  PARoxetine 10 milliGRAM(s) Oral daily  ursodiol Tablet 250 milliGRAM(s) Oral two times a day    Antimicrobials:  ceFAZolin   IVPB 2000 milliGRAM(s) IV Intermittent every 8 hours - started 10/6  hydroxychloroquine 400 milliGRAM(s) Oral <User Schedule>  S/p remdesivir 10/2-10/6  S/p vancomycin 10/2-10/6

## 2020-10-08 NOTE — PROGRESS NOTE ADULT - SUBJECTIVE AND OBJECTIVE BOX
Follow-up Pulm Progress Note  Gordon Fox MD  [phone]    Remains afebrile on Cefazolin, decadron day #7  Nasal oxygen remains at 6 iters with sats in low 90's    Vital Signs Last 24 Hrs  T(C): 36.9 (08 Oct 2020 11:07), Max: 37.1 (07 Oct 2020 21:35)  T(F): 98.5 (08 Oct 2020 11:07), Max: 98.8 (07 Oct 2020 21:35)  HR: 73 (08 Oct 2020 11:07) (71 - 75)  BP: 123/79 (08 Oct 2020 11:07) (123/79 - 137/75)  BP(mean): --  RR: 18 (08 Oct 2020 11:07) (17 - 18)  SpO2: 91% (08 Oct 2020 11:07) (91% - 95%)                        11.1   21.38 )-----------( 448      ( 08 Oct 2020 08:38 )             33.1     10-08    132<L>  |  99  |  15  ----------------------------<  98  4.9   |  20<L>  |  0.34<L>    Ca    8.6      08 Oct 2020 06:39    TPro  6.5  /  Alb  2.9<L>  /  TBili  0.4  /  DBili  x   /  AST  61<H>  /  ALT  16  /  AlkPhos  91  10-08      CULTURES:  Culture Results:   Growth in aerobic and anaerobic bottles: Staphylococcus epidermidis  Susceptibility to follow.  "Due to technical problems, Proteus sp. will Not be reported as part of  the BCID panel until further notice"  ***Blood Panel PCR results on this specimen are available  approximately 3 hours after the Gram stain result.***  Gram stain, PCR, and/or culture results may not always  correspond due to difference in methodologies.  ************************************************************  This PCR assay was performed using [employer].  The following targets are tested for: Enterococcus,  vancomycin resistant enterococci, Listeria monocytogenes,  coagulase negative staphylococci, S. aureus,  methicillin resistant S. aureus, Streptococcus agalactiae  (Group B), S. pneumoniae, S. pyogenes (Group A),  Acinetobacter baumannii, Enterobacter cloacae, E. coli,  Klebsiella oxytoca, K. pneumoniae, Proteus sp.,  Serratia marcescens, Haemophilus influenzae,  Neisseria meningitidis, Pseudomonas aeruginosa, Candida  albicans, C. glabrata, C krusei, C parapsilosis,  C. tropicalis and the KPC resistance gene. (10-02 @ 14:52)  Culture Results:   Growth in aerobic and anaerobic bottles: Staphylococcus epidermidis  "Susceptibilities not performed" (10-02 @ 14:52)    Most recent blood culture -- 10-02 @ 14:52   Blood Culture PCR Blood Culture PCR .Blood Blood-Peripheral 10-02 @ 14:52      Physical Examination:  PULM: Few scattered basilar crackles; no wheeze  CVS: Regular rate and rhythm, no murmurs, rubs, or gallops  ABD: Soft, non-tender  EXT:  No clubbing, cyanosis, or edema    RADIOLOGY REVIEWED  CXR:    CT chest:    PROCEDURE DATE:  10/02/2020        INTERPRETATION:  EXAMINATION: CT ANGIO CHEST WITHOUT AND OR WITH IV CONTRAST    CLINICAL INDICATION: Dyspnea    TECHNIQUE: CTA of the chest was performed for evaluation of pulmonary embolism after administration of 90 ml of Omnipaque-350, 10 ml discarded.  MIP images were reconstructed.    COMPARISON: 10/27/2019.    FINDINGS:    PULMONARY ARTERIES: There is no pulmonary embolism    AIRWAYS AND LUNGS: The central tracheobronchial tree is patent.  Bilateral groundglass opacities and consolidations, predominantly peripherally    MEDIASTINUM AND PLEURA: There are no enlarged mediastinal, hilar or axillary lymph nodes. The visualized portion of the thyroid gland is unremarkable. There is no pleural effusion. There is no pneumothorax.    HEART AND VESSELS: There is mild cardiomegaly. There are atherosclerotic calcifications of the aorta and coronary arteries.  There is no pericardial effusion.    UPPER ABDOMEN: Images of the upper abdomen demonstrate no abnormality.    BONES AND SOFT TISSUES: Unchanged T12 vertebral plasty and L1 vertebral body severe compression deformity.  The soft tissues are unremarkable.    TUBES/LINES: None.    IMPRESSION:  No pulmonary embolism. Bilateral lung opacities may represent Covid-19.    TTE:   Follow-up Pulm Progress Note  Gordon Fox MD  [phone]    Remains afebrile on Cefazolin, decadron day #7  Nasal oxygen remains at 6 iters with sats in low 90's  Patient appeared to be breathing comfortable supine at rest in bed: she primarily complains of cough    Vital Signs Last 24 Hrs  T(C): 36.9 (08 Oct 2020 11:07), Max: 37.1 (07 Oct 2020 21:35)  T(F): 98.5 (08 Oct 2020 11:07), Max: 98.8 (07 Oct 2020 21:35)  HR: 73 (08 Oct 2020 11:07) (71 - 75)  BP: 123/79 (08 Oct 2020 11:07) (123/79 - 137/75)  BP(mean): --  RR: 18 (08 Oct 2020 11:07) (17 - 18)  SpO2: 91% (08 Oct 2020 11:07) (91% - 95%)                        11.1   21.38 )-----------( 448      ( 08 Oct 2020 08:38 )             33.1     10-08    132<L>  |  99  |  15  ----------------------------<  98  4.9   |  20<L>  |  0.34<L>    Ca    8.6      08 Oct 2020 06:39    TPro  6.5  /  Alb  2.9<L>  /  TBili  0.4  /  DBili  x   /  AST  61<H>  /  ALT  16  /  AlkPhos  91  10-08      CULTURES:  Culture Results:   Growth in aerobic and anaerobic bottles: Staphylococcus epidermidis  Susceptibility to follow.  "Due to technical problems, Proteus sp. will Not be reported as part of  the BCID panel until further notice"  ***Blood Panel PCR results on this specimen are available  approximately 3 hours after the Gram stain result.***  Gram stain, PCR, and/or culture results may not always  correspond due to difference in methodologies.  ************************************************************  This PCR assay was performed using [employer].  The following targets are tested for: Enterococcus,  vancomycin resistant enterococci, Listeria monocytogenes,  coagulase negative staphylococci, S. aureus,  methicillin resistant S. aureus, Streptococcus agalactiae  (Group B), S. pneumoniae, S. pyogenes (Group A),  Acinetobacter baumannii, Enterobacter cloacae, E. coli,  Klebsiella oxytoca, K. pneumoniae, Proteus sp.,  Serratia marcescens, Haemophilus influenzae,  Neisseria meningitidis, Pseudomonas aeruginosa, Candida  albicans, C. glabrata, C krusei, C parapsilosis,  C. tropicalis and the KPC resistance gene. (10-02 @ 14:52)  Culture Results:   Growth in aerobic and anaerobic bottles: Staphylococcus epidermidis  "Susceptibilities not performed" (10-02 @ 14:52)    Most recent blood culture -- 10-02 @ 14:52   Blood Culture PCR Blood Culture PCR .Blood Blood-Peripheral 10-02 @ 14:52      Physical Examination:  PULM: Few scattered basilar crackles; no wheeze  CVS: Regular rate and rhythm, no murmurs, rubs, or gallops  ABD: Soft, non-tender  EXT:  No clubbing, cyanosis, or edema    RADIOLOGY REVIEWED  CXR:    CT chest:    PROCEDURE DATE:  10/02/2020        INTERPRETATION:  EXAMINATION: CT ANGIO CHEST WITHOUT AND OR WITH IV CONTRAST    CLINICAL INDICATION: Dyspnea    TECHNIQUE: CTA of the chest was performed for evaluation of pulmonary embolism after administration of 90 ml of Omnipaque-350, 10 ml discarded.  MIP images were reconstructed.    COMPARISON: 10/27/2019.    FINDINGS:    PULMONARY ARTERIES: There is no pulmonary embolism    AIRWAYS AND LUNGS: The central tracheobronchial tree is patent.  Bilateral groundglass opacities and consolidations, predominantly peripherally    MEDIASTINUM AND PLEURA: There are no enlarged mediastinal, hilar or axillary lymph nodes. The visualized portion of the thyroid gland is unremarkable. There is no pleural effusion. There is no pneumothorax.    HEART AND VESSELS: There is mild cardiomegaly. There are atherosclerotic calcifications of the aorta and coronary arteries.  There is no pericardial effusion.    UPPER ABDOMEN: Images of the upper abdomen demonstrate no abnormality.    BONES AND SOFT TISSUES: Unchanged T12 vertebral plasty and L1 vertebral body severe compression deformity.  The soft tissues are unremarkable.    TUBES/LINES: None.    IMPRESSION:  No pulmonary embolism. Bilateral lung opacities may represent Covid-19.    TTE:

## 2020-10-08 NOTE — PROGRESS NOTE ADULT - ASSESSMENT
Patient is a 75 year old female with PMH of significant for recently diagnosed hyperthyroidism (3mo ago, on Methimazole), RA (on MTX weekly, Remicade [id number], Hydroxychloroquine, and daily steroids w/ Prednisone 10mg daily), HTN, CVA, memory impairment who presented from [facility] with nonproductive cough and dyspnea with nausea and diarrhea and was found to be COVID-19 positive. Blood cultures positive for Staph epi in both sets, unclear if true or contaminant.

## 2020-10-08 NOTE — PROGRESS NOTE ADULT - PROBLEM SELECTOR PLAN 2
- COVID19 PCR detected    - supportive management including for fever/cough  - monitor respiratory status closely and serial inflammatory markers to monitor for severity progression  - maintain oxygenation and monitoring as above  - Lovenox 40mg SubQ for high risk of microthrombi a/w COVID-19  - Completed course of Remdesivir under EUA as Spo2 < 94% on RA (pt requiring O2 supp via NC currently), eGFR = 90 (>30), ALT = 33 (WNL), and pt does not meet exclusion criteria -- given weight = 49.9kg, dose: 200mg IV x1 followed by 100mg IV Q24 daily x4days; discussed fact sheet extensively with patient's  via telephone  - also initiated steroids: as patient on daily Prednisone and received Solucortef 100mg IV x1 in ED at 1:40pm, remains on extended course of Dexamethasone 6mg daily   - blood cultures 4/4 bottles positive. see above for treatement  - TTE r/o endocarditis is pending still  - stopped Azithromycin as pt on chronic home Hydroxychloroquine for RA as below, and risk of Qtc prolongation

## 2020-10-08 NOTE — PROGRESS NOTE ADULT - PROBLEM SELECTOR PLAN 2
CoNS - Staph epi - grew in both sets of blood cultures - ?contaminant - unclear if drawn from 2 separate venipunctures vs true bacteremia  No evidence of skin infection, long term indwelling lines, or hardware.     Repeat blood cultures - NGTD  Follow for 2D echo, ordered  Continue Cefazolin 2g IV Q8h - tentatively plan for 10 day course from first negative blood cultures

## 2020-10-08 NOTE — PROGRESS NOTE ADULT - PROBLEM SELECTOR PLAN 1
- maintain SpO2 > 92%, pt on NC currently  - wean as tolerated, or if needed progress from NC to NRB (avoid BiLevel given covid status)  - Coag neg strep bacteremia, 4/4, s/p empiric vanco. TTE to r/o endocarditis  - pansensitive, switched to IV Ancef. repeat cultures neg  - monitor inflammatory markers, if remain high, decadron course extended owing to her persistently high O2 requirements

## 2020-10-08 NOTE — PROGRESS NOTE ADULT - SUBJECTIVE AND OBJECTIVE BOX
No significant changes in how her breathing feels today compared to yesterday  Still with residual cough  Appetite remains suboptimal  generalized arthralgias persist    Vital Signs Last 24 Hrs  T(C): 36.7 (08 Oct 2020 06:04), Max: 37.1 (07 Oct 2020 21:35)  T(F): 98.1 (08 Oct 2020 06:04), Max: 98.8 (07 Oct 2020 21:35)  HR: 71 (08 Oct 2020 06:04) (70 - 75)  BP: 127/73 (08 Oct 2020 06:04) (127/73 - 148/71)  BP(mean): --  RR: 18 (08 Oct 2020 06:04) (17 - 18)  SpO2: 92% (08 Oct 2020 06:04) (91% - 95%)    I&O's Summary    10-07-20 @ 07:01  -  10-08-20 @ 07:00  --------------------------------------------------------  IN: 600 mL / OUT: 0 mL / NET: 600 mL        PHYSICAL EXAM:  GENERAL: NAD, Comfortable, on nasal cannula  HEAD:  Atraumatic, Normocephalic  EYES: EOMI, PERRLA, conjunctiva and sclera clear  NECK: Supple, No JVD  CHEST/LUNG: mild decrease breath sounds bilaterally; No wheeze   HEART: Regular rate and rhythm; No murmurs, rubs, or gallops  ABDOMEN: Soft, Nontender, Nondistended; Bowel sounds present  Neuro: AAO x 2-3, no focal deficit, 5/5 b/l extremities  EXTREMITIES:  2+ Peripheral Pulses, No clubbing, cyanosis, or edema  SKIN: No rashes or lesions    LABS:                        11.1   21.38 )-----------( 448      ( 08 Oct 2020 08:38 )             33.1     10-08    132<L>  |  99  |  15  ----------------------------<  98  4.9   |  20<L>  |  0.34<L>    Ca    8.6      08 Oct 2020 06:39    TPro  6.5  /  Alb  2.9<L>  /  TBili  0.4  /  DBili  x   /  AST  61<H>  /  ALT  16  /  AlkPhos  91  10-08      CAPILLARY BLOOD GLUCOSE                RADIOLOGY & ADDITIONAL TESTS:    Imaging Personally Reviewed:  [x] YES  [ ] NO    Consultant(s) Notes Reviewed:  [x] YES  [ ] NO

## 2020-10-08 NOTE — PROGRESS NOTE ADULT - ASSESSMENT
74 yo F w/ PMHx significant for recently diagnosed hyperthyroidism (3mo ago, on Methimazole), rheumatoid arthritis (on MTX weekly, Remicade [id number], Hydroxychloroquine, and daily steroids w/ Prednisone 10mg daily), HTN (on multiple anti-hypertensive agents), s/p CVA w/o residual deficits, memory impairment, who presents from  after presenting from [facility] where she resides, w/ c/o nonproductive cough over the past few days, and SOB, along w/ NBNB vomiting 3-4days ago and NB diarrhea beginning today, found to be COVID-19 positive.

## 2020-10-08 NOTE — PROGRESS NOTE ADULT - ASSESSMENT
Acute hypoxemic resp failure due to multilobar viral pneumonia  COVID 19 viral infection  CNS bacteremia: unclear significance  RA on immunosupressive regimen  Inflammatory markers largely stable  10/5: Clinically stable, Mild tachypnea; howver increase in nasal O2 to 5 liters noted. Uptrending inflammatory markers noted  10/6:  CXR progressed vs. admission; still requires 5 liters nasal cannula with sat 91%; Ferritin decreased  10/7: Still requires 6 liters nasal oxygen; infl markers downtrending  ; no gross improvement in status  10/8: Clinically unchanged; Ferritin increasing; remains on 6 liters nasal cannula    REC    Continue decadron   Patient at risk of decompensation: monitor inflammatory markers and oxygen requirements  Continue DVT prophylaxis  Monitor O2 sats and titrate to maintain 90-92% with nasal oxygen   Will transition to HiFlo cannula if oxygenation further deteriorates   Acute hypoxemic resp failure due to multilobar viral pneumonia  COVID 19 viral infection  CNS bacteremia: unclear significance  RA on immunosupressive regimen  Inflammatory markers largely stable  10/5: Clinically stable, Mild tachypnea; howver increase in nasal O2 to 5 liters noted. Uptrending inflammatory markers noted  10/6:  CXR progressed vs. admission; still requires 5 liters nasal cannula with sat 91%; Ferritin decreased  10/7: Still requires 6 liters nasal oxygen; infl markers downtrending  ; no gross improvement in status  10/8: Clinically unchanged; Ferritin increasing; remains on 6 liters nasal cannula; c/o cogh    REC    Continue decadron   Add tessalon and prn hycodan qhs  Patient at risk of decompensation: monitor inflammatory markers and oxygen requirements  Continue DVT prophylaxis  Monitor O2 sats and titrate to maintain 90-92% with nasal oxygen   Will transition to HiFlo cannula if oxygenation further deteriorates

## 2020-10-09 NOTE — PROGRESS NOTE ADULT - ASSESSMENT
74 yo F w/ PMHx significant for recently diagnosed hyperthyroidism (3mo ago, on Methimazole), rheumatoid arthritis (on MTX weekly, Remicade [id number], Hydroxychloroquine, and daily steroids w/ Prednisone 10mg daily), HTN (on multiple anti-hypertensive agents), s/p CVA w/o residual deficits, memory impairment, who presents from  after presenting from [facility] where she resides, w/ c/o nonproductive cough over the past few days, and SOB, along w/ NBNB vomiting 3-4days ago and NB diarrhea beginning today, found to be COVID-19 positive.

## 2020-10-09 NOTE — PHYSICAL THERAPY INITIAL EVALUATION ADULT - LIVES WITH, PROFILE
Pt resides alone at [facility].  Prior to admission, pt ambulates independently however uses a wheelchair for long distances. Pt with [facility] 4hrs x 7 days ( 2hrs in Am and 2 in PM)./alone

## 2020-10-09 NOTE — PROGRESS NOTE ADULT - PROBLEM SELECTOR PLAN 3
secondary to COVID-19 pneumonia  On NC 6L/min, has some cough with no dyspnea or tachypnea  Continue to monitor O2 saturation. On dexamethasone.   Pulmonary following

## 2020-10-09 NOTE — PROGRESS NOTE ADULT - PROBLEM SELECTOR PLAN 1
COVID-19 PCR positive  CTA chest with peripheral opacities consistent with COVID lung changes. CXR with some progression, diffuse airspace opacities. Remains on NC 6L/min. Pulmonary following.  S/p 5 days of remdesivir  Ferritin trending down, Ddimer and CRP slightly increased.   Continuing on Dexamethasone - extending course for now given continued oxygen requirements and risk for decompensation  On lovenox subq daily  Continue to monitor pulse ox and cont supplemental O2 for O2 90-92%  Continue to monitor inflammatory markers

## 2020-10-09 NOTE — PHYSICAL THERAPY INITIAL EVALUATION ADULT - PERTINENT HX OF CURRENT PROBLEM, REHAB EVAL
75 year old female with PMH of significant for recently diagnosed hyperthyroidism (3mo ago, on Methimazole), RA (on MTX weekly, Remicade [id number], Hydroxychloroquine, and daily steroids w/ Prednisone 10mg daily), HTN, CVA, memory impairment who presented from [facility] with nonproductive cough and dyspnea with nausea and diarrhea and was found to be COVID-19 positive. A/w Hypoxic resp failure - COVID PNA S/P Remdesivir and Decadron

## 2020-10-09 NOTE — PROGRESS NOTE ADULT - ASSESSMENT
Patient is a 75 year old female with PMH of significant for recently diagnosed hyperthyroidism (3mo ago, on Methimazole), RA (on MTX weekly, Remicade [id number], Hydroxychloroquine, and daily steroids w/ Prednisone 10mg daily), HTN, CVA, memory impairment who presented from [facility] with nonproductive cough and dyspnea with nausea and diarrhea and was found to be COVID-19 positive. Blood cultures positive for Staph epi in both sets, unclear if true or contaminant.

## 2020-10-09 NOTE — PHYSICAL THERAPY INITIAL EVALUATION ADULT - CRITERIA FOR SKILLED THERAPEUTIC INTERVENTIONS
anticipated equipment needs at discharge/impairments found/rehab potential/functional limitations in following categories/anticipated discharge recommendation

## 2020-10-09 NOTE — PROGRESS NOTE ADULT - SUBJECTIVE AND OBJECTIVE BOX
[facility], Division of Infectious Diseases  ROSALINA Cali Y. Patel, S. Shah  [phone]  ([phone] - weekdays after 5pm and weekends)    Name: TARA ESPINAL  Age: 75y  Gender: Female  MRN: [id number]    Interval History:  Remains on nasal cannula 6L, saturating 91-94%.  Feels cough less with cough suppressants. Denies dyspnea, fever, or chills.   ROS reviewed, pertinent positives and negatives as above.   Notes reviewed. Afebrile, remains on dexamethasone.     Allergies: Actonel (Hives), clonidine (Other), crab meat (Unknown)    Objective:  Vitals:  T(F): 98.2 (10-09-20 @ 05:36), Max: 98.5 (10-08-20 @ 11:07)  HR: 79 (10-09-20 @ 05:36) (70 - 80)  BP: 135/77 (10-09-20 @ 05:36) (120/77 - 135/77)  RR: 18 (10-09-20 @ 05:36) (18 - 18)  SpO2: 93% (10-09-20 @ 05:36) (91% - 94%)    Physical Examination:  General: no acute distress, comfortable, on NC 6L/min  HEENT: NC/AT, EOMI, anicteric, neck supple  Cardio: S1, S2 heard, RRR, no murmurs  Resp: decreased breath sounds bilaterally  Abd: soft, NT, ND, + bowel sounds  Neuro: AAOx3, no obvious focal deficits  Ext: no edema, moving extremities  Skin: warm, dry, no visible rash  Lines: PIV    Laboratory Studies:  CBC:                       11.4   16.04 )-----------( 451      ( 09 Oct 2020 06:06 )             32.9     CMP: 10-09    135  |  101  |  16  ----------------------------<  89  3.9   |  21<L>  |  0.37<L>    Ca    8.7      09 Oct 2020 06:06    TPro  6.1  /  Alb  2.9<L>  /  TBili  0.4  /  DBili  x   /  AST  22  /  ALT  9<L>  /  AlkPhos  91  10-09    LIVER FUNCTIONS - ( 09 Oct 2020 06:06 )  Alb: 2.9 g/dL / Pro: 6.1 g/dL / ALK PHOS: 91 U/L / ALT: 9 U/L / AST: 22 U/L / GGT: x           Ferritin 2645 < 3525 < 6426 < 5573 < 4710  CRP 1.39 < 0.74 < 2.00  D-Dimer 426< 254 <294    Microbiology:  10/4 - blood cultures - NGTD x2  10/3 - COVID ab - negative   10/2 - blood cultures - 2/2 - CoNS- S. epidermidis - S to A/S, cefazolin, gent, oxacillin, tetracyclines, bactrim, vancomycin; R to clindamycin, erythromycin, penicillin  10/2 - COVID-19 PCR - positive    Radiology:  Xray Chest 1 View- PORTABLE-Urgent (Xray Chest 1 View- PORTABLE-Urgent .) (10.05.20 @ 12:41) > Impression: The heart is normal insize. Poor inspiratory effort. Diffuse airspace opacities are seen from both lungs compatible with pneumonia status post seen in COVID 19., Correlate with CT scan that was performed on October 2, 2020.    CT Angio Chest w/ IV Cont (10.02.20 @ 13:30) > IMPRESSION: No pulmonary embolism. Bilateral lung opacities may represent Covid-19.    Xray Chest 1 View-PORTABLE IMMEDIATE (10.02.20 @ 11:26) > IMPRESSION: Mid left lung infiltrate.    Medications:  MEDICATIONS  (STANDING):  aspirin  chewable 81 milliGRAM(s) Oral daily  benzonatate 100 milliGRAM(s) Oral three times a day  ceFAZolin   IVPB 2000 milliGRAM(s) IV Intermittent every 8 hours  cholecalciferol 2000 Unit(s) Oral daily  dexAMETHasone     Tablet 6 milliGRAM(s) Oral daily  enoxaparin Injectable 40 milliGRAM(s) SubCutaneous daily  hydroxychloroquine 400 milliGRAM(s) Oral <User Schedule>  labetalol 200 milliGRAM(s) Oral two times a day  methimazole 5 milliGRAM(s) Oral daily  pantoprazole    Tablet 40 milliGRAM(s) Oral before breakfast  PARoxetine 10 milliGRAM(s) Oral daily  ursodiol Tablet 250 milliGRAM(s) Oral two times a day    Antimicrobials:  ceFAZolin   IVPB 2000 milliGRAM(s) IV Intermittent every 8 hours - started 10/6  hydroxychloroquine 400 milliGRAM(s) Oral <User Schedule>  S/p remdesivir 10/2-10/6  S/p vancomycin 10/2-10/6

## 2020-10-09 NOTE — PROGRESS NOTE ADULT - SUBJECTIVE AND OBJECTIVE BOX
Follow-up Pulm Progress Note  Gordon Fox MD  [phone]    Remains afebrile on Cefazolin, decadron   Oxygen requirements unchanged: on 6liters nasal cannula, unable to reduce  Remains comfortable supine at rest in bed  Inflammatory markers mixed, remain elevated    Vital Signs Last 24 Hrs  T(C): 36.8 (09 Oct 2020 11:35), Max: 36.8 (08 Oct 2020 21:21)  T(F): 98.2 (09 Oct 2020 11:35), Max: 98.3 (08 Oct 2020 21:21)  HR: 74 (09 Oct 2020 11:35) (70 - 80)  BP: 114/72 (09 Oct 2020 11:35) (114/72 - 135/77)  BP(mean): --  RR: 18 (09 Oct 2020 11:35) (18 - 18)  SpO2: 90% (09 Oct 2020 11:35) (90% - 94%)                          11.4   16.04 )-----------( 451      ( 09 Oct 2020 06:06 )             32.9       10-09    135  |  101  |  16  ----------------------------<  89  3.9   |  21<L>  |  0.37<L>    Ca    8.7      09 Oct 2020 06:06    TPro  6.1  /  Alb  2.9<L>  /  TBili  0.4  /  DBili  x   /  AST  22  /  ALT  9<L>  /  AlkPhos  91  10-09    CULTURES:  Culture Results:   Growth in aerobic and anaerobic bottles: Staphylococcus epidermidis  Susceptibility to follow.  "Due to technical problems, Proteus sp. will Not be reported as part of  the BCID panel until further notice"  ***Blood Panel PCR results on this specimen are available  approximately 3 hours after the Gram stain result.***  Gram stain, PCR, and/or culture results may not always  correspond due to difference in methodologies.  ************************************************************  This PCR assay was performed using [employer].  The following targets are tested for: Enterococcus,  vancomycin resistant enterococci, Listeria monocytogenes,  coagulase negative staphylococci, S. aureus,  methicillin resistant S. aureus, Streptococcus agalactiae  (Group B), S. pneumoniae, S. pyogenes (Group A),  Acinetobacter baumannii, Enterobacter cloacae, E. coli,  Klebsiella oxytoca, K. pneumoniae, Proteus sp.,  Serratia marcescens, Haemophilus influenzae,  Neisseria meningitidis, Pseudomonas aeruginosa, Candida  albicans, C. glabrata, C krusei, C parapsilosis,  C. tropicalis and the KPC resistance gene. (10-02 @ 14:52)  Culture Results:   Growth in aerobic and anaerobic bottles: Staphylococcus epidermidis  "Susceptibilities not performed" (10-02 @ 14:52)    Most recent blood culture -- 10-02 @ 14:52   Blood Culture PCR Blood Culture PCR .Blood Blood-Peripheral 10-02 @ 14:52      Physical Examination:  PULM: Few scattered basilar crackles; no wheeze  CVS: Regular rate and rhythm, no murmurs, rubs, or gallops  ABD: Soft, non-tender  EXT:  No clubbing, cyanosis, or edema    RADIOLOGY REVIEWED  CXR:    CT chest:    PROCEDURE DATE:  10/02/2020        INTERPRETATION:  EXAMINATION: CT ANGIO CHEST WITHOUT AND OR WITH IV CONTRAST    CLINICAL INDICATION: Dyspnea    TECHNIQUE: CTA of the chest was performed for evaluation of pulmonary embolism after administration of 90 ml of Omnipaque-350, 10 ml discarded.  MIP images were reconstructed.    COMPARISON: 10/27/2019.    FINDINGS:    PULMONARY ARTERIES: There is no pulmonary embolism    AIRWAYS AND LUNGS: The central tracheobronchial tree is patent.  Bilateral groundglass opacities and consolidations, predominantly peripherally    MEDIASTINUM AND PLEURA: There are no enlarged mediastinal, hilar or axillary lymph nodes. The visualized portion of the thyroid gland is unremarkable. There is no pleural effusion. There is no pneumothorax.    HEART AND VESSELS: There is mild cardiomegaly. There are atherosclerotic calcifications of the aorta and coronary arteries.  There is no pericardial effusion.    UPPER ABDOMEN: Images of the upper abdomen demonstrate no abnormality.    BONES AND SOFT TISSUES: Unchanged T12 vertebral plasty and L1 vertebral body severe compression deformity.  The soft tissues are unremarkable.    TUBES/LINES: None.    IMPRESSION:  No pulmonary embolism. Bilateral lung opacities may represent Covid-19.    TTE:

## 2020-10-09 NOTE — PROGRESS NOTE ADULT - SUBJECTIVE AND OBJECTIVE BOX
Still requiring 6LNCO2 to maintain her O2 sats above 90%  (+)malaise  (+)cough    Vital Signs Last 24 Hrs  T(C): 36.8 (09 Oct 2020 05:36), Max: 36.9 (08 Oct 2020 11:07)  T(F): 98.2 (09 Oct 2020 05:36), Max: 98.5 (08 Oct 2020 11:07)  HR: 79 (09 Oct 2020 05:36) (70 - 80)  BP: 135/77 (09 Oct 2020 05:36) (120/77 - 135/77)  BP(mean): --  RR: 18 (09 Oct 2020 05:36) (18 - 18)  SpO2: 93% (09 Oct 2020 05:36) (91% - 94%)    I&O's Summary    10-08-20 @ 07:01  -  10-09-20 @ 07:00  --------------------------------------------------------  IN: 1210 mL / OUT: 0 mL / NET: 1210 mL        PHYSICAL EXAM:  GENERAL: NAD, on 6LNCO2  HEAD:  Atraumatic, Normocephalic  EYES: EOMI, PERRLA, conjunctiva and sclera clear  NECK: Supple, No JVD  CHEST/LUNG: mild decrease breath sounds bilaterally; No wheeze   HEART: Regular rate and rhythm; No murmurs, rubs, or gallops  ABDOMEN: Soft, Nontender, Nondistended; Bowel sounds present  Neuro: AAO x 2-3, no focal deficit, 5/5 b/l extremities  EXTREMITIES:  2+ Peripheral Pulses, No clubbing, cyanosis, or edema  SKIN: No rashes or lesions    LABS:                        11.4   16.04 )-----------( 451      ( 09 Oct 2020 06:06 )             32.9     10-09    135  |  101  |  16  ----------------------------<  89  3.9   |  21<L>  |  0.37<L>    Ca    8.7      09 Oct 2020 06:06    TPro  6.1  /  Alb  2.9<L>  /  TBili  0.4  /  DBili  x   /  AST  22  /  ALT  9<L>  /  AlkPhos  91  10-09      CAPILLARY BLOOD GLUCOSE                RADIOLOGY & ADDITIONAL TESTS:    Imaging Personally Reviewed:  [x] YES  [ ] NO    Consultant(s) Notes Reviewed:  [x] YES  [ ] NO

## 2020-10-09 NOTE — PHYSICAL THERAPY INITIAL EVALUATION ADULT - ADDITIONAL COMMENTS
XR Chest 10.05.20: The heart is normal insize. Poor inspiratory effort. Diffuse airspace opacities are seen from both lungs compatible with pneumonia status post seen in COVID 19.  CT Angio Chest w/ IV Cont 10.02.20: No pulmonary embolism. Bilateral lung opacities may represent Covid-19.  XR Chest 10.02.20: Mid left lung infiltrate.

## 2020-10-09 NOTE — PROGRESS NOTE ADULT - PROBLEM SELECTOR PLAN 1
- maintain SpO2 > 92%, pt on 6LNC currently, consider Hi-Tulio if persistent desaturation below 90%  - avoid BiLevel given covid status  - Coag neg strep bacteremia, 4/4, s/p empiric vanco. TTE to r/o endocarditis  - pansensitive, switched to IV Ancef x 10 days from negative blood cultures on 10/4/20  - monitor inflammatory markers, if remain high, decadron course extended owing to her persistently high O2 requirements

## 2020-10-09 NOTE — CHART NOTE - NSCHARTNOTEFT_GEN_A_CORE
Nutrition Follow-up  Patient seen for: malnutrition follow-up    Source: comprehensive chart review, ( )     Hospital course as per chart: Pt is a 76 yo female with PMH of recently diagnosed hyperthyroidism (3 months ago), rheumatoid arthritis, HTN, s/p CVA without residual deficits, memory impairment, who presented from assisted living facility with nonproductive cough, SOB, along with NBNB vomiting 3-4 days ago and NB diarrhea beginning on day of admission, found to be COVID-19 positive. Admitted 10/2. Chart reviewed, events noted.    Diet: Regular, Low Sodium  Supplement: Ensure Enlive 2 daily    Patient reports ( )    Encouraged PO intake as tolerated ( ).     Education provided:     PO intake:  < 50% [ ] 50-75% [ ]   % [ ]  other :    Source for PO intake:     Current Weight/% Weight Change: no new weights to assess  Will continue to monitor and trend weights.     Pertinent Medications: MEDICATIONS  (STANDING):  aspirin  chewable 81 milliGRAM(s) Oral daily  benzonatate 100 milliGRAM(s) Oral three times a day  ceFAZolin   IVPB 2000 milliGRAM(s) IV Intermittent every 8 hours  cholecalciferol 2000 Unit(s) Oral daily  dexAMETHasone     Tablet 6 milliGRAM(s) Oral daily  enoxaparin Injectable 40 milliGRAM(s) SubCutaneous daily  hydroxychloroquine 400 milliGRAM(s) Oral <User Schedule>  labetalol 200 milliGRAM(s) Oral two times a day  methimazole 5 milliGRAM(s) Oral daily  pantoprazole    Tablet 40 milliGRAM(s) Oral before breakfast  PARoxetine 10 milliGRAM(s) Oral daily  ursodiol Tablet 250 milliGRAM(s) Oral two times a day    MEDICATIONS  (PRN):  acetaminophen   Tablet .. 650 milliGRAM(s) Oral every 8 hours PRN Temp greater or equal to 38.5C (101.3F)  acetaminophen   Tablet .. 650 milliGRAM(s) Oral every 6 hours PRN Moderate Pain (4 - 6)  guaiFENesin   Syrup  (Sugar-Free) 200 milliGRAM(s) Oral every 6 hours PRN Cough  hydrocodone/homatropine Syrup 5 milliLiter(s) Oral at bedtime PRN Cough    Pertinent Labs:  10-09 Na135 mmol/L Glu 89 mg/dL K+ 3.9 mmol/L Cr  0.37 mg/dL<L> BUN 16 mg/dL 10-09 Alb 2.9 g/dL<L>    Skin: no pressure injuries per flowsheets   Edema: no edema per flowsheets     Estimated Needs: no change since previous assessment  Based on weight: 110.6 lbs (10/2)   Estimated Energy Needs (30-35 calories/kg): 7530-2304 kcal/day  Estimated Protein Needs (1.2-1.4 gm/kg): 60-70 g/day  Estimated Fluid Needs (25-30 ml/kg): 7222-1348 ml/day    Previous Nutrition Diagnosis: Severe malnutrition  Nutrition Diagnosis is [x] ongoing  [ ] resolved [ ] not applicable   Care plan: [ ] in progress [ ] achieved  Being addressed with: liberalized diet, Ensure Enlive supplements     New Nutrition Diagnosis: [ ] not applicable    [ ] Inadequate Protein Energy Intake [ ]Inadequate Oral Intake [ ] Excessive Energy Intake     [ ] Underweight [ ] Increased Nutrient Needs [ ] Overweight/Obesity     [ ] Altered GI Function [ ] Unintended Weight Loss [ ] Food & Nutrition Related Knowledge Deficit[ ] Limited Adherence to nutrition related recommendations [ ] Malnutrition  [ ] other: Free text    Recommendations:  1)     Monitoring and Evaluation: Monitor PO intake, weight, labs, skin, GI status, diet     RD remains available upon request and will continue to follow-up per protocol.   Silvana Bliss MS RD CDN pager #[phone] Nutrition Follow-up  Patient seen for: malnutrition follow-up    Source: comprehensive chart review, RN    Hospital course as per chart: Pt is a 76 yo female with PMH of recently diagnosed hyperthyroidism (3 months ago), rheumatoid arthritis, HTN, s/p CVA without residual deficits, memory impairment, who presented from assisted living facility with nonproductive cough, SOB, along with NBNB vomiting 3-4 days ago and NB diarrhea beginning on day of admission, found to be COVID-19 positive. Admitted 10/2. Chart reviewed, events noted.    Diet: Regular, Low Sodium  Supplement: Ensure Enlive 2 daily    Attempted to reach patient over phone with [employer] , no answer. Per RN, pt with poor intake today, had only a few bites of breakfast and lunch and a few sips of Ensure. Yesterday, intake was slightly better, close to half of meals. Denies pt with nausea/vomiting/diarrhea/constipation. Noted pt with fecal incontinence, last BM 10/7. Per RN, pt with no food preferences. Will continue to monitor intake.     PO intake:  < 50% of meals and sips of Ensure Enlive    Source for PO intake: RN    Current Weight/% Weight Change: no new weights to assess  Will continue to monitor and trend weights.     Pertinent Medications: MEDICATIONS  (STANDING):  aspirin  chewable 81 milliGRAM(s) Oral daily  benzonatate 100 milliGRAM(s) Oral three times a day  ceFAZolin   IVPB 2000 milliGRAM(s) IV Intermittent every 8 hours  cholecalciferol 2000 Unit(s) Oral daily  dexAMETHasone     Tablet 6 milliGRAM(s) Oral daily  enoxaparin Injectable 40 milliGRAM(s) SubCutaneous daily  hydroxychloroquine 400 milliGRAM(s) Oral <User Schedule>  labetalol 200 milliGRAM(s) Oral two times a day  methimazole 5 milliGRAM(s) Oral daily  pantoprazole    Tablet 40 milliGRAM(s) Oral before breakfast  PARoxetine 10 milliGRAM(s) Oral daily  ursodiol Tablet 250 milliGRAM(s) Oral two times a day    MEDICATIONS  (PRN):  acetaminophen   Tablet .. 650 milliGRAM(s) Oral every 8 hours PRN Temp greater or equal to 38.5C (101.3F)  acetaminophen   Tablet .. 650 milliGRAM(s) Oral every 6 hours PRN Moderate Pain (4 - 6)  guaiFENesin   Syrup  (Sugar-Free) 200 milliGRAM(s) Oral every 6 hours PRN Cough  hydrocodone/homatropine Syrup 5 milliLiter(s) Oral at bedtime PRN Cough    Pertinent Labs:  10-09 Na135 mmol/L Glu 89 mg/dL K+ 3.9 mmol/L Cr  0.37 mg/dL<L> BUN 16 mg/dL 10-09 Alb 2.9 g/dL<L>    Skin: no pressure injuries per flowsheets   Edema: no edema per flowsheets     Estimated Needs: no change since previous assessment  Based on weight: 110.6 lbs (10/2)   Estimated Energy Needs (30-35 calories/kg): 7608-1682 kcal/day  Estimated Protein Needs (1.2-1.4 gm/kg): 60-70 g/day  Estimated Fluid Needs (25-30 ml/kg): 4961-5641 ml/day    Previous Nutrition Diagnosis: Severe malnutrition  Nutrition Diagnosis is [x] ongoing  [ ] resolved [ ] not applicable   Care plan: [x] in progress [ ] achieved  Being addressed with: liberalized diet, Ensure Enlive supplements     New Nutrition Diagnosis: not applicable    Recommendations:  1) Continue Low Sodium diet with Ensure Enlive 2 daily (350 kcal, 20 g protein in each) to optimize intake. Monitor/adjust as needed.  2) Continue to provide assistance and encouragement at mealtimes as needed. RD to continue to obtain/honor food preferences as feasible.     Monitoring and Evaluation: Monitor PO intake, weight, labs, skin, GI status, diet     RD remains available upon request and will continue to follow-up per protocol.   Silvana Bliss MS RD CDN pager #[phone]

## 2020-10-09 NOTE — PHYSICAL THERAPY INITIAL EVALUATION ADULT - ACTIVE RANGE OF MOTION EXAMINATION, REHAB EVAL
bilateral  lower extremity Active ROM was WFL (within functional limits)/BUE shoulder AAROM WFL, b/l elbow ext limited L>R,  b/l hand joint deformities

## 2020-10-09 NOTE — PROGRESS NOTE ADULT - ASSESSMENT
Acute hypoxemic resp failure due to multilobar viral pneumonia  COVID 19 viral infection  CNS bacteremia: unclear significance  RA on immunosupressive regimen  Inflammatory markers largely stable  10/5: Clinically stable, Mild tachypnea; howver increase in nasal O2 to 5 liters noted. Uptrending inflammatory markers noted  10/6:  CXR progressed vs. admission; still requires 5 liters nasal cannula with sat 91%; Ferritin decreased  10/7: Still requires 6 liters nasal oxygen; infl markers downtrending  ; no gross improvement in status  10/8: Clinically unchanged; Ferritin increasing; remains on 6 liters nasal cannula; c/o cogh    REC    Continue decadron   Continue tessalon and prn hycodan qhs  Monitor inflammatory markers and oxygen requirements  Continue DVT prophylaxis  Monitor O2 sats and titrate to maintain 90-92% with nasal oxygen   Will transition to HiFlo cannula if oxygenation further deteriorates

## 2020-10-09 NOTE — PHYSICAL THERAPY INITIAL EVALUATION ADULT - TRANSFER TRAINING, PT EVAL
GOAL: Pt will perform ALL transfers with MOD assist, w/use of appropriate assistive device as needed, in 2 weeks.

## 2020-10-10 NOTE — PROGRESS NOTE ADULT - PROBLEM SELECTOR PLAN 1
COVID-19 PCR positive  CTA chest with peripheral opacities consistent with COVID lung changes. CXR with some progression, diffuse airspace opacities. Remains on NC 6L/min. Pulmonary following.  S/p 5 days of remdesivir  Ferritin trending down, Ddimer and CRP trende down slightly  today.  Continuing on Dexamethasone - extending course for now given continued oxygen requirements and risk for decompensation  On lovenox subq daily  Continue to monitor pulse ox and cont supplemental O2 for O2 90-92%  Continue to monitor inflammatory markers

## 2020-10-10 NOTE — PROGRESS NOTE ADULT - SUBJECTIVE AND OBJECTIVE BOX
[facility], Division of Infectious Diseases  ROSALINA Cali, DARCI Perez  [phone]  ([phone] - weekdays after 5pm and weekends)    Name: TARA ESPINAL  Age: 75y  Gender: Female  MRN: [id number]    Interval History:  Remains on nasal cannula 6L this morning saturating 92-95%  Cough unchanged today, denies dyspnea, chest pain, fever, or chills.   ROS reviewed, pertinent positives and negatives as above.   Notes reviewed. Afebrile, remains on dexamethasone.     Allergies: Actonel (Hives), clonidine (Other), crab meat (Unknown)    Objective:  Vitals:  T(F): 97.6 (10-10-20 @ 08:29), Max: 98.7 (10-10-20 @ 05:00)  HR: 75 (10-10-20 @ 08:29) (74 - 84)  BP: 121/74 (10-10-20 @ 08:29) (114/72 - 129/81)  RR: 16 (10-10-20 @ 08:29) (16 - 19)  SpO2: 95% (10-10-20 @ 08:29) (88% - 95%)    Physical Examination:  General: no acute distress, comfortable, on NC 6L/min  HEENT: NC/AT, EOMI, anicteric, neck supple  Cardio: S1, S2 heard, RRR, no murmurs  Resp: decreased breath sounds bilaterally  Abd: soft, NT, ND, + bowel sounds  Neuro: AAOx3, no obvious focal deficits  Ext: no edema, moving extremities  Skin: warm, dry, no visible rash  Lines: PIV    Laboratory Studies:  CBC:                       11.7   15.40 )-----------( 488      ( 10 Oct 2020 06:01 )             34.8     CMP: 10-10    134<L>  |  101  |  19  ----------------------------<  104<H>  4.3   |  20<L>  |  0.37<L>    Ca    8.9      10 Oct 2020 06:03    TPro  6.3  /  Alb  3.2<L>  /  TBili  0.4  /  DBili  0.1  /  AST  23  /  ALT  7<L>  /  AlkPhos  92  10-10    LIVER FUNCTIONS - ( 10 Oct 2020 06:03 )  Alb: 3.2 g/dL / Pro: 6.3 g/dL / ALK PHOS: 92 U/L / ALT: 7 U/L / AST: 23 U/L / GGT: x           Ferritin 2645 < 3525 < 6426 < 5573 < 4710  CRP 1.15< 1.39 < 0.74 < 2.00  D-Dimer 334< 426< 254 <294    Microbiology:  10/4 - blood cultures - negative   10/3 - COVID ab - negative   10/2 - blood cultures - 2/2 - CoNS- S. epidermidis - S to A/S, cefazolin, gent, oxacillin, tetracyclines, bactrim, vancomycin; R to clindamycin, erythromycin, penicillin  10/2 - COVID-19 PCR - positive    Radiology:  Xray Chest 1 View- PORTABLE-Urgent (Xray Chest 1 View- PORTABLE-Urgent .) (10.05.20 @ 12:41) > Impression: The heart is normal insize. Poor inspiratory effort. Diffuse airspace opacities are seen from both lungs compatible with pneumonia status post seen in COVID 19., Correlate with CT scan that was performed on October 2, 2020.    CT Angio Chest w/ IV Cont (10.02.20 @ 13:30) > IMPRESSION: No pulmonary embolism. Bilateral lung opacities may represent Covid-19.    Xray Chest 1 View-PORTABLE IMMEDIATE (10.02.20 @ 11:26) > IMPRESSION: Mid left lung infiltrate.    Medications:  MEDICATIONS  (STANDING):  aspirin  chewable 81 milliGRAM(s) Oral daily  benzonatate 100 milliGRAM(s) Oral three times a day  ceFAZolin   IVPB 2000 milliGRAM(s) IV Intermittent every 8 hours  cholecalciferol 2000 Unit(s) Oral daily  dexAMETHasone     Tablet 6 milliGRAM(s) Oral daily  enoxaparin Injectable 40 milliGRAM(s) SubCutaneous daily  hydroxychloroquine 400 milliGRAM(s) Oral <User Schedule>  labetalol 200 milliGRAM(s) Oral two times a day  methimazole 5 milliGRAM(s) Oral daily  pantoprazole    Tablet 40 milliGRAM(s) Oral before breakfast  PARoxetine 10 milliGRAM(s) Oral daily  ursodiol Tablet 250 milliGRAM(s) Oral two times a day    Antimicrobials:  ceFAZolin   IVPB 2000 milliGRAM(s) IV Intermittent every 8 hours - started 10/6  hydroxychloroquine 400 milliGRAM(s) Oral <User Schedule>  S/p remdesivir 10/2-10/6  S/p vancomycin 10/2-10/6

## 2020-10-10 NOTE — PROGRESS NOTE ADULT - PROBLEM SELECTOR PLAN 1
- maintain SpO2 > 92%, pt on 6LNC currently, consider Hi-Tulio if persistent desaturation below 90%  - avoid BiLevel given (+)COVID-19 status  - Coag neg strep bacteremia, 4/4, s/p empiric vanco. TTE to r/o endocarditis  - pansensitive, switched to IV Ancef x 10 days from negative blood cultures on 10/4/20  - monitor inflammatory markers every 3 days  - she remains on decadron course which was extended owing to her persistently high O2 requirements

## 2020-10-10 NOTE — PROGRESS NOTE ADULT - PROBLEM SELECTOR PLAN 2
CoNS - Staph epi - grew in both sets of blood cultures - ?contaminant - unclear if drawn from 2 separate venipunctures vs true bacteremia  No evidence of skin infection, long term indwelling lines, or hardware.   Repeat blood cultures are negative on 10/4.   No fevers and cleared CoNS, can hold off on TTE for now.    Continue Cefazolin 2g IV Q8h - complete 10 day course from first negative blood cultures - end 10/13

## 2020-10-10 NOTE — PROGRESS NOTE ADULT - ASSESSMENT
76 yo F w/ PMHx significant for recently diagnosed hyperthyroidism (3mo ago, on Methimazole), rheumatoid arthritis (on MTX weekly, Remicade [id number], Hydroxychloroquine, and daily steroids w/ Prednisone 10mg daily), HTN (on multiple anti-hypertensive agents), s/p CVA w/o residual deficits, memory impairment, who presents from  after presenting from [facility] where she resides, w/ c/o nonproductive cough over the past few days, and SOB, along w/ NBNB vomiting 3-4days ago and NB diarrhea beginning today, found to be COVID-19 positive.

## 2020-10-10 NOTE — PROGRESS NOTE ADULT - SUBJECTIVE AND OBJECTIVE BOX
Ferritin markers decreasing  D-dimer starting to come down  She remains in the mid 90s on 5-6L NCO2    Vital Signs Last 24 Hrs  T(C): 36.4 (10 Oct 2020 08:29), Max: 37.1 (10 Oct 2020 05:00)  T(F): 97.6 (10 Oct 2020 08:29), Max: 98.7 (10 Oct 2020 05:00)  HR: 75 (10 Oct 2020 08:29) (74 - 84)  BP: 121/74 (10 Oct 2020 08:29) (114/72 - 129/81)  BP(mean): --  RR: 16 (10 Oct 2020 08:29) (16 - 19)  SpO2: 95% (10 Oct 2020 08:29) (88% - 95%)    I&O's Summary    10-09-20 @ 07:01  -  10-10-20 @ 07:00  --------------------------------------------------------  IN: 490 mL / OUT: 0 mL / NET: 490 mL        PHYSICAL EXAM:  GENERAL: NAD, on 6LNCO2  HEAD:  Atraumatic, Normocephalic  EYES: EOMI, PERRLA, conjunctiva and sclera clear  NECK: Supple, No JVD  CHEST/LUNG: mild decrease breath sounds bilaterally; No wheeze   HEART: Regular rate and rhythm; No murmurs, rubs, or gallops  ABDOMEN: Soft, Nontender, Nondistended; Bowel sounds present  Neuro: AAO x 2-3, no focal deficit, 5/5 b/l extremities  EXTREMITIES:  2+ Peripheral Pulses, No clubbing, cyanosis, or edema  SKIN: No rashes or lesions    LABS:                        11.7   15.40 )-----------( 488      ( 10 Oct 2020 06:01 )             34.8     10-10    134<L>  |  101  |  19  ----------------------------<  104<H>  4.3   |  20<L>  |  0.37<L>    Ca    8.9      10 Oct 2020 06:03    TPro  6.3  /  Alb  3.2<L>  /  TBili  0.4  /  DBili  0.1  /  AST  23  /  ALT  7<L>  /  AlkPhos  92  10-10      CAPILLARY BLOOD GLUCOSE                RADIOLOGY & ADDITIONAL TESTS:    Imaging Personally Reviewed:  [x] YES  [ ] NO    Consultant(s) Notes Reviewed:  [x] YES  [ ] NO

## 2020-10-10 NOTE — PROGRESS NOTE ADULT - ASSESSMENT
Patient is a 75 year old female with PMH of significant for recently diagnosed hyperthyroidism (3mo ago, on Methimazole), RA (on MTX weekly, Remicade [id number], Hydroxychloroquine, and daily steroids w/ Prednisone 10mg daily), HTN, CVA, memory impairment who presented from [facility] with nonproductive cough and dyspnea with nausea and diarrhea and was found to be COVID-19 positive. Blood cultures positive for Staph epi in both sets, unclear if true or contaminant.

## 2020-10-11 NOTE — PROGRESS NOTE ADULT - ASSESSMENT
74 yo F w/ PMHx significant for recently diagnosed hyperthyroidism (3mo ago, on Methimazole), rheumatoid arthritis (on MTX weekly, Remicade [id number], Hydroxychloroquine, and daily steroids w/ Prednisone 10mg daily), HTN (on multiple anti-hypertensive agents), s/p CVA w/o residual deficits, memory impairment, who presents from  after presenting from [facility] where she resides, w/ c/o nonproductive cough over the past few days, and SOB, along w/ NBNB vomiting 3-4days ago and NB diarrhea beginning today, found to be COVID-19 positive.

## 2020-10-11 NOTE — PROGRESS NOTE ADULT - PROBLEM SELECTOR PLAN 1
COVID-19 PCR positive  CTA chest with peripheral opacities consistent with COVID lung changes. CXR with some progression, diffuse airspace opacities. Remains on NC 6L/min. Pulmonary following.  S/p 5 days of remdesivir  Inflammatory markers with downtrend.   Continuing on Dexamethasone - extending course for now given continued oxygen requirements and risk for decompensation  On lovenox subq daily  Continue to monitor pulse ox and cont supplemental O2 for O2 90-92%  Continue to monitor inflammatory markers

## 2020-10-11 NOTE — PROGRESS NOTE ADULT - PROBLEM SELECTOR PLAN 3
- COVID19 PCR detected    - supportive management including for fever/cough  - monitor respiratory status closely and serial inflammatory markers to monitor for severity progression  - maintain oxygenation and monitoring as above  - Lovenox 40mg SubQ for high risk of microthrombi a/w COVID-19  - Completed course of Remdesivir under EUA as Spo2 < 94% on RA (pt requiring O2 supp via NC currently), eGFR = 90 (>30), ALT = 33 (WNL), and pt does not meet exclusion criteria -- given weight = 49.9kg, dose: 200mg IV x1 followed by 100mg IV Q24 daily x4days; discussed fact sheet extensively with patient's  via telephone  - also initiated steroids: as patient on daily Prednisone and received Solucortef 100mg IV x1 in ED at 1:40pm, remains on extended course of Dexamethasone 6mg daily   - stopped Azithromycin as pt on chronic home Hydroxychloroquine for RA as below, and risk of Qtc prolongation

## 2020-10-11 NOTE — PROGRESS NOTE ADULT - PROBLEM SELECTOR PLAN 2
- Coag neg strep bacteremia, 4/4, s/p empiric vanco. TTE to r/o endocarditis  - pansensitive, switched to IV Ancef x 10 days, last day on 10/13  - Appreciate ID

## 2020-10-11 NOTE — PROGRESS NOTE ADULT - SUBJECTIVE AND OBJECTIVE BOX
[facility], Division of Infectious Diseases  ROSALINA Cali Y. Patel, S. Shah  [phone]  ([phone] - weekdays after 5pm and weekends)    Name: TARA ESPINAL  Age: 75y  Gender: Female  MRN: [id number]    Interval History:  Remains on nasal cannula 6L this morning saturating 91-95%  Coughing less, denies dyspnea, chest pain, fever, or chills.   ROS reviewed, pertinent positives and negatives as above.   Notes reviewed. Afebrile, remains on dexamethasone.     Objective:  Vitals:  T(F): 98.6 (10-11-20 @ 04:55), Max: 98.6 (10-11-20 @ 04:55)  HR: 75 (10-11-20 @ 04:55) (70 - 84)  BP: 135/76 (10-11-20 @ 04:55) (97/63 - 150/62)  RR: 17 (10-11-20 @ 04:55) (16 - 18)  SpO2: 92% (10-11-20 @ 04:55) (91% - 93%)    Physical Examination:  General: no acute distress, comfortable, NC 6L/min  HEENT: NC/AT, EOMI, anicteric, neck supple  Cardio: S1, S2 heard, RRR, no murmurs  Resp: decreased breath sounds bilaterally  Abd: soft, NT, ND, + bowel sounds  Neuro: AAOx3, no obvious focal deficits  Ext: no edema, moving extremities  Skin: warm, dry, no visible rash  Lines: PIV    Laboratory Studies:  CBC:                       11.3   19.14 )-----------( 486      ( 11 Oct 2020 06:20 )             34.0     CMP: 10-11    137  |  102  |  20  ----------------------------<  100<H>  4.2   |  22  |  0.36<L>    Ca    8.9      11 Oct 2020 06:22    TPro  6.1  /  Alb  3.0<L>  /  TBili  0.4  /  DBili  0.1  /  AST  21  /  ALT  7<L>  /  AlkPhos  83  10-11    LIVER FUNCTIONS - ( 11 Oct 2020 06:22 )  Alb: 3.0 g/dL / Pro: 6.1 g/dL / ALK PHOS: 83 U/L / ALT: 7 U/L / AST: 21 U/L / GGT: x           Ferritin 1764 < 2645 < 3525 < 6426 < 5573 < 4710  CRP 1.15 < 1.39 < 0.74 < 2.00  D-Dimer 334 < 426< 254 <294    Microbiology:  10/4 - blood cultures - negative   10/3 - COVID ab - negative   10/2 - blood cultures - 2/2 - CoNS- S. epidermidis - S to A/S, cefazolin, gent, oxacillin, tetracyclines, bactrim, vancomycin; R to clindamycin, erythromycin, penicillin  10/2 - COVID-19 PCR - positive    Radiology:  Xray Chest 1 View- PORTABLE-Urgent (Xray Chest 1 View- PORTABLE-Urgent .) (10.05.20 @ 12:41) > Impression: The heart is normal insize. Poor inspiratory effort. Diffuse airspace opacities are seen from both lungs compatible with pneumonia status post seen in COVID 19., Correlate with CT scan that was performed on October 2, 2020.    CT Angio Chest w/ IV Cont (10.02.20 @ 13:30) > IMPRESSION: No pulmonary embolism. Bilateral lung opacities may represent Covid-19.    Xray Chest 1 View-PORTABLE IMMEDIATE (10.02.20 @ 11:26) > IMPRESSION: Mid left lung infiltrate.    Medications:  MEDICATIONS  (STANDING):  aspirin  chewable 81 milliGRAM(s) Oral daily  benzonatate 100 milliGRAM(s) Oral three times a day  ceFAZolin   IVPB 2000 milliGRAM(s) IV Intermittent every 8 hours  cholecalciferol 2000 Unit(s) Oral daily  dexAMETHasone     Tablet 6 milliGRAM(s) Oral daily  enoxaparin Injectable 40 milliGRAM(s) SubCutaneous daily  hydroxychloroquine 400 milliGRAM(s) Oral <User Schedule>  labetalol 200 milliGRAM(s) Oral two times a day  methimazole 5 milliGRAM(s) Oral daily  pantoprazole    Tablet 40 milliGRAM(s) Oral before breakfast  PARoxetine 10 milliGRAM(s) Oral daily  ursodiol Tablet 250 milliGRAM(s) Oral two times a day    Antimicrobials:  ceFAZolin   IVPB 2000 milliGRAM(s) IV Intermittent every 8 hours - started 10/6  hydroxychloroquine 400 milliGRAM(s) Oral <User Schedule>  S/p remdesivir 10/2-10/6  S/p vancomycin 10/2-10/6

## 2020-10-11 NOTE — PROGRESS NOTE ADULT - SUBJECTIVE AND OBJECTIVE BOX
Residual cough  Breathing is OK while lying in bed  Her O2 saturations remain in the mid 90s on 6L NCO2    Vital Signs Last 24 Hrs  T(C): 37 (11 Oct 2020 04:55), Max: 37 (11 Oct 2020 04:55)  T(F): 98.6 (11 Oct 2020 04:55), Max: 98.6 (11 Oct 2020 04:55)  HR: 75 (11 Oct 2020 04:55) (70 - 84)  BP: 135/76 (11 Oct 2020 04:55) (97/63 - 150/62)  BP(mean): --  RR: 17 (11 Oct 2020 04:55) (16 - 18)  SpO2: 92% (11 Oct 2020 04:55) (91% - 93%)    I&O's Summary    10-10-20 @ 07:01  -  10-11-20 @ 07:00  --------------------------------------------------------  IN: 700 mL / OUT: 0 mL / NET: 700 mL    10-11-20 @ 07:01  -  10-11-20 @ 10:41  --------------------------------------------------------  IN: 240 mL / OUT: 0 mL / NET: 240 mL        PHYSICAL EXAM:  GENERAL: NAD, on 6LNCO2  HEAD:  Atraumatic, Normocephalic  EYES: EOMI, PERRLA, conjunctiva and sclera clear  NECK: Supple, No JVD  CHEST/LUNG: mild decrease breath sounds bilaterally; No wheeze   HEART: Regular rate and rhythm; No murmurs, rubs, or gallops  ABDOMEN: Soft, Nontender, Nondistended; Bowel sounds present  Neuro: AAO x 2-3, no focal deficit, 5/5 b/l extremities  EXTREMITIES:  2+ Peripheral Pulses, No clubbing, cyanosis, or edema  SKIN: No rashes or lesions    LABS:                        11.3   19.14 )-----------( 486      ( 11 Oct 2020 06:20 )             34.0     10-11    137  |  102  |  20  ----------------------------<  100<H>  4.2   |  22  |  0.36<L>    Ca    8.9      11 Oct 2020 06:22    TPro  6.1  /  Alb  3.0<L>  /  TBili  0.4  /  DBili  0.1  /  AST  21  /  ALT  7<L>  /  AlkPhos  83  10-11      CAPILLARY BLOOD GLUCOSE                RADIOLOGY & ADDITIONAL TESTS:    Imaging Personally Reviewed:  [x] YES  [ ] NO    Consultant(s) Notes Reviewed:  [x] YES  [ ] NO

## 2020-10-11 NOTE — PROGRESS NOTE ADULT - PROBLEM SELECTOR PLAN 1
- maintain SpO2 > 92%, pt on 6LNC currently, consider Hi-Tulio if persistent desaturation below 90%  - avoid BiLevel given (+)COVID-19 status  - monitor inflammatory markers every 3 days  - she remains on decadron course which was extended owing to her persistently high O2 requirements

## 2020-10-11 NOTE — PROGRESS NOTE ADULT - ASSESSMENT
Patient is a 75 year old female with PMH of significant for recently diagnosed hyperthyroidism (3mo ago, on Methimazole), RA (on MTX weekly, Remicade [id number], Hydroxychloroquine, and daily steroids w/ Prednisone 10mg daily), HTN, CVA, memory impairment who presented from [facility] with nonproductive cough and dyspnea with nausea and diarrhea and was found to be COVID-19 positive. Blood cultures positive for Staph epi in both sets, unclear if true or contaminant.

## 2020-10-12 NOTE — PROGRESS NOTE ADULT - ASSESSMENT
Patient is a 75 year old female with PMH of significant for recently diagnosed hyperthyroidism (3mo ago, on Methimazole), RA (on MTX weekly, Remicade [id number], Hydroxychloroquine, and daily steroids w/ Prednisone 10mg daily), HTN, CVA, memory impairment who presented from [facility] with nonproductive cough and dyspnea with nausea and diarrhea and was found to be COVID-19 positive. Blood cultures positive for Staph epi in both sets, unclear if true or contaminant.

## 2020-10-12 NOTE — PROGRESS NOTE ADULT - ASSESSMENT
76 yo F w/ PMHx significant for recently diagnosed hyperthyroidism (3mo ago, on Methimazole), rheumatoid arthritis (on MTX weekly, Remicade [id number], Hydroxychloroquine, and daily steroids w/ Prednisone 10mg daily), HTN (on multiple anti-hypertensive agents), s/p CVA w/o residual deficits, memory impairment, who presents from  after presenting from [facility] where she resides, w/ c/o nonproductive cough over the past few days, and SOB, along w/ NBNB vomiting 3-4days ago and NB diarrhea beginning today, found to be COVID-19 positive. DISPLAY PLAN FREE TEXT

## 2020-10-12 NOTE — PROGRESS NOTE ADULT - PROBLEM SELECTOR PLAN 1
- maintain SpO2 > 92%, pt on 6LNC currently, consider Hi-Tulio if persistent desaturation below 90%  - avoid BiLevel given (+)COVID-19 status  - monitor inflammatory markers every 2-3 days  - she remains on decadron 6 mg PO daily which was extended owing to her persistently high O2 requirements

## 2020-10-12 NOTE — PROGRESS NOTE ADULT - SUBJECTIVE AND OBJECTIVE BOX
Follow-up Pulm Progress Note  Gordon Fox MD  [phone]    Remains afebrile on Cefazolin, decadron   Oxygen requirements unchanged: on 6liters nasal cannula, unable to reduce  Remains comfortable supine at rest in bed  Ferritin decreasing    Vital Signs Last 24 Hrs  T(C): 37.2 (12 Oct 2020 11:16), Max: 37.2 (12 Oct 2020 11:16)  T(F): 98.9 (12 Oct 2020 11:16), Max: 98.9 (12 Oct 2020 11:16)  HR: 60 (12 Oct 2020 11:16) (60 - 82)  BP: 164/89 (12 Oct 2020 11:16) (128/83 - 164/89)  BP(mean): --  RR: 18 (12 Oct 2020 11:16) (17 - 18)  SpO2: 96% (12 Oct 2020 11:16) (92% - 96%)                          11.4   14.49 )-----------( 401      ( 12 Oct 2020 05:42 )             33.9     10-12    135  |  101  |  22  ----------------------------<  107<H>  4.3   |  23  |  0.32<L>    Ca    8.9      12 Oct 2020 05:42    TPro  6.2  /  Alb  3.2<L>  /  TBili  0.4  /  DBili  x   /  AST  22  /  ALT  8<L>  /  AlkPhos  81  10-12      CULTURES:  Culture Results:   Growth in aerobic and anaerobic bottles: Staphylococcus epidermidis  Susceptibility to follow.  "Due to technical problems, Proteus sp. will Not be reported as part of  the BCID panel until further notice"  ***Blood Panel PCR results on this specimen are available  approximately 3 hours after the Gram stain result.***  Gram stain, PCR, and/or culture results may not always  correspond due to difference in methodologies.  ************************************************************  This PCR assay was performed using [employer] #[employer].  The following targets are tested for: Enterococcus,  vancomycin resistant enterococci, Listeria monocytogenes,  coagulase negative staphylococci, S. aureus,  methicillin resistant S. aureus, Streptococcus agalactiae  (Group B), S. pneumoniae, S. pyogenes (Group A),  Acinetobacter baumannii, Enterobacter cloacae, E. coli,  Klebsiella oxytoca, K. pneumoniae, Proteus sp.,  Serratia marcescens, Haemophilus influenzae,  Neisseria meningitidis, Pseudomonas aeruginosa, Candida  albicans, C. glabrata, C krusei, C parapsilosis,  C. tropicalis and the KPC resistance gene. (10-02 @ 14:52)  Culture Results:   Growth in aerobic and anaerobic bottles: Staphylococcus epidermidis  "Susceptibilities not performed" (10-02 @ 14:52)    Most recent blood culture -- 10-02 @ 14:52   Blood Culture PCR Blood Culture PCR .Blood Blood-Peripheral 10-02 @ 14:52      Physical Examination:  PULM: Few scattered basilar crackles; no wheeze  CVS: Regular rate and rhythm, no murmurs, rubs, or gallops  ABD: Soft, non-tender  EXT:  No clubbing, cyanosis, or edema    RADIOLOGY REVIEWED  CXR:    CT chest:    PROCEDURE DATE:  10/02/2020        INTERPRETATION:  EXAMINATION: CT ANGIO CHEST WITHOUT AND OR WITH IV CONTRAST    CLINICAL INDICATION: Dyspnea    TECHNIQUE: CTA of the chest was performed for evaluation of pulmonary embolism after administration of 90 ml of Omnipaque-350, 10 ml discarded.  MIP images were reconstructed.    COMPARISON: 10/27/2019.    FINDINGS:    PULMONARY ARTERIES: There is no pulmonary embolism    AIRWAYS AND LUNGS: The central tracheobronchial tree is patent.  Bilateral groundglass opacities and consolidations, predominantly peripherally    MEDIASTINUM AND PLEURA: There are no enlarged mediastinal, hilar or axillary lymph nodes. The visualized portion of the thyroid gland is unremarkable. There is no pleural effusion. There is no pneumothorax.    HEART AND VESSELS: There is mild cardiomegaly. There are atherosclerotic calcifications of the aorta and coronary arteries.  There is no pericardial effusion.    UPPER ABDOMEN: Images of the upper abdomen demonstrate no abnormality.    BONES AND SOFT TISSUES: Unchanged T12 vertebral plasty and L1 vertebral body severe compression deformity.  The soft tissues are unremarkable.    TUBES/LINES: None.    IMPRESSION:  No pulmonary embolism. Bilateral lung opacities may represent Covid-19.    TTE:

## 2020-10-12 NOTE — PROGRESS NOTE ADULT - SUBJECTIVE AND OBJECTIVE BOX
Residual cough  Breathing is OK while lying in bed  No N/V/acute abd pain  Inflammatory slowly trending down  Her O2 saturations remain in the mid 90s on 6L NCO2    Vital Signs Last 24 Hrs  T(C): 37.2 (12 Oct 2020 11:16), Max: 37.2 (12 Oct 2020 11:16)  T(F): 98.9 (12 Oct 2020 11:16), Max: 98.9 (12 Oct 2020 11:16)  HR: 60 (12 Oct 2020 11:16) (60 - 82)  BP: 164/89 (12 Oct 2020 11:16) (128/83 - 164/89)  BP(mean): --  RR: 18 (12 Oct 2020 11:16) (17 - 18)  SpO2: 96% (12 Oct 2020 11:16) (92% - 96%)    I&O's Summary    10-11-20 @ 07:01  -  10-12-20 @ 07:00  --------------------------------------------------------  IN: 580 mL / OUT: 0 mL / NET: 580 mL        PHYSICAL EXAM:  GENERAL: NAD, on 6LNCO2  HEAD:  Atraumatic, Normocephalic  EYES: EOMI, PERRLA, conjunctiva and sclera clear  NECK: Supple, No JVD  CHEST/LUNG: mild decrease breath sounds bilaterally; No wheeze   HEART: Regular rate and rhythm; No murmurs, rubs, or gallops  ABDOMEN: Soft, Nontender, Nondistended; Bowel sounds present  Neuro: AAO x 2-3, no focal deficit, 5/5 b/l extremities  EXTREMITIES:  2+ Peripheral Pulses, No clubbing, cyanosis, or edema  SKIN: No rashes or lesions    LABS:                        11.4   14.49 )-----------( 401      ( 12 Oct 2020 05:42 )             33.9     10-12    135  |  101  |  22  ----------------------------<  107<H>  4.3   |  23  |  0.32<L>    Ca    8.9      12 Oct 2020 05:42    TPro  6.2  /  Alb  3.2<L>  /  TBili  0.4  /  DBili  x   /  AST  22  /  ALT  8<L>  /  AlkPhos  81  10-12      CAPILLARY BLOOD GLUCOSE                RADIOLOGY & ADDITIONAL TESTS:    Imaging Personally Reviewed:  [x] YES  [ ] NO    Consultant(s) Notes Reviewed:  [x] YES  [ ] NO

## 2020-10-12 NOTE — PROGRESS NOTE ADULT - SUBJECTIVE AND OBJECTIVE BOX
[facility], Division of Infectious Diseases  ROSALINA Cali Y. Patel, S. Shah  [phone]  ([phone] - weekdays after 5pm and weekends)    Name: TARA ESPINAL  Age: 75y  Gender: Female  MRN: [id number]    Interval History:  No new complaints, feeling ok, remains on NC 6L at 92-95%  Coughing less, denies fever, chills, dyspnea, chest pain.  ROS reviewed, pertinent positives and negatives as above.   Notes reviewed. Afebrile, remains on dexamethasone.     Objective:  Vitals:  T(F): 98.9 (10-12-20 @ 11:16), Max: 98.9 (10-12-20 @ 11:16)  HR: 60 (10-12-20 @ 11:16) (60 - 82)  BP: 164/89 (10-12-20 @ 11:16) (128/83 - 164/89)  RR: 18 (10-12-20 @ 11:16) (17 - 18)  SpO2: 96% (10-12-20 @ 11:16) (92% - 96%)    Physical Examination:  General: no acute distress, comfortable, NC 6L/min  HEENT: NC/AT, EOMI, anicteric, neck supple  Cardio: S1, S2 heard, RRR, no murmurs  Resp: decreased breath sounds bilaterally  Abd: soft, NT, ND, + bowel sounds  Neuro: AAOx3, no obvious focal deficits  Ext: no edema, moving extremities  Skin: warm, dry, no visible rash  Lines: PIV    Laboratory Studies:  CBC:                       11.4   14.49 )-----------( 401      ( 12 Oct 2020 05:42 )             33.9     CMP: 10-12    135  |  101  |  22  ----------------------------<  107<H>  4.3   |  23  |  0.32<L>    Ca    8.9      12 Oct 2020 05:42    TPro  6.2  /  Alb  3.2<L>  /  TBili  0.4  /  DBili  x   /  AST  22  /  ALT  8<L>  /  AlkPhos  81  10-12    LIVER FUNCTIONS - ( 12 Oct 2020 05:42 )  Alb: 3.2 g/dL / Pro: 6.2 g/dL / ALK PHOS: 81 U/L / ALT: 8 U/L / AST: 22 U/L / GGT: x           Ferritin 1652 < 1764 < 2645 < 3525 < 6426 < 5573 < 4710  CRP 0.45 < 1.15 < 1.39 < 0.74 < 2.00  D-Dimer 401< 334 < 426< 254 <294    Microbiology:  10/4 - blood cultures - negative   10/3 - COVID ab - negative   10/2 - blood cultures - 2/2 - CoNS- S. epidermidis - S to A/S, cefazolin, gent, oxacillin, tetracyclines, bactrim, vancomycin; R to clindamycin, erythromycin, penicillin  10/2 - COVID-19 PCR - positive    Radiology:  Xray Chest 1 View- PORTABLE-Urgent (Xray Chest 1 View- PORTABLE-Urgent .) (10.05.20 @ 12:41) > Impression: The heart is normal insize. Poor inspiratory effort. Diffuse airspace opacities are seen from both lungs compatible with pneumonia status post seen in COVID 19., Correlate with CT scan that was performed on October 2, 2020.    CT Angio Chest w/ IV Cont (10.02.20 @ 13:30) > IMPRESSION: No pulmonary embolism. Bilateral lung opacities may represent Covid-19.    Xray Chest 1 View-PORTABLE IMMEDIATE (10.02.20 @ 11:26) > IMPRESSION: Mid left lung infiltrate.    Medications:  MEDICATIONS  (STANDING):  aspirin  chewable 81 milliGRAM(s) Oral daily  benzonatate 100 milliGRAM(s) Oral three times a day  ceFAZolin   IVPB 2000 milliGRAM(s) IV Intermittent every 8 hours  cholecalciferol 2000 Unit(s) Oral daily  enoxaparin Injectable 40 milliGRAM(s) SubCutaneous daily  hydroxychloroquine 400 milliGRAM(s) Oral <User Schedule>  labetalol 200 milliGRAM(s) Oral two times a day  methimazole 5 milliGRAM(s) Oral daily  pantoprazole    Tablet 40 milliGRAM(s) Oral before breakfast  PARoxetine 10 milliGRAM(s) Oral daily  ursodiol Tablet 250 milliGRAM(s) Oral two times a day    Antimicrobials:  ceFAZolin   IVPB 2000 milliGRAM(s) IV Intermittent every 8 hours - started 10/6  hydroxychloroquine 400 milliGRAM(s) Oral <User Schedule>  S/p remdesivir 10/2-10/6  S/p vancomycin 10/2-10/6

## 2020-10-12 NOTE — PROGRESS NOTE ADULT - PROBLEM SELECTOR PLAN 1
COVID-19 PCR positive  CTA chest with peripheral opacities consistent with COVID lung changes. CXR with some progression, diffuse airspace opacities. Remains on NC 6L/min. Pulmonary following.  S/p 5 days of remdesivir  Inflammatory markers stable, CRP downtrended to 0.45.   Continuing on Dexamethasone - extending course for now given continued oxygen requirements and risk for decompensation  On lovenox subq daily  Continue to monitor pulse ox and cont supplemental O2 for O2 90-92%  Continue to monitor inflammatory markers

## 2020-10-12 NOTE — PROGRESS NOTE ADULT - PROBLEM SELECTOR PLAN 5
- at home, patient on Lisinopril 10mg daily, Labetalol 200mg BID, and Amlodipine 10mg daily  - holding all antihypertensive agents on admission given presenting hypotension in setting of above infection; close monitoring of hemodynamics w/ resumption of agents as required w/ clinical improvement  - continue Labetalol 200 mg BID with hold parameters  - holding home Tekturna as well, not on formulary

## 2020-10-12 NOTE — PROGRESS NOTE ADULT - PROBLEM SELECTOR PLAN 2
CoNS - Staph epi - grew in both sets of blood cultures - ?contaminant - unclear if drawn from 2 separate venipunctures vs true bacteremia  No evidence of skin infection, long term indwelling lines, or hardware.   Repeat blood cultures are negative on 10/4.   No fevers and cleared CoNS, can hold off on TTE for now.    Continue Cefazolin 2g IV Q8h - complete 10 day course from first negative blood cultures - end tomorrow 10/13

## 2020-10-13 NOTE — PROGRESS NOTE ADULT - ASSESSMENT
Patient is a 75 year old female with PMH of significant for recently diagnosed hyperthyroidism (3mo ago, on Methimazole), RA (on MTX weekly, Remicade [id number], Hydroxychloroquine, and daily steroids w/ Prednisone 10mg daily), HTN, CVA, memory impairment who presented from [facility] with nonproductive cough and dyspnea with nausea and diarrhea and was found to be COVID-19 positive. Blood cultures positive for Staph epi in both sets, unclear if true or contaminant.

## 2020-10-13 NOTE — PROGRESS NOTE ADULT - ASSESSMENT
76 yo F w/ PMHx significant for recently diagnosed hyperthyroidism (3mo ago, on Methimazole), rheumatoid arthritis (on MTX weekly, Remicade [id number], Hydroxychloroquine, and daily steroids w/ Prednisone 10mg daily), HTN (on multiple anti-hypertensive agents), s/p CVA w/o residual deficits, memory impairment, who presents from  after presenting from [facility] where she resides, w/ c/o nonproductive cough over the past few days, and SOB, along w/ NBNB vomiting 3-4days ago and NB diarrhea beginning today, found to be COVID-19 positive.

## 2020-10-13 NOTE — CHART NOTE - NSCHARTNOTEFT_GEN_A_CORE
Nutrition Follow Up Note  Patient seen for malnutrition follow-up.     Chart reviewed, events noted.    Unable to conduct a face to face interview or nutrition-focused physical exam due to limited contact restrictions related to Pt's medical condition and isolation precautions.     Diet : low sodium diet    Patient reports:    PO intake : 10-20% per flow sheets    Daily Weight in k.1 (10-07)  % Weight Change    Pertinent Medications: MEDICATIONS  (STANDING):  aspirin  chewable 81 milliGRAM(s) Oral daily  benzonatate 100 milliGRAM(s) Oral three times a day  ceFAZolin   IVPB 2000 milliGRAM(s) IV Intermittent every 8 hours  cholecalciferol 2000 Unit(s) Oral daily  dexAMETHasone     Tablet 6 milliGRAM(s) Oral daily  enoxaparin Injectable 40 milliGRAM(s) SubCutaneous daily  hydroxychloroquine 400 milliGRAM(s) Oral <User Schedule>  labetalol 200 milliGRAM(s) Oral two times a day  methimazole 5 milliGRAM(s) Oral daily  pantoprazole    Tablet 40 milliGRAM(s) Oral before breakfast  PARoxetine 10 milliGRAM(s) Oral daily  ursodiol Tablet 250 milliGRAM(s) Oral two times a day    MEDICATIONS  (PRN):  acetaminophen   Tablet .. 650 milliGRAM(s) Oral every 8 hours PRN Temp greater or equal to 38.5C (101.3F)  acetaminophen   Tablet .. 650 milliGRAM(s) Oral every 6 hours PRN Moderate Pain (4 - 6)  guaiFENesin   Syrup  (Sugar-Free) 200 milliGRAM(s) Oral every 6 hours PRN Cough  hydrocodone/homatropine Syrup 5 milliLiter(s) Oral at bedtime PRN Cough  oxyCODONE  ER Tablet 10 milliGRAM(s) Oral every 12 hours PRN moderate pain      Skin per nursing documentation:  no pressure injuries  Edema: none     Estimated Needs:   [x] no change since previous assessment  [ ] recalculated:     Previous Nutrition Diagnosis: Severe malnutrition  Nutrition Diagnosis is [x] ongoing    Care plan: [x] in progress   Being addressed with: liberalized diet, Ensure Enlive supplements     New Nutrition Diagnosis: not applicable    Recommendations Interventions:  1) Continue Low Sodium diet with Ensure Enlive 2 daily (350 kcal, 20 g protein in each) to optimize intake. Please add Ensure Pudding once daily. Monitor/adjust as needed.  2) Continue to provide assistance and encouragement at mealtimes as needed. BO to continue to obtain/honor food preferences as feasible.   3) Consider addition of appetite stimulant considering malnourishment and poor PO intake.       Monitoring and Evaluation:   Continue to monitor Nutritional intake, Tolerance to diet prescription, weights, labs, skin integrity    Lima Bui RD, CDN  Pager [phone]  RD remains available upon request and will follow up per protocol Nutrition Follow Up Note  Patient seen for malnutrition follow-up.     Chart reviewed, events noted.    Unable to conduct a face to face interview or nutrition-focused physical exam due to limited contact restrictions related to Pt's medical condition and isolation precautions. Pt did not answer room phone despite multiple attempts, information obtained from PCA and EMR.    Diet : low sodium diet    PCA reports the pt's poor PO intake persists. The pt typically takes 1/2 of oatmeal, 1 boiled egg, some sips of coffee and 1/2 Ensure in the morning. At lunch and dinner, intake of food is minimal, pt drinks soup and mainly only takes liquids.     GI: + BM on 10/12 per flow sheets    PO intake : 10-20% per flow sheets    Daily Weight in k.1 (10-07)  % Weight Change    Pertinent Medications: MEDICATIONS  (STANDING):  aspirin  chewable 81 milliGRAM(s) Oral daily  benzonatate 100 milliGRAM(s) Oral three times a day  ceFAZolin   IVPB 2000 milliGRAM(s) IV Intermittent every 8 hours  cholecalciferol 2000 Unit(s) Oral daily  dexAMETHasone     Tablet 6 milliGRAM(s) Oral daily  enoxaparin Injectable 40 milliGRAM(s) SubCutaneous daily  hydroxychloroquine 400 milliGRAM(s) Oral <User Schedule>  labetalol 200 milliGRAM(s) Oral two times a day  methimazole 5 milliGRAM(s) Oral daily  pantoprazole    Tablet 40 milliGRAM(s) Oral before breakfast  PARoxetine 10 milliGRAM(s) Oral daily  ursodiol Tablet 250 milliGRAM(s) Oral two times a day    MEDICATIONS  (PRN):  acetaminophen   Tablet .. 650 milliGRAM(s) Oral every 8 hours PRN Temp greater or equal to 38.5C (101.3F)  acetaminophen   Tablet .. 650 milliGRAM(s) Oral every 6 hours PRN Moderate Pain (4 - 6)  guaiFENesin   Syrup  (Sugar-Free) 200 milliGRAM(s) Oral every 6 hours PRN Cough  hydrocodone/homatropine Syrup 5 milliLiter(s) Oral at bedtime PRN Cough  oxyCODONE  ER Tablet 10 milliGRAM(s) Oral every 12 hours PRN moderate pain      Skin per nursing documentation:  no pressure injuries  Edema: none     Estimated Needs:   [x] no change since previous assessment  [ ] recalculated:     Previous Nutrition Diagnosis: Severe malnutrition  Nutrition Diagnosis is [x] ongoing    Care plan: [x] in progress   Being addressed with: liberalized diet, Ensure Enlive supplements     New Nutrition Diagnosis: not applicable    Recommendations Interventions:  1) Continue Low Sodium diet with Ensure Enlive 2 daily (350 kcal, 20 g protein in each) to optimize intake. Will add pastina to lunch and dinner, similar texture to oatmeal, will add soup at lunch and dinner.  2) Continue to provide assistance and encouragement at mealtimes as needed. RD to continue to obtain/honor food preferences as feasible.   3) Consider addition of appetite stimulant considering malnourishment and poor PO intake.     Monitoring and Evaluation:   Continue to monitor Nutritional intake, Tolerance to diet prescription, weights, labs, skin integrity    Lima Bui RD, CDN  Pager [phone]  RD remains available upon request and will follow up per protocol Nutrition Follow Up Note  Patient seen for malnutrition follow-up.     Chart reviewed, events noted.    Unable to conduct a face to face interview or nutrition-focused physical exam due to limited contact restrictions related to Pt's medical condition and isolation precautions. Pt did not answer room phone despite multiple attempts, information obtained from PCA and EMR.    Diet : low sodium diet    PCA reports the pt's poor PO intake persists. The pt typically takes 1/2 of oatmeal, 1 boiled egg, some sips of coffee and 1/2 Ensure in the morning. At lunch and dinner, intake of food is minimal, pt drinks soup and mainly only takes liquids.     GI: + BM on 10/12 per flow sheets    PO intake : 10-20% per flow sheets    Daily Weight in k.1 (10-07)  % Weight Change    Pertinent Medications: MEDICATIONS  (STANDING):  aspirin  chewable 81 milliGRAM(s) Oral daily  benzonatate 100 milliGRAM(s) Oral three times a day  ceFAZolin   IVPB 2000 milliGRAM(s) IV Intermittent every 8 hours  cholecalciferol 2000 Unit(s) Oral daily  dexAMETHasone     Tablet 6 milliGRAM(s) Oral daily  enoxaparin Injectable 40 milliGRAM(s) SubCutaneous daily  hydroxychloroquine 400 milliGRAM(s) Oral <User Schedule>  labetalol 200 milliGRAM(s) Oral two times a day  methimazole 5 milliGRAM(s) Oral daily  pantoprazole    Tablet 40 milliGRAM(s) Oral before breakfast  PARoxetine 10 milliGRAM(s) Oral daily  ursodiol Tablet 250 milliGRAM(s) Oral two times a day    MEDICATIONS  (PRN):  acetaminophen   Tablet .. 650 milliGRAM(s) Oral every 8 hours PRN Temp greater or equal to 38.5C (101.3F)  acetaminophen   Tablet .. 650 milliGRAM(s) Oral every 6 hours PRN Moderate Pain (4 - 6)  guaiFENesin   Syrup  (Sugar-Free) 200 milliGRAM(s) Oral every 6 hours PRN Cough  hydrocodone/homatropine Syrup 5 milliLiter(s) Oral at bedtime PRN Cough  oxyCODONE  ER Tablet 10 milliGRAM(s) Oral every 12 hours PRN moderate pain      Skin per nursing documentation:  no pressure injuries  Edema: none     Estimated Needs:   [x] no change since previous assessment  [ ] recalculated:     Previous Nutrition Diagnosis: Severe malnutrition  Nutrition Diagnosis is [x] ongoing    Care plan: [x] in progress   Being addressed with: liberalized diet, Ensure Enlive supplements     New Nutrition Diagnosis: not applicable    Recommendations Interventions:  1) Continue Low Sodium diet with Ensure Enlive 2 daily (350 kcal, 20 g protein in each) to optimize intake. Will add pastina to lunch and dinner, similar texture to oatmeal, will add soup at lunch and dinner.  2) Recommend Multivitamin daily   3) Continue to provide assistance and encouragement at mealtimes as needed. RD to continue to obtain/honor food preferences as feasible.   4) Consider addition of appetite stimulant considering malnourishment and poor PO intake.     Monitoring and Evaluation:   Continue to monitor Nutritional intake, Tolerance to diet prescription, weights, labs, skin integrity    Lima Bui RD, CDN  Pager [phone]  RD remains available upon request and will follow up per protocol

## 2020-10-13 NOTE — PROGRESS NOTE ADULT - ASSESSMENT
Infectious Disease Progress Note    Requested by: Dr. Irine Olszewski    Reason: enterococcus cystitis, persistent leukocytosis, clearance for CABG    Current abx Prior abx   Vancomycin since 1/9 Ceftriaxone 1/6-1/8     Lines:       Assessment :    44-year-old morbidly obese male with type 2 DM, rheumatoid arthritis, right ureteral stent for h/o ureteral stricture (last exchanged December 2016) admitted to [facility] on 1/3/17 for evaluation for CABG    Now with persistent leukocytosis, ct scan with evidence of mild right hydroureter/hydronephrosis, thickened bladder    Persistent leukocytosis is likely due to recent steroids alongwith probable enterococcus fecalis cystitis. No other clinical evidence to suggest any other sites of infection. Clinically stable. Decreased wbc. Bands noted on differential - will obtain manual differential to determine accuracy of automated differential and rule out underlying hematological disorder      Recommendations:    1. cont iv ampicilin/sulbactam for improved enterococcus coverage  2. Inhaler and mx of copd per medicine team  3. Obtain peripheral blood smear/flow cytometry  4. Ok to proceed with CABG from ID standpoint if decreasing wbc and no fevers/signs of infection on subsequent exam       Above plan was discussed in details with patient. Will d/w dr Justin Miller. Please call me if any further questions or concerns. Will continue to participate in the care of this patient. subjective:    Patient states that he feels fine except for GERD symptoms, some phlegm in throat. Patient denies headaches, visual disturbances, sore throat, runny nose, earaches, cp, sob, chills, cough, abdominal pain, diarrhea, burning micturition, pain or weakness in extremities. He denies back pain/flank pain. Home Medication List       Details   simvastatin (ZOCOR) 40 mg tablet Take 1 Tab by mouth nightly.  Indications: hyperlipidemia  Qty: 10 Tab, Refills: 0      furosemide (LASIX) 10 mg/mL injection 4 mL by IntraVENous route every twelve (12) hours. Qty: 1 Vial, Refills: 0      metoprolol succinate (TOPROL-XL) 25 mg XL tablet Take 1 Tab by mouth daily. Qty: 10 Tab, Refills: 0      nitroglycerin (NITROSTAT) 0.4 mg SL tablet 1 Tab by SubLINGual route every five (5) minutes as needed for Chest Pain. Qty: 10 Tab, Refills: 0      Omeprazole delayed release (PRILOSEC D/R) 20 mg tablet Take 20 mg by mouth two (2) times a day. levothyroxine (SYNTHROID) 25 mcg tablet Take 25 mcg by mouth Daily (before breakfast). Indications: hypothyroidism      tamsulosin (FLOMAX) 0.4 mg capsule Take 0.4 mg by mouth daily. Indications: SYMPTOMATIC BENIGN PROSTATIC HYPERPLASIA, 1-2 tab      lisinopril (PRINIVIL, ZESTRIL) 40 mg tablet Take 40 mg by mouth daily. Indications: Hypertension      allopurinol (ZYLOPRIM) 100 mg tablet Take 300 mg by mouth three (3) times daily. Indications: GOUT      HEPARIN SODIUM,PORCINE/D5W (HEPARIN 25,000 UNITS IN D5W 250 ML) 25,000 unit/250 mL(100 unit/mL) infusion 997.92-2,835 Units/hr by IntraVENous route TITRATE.   Qty: 100 mL, Refills: 0             Current Facility-Administered Medications   Medication Dose Route Frequency    mupirocin (BACTROBAN) 2 % ointment   Both Nostrils BID    ampicillin-sulbactam (UNASYN) 3 g in 0.9% sodium chloride (MBP/ADV) 100 mL MBP  3 g IntraVENous Q6H    albuterol-ipratropium (DUO-NEB) 2.5 MG-0.5 MG/3 ML  3 mL Nebulization Q6H PRN    metoprolol (LOPRESSOR) injection 5 mg  5 mg IntraVENous ONCE    polyethylene glycol (MIRALAX) packet 17 g  17 g Oral DAILY    furosemide (LASIX) injection 40 mg  40 mg IntraVENous DAILY    tamsulosin (FLOMAX) capsule 0.8 mg  0.8 mg Oral DAILY    sodium chloride (NS) flush 5-10 mL  5-10 mL IntraVENous Q8H    sodium chloride (NS) flush 5-10 mL  5-10 mL IntraVENous PRN    nitroglycerin (NITROSTAT) tablet 0.4 mg  0.4 mg SubLINGual PRN    nitroglycerin (TRIDIL) 400 mcg/ml infusion  5 mcg/min IntraVENous TITRATE    aspirin Acute hypoxemic resp failure due to multilobar viral pneumonia  COVID 19 viral infection  CNS bacteremia: unclear significance  RA on immunosupressive regimen  Inflammatory markers largely stable  10/5: Clinically stable, Mild tachypnea; howver increase in nasal O2 to 5 liters noted. Uptrending inflammatory markers noted  10/6:  CXR progressed vs. admission; still requires 5 liters nasal cannula with sat 91%; Ferritin decreased  10/7: Still requires 6 liters nasal oxygen; infl markers downtrending  ; no gross improvement in status  10/8: Clinically unchanged; Ferritin increasing; remains on 6 liters nasal cannula; c/o cogh    REC    Continue decadron   Taper oxygen as tolerated: target sat 90-92%  Monitor O2 sats and titrate to maintain 90-92% with nasal oxygen      Bonifacio Mejía, Infectious Disease Specialist  [phone]  January 12, 2017  11:18 AM

## 2020-10-13 NOTE — PROGRESS NOTE ADULT - PROBLEM SELECTOR PROBLEM 5
Physical Therapy    Physical Therapy Re Assessment     Name: Andrei Ramírez  : 1948  MRN: [id number]    Referring Provider:  Marvin Whittaker MD    Date of Service: 2020    Evaluating PT:  Ning Dobbs PT, DPT [id number]     Room #:  3182/5036-T  Diagnosis:  Sepsis  PMHx/PSHx:  OA, DM, HTN, kidney disease, depression, tremor, sarcoidosis, obesity  Procedure/Surgery:  Excisional debridement of skin, subcutaneous tissue, muscle and fascia of sacrum and gluteal region , EGD with IR emobolization , extubated 5/10   Precautions:  Falls, Droplet plus isolation, COVID (+), O2, FMS, Large sacral wound   Equipment Needs:  TBD    Re-evaluation on 20 d/t change in medical status requiring intubation and procedure on      SUBJECTIVE:    Pt lives at [facility]. Reports non-ambulatory for 6 weeks. Prior to that she was a roger lift vs stand pivot with [facility] to w/c and ambulated short distances with therapy using [employer]. She does assist with ADLs. OBJECTIVE:   Re Evaluation  Date: 20 Treatment Short Term/ Long Term   Goals   AM-PAC 6 Clicks      Was pt agreeable to Eval/treatment? Yes     Does pt have pain? No c/o pain at rest      Bed Mobility  Rolling: Dep  Supine to sit: NT  Sit to supine: NT  Scooting: Dep x2  Rolling: Max A  Supine to sit: Max A  Sit to supine: Max A  Scooting: Max A   Transfers Sit to stand: NT  Stand to sit: NT  Stand pivot: NT  Sit to stand: Max A  Stand to sit: Max A  Stand pivot: Max A   Ambulation    NT  >5 feet with [employer] Max A   Stair negotiation: ascended and descended  NT  NA    ROM BUE:  Per OT eval  BLE:  Limited B knee flexion and extension      Strength BUE:  Per OT eval   BLE:  Grossly 2-/5     Balance Sitting EOB:  NT  Dynamic Standing:  NT  Sitting EOB:  Min A  Dynamic Standing:   Max A     Pt is A & O x 3-4  RASS:  0  CAM-ICU:  Negative   Sensation:  Pt denies numbness and tingling to extremities  Edema:  Unremarkable     Vitals:  Blood Pressure at rest 121/62 Hypertension

## 2020-10-13 NOTE — PROGRESS NOTE ADULT - PROBLEM SELECTOR PLAN 2
CoNS - Staph epi - grew in both sets of blood cultures - ?contaminant - unclear if drawn from 2 separate venipunctures vs true bacteremia  No evidence of skin infection, long term indwelling lines, or hardware.   Repeat blood cultures are negative on 10/4.   No fevers and cleared CoNS, can hold off on TTE for now.    Completed 10 day course of cefazolin today - can discontinue

## 2020-10-13 NOTE — PROGRESS NOTE ADULT - PROBLEM SELECTOR PLAN 2
- Coag neg strep bacteremia, 4/4, s/p empiric vanco. TTE no longer necessary as repeat blood cxs have remained (-) and she remains afebrile  - pansensitive, switched to IV Ancef x 10 days, last day was on 10/13  - Appreciate ID

## 2020-10-13 NOTE — PROGRESS NOTE ADULT - PROBLEM SELECTOR PLAN 3
secondary to COVID-19 pneumonia  On NC 5L/min now since last night, mild cough with no dyspnea or tachypnea  Continue to monitor O2 saturation. On dexamethasone.   Pulmonary following

## 2020-10-13 NOTE — PROGRESS NOTE ADULT - SUBJECTIVE AND OBJECTIVE BOX
Saturating 91-93% on 5LNCO2  No significant changes in her breathing subjectively    Vital Signs Last 24 Hrs  T(C): 37.2 (13 Oct 2020 11:36), Max: 37.2 (13 Oct 2020 11:36)  T(F): 99 (13 Oct 2020 11:36), Max: 99 (13 Oct 2020 11:36)  HR: 70 (13 Oct 2020 11:36) (70 - 76)  BP: 127/78 (13 Oct 2020 11:36) (113/68 - 145/79)  BP(mean): --  RR: 18 (13 Oct 2020 11:36) (18 - 20)  SpO2: 95% (13 Oct 2020 11:36) (91% - 95%)    I&O's Summary    10-12-20 @ 07:01  -  10-13-20 @ 07:00  --------------------------------------------------------  IN: 150 mL / OUT: 0 mL / NET: 150 mL    10-13-20 @ 07:01  -  10-13-20 @ 12:11  --------------------------------------------------------  IN: 120 mL / OUT: 0 mL / NET: 120 mL        PHYSICAL EXAM:  GENERAL: NAD, on 5LNCO2  HEAD:  Atraumatic, Normocephalic  EYES: EOMI, PERRLA, conjunctiva and sclera clear  NECK: Supple, No JVD  CHEST/LUNG: mild decrease breath sounds bilaterally; No wheeze   HEART: Regular rate and rhythm; No murmurs, rubs, or gallops  ABDOMEN: Soft, Nontender, Nondistended; Bowel sounds present  Neuro: AAO x 2-3, no focal deficit, 5/5 b/l extremities  EXTREMITIES:  2+ Peripheral Pulses, No clubbing, cyanosis, or edema  SKIN: No rashes or lesions    LABS:                        11.4   14.49 )-----------( 401      ( 12 Oct 2020 05:42 )             33.9     10-12    135  |  101  |  22  ----------------------------<  107<H>  4.3   |  23  |  0.32<L>    Ca    8.9      12 Oct 2020 05:42    TPro  6.2  /  Alb  3.2<L>  /  TBili  0.4  /  DBili  x   /  AST  22  /  ALT  8<L>  /  AlkPhos  81  10-12      CAPILLARY BLOOD GLUCOSE                RADIOLOGY & ADDITIONAL TESTS:    Imaging Personally Reviewed:  [x] YES  [ ] NO    Consultant(s) Notes Reviewed:  [x] YES  [ ] NO

## 2020-10-13 NOTE — PROGRESS NOTE ADULT - SUBJECTIVE AND OBJECTIVE BOX
Follow-up Pulm Progress Note  Gordon Fox MD  [phone]    Remains afebrile on Cefazolin, decadron   Oxygenation improved today: now on 4liters with sats in mid 90's  Ambulating in room, appears comfortable    Vital Signs Last 24 Hrs  T(C): 37.2 (13 Oct 2020 11:36), Max: 37.2 (13 Oct 2020 11:36)  T(F): 99 (13 Oct 2020 11:36), Max: 99 (13 Oct 2020 11:36)  HR: 70 (13 Oct 2020 11:36) (70 - 76)  BP: 127/78 (13 Oct 2020 11:36) (113/68 - 145/79)  BP(mean): --  RR: 18 (13 Oct 2020 11:36) (18 - 20)  SpO2: 95% (13 Oct 2020 11:36) (91% - 95%)                          11.4   14.49 )-----------( 401      ( 12 Oct 2020 05:42 )             33.9       10-12    135  |  101  |  22  ----------------------------<  107<H>  4.3   |  23  |  0.32<L>    Ca    8.9      12 Oct 2020 05:42    TPro  6.2  /  Alb  3.2<L>  /  TBili  0.4  /  DBili  x   /  AST  22  /  ALT  8<L>  /  AlkPhos  81  10-12        CULTURES:  Culture Results:   Growth in aerobic and anaerobic bottles: Staphylococcus epidermidis  Susceptibility to follow.  "Due to technical problems, Proteus sp. will Not be reported as part of  the BCID panel until further notice"  ***Blood Panel PCR results on this specimen are available  approximately 3 hours after the Gram stain result.***  Gram stain, PCR, and/or culture results may not always  correspond due to difference in methodologies.  ************************************************************  This PCR assay was performed using [employer].  The following targets are tested for: Enterococcus,  vancomycin resistant enterococci, Listeria monocytogenes,  coagulase negative staphylococci, S. aureus,  methicillin resistant S. aureus, Streptococcus agalactiae  (Group B), S. pneumoniae, S. pyogenes (Group A),  Acinetobacter baumannii, Enterobacter cloacae, E. coli,  Klebsiella oxytoca, K. pneumoniae, Proteus sp.,  Serratia marcescens, Haemophilus influenzae,  Neisseria meningitidis, Pseudomonas aeruginosa, Candida  albicans, C. glabrata, C krusei, C parapsilosis,  C. tropicalis and the KPC resistance gene. (10-02 @ 14:52)  Culture Results:   Growth in aerobic and anaerobic bottles: Staphylococcus epidermidis  "Susceptibilities not performed" (10-02 @ 14:52)    Most recent blood culture -- 10-02 @ 14:52   Blood Culture PCR Blood Culture PCR .Blood Blood-Peripheral 10-02 @ 14:52      Physical Examination:  PULM: Few scattered basilar crackles; no wheeze  CVS: Regular rate and rhythm, no murmurs, rubs, or gallops  ABD: Soft, non-tender  EXT:  No clubbing, cyanosis, or edema    RADIOLOGY REVIEWED  CXR:    CT chest:    PROCEDURE DATE:  10/02/2020        INTERPRETATION:  EXAMINATION: CT ANGIO CHEST WITHOUT AND OR WITH IV CONTRAST    CLINICAL INDICATION: Dyspnea    TECHNIQUE: CTA of the chest was performed for evaluation of pulmonary embolism after administration of 90 ml of Omnipaque-350, 10 ml discarded.  MIP images were reconstructed.    COMPARISON: 10/27/2019.    FINDINGS:    PULMONARY ARTERIES: There is no pulmonary embolism    AIRWAYS AND LUNGS: The central tracheobronchial tree is patent.  Bilateral groundglass opacities and consolidations, predominantly peripherally    MEDIASTINUM AND PLEURA: There are no enlarged mediastinal, hilar or axillary lymph nodes. The visualized portion of the thyroid gland is unremarkable. There is no pleural effusion. There is no pneumothorax.    HEART AND VESSELS: There is mild cardiomegaly. There are atherosclerotic calcifications of the aorta and coronary arteries.  There is no pericardial effusion.    UPPER ABDOMEN: Images of the upper abdomen demonstrate no abnormality.    BONES AND SOFT TISSUES: Unchanged T12 vertebral plasty and L1 vertebral body severe compression deformity.  The soft tissues are unremarkable.    TUBES/LINES: None.    IMPRESSION:  No pulmonary embolism. Bilateral lung opacities may represent Covid-19.    TTE:

## 2020-10-13 NOTE — PROGRESS NOTE ADULT - SUBJECTIVE AND OBJECTIVE BOX
[facility], Division of Infectious Diseases  ROSALINA Cali Y. Patel, S. Shah  [phone]  ([phone] - weekdays after 5pm and weekends)    Name: TARA ESPINAL  Age: 75y  Gender: Female  MRN: [id number]    Interval History:  No new complaints, now on NC 5L since last night, saturating 91-93%  Still with some coughing, no fever, chills, dyspnea, chest pain.  ROS reviewed, pertinent positives and negatives as above.   Notes reviewed. Afebrile, remains on dexamethasone.     Objective:  Vitals:  T(F): 99 (10-13-20 @ 11:36), Max: 99 (10-13-20 @ 11:36)  HR: 70 (10-13-20 @ 11:36) (70 - 76)  BP: 127/78 (10-13-20 @ 11:36) (113/68 - 145/79)  RR: 18 (10-13-20 @ 11:36) (18 - 20)  SpO2: 95% (10-13-20 @ 11:36) (91% - 95%)    Physical Examination:  General: no acute distress, comfortable, NC 5L/min  HEENT: NC/AT, EOMI, anicteric, neck supple  Cardio: S1, S2 heard, RRR, no murmurs  Resp: decreased breath sounds bilaterally  Abd: soft, NT, ND, + bowel sounds  Neuro: AAOx3, no obvious focal deficits  Ext: no edema, moving extremities  Skin: warm, dry, no visible rash  Lines: PIV    Laboratory Studies:  CBC:                       11.4   14.49 )-----------( 401      ( 12 Oct 2020 05:42 )             33.9     CMP: 10-12    135  |  101  |  22  ----------------------------<  107<H>  4.3   |  23  |  0.32<L>    Ca    8.9      12 Oct 2020 05:42    TPro  6.2  /  Alb  3.2<L>  /  TBili  0.4  /  DBili  x   /  AST  22  /  ALT  8<L>  /  AlkPhos  81  10-12    LIVER FUNCTIONS - ( 12 Oct 2020 05:42 )  Alb: 3.2 g/dL / Pro: 6.2 g/dL / ALK PHOS: 81 U/L / ALT: 8 U/L / AST: 22 U/L / GGT: x                Ferritin 1652 < 1764 < 2645 < 3525 < 6426 < 5573 < 4710  CRP 0.45 < 1.15 < 1.39 < 0.74 < 2.00  D-Dimer 401< 334 < 426< 254 <294    Microbiology:  10/4 - blood cultures - negative   10/3 - COVID ab - negative   10/2 - blood cultures - 2/2 - CoNS- S. epidermidis - S to A/S, cefazolin, gent, oxacillin, tetracyclines, bactrim, vancomycin; R to clindamycin, erythromycin, penicillin  10/2 - COVID-19 PCR - positive    Radiology:  Xray Chest 1 View- PORTABLE-Routine (Xray Chest 1 View- PORTABLE-Routine .) (10.12.20 @ 12:49) >  The heart is slightly enlarged. A left hiatal hernia is present. Diffuse airspace disease is seen in both lungs more pronounced on the left compatible with pneumonia. Degenerative changes of the thoracic spine and both shoulders. No pleural effusion. No pneumothorax.    Xray Chest 1 View- PORTABLE-Urgent (Xray Chest 1 View- PORTABLE-Urgent .) (10.05.20 @ 12:41) > Impression: The heart is normal insize. Poor inspiratory effort. Diffuse airspace opacities are seen from both lungs compatible with pneumonia status post seen in COVID 19., Correlate with CT scan that was performed on October 2, 2020.    CT Angio Chest w/ IV Cont (10.02.20 @ 13:30) > IMPRESSION: No pulmonary embolism. Bilateral lung opacities may represent Covid-19.    Xray Chest 1 View-PORTABLE IMMEDIATE (10.02.20 @ 11:26) > IMPRESSION: Mid left lung infiltrate.    Medications:  MEDICATIONS  (STANDING):  aspirin  chewable 81 milliGRAM(s) Oral daily  benzonatate 100 milliGRAM(s) Oral three times a day  ceFAZolin   IVPB 2000 milliGRAM(s) IV Intermittent every 8 hours  cholecalciferol 2000 Unit(s) Oral daily  dexAMETHasone     Tablet 6 milliGRAM(s) Oral daily  enoxaparin Injectable 40 milliGRAM(s) SubCutaneous daily  hydroxychloroquine 400 milliGRAM(s) Oral <User Schedule>  labetalol 200 milliGRAM(s) Oral two times a day  methimazole 5 milliGRAM(s) Oral daily  pantoprazole    Tablet 40 milliGRAM(s) Oral before breakfast  PARoxetine 10 milliGRAM(s) Oral daily  ursodiol Tablet 250 milliGRAM(s) Oral two times a day    Antimicrobials:  ceFAZolin   IVPB 2000 milliGRAM(s) IV Intermittent every 8 hours - started 10/6  hydroxychloroquine 400 milliGRAM(s) Oral <User Schedule>  S/p remdesivir 10/2-10/6  S/p vancomycin 10/2-10/6

## 2020-10-13 NOTE — PROGRESS NOTE ADULT - PROBLEM SELECTOR PLAN 1
- maintain SpO2 > 92%, pt on 5LNC currently, consider Hi-Tulio if persistent desaturation below 90%  - avoid BiLevel given (+)COVID-19 status  - monitor inflammatory markers every 2-3 days  - she remains on decadron 6 mg PO daily which was extended owing to her persistently high O2 requirements

## 2020-10-13 NOTE — PROGRESS NOTE ADULT - PROBLEM SELECTOR PLAN 1
COVID-19 PCR positive  CTA chest with peripheral opacities consistent with COVID lung changes. CXR with progression on left side, On NC 5L/min now. Pulmonary following.  S/p 5 days of remdesivir  Inflammatory markers stable, CRP downtrended to 0.45.   Continuing on Dexamethasone - extending course for now given continued oxygen requirements and risk for decompensation  On lovenox subq daily  Continue to monitor pulse ox and cont supplemental O2 for O2 90-92%  Continue to monitor inflammatory markers

## 2020-10-14 NOTE — PROGRESS NOTE ADULT - PROBLEM SELECTOR PLAN 3
secondary to COVID-19 pneumonia  On NC 4L/min, mild cough with no dyspnea or tachypnea  Continue to monitor O2 saturation. On dexamethasone.   Pulmonary following secondary to COVID-19 pneumonia  On NC 4L/min, mild cough with no dyspnea or tachypnea. On dexamethasone.   Pulmonary following

## 2020-10-14 NOTE — PROGRESS NOTE ADULT - PROBLEM SELECTOR PLAN 1
COVID-19 PCR positive  CTA chest with peripheral opacities consistent with COVID lung changes. CXR with progression on left side, On NC 5L/min now. Pulmonary following.  S/p 5 days of remdesivir  Inflammatory markers stable/improved.   Now down to 4L NC  Pulmonary following, on Dexamethasone  On lovenox subq daily  Continue to monitor pulse ox and cont supplemental O2 for O2 90-92%  Continue to monitor inflammatory markers COVID-19 PCR positive. CTA chest consistent with COVID. Last CXR with progression on left side.   S/p 5 days of remdesivir. On lovenox.   Inflammatory markers stable/improved.   Now down to 4L NC, Pulmonary following, on Dexamethasone  Monitor pulse ox and cont supplemental O2 for O2 90-92%  Monitor inflammatory markers

## 2020-10-14 NOTE — PROGRESS NOTE ADULT - ASSESSMENT
Acute hypoxemic resp failure due to multilobar viral pneumonia  COVID 19 viral infection  CNS bacteremia: unclear significance  RA on immunosupressive regimen  Inflammatory markers largely stable  10/5: Clinically stable, Mild tachypnea; howver increase in nasal O2 to 5 liters noted. Uptrending inflammatory markers noted  10/6:  CXR progressed vs. admission; still requires 5 liters nasal cannula with sat 91%; Ferritin decreased  10/7: Still requires 6 liters nasal oxygen; infl markers downtrending  ; no gross improvement in status  10/8: Clinically unchanged; Ferritin increasing; remains on 6 liters nasal cannula; c/o cogh    REC    Continue decadron   Taper oxygen as tolerated: target sat 90-92%: reduce to 3 liters today  Continue decadron for now

## 2020-10-14 NOTE — PROGRESS NOTE ADULT - SUBJECTIVE AND OBJECTIVE BOX
Patient is a 75y old  Female who presents with a chief complaint of COVID-19 PNA, acute respiratory failure (14 Oct 2020 14:34)      SUBJECTIVE / OVERNIGHT EVENTS:  overall feels well  COVID19 still positive  still on 4L NC O2, attempting to wean  no cp, no sob, no n/v/d. no abdominal pain.  no headache, no dizziness.       Vital Signs Last 24 Hrs  T(C): 37 (14 Oct 2020 11:16), Max: 37 (14 Oct 2020 11:16)  T(F): 98.6 (14 Oct 2020 11:16), Max: 98.6 (14 Oct 2020 11:16)  HR: 64 (14 Oct 2020 17:16) (61 - 78)  BP: 129/74 (14 Oct 2020 17:16) (129/74 - 164/83)  BP(mean): --  RR: 16 (14 Oct 2020 17:16) (16 - 18)  SpO2: 96% (14 Oct 2020 17:16) (92% - 97%)  I&O's Summary    13 Oct 2020 07:01  -  14 Oct 2020 07:00  --------------------------------------------------------  IN: 690 mL / OUT: 0 mL / NET: 690 mL    14 Oct 2020 07:01  -  14 Oct 2020 19:21  --------------------------------------------------------  IN: 780 mL / OUT: 200 mL / NET: 580 mL      PHYSICAL EXAM:  GENERAL: NAD, on 4 LNCO2, mild hard of hearing  HEAD:  Atraumatic, Normocephalic  EYES: EOMI, PERRLA, conjunctiva and sclera clear  NECK: Supple, No JVD  CHEST/LUNG: mild decrease breath sounds bilaterally; No wheeze   HEART: Regular rate and rhythm; No murmurs, rubs, or gallops  ABDOMEN: Soft, Nontender, Nondistended; Bowel sounds present  Neuro: AAO x 2-3, no focal deficit, 5/5 b/l extremities  EXTREMITIES:  2+ Peripheral Pulses, No clubbing, cyanosis, or edema  SKIN: No rashes or lesions    LABS:                        11.1   13.10 )-----------( 422      ( 14 Oct 2020 05:37 )             33.1     10-14    134<L>  |  100  |  20  ----------------------------<  90  4.4   |  23  |  0.35<L>    Ca    8.6      14 Oct 2020 05:37    TPro  6.0  /  Alb  3.1<L>  /  TBili  0.5  /  DBili  x   /  AST  22  /  ALT  10  /  AlkPhos  75  10-14      CAPILLARY BLOOD GLUCOSE                RADIOLOGY & ADDITIONAL TESTS:    Imaging Personally Reviewed:  [x] YES  [ ] NO    Consultant(s) Notes Reviewed:  [x] YES  [ ] NO      MEDICATIONS  (STANDING):  aspirin  chewable 81 milliGRAM(s) Oral daily  benzonatate 100 milliGRAM(s) Oral three times a day  cholecalciferol 2000 Unit(s) Oral daily  dexAMETHasone     Tablet 6 milliGRAM(s) Oral daily  enoxaparin Injectable 40 milliGRAM(s) SubCutaneous daily  hydroxychloroquine 400 milliGRAM(s) Oral <User Schedule>  labetalol 200 milliGRAM(s) Oral two times a day  methimazole 5 milliGRAM(s) Oral daily  multivitamin 1 Tablet(s) Oral daily  pantoprazole    Tablet 40 milliGRAM(s) Oral before breakfast  PARoxetine 10 milliGRAM(s) Oral daily  ursodiol Tablet 250 milliGRAM(s) Oral two times a day    MEDICATIONS  (PRN):  acetaminophen   Tablet .. 650 milliGRAM(s) Oral every 8 hours PRN Temp greater or equal to 38.5C (101.3F)  acetaminophen   Tablet .. 650 milliGRAM(s) Oral every 6 hours PRN Moderate Pain (4 - 6)  guaiFENesin   Syrup  (Sugar-Free) 200 milliGRAM(s) Oral every 6 hours PRN Cough  hydrocodone/homatropine Syrup 5 milliLiter(s) Oral at bedtime PRN Cough  oxyCODONE    IR 10 milliGRAM(s) Oral every 6 hours PRN Severe Pain (7 - 10)      Care Discussed with Consultants/Other Providers [x] YES  [ ] NO

## 2020-10-14 NOTE — PROGRESS NOTE ADULT - SUBJECTIVE AND OBJECTIVE BOX
Follow-up Pulm Progress Note  Gordon Fox MD  [phone]    Remains afebrile off antibiotics  O2 sats in upper 90's on 4 liters nasal cannula  Ambulating in room, appears comfortable    Vital Signs Last 24 Hrs  T(C): 37 (14 Oct 2020 11:16), Max: 37 (14 Oct 2020 11:16)  T(F): 98.6 (14 Oct 2020 11:16), Max: 98.6 (14 Oct 2020 11:16)  HR: 61 (14 Oct 2020 11:16) (61 - 78)  BP: 164/83 (14 Oct 2020 11:16) (144/76 - 164/83)  BP(mean): --  RR: 18 (14 Oct 2020 11:16) (18 - 20)  SpO2: 97% (14 Oct 2020 11:16) (91% - 97%)                          11.1   13.10 )-----------( 422      ( 14 Oct 2020 05:37 )             33.1       10-14    134<L>  |  100  |  20  ----------------------------<  90  4.4   |  23  |  0.35<L>    Ca    8.6      14 Oct 2020 05:37    TPro  6.0  /  Alb  3.1<L>  /  TBili  0.5  /  DBili  x   /  AST  22  /  ALT  10  /  AlkPhos  75  10-14      CULTURES:  Culture Results:   Growth in aerobic and anaerobic bottles: Staphylococcus epidermidis  Susceptibility to follow.  "Due to technical problems, Proteus sp. will Not be reported as part of  the BCID panel until further notice"  ***Blood Panel PCR results on this specimen are available  approximately 3 hours after the Gram stain result.***  Gram stain, PCR, and/or culture results may not always  correspond due to difference in methodologies.  ************************************************************  This PCR assay was performed using [employer].  The following targets are tested for: Enterococcus,  vancomycin resistant enterococci, Listeria monocytogenes,  coagulase negative staphylococci, S. aureus,  methicillin resistant S. aureus, Streptococcus agalactiae  (Group B), S. pneumoniae, S. pyogenes (Group A),  Acinetobacter baumannii, Enterobacter cloacae, E. coli,  Klebsiella oxytoca, K. pneumoniae, Proteus sp.,  Serratia marcescens, Haemophilus influenzae,  Neisseria meningitidis, Pseudomonas aeruginosa, Candida  albicans, C. glabrata, C krusei, C parapsilosis,  C. tropicalis and the KPC resistance gene. (10-02 @ 14:52)  Culture Results:   Growth in aerobic and anaerobic bottles: Staphylococcus epidermidis  "Susceptibilities not performed" (10-02 @ 14:52)    Most recent blood culture -- 10-02 @ 14:52   Blood Culture PCR Blood Culture PCR .Blood Blood-Peripheral 10-02 @ 14:52      Physical Examination:  PULM: Few scattered basilar crackles; no wheeze  CVS: Regular rate and rhythm, no murmurs, rubs, or gallops  ABD: Soft, non-tender  EXT:  No clubbing, cyanosis, or edema    RADIOLOGY REVIEWED  CXR:    CT chest:    PROCEDURE DATE:  10/02/2020        INTERPRETATION:  EXAMINATION: CT ANGIO CHEST WITHOUT AND OR WITH IV CONTRAST    CLINICAL INDICATION: Dyspnea    TECHNIQUE: CTA of the chest was performed for evaluation of pulmonary embolism after administration of 90 ml of Omnipaque-350, 10 ml discarded.  MIP images were reconstructed.    COMPARISON: 10/27/2019.    FINDINGS:    PULMONARY ARTERIES: There is no pulmonary embolism    AIRWAYS AND LUNGS: The central tracheobronchial tree is patent.  Bilateral groundglass opacities and consolidations, predominantly peripherally    MEDIASTINUM AND PLEURA: There are no enlarged mediastinal, hilar or axillary lymph nodes. The visualized portion of the thyroid gland is unremarkable. There is no pleural effusion. There is no pneumothorax.    HEART AND VESSELS: There is mild cardiomegaly. There are atherosclerotic calcifications of the aorta and coronary arteries.  There is no pericardial effusion.    UPPER ABDOMEN: Images of the upper abdomen demonstrate no abnormality.    BONES AND SOFT TISSUES: Unchanged T12 vertebral plasty and L1 vertebral body severe compression deformity.  The soft tissues are unremarkable.    TUBES/LINES: None.    IMPRESSION:  No pulmonary embolism. Bilateral lung opacities may represent Covid-19.    TTE:

## 2020-10-14 NOTE — PROGRESS NOTE ADULT - SUBJECTIVE AND OBJECTIVE BOX
[facility], Division of Infectious Diseases  ROSALINA Cali Y. Patel, S. Shah  [phone]  ([phone] - weekdays after 5pm and weekends)    Name: TARA ESPINAL  Age: 75y  Gender: Female  MRN: [id number]    Interval History:  No new complaints, now down to 4L NC saturating 91-95%  ROS reviewed, pertinent positives and negatives as above.   Notes reviewed. Afebrile, remains on dexamethasone.     Objective:  Vitals:  T(F): 98.6 (10-14-20 @ 11:16), Max: 98.6 (10-14-20 @ 11:16)  HR: 61 (10-14-20 @ 11:16) (61 - 78)  BP: 164/83 (10-14-20 @ 11:16) (144/76 - 164/83)  RR: 18 (10-14-20 @ 11:16) (18 - 20)  SpO2: 97% (10-14-20 @ 11:16) (91% - 97%)    Physical Examination:  General: no acute distress, comfortable, NC 5L/min  HEENT: NC/AT, EOMI, anicteric, neck supple  Cardio: S1, S2 heard, RRR, no murmurs  Resp: decreased breath sounds bilaterally  Abd: soft, NT, ND, + bowel sounds  Neuro: AAOx3, no obvious focal deficits  Ext: no edema, moving extremities  Skin: warm, dry, no visible rash  Lines: PIV    Laboratory Studies:  CBC:                       11.1   13.10 )-----------( 422      ( 14 Oct 2020 05:37 )             33.1     CMP: 10-14    134<L>  |  100  |  20  ----------------------------<  90  4.4   |  23  |  0.35<L>    Ca    8.6      14 Oct 2020 05:37    TPro  6.0  /  Alb  3.1<L>  /  TBili  0.5  /  DBili  x   /  AST  22  /  ALT  10  /  AlkPhos  75  10-14    LIVER FUNCTIONS - ( 14 Oct 2020 05:37 )  Alb: 3.1 g/dL / Pro: 6.0 g/dL / ALK PHOS: 75 U/L / ALT: 10 U/L / AST: 22 U/L / GGT: x             Ferritin 1652 < 1764 < 2645 < 3525 < 6426 < 5573 < 4710  CRP 0.45 < 1.15 < 1.39 < 0.74 < 2.00  D-Dimer 224 < 401< 334 < 426< 254 <294    Microbiology:  10/4 - blood cultures - negative   10/3 - COVID ab - negative   10/2 - blood cultures - 2/2 - CoNS- S. epidermidis - S to A/S, cefazolin, gent, oxacillin, tetracyclines, bactrim, vancomycin; R to clindamycin, erythromycin, penicillin  10/2 - COVID-19 PCR - positive    Radiology:  Xray Chest 1 View- PORTABLE-Routine (Xray Chest 1 View- PORTABLE-Routine .) (10.12.20 @ 12:49) >The heart is slightly enlarged. A left hiatal hernia is present. Diffuse airspace disease is seen in both lungs more pronounced on the left compatible with pneumonia. Degenerative changes of the thoracic spine and both shoulders. No pleural effusion. No pneumothorax.    Xray Chest 1 View- PORTABLE-Urgent (Xray Chest 1 View- PORTABLE-Urgent .) (10.05.20 @ 12:41) > Impression: The heart is normal insize. Poor inspiratory effort. Diffuse airspace opacities are seen from both lungs compatible with pneumonia status post seen in COVID 19., Correlate with CT scan that was performed on October 2, 2020.    CT Angio Chest w/ IV Cont (10.02.20 @ 13:30) > IMPRESSION: No pulmonary embolism. Bilateral lung opacities may represent Covid-19.    Xray Chest 1 View-PORTABLE IMMEDIATE (10.02.20 @ 11:26) > IMPRESSION: Mid left lung infiltrate.    Medications:  MEDICATIONS  (STANDING):  aspirin  chewable 81 milliGRAM(s) Oral daily  benzonatate 100 milliGRAM(s) Oral three times a day  cholecalciferol 2000 Unit(s) Oral daily  dexAMETHasone     Tablet 6 milliGRAM(s) Oral daily  enoxaparin Injectable 40 milliGRAM(s) SubCutaneous daily  hydroxychloroquine 400 milliGRAM(s) Oral <User Schedule>  labetalol 200 milliGRAM(s) Oral two times a day  methimazole 5 milliGRAM(s) Oral daily  multivitamin 1 Tablet(s) Oral daily  pantoprazole    Tablet 40 milliGRAM(s) Oral before breakfast  PARoxetine 10 milliGRAM(s) Oral daily  ursodiol Tablet 250 milliGRAM(s) Oral two times a day    Antimicrobials:  hydroxychloroquine 400 milliGRAM(s) Oral <User Schedule>  S/p cefazolin 10/6-10/13  S/p remdesivir 10/2-10/6  S/p vancomycin 10/2-10/6   [facility], Division of Infectious Diseases  ROSALINA Cali Y. Patel, S. Shah  [phone]  ([phone] - weekdays after 5pm and weekends)    Name: TARA ESPINAL  Age: 75y  Gender: Female  MRN: [id number]    Interval History:  No new complaints, now down to 4L NC saturating 91-95%  ROS reviewed, pertinent positives and negatives as above.   Notes reviewed. Afebrile, remains on dexamethasone.     Objective:  Vitals:  T(F): 98.6 (10-14-20 @ 11:16), Max: 98.6 (10-14-20 @ 11:16)  HR: 61 (10-14-20 @ 11:16) (61 - 78)  BP: 164/83 (10-14-20 @ 11:16) (144/76 - 164/83)  RR: 18 (10-14-20 @ 11:16) (18 - 20)  SpO2: 97% (10-14-20 @ 11:16) (91% - 97%)    Physical Examination:  General: no acute distress, comfortable, NC 4L/min  HEENT: NC/AT, EOMI, anicteric, neck supple  Cardio: S1, S2 heard, RRR, no murmurs  Resp: decreased breath sounds bilaterally  Abd: soft, NT, ND, + bowel sounds  Neuro: AAOx3, no obvious focal deficits  Ext: no edema, moving extremities  Skin: warm, dry, no visible rash  Lines: PIV    Laboratory Studies:  CBC:                       11.1   13.10 )-----------( 422      ( 14 Oct 2020 05:37 )             33.1     CMP: 10-14    134<L>  |  100  |  20  ----------------------------<  90  4.4   |  23  |  0.35<L>    Ca    8.6      14 Oct 2020 05:37    TPro  6.0  /  Alb  3.1<L>  /  TBili  0.5  /  DBili  x   /  AST  22  /  ALT  10  /  AlkPhos  75  10-14    LIVER FUNCTIONS - ( 14 Oct 2020 05:37 )  Alb: 3.1 g/dL / Pro: 6.0 g/dL / ALK PHOS: 75 U/L / ALT: 10 U/L / AST: 22 U/L / GGT: x             Ferritin 1652 < 1764 < 2645 < 3525 < 6426 < 5573 < 4710  CRP 0.45 < 1.15 < 1.39 < 0.74 < 2.00  D-Dimer 224 < 401< 334 < 426< 254 <294    Microbiology:  10/4 - blood cultures - negative   10/3 - COVID ab - negative   10/2 - blood cultures - 2/2 - CoNS- S. epidermidis - S to A/S, cefazolin, gent, oxacillin, tetracyclines, bactrim, vancomycin; R to clindamycin, erythromycin, penicillin  10/2 - COVID-19 PCR - positive    Radiology:  Xray Chest 1 View- PORTABLE-Routine (Xray Chest 1 View- PORTABLE-Routine .) (10.12.20 @ 12:49) >The heart is slightly enlarged. A left hiatal hernia is present. Diffuse airspace disease is seen in both lungs more pronounced on the left compatible with pneumonia. Degenerative changes of the thoracic spine and both shoulders. No pleural effusion. No pneumothorax.    Xray Chest 1 View- PORTABLE-Urgent (Xray Chest 1 View- PORTABLE-Urgent .) (10.05.20 @ 12:41) > Impression: The heart is normal insize. Poor inspiratory effort. Diffuse airspace opacities are seen from both lungs compatible with pneumonia status post seen in COVID 19., Correlate with CT scan that was performed on October 2, 2020.    CT Angio Chest w/ IV Cont (10.02.20 @ 13:30) > IMPRESSION: No pulmonary embolism. Bilateral lung opacities may represent Covid-19.    Xray Chest 1 View-PORTABLE IMMEDIATE (10.02.20 @ 11:26) > IMPRESSION: Mid left lung infiltrate.    Medications:  MEDICATIONS  (STANDING):  aspirin  chewable 81 milliGRAM(s) Oral daily  benzonatate 100 milliGRAM(s) Oral three times a day  cholecalciferol 2000 Unit(s) Oral daily  dexAMETHasone     Tablet 6 milliGRAM(s) Oral daily  enoxaparin Injectable 40 milliGRAM(s) SubCutaneous daily  hydroxychloroquine 400 milliGRAM(s) Oral <User Schedule>  labetalol 200 milliGRAM(s) Oral two times a day  methimazole 5 milliGRAM(s) Oral daily  multivitamin 1 Tablet(s) Oral daily  pantoprazole    Tablet 40 milliGRAM(s) Oral before breakfast  PARoxetine 10 milliGRAM(s) Oral daily  ursodiol Tablet 250 milliGRAM(s) Oral two times a day    Antimicrobials:  hydroxychloroquine 400 milliGRAM(s) Oral <User Schedule>  S/p cefazolin 10/6-10/13  S/p remdesivir 10/2-10/6  S/p vancomycin 10/2-10/6

## 2020-10-14 NOTE — PROGRESS NOTE ADULT - PROBLEM SELECTOR PLAN 2
CoNS - Staph epi - grew in both sets of blood cultures - ?contaminant vs true bacteremia - cleared 10/4, s/p 10 days of cefazolin.

## 2020-10-14 NOTE — PROGRESS NOTE ADULT - ASSESSMENT
76 yo F w/ PMHx significant for recently diagnosed hyperthyroidism (3mo ago, on Methimazole), rheumatoid arthritis (on MTX weekly, Remicade [id number], Hydroxychloroquine, and daily steroids w/ Prednisone 10mg daily), HTN (on multiple anti-hypertensive agents), s/p CVA w/o residual deficits, memory impairment, who presents from  after presenting from [facility] where she resides, w/ c/o nonproductive cough over the past few days, and SOB, along w/ NBNB vomiting 3-4days ago and NB diarrhea beginning today, found to be COVID-19 positive.

## 2020-10-14 NOTE — PROGRESS NOTE ADULT - ASSESSMENT
Patient is a 75 year old female with PMH of significant for recently diagnosed hyperthyroidism (3mo ago, on Methimazole), RA (on MTX weekly, Remicade [id number], Hydroxychloroquine, and daily steroids w/ Prednisone 10mg daily), HTN, CVA, memory impairment who presented from [facility] with nonproductive cough and dyspnea with nausea and diarrhea and was found to be COVID-19 positive. Blood cultures positive for Staph epi in both sets, unclear if true or contaminant, treated as true bacteremia.

## 2020-10-14 NOTE — PROGRESS NOTE ADULT - PROBLEM SELECTOR PLAN 1
- maintain SpO2 > 92%, pt on 5LNC currently, consider Hi-Tulio if persistent desaturation below 90%  - avoid BiLevel given (+) COVID-19 status  - monitor inflammatory markers every 2-3 days  - she remains on decadron 6 mg PO daily which was extended owing to her persistently high O2 requirements  - continue to wean O2 as she tolerates

## 2020-10-15 NOTE — PROGRESS NOTE ADULT - SUBJECTIVE AND OBJECTIVE BOX
Patient is a 75y old  Female who presents with a chief complaint of COVID-19 PNA, acute respiratory failure (15 Oct 2020 13:42)      SUBJECTIVE / OVERNIGHT EVENTS:  feels well.  no chest pain, no shortness of breath.   comfortable. no n/v/d. no abd pain.  no HA/dizziness.   wean 3L NC to 2L, monitoring O2 sats.      Vital Signs Last 24 Hrs  T(C): 36.8 (15 Oct 2020 17:37), Max: 36.9 (14 Oct 2020 21:50)  T(F): 98.3 (15 Oct 2020 17:37), Max: 98.4 (14 Oct 2020 21:50)  HR: 75 (15 Oct 2020 18:26) (62 - 80)  BP: 109/66 (15 Oct 2020 18:26) (109/66 - 162/87)  BP(mean): --  RR: 18 (15 Oct 2020 17:37) (16 - 18)  SpO2: 94% (15 Oct 2020 17:37) (90% - 97%)  I&O's Summary    14 Oct 2020 07:01  -  15 Oct 2020 07:00  --------------------------------------------------------  IN: 840 mL / OUT: 650 mL / NET: 190 mL      PHYSICAL EXAM:  GENERAL: NAD, on 2-3 LNCO2, mild hard of hearing  HEAD:  Atraumatic, Normocephalic  EYES: EOMI, PERRLA, conjunctiva and sclera clear  NECK: Supple, No JVD  CHEST/LUNG: mild decrease breath sounds bilaterally; No wheeze   HEART: Regular rate and rhythm; No murmurs, rubs, or gallops  ABDOMEN: Soft, Nontender, Nondistended; Bowel sounds present  Neuro: AAO x 2-3, no focal deficit, 5/5 b/l extremities  EXTREMITIES:  2+ Peripheral Pulses, No clubbing, cyanosis, or edema  SKIN: No rashes or lesions    LABS:                        11.1   13.10 )-----------( 422      ( 14 Oct 2020 05:37 )             33.1     10-14    134<L>  |  100  |  20  ----------------------------<  90  4.4   |  23  |  0.35<L>    Ca    8.6      14 Oct 2020 05:37    TPro  6.0  /  Alb  3.1<L>  /  TBili  0.5  /  DBili  x   /  AST  22  /  ALT  10  /  AlkPhos  75  10-14      CAPILLARY BLOOD GLUCOSE                RADIOLOGY & ADDITIONAL TESTS:    Imaging Personally Reviewed:  [x] YES  [ ] NO    Consultant(s) Notes Reviewed:  [x] YES  [ ] NO      MEDICATIONS  (STANDING):  aspirin  chewable 81 milliGRAM(s) Oral daily  benzonatate 100 milliGRAM(s) Oral three times a day  cholecalciferol 2000 Unit(s) Oral daily  dexAMETHasone     Tablet 6 milliGRAM(s) Oral daily  enoxaparin Injectable 40 milliGRAM(s) SubCutaneous daily  hydroxychloroquine 400 milliGRAM(s) Oral <User Schedule>  labetalol 200 milliGRAM(s) Oral two times a day  methimazole 5 milliGRAM(s) Oral daily  multivitamin 1 Tablet(s) Oral daily  pantoprazole    Tablet 40 milliGRAM(s) Oral before breakfast  PARoxetine 10 milliGRAM(s) Oral daily  ursodiol Tablet 250 milliGRAM(s) Oral two times a day    MEDICATIONS  (PRN):  acetaminophen   Tablet .. 650 milliGRAM(s) Oral every 8 hours PRN Temp greater or equal to 38.5C (101.3F)  acetaminophen   Tablet .. 650 milliGRAM(s) Oral every 6 hours PRN Moderate Pain (4 - 6)  guaiFENesin   Syrup  (Sugar-Free) 200 milliGRAM(s) Oral every 6 hours PRN Cough  hydrocodone/homatropine Syrup 5 milliLiter(s) Oral at bedtime PRN Cough  oxyCODONE    IR 10 milliGRAM(s) Oral every 6 hours PRN Severe Pain (7 - 10)      Care Discussed with Consultants/Other Providers [x] YES  [ ] NO

## 2020-10-15 NOTE — PROGRESS NOTE ADULT - SUBJECTIVE AND OBJECTIVE BOX
[facility], Division of Infectious Diseases  ROSALINA Cali Y. Patel, S. Shah  [phone]  ([phone] - weekdays after 5pm and weekends)    Name: TARA ESPINAL  Age: 75y  Gender: Female  MRN: [id number]    Interval History:  No new complaints, mild cough, no fever, on 3L NC saturating 90-96%  ROS reviewed, pertinent positives and negatives as above.   Notes reviewed. Afebrile, remains on dexamethasone.     Objective:  Vitals:  T(F): 98.4 (10-15-20 @ 11:35), Max: 98.4 (10-14-20 @ 21:50)  HR: 62 (10-15-20 @ 11:35) (62 - 69)  BP: 162/87 (10-15-20 @ 11:35) (129/74 - 162/87)  RR: 18 (10-15-20 @ 11:35) (16 - 18)  SpO2: 97% (10-15-20 @ 11:35) (90% - 97%)    Physical Examination:  General: no acute distress, comfortable, NC 3L/min  HEENT: NC/AT, EOMI, anicteric, neck supple  Cardio: S1, S2 heard, RRR, no murmurs  Resp: decreased breath sounds bilaterally  Abd: soft, NT, ND, + bowel sounds  Neuro: AAOx3, no obvious focal deficits  Ext: no edema, moving extremities  Skin: warm, dry, no visible rash  Lines: PIV    Laboratory Studies:  CBC:                       11.1   13.10 )-----------( 422      ( 14 Oct 2020 05:37 )             33.1     CMP: 10-14    134<L>  |  100  |  20  ----------------------------<  90  4.4   |  23  |  0.35<L>    Ca    8.6      14 Oct 2020 05:37    TPro  6.0  /  Alb  3.1<L>  /  TBili  0.5  /  DBili  x   /  AST  22  /  ALT  10  /  AlkPhos  75  10-14    LIVER FUNCTIONS - ( 14 Oct 2020 05:37 )  Alb: 3.1 g/dL / Pro: 6.0 g/dL / ALK PHOS: 75 U/L / ALT: 10 U/L / AST: 22 U/L / GGT: x                 Ferritin 1652 < 1764 < 2645 < 3525 < 6426 < 5573 < 4710  CRP 0.45 < 1.15 < 1.39 < 0.74 < 2.00  D-Dimer 224 < 401< 334 < 426< 254 <294    Microbiology:  10/4 - blood cultures - negative   10/3 - COVID ab - negative   10/2 - blood cultures - 2/2 - CoNS- S. epidermidis - S to A/S, cefazolin, gent, oxacillin, tetracyclines, bactrim, vancomycin; R to clindamycin, erythromycin, penicillin  10/2 - COVID-19 PCR - positive    Radiology:  Xray Chest 1 View- PORTABLE-Routine (Xray Chest 1 View- PORTABLE-Routine .) (10.12.20 @ 12:49) >The heart is slightly enlarged. A left hiatal hernia is present. Diffuse airspace disease is seen in both lungs more pronounced on the left compatible with pneumonia. Degenerative changes of the thoracic spine and both shoulders. No pleural effusion. No pneumothorax.    Xray Chest 1 View- PORTABLE-Urgent (Xray Chest 1 View- PORTABLE-Urgent .) (10.05.20 @ 12:41) > Impression: The heart is normal insize. Poor inspiratory effort. Diffuse airspace opacities are seen from both lungs compatible with pneumonia status post seen in COVID 19., Correlate with CT scan that was performed on October 2, 2020.    CT Angio Chest w/ IV Cont (10.02.20 @ 13:30) > IMPRESSION: No pulmonary embolism. Bilateral lung opacities may represent Covid-19.    Xray Chest 1 View-PORTABLE IMMEDIATE (10.02.20 @ 11:26) > IMPRESSION: Mid left lung infiltrate.    Medications:  T(F): 98.4 (10-15-20 @ 11:35), Max: 98.4 (10-14-20 @ 21:50)  HR: 62 (10-15-20 @ 11:35) (62 - 69)  BP: 162/87 (10-15-20 @ 11:35) (129/74 - 162/87)  RR: 18 (10-15-20 @ 11:35) (16 - 18)  SpO2: 97% (10-15-20 @ 11:35) (90% - 97%)    Antimicrobials:  hydroxychloroquine 400 milliGRAM(s) Oral <User Schedule>  S/p cefazolin 10/6-10/13  S/p remdesivir 10/2-10/6  S/p vancomycin 10/2-10/6

## 2020-10-15 NOTE — PROGRESS NOTE ADULT - ASSESSMENT
Acute hypoxemic resp failure due to multilobar viral pneumonia  COVID 19 viral infection  CNS bacteremia: unclear significance  RA on immunosupressive regimen  Inflammatory markers largely stable  10/5: Clinically stable, Mild tachypnea; howver increase in nasal O2 to 5 liters noted. Uptrending inflammatory markers noted  10/6:  CXR progressed vs. admission; still requires 5 liters nasal cannula with sat 91%; Ferritin decreased  10/7: Still requires 6 liters nasal oxygen; infl markers downtrending  ; no gross improvement in status  10/8: Clinically unchanged; Ferritin increasing; remains on 6 liters nasal cannula; c/o cogh    REC    Continue decadron   Decrease nasal cannula to 2 liters today and continue to taper with target sat 90-92%

## 2020-10-15 NOTE — PROGRESS NOTE ADULT - ASSESSMENT
74 yo F w/ PMHx significant for recently diagnosed hyperthyroidism (3mo ago, on Methimazole), rheumatoid arthritis (on MTX weekly, Remicade [id number], Hydroxychloroquine, and daily steroids w/ Prednisone 10mg daily), HTN (on multiple anti-hypertensive agents), s/p CVA w/o residual deficits, memory impairment, who presents from  after presenting from [facility] where she resides, w/ c/o nonproductive cough over the past few days, and SOB, along w/ NBNB vomiting 3-4days ago and NB diarrhea beginning today, found to be COVID-19 positive.

## 2020-10-15 NOTE — PROGRESS NOTE ADULT - PROBLEM SELECTOR PLAN 1
- maintain SpO2 > 92%, pt on 5LNC currently, consider Hi-Tulio if persistent desaturation below 90%  - avoid BiLevel given (+) COVID-19 status  - monitor inflammatory markers every 2-3 days  - she remains on decadron 6 mg PO daily which was extended owing to her persistently high O2 requirements  - continue to wean O2 as she tolerates  - wean 3L NC to 2L, monitoring O2 sats

## 2020-10-15 NOTE — PROGRESS NOTE ADULT - ASSESSMENT
Patient is a 75 year old female with PMH of significant for recently diagnosed hyperthyroidism (3mo ago, on Methimazole), RA (on MTX weekly, Remicade [id number], Hydroxychloroquine, and daily steroids w/ Prednisone 10mg daily), HTN, CVA, memory impairment who presented from [facility] with nonproductive cough and dyspnea with nausea and diarrhea and was found to be COVID-19 positive. Blood cultures positive for Staph epi in both sets, unclear if true or contaminant, treated as true bacteremia.

## 2020-10-15 NOTE — PROGRESS NOTE ADULT - SUBJECTIVE AND OBJECTIVE BOX
Follow-up Pulm Progress Note  Gordon Fox MD  [phone]    Remains afebrile off antibiotics  O2 sats in upper 90's on 3 liters nasal cannula today  No dyspnea    Vital Signs Last 24 Hrs  T(C): 36.9 (15 Oct 2020 11:35), Max: 36.9 (14 Oct 2020 21:50)  T(F): 98.4 (15 Oct 2020 11:35), Max: 98.4 (14 Oct 2020 21:50)  HR: 62 (15 Oct 2020 11:35) (62 - 69)  BP: 162/87 (15 Oct 2020 11:35) (129/74 - 162/87)  BP(mean): --  RR: 18 (15 Oct 2020 11:35) (16 - 18)  SpO2: 97% (15 Oct 2020 11:35) (90% - 97%)                        11.1   13.10 )-----------( 422      ( 14 Oct 2020 05:37 )             33.1       10-14    134<L>  |  100  |  20  ----------------------------<  90  4.4   |  23  |  0.35<L>    Ca    8.6      14 Oct 2020 05:37    TPro  6.0  /  Alb  3.1<L>  /  TBili  0.5  /  DBili  x   /  AST  22  /  ALT  10  /  AlkPhos  75  10-14      CULTURES:  Culture Results:   Growth in aerobic and anaerobic bottles: Staphylococcus epidermidis  Susceptibility to follow.  "Due to technical problems, Proteus sp. will Not be reported as part of  the BCID panel until further notice"  ***Blood Panel PCR results on this specimen are available  approximately 3 hours after the Gram stain result.***  Gram stain, PCR, and/or culture results may not always  correspond due to difference in methodologies.  ************************************************************  This PCR assay was performed using [employer].  The following targets are tested for: Enterococcus,  vancomycin resistant enterococci, Listeria monocytogenes,  coagulase negative staphylococci, S. aureus,  methicillin resistant S. aureus, Streptococcus agalactiae  (Group B), S. pneumoniae, S. pyogenes (Group A),  Acinetobacter baumannii, Enterobacter cloacae, E. coli,  Klebsiella oxytoca, K. pneumoniae, Proteus sp.,  Serratia marcescens, Haemophilus influenzae,  Neisseria meningitidis, Pseudomonas aeruginosa, Candida  albicans, C. glabrata, C krusei, C parapsilosis,  C. tropicalis and the KPC resistance gene. (10-02 @ 14:52)  Culture Results:   Growth in aerobic and anaerobic bottles: Staphylococcus epidermidis  "Susceptibilities not performed" (10-02 @ 14:52)    Most recent blood culture -- 10-02 @ 14:52   Blood Culture PCR Blood Culture PCR .Blood Blood-Peripheral 10-02 @ 14:52      Physical Examination:  PULM: Few scattered basilar crackles; no wheeze  CVS: Regular rate and rhythm, no murmurs, rubs, or gallops  ABD: Soft, non-tender  EXT:  No clubbing, cyanosis, or edema    RADIOLOGY REVIEWED  CXR:    CT chest:    PROCEDURE DATE:  10/02/2020        INTERPRETATION:  EXAMINATION: CT ANGIO CHEST WITHOUT AND OR WITH IV CONTRAST    CLINICAL INDICATION: Dyspnea    TECHNIQUE: CTA of the chest was performed for evaluation of pulmonary embolism after administration of 90 ml of Omnipaque-350, 10 ml discarded.  MIP images were reconstructed.    COMPARISON: 10/27/2019.    FINDINGS:    PULMONARY ARTERIES: There is no pulmonary embolism    AIRWAYS AND LUNGS: The central tracheobronchial tree is patent.  Bilateral groundglass opacities and consolidations, predominantly peripherally    MEDIASTINUM AND PLEURA: There are no enlarged mediastinal, hilar or axillary lymph nodes. The visualized portion of the thyroid gland is unremarkable. There is no pleural effusion. There is no pneumothorax.    HEART AND VESSELS: There is mild cardiomegaly. There are atherosclerotic calcifications of the aorta and coronary arteries.  There is no pericardial effusion.    UPPER ABDOMEN: Images of the upper abdomen demonstrate no abnormality.    BONES AND SOFT TISSUES: Unchanged T12 vertebral plasty and L1 vertebral body severe compression deformity.  The soft tissues are unremarkable.    TUBES/LINES: None.    IMPRESSION:  No pulmonary embolism. Bilateral lung opacities may represent Covid-19.    TTE:

## 2020-10-15 NOTE — PROGRESS NOTE ADULT - PROBLEM SELECTOR PLAN 1
COVID-19 PCR positive. CTA chest consistent with COVID. Last CXR with progression on left side.   S/p 5 days of remdesivir. On lovenox.   Inflammatory markers stable/improved.   Improving oxygenation - on 3L NC, Pulmonary following, on Dexamethasone  Monitor pulse ox and cont supplemental O2 for O2 90-92%

## 2020-10-16 NOTE — PROGRESS NOTE ADULT - SUBJECTIVE AND OBJECTIVE BOX
Follow-up Pulm Progress Note  Gordon Fox MD  [phone]    Remains afebrile off antibiotics  RA sats at rest in 90's  Stable resp status    Vital Signs Last 24 Hrs  T(C): 36.8 (16 Oct 2020 11:41), Max: 37.1 (16 Oct 2020 05:13)  T(F): 98.2 (16 Oct 2020 11:41), Max: 98.7 (16 Oct 2020 05:13)  HR: 73 (16 Oct 2020 11:41) (66 - 80)  BP: 128/- (16 Oct 2020 11:41) (109/66 - 160/79)  BP(mean): --  RR: 18 (16 Oct 2020 13:19) (18 - 18)  SpO2: 95% (16 Oct 2020 13:19) (93% - 95%)                          11.7   13.75 )-----------( 382      ( 16 Oct 2020 05:55 )             34.3       10-16    136  |  102  |  31<H>  ----------------------------<  104<H>  4.5   |  25  |  0.30<L>    Ca    8.8      16 Oct 2020 05:55    TPro  5.7<L>  /  Alb  3.3  /  TBili  0.4  /  DBili  x   /  AST  18  /  ALT  14  /  AlkPhos  72  10-16        CULTURES:  Culture Results:   Growth in aerobic and anaerobic bottles: Staphylococcus epidermidis  Susceptibility to follow.  "Due to technical problems, Proteus sp. will Not be reported as part of  the BCID panel until further notice"  ***Blood Panel PCR results on this specimen are available  approximately 3 hours after the Gram stain result.***  Gram stain, PCR, and/or culture results may not always  correspond due to difference in methodologies.  ************************************************************  This PCR assay was performed using Guide.  The following targets are tested for: Enterococcus,  vancomycin resistant enterococci, Listeria monocytogenes,  coagulase negative staphylococci, S. aureus,  methicillin resistant S. aureus, Streptococcus agalactiae  (Group B), S. pneumoniae, S. pyogenes (Group A),  Acinetobacter baumannii, Enterobacter cloacae, E. coli,  Klebsiella oxytoca, K. pneumoniae, Proteus sp.,  Serratia marcescens, Haemophilus influenzae,  Neisseria meningitidis, Pseudomonas aeruginosa, Candida  albicans, C. glabrata, C krusei, C parapsilosis,  C. tropicalis and the KPC resistance gene. (10-02 @ 14:52)  Culture Results:   Growth in aerobic and anaerobic bottles: Staphylococcus epidermidis  "Susceptibilities not performed" (10-02 @ 14:52)    Most recent blood culture -- 10-02 @ 14:52   Blood Culture PCR Blood Culture PCR .Blood Blood-Peripheral 10-02 @ 14:52      Physical Examination:  PULM: Few scattered basilar crackles; no wheeze  CVS: Regular rate and rhythm, no murmurs, rubs, or gallops  ABD: Soft, non-tender  EXT:  No clubbing, cyanosis, or edema    RADIOLOGY REVIEWED  CXR:    CT chest:    PROCEDURE DATE:  10/02/2020        INTERPRETATION:  EXAMINATION: CT ANGIO CHEST WITHOUT AND OR WITH IV CONTRAST    CLINICAL INDICATION: Dyspnea    TECHNIQUE: CTA of the chest was performed for evaluation of pulmonary embolism after administration of 90 ml of Omnipaque-350, 10 ml discarded.  MIP images were reconstructed.    COMPARISON: 10/27/2019.    FINDINGS:    PULMONARY ARTERIES: There is no pulmonary embolism    AIRWAYS AND LUNGS: The central tracheobronchial tree is patent.  Bilateral groundglass opacities and consolidations, predominantly peripherally    MEDIASTINUM AND PLEURA: There are no enlarged mediastinal, hilar or axillary lymph nodes. The visualized portion of the thyroid gland is unremarkable. There is no pleural effusion. There is no pneumothorax.    HEART AND VESSELS: There is mild cardiomegaly. There are atherosclerotic calcifications of the aorta and coronary arteries.  There is no pericardial effusion.    UPPER ABDOMEN: Images of the upper abdomen demonstrate no abnormality.    BONES AND SOFT TISSUES: Unchanged T12 vertebral plasty and L1 vertebral body severe compression deformity.  The soft tissues are unremarkable.    TUBES/LINES: None.    IMPRESSION:  No pulmonary embolism. Bilateral lung opacities may represent Covid-19.    TTE:

## 2020-10-16 NOTE — PROGRESS NOTE ADULT - SUBJECTIVE AND OBJECTIVE BOX
[facility], Division of Infectious Diseases  ROSALINA Cali Y. Patel, S. Shah  [phone]  ([phone] - weekdays after 5pm and weekends)    Name: TARA ESPINAL  Age: 75y  Gender: Female  MRN: [id number]    Interval History:  Patient on 2L NC now saturation 94%, mild intermittent cough, resting comfortably.   Notes reviewed. Remains afebrile.     Objective:  Vitals:  T(F): 98.7 (10-16-20 @ 05:13), Max: 98.7 (10-16-20 @ 05:13)  HR: 66 (10-16-20 @ 05:13) (62 - 80)  BP: 160/79 (10-16-20 @ 05:13) (109/66 - 162/87)  RR: 18 (10-16-20 @ 05:13) (18 - 18)  SpO2: 94% (10-16-20 @ 05:13) (94% - 97%)    Physical Examination:  General: NAD, comfortable on NC 2L/min  HEENT: NC/AT, EOMI, anicteric, neck supple  Cardio: S1, S2 heard, regular rate   Resp: no respiratory distress  Neuro: AAOx3, no obvious focal deficits  Ext: no edema, moving all extremities  Skin: no visible rash  Psych: calm, cooperative  Lines: PIV    Laboratory Studies:  CBC:                       11.7   13.75 )-----------( 382      ( 16 Oct 2020 05:55 )             34.3     CMP: 10-16    136  |  102  |  31<H>  ----------------------------<  104<H>  4.5   |  25  |  0.30<L>    Ca    8.8      16 Oct 2020 05:55    TPro  5.7<L>  /  Alb  3.3  /  TBili  0.4  /  DBili  x   /  AST  18  /  ALT  14  /  AlkPhos  72  10-16    LIVER FUNCTIONS - ( 16 Oct 2020 05:55 )  Alb: 3.3 g/dL / Pro: 5.7 g/dL / ALK PHOS: 72 U/L / ALT: 14 U/L / AST: 18 U/L / GGT: x                   Ferritin 1652 < 1764 < 2645 < 3525 < 6426 < 5573 < 4710  CRP 0.45 < 1.15 < 1.39 < 0.74 < 2.00  D-Dimer 224 < 401< 334 < 426< 254 <294    Microbiology:  10/4 - blood cultures - negative   10/3 - COVID ab - negative   10/2 - blood cultures - 2/2 - CoNS- S. epidermidis - S to A/S, cefazolin, gent, oxacillin, tetracyclines, bactrim, vancomycin; R to clindamycin, erythromycin, penicillin  10/2 - COVID-19 PCR - positive    Radiology:  Xray Chest 1 View- PORTABLE-Routine (Xray Chest 1 View- PORTABLE-Routine .) (10.12.20 @ 12:49) >The heart is slightly enlarged. A left hiatal hernia is present. Diffuse airspace disease is seen in both lungs more pronounced on the left compatible with pneumonia. Degenerative changes of the thoracic spine and both shoulders. No pleural effusion. No pneumothorax.    Xray Chest 1 View- PORTABLE-Urgent (Xray Chest 1 View- PORTABLE-Urgent .) (10.05.20 @ 12:41) > Impression: The heart is normal insize. Poor inspiratory effort. Diffuse airspace opacities are seen from both lungs compatible with pneumonia status post seen in COVID 19., Correlate with CT scan that was performed on October 2, 2020.    CT Angio Chest w/ IV Cont (10.02.20 @ 13:30) > IMPRESSION: No pulmonary embolism. Bilateral lung opacities may represent Covid-19.    Xray Chest 1 View-PORTABLE IMMEDIATE (10.02.20 @ 11:26) > IMPRESSION: Mid left lung infiltrate.    Medications:  T(F): 98.4 (10-15-20 @ 11:35), Max: 98.4 (10-14-20 @ 21:50)  HR: 62 (10-15-20 @ 11:35) (62 - 69)  BP: 162/87 (10-15-20 @ 11:35) (129/74 - 162/87)  RR: 18 (10-15-20 @ 11:35) (16 - 18)  SpO2: 97% (10-15-20 @ 11:35) (90% - 97%)    Antimicrobials:  hydroxychloroquine 400 milliGRAM(s) Oral <User Schedule>  S/p cefazolin 10/6-10/13  S/p remdesivir 10/2-10/6  S/p vancomycin 10/2-10/6

## 2020-10-16 NOTE — CHART NOTE - NSCHARTNOTEFT_GEN_A_CORE
Nutrition Follow Up Note    Patient seen for malnutrition follow up    Source: Unable to conduct a face-to-face interview at this time due to limited contact restrictions related to pt's medical condition and isolation precautions. Attempted to reach pt via room phone with no answer, information obtained from staff and EMR.    Chart reviewed, events noted.     Diet : Diet, Regular:   Low Sodium  Supplement Feeding Modality:  Oral  Ensure Enlive Cans or Servings Per Day:  2       Frequency:  Daily (10-07-20 @ 15:11)    Patient with no documented N/V, last BM 10/14    PO intake: per flow sheets pt with poor PO intake. Per nursing notes family is requesting provision of Ensure supplements. Requiring feeding assistance, consuming 10-50% of meal trays.    Source for PO intake: EMR    Enteral/Parenteral Nutrition: n/a    No updated weights available to assess at this time, will continue to monitor    Pertinent Medications: MEDICATIONS  (STANDING):  aspirin  chewable 81 milliGRAM(s) Oral daily  benzonatate 100 milliGRAM(s) Oral three times a day  cholecalciferol 2000 Unit(s) Oral daily  dexAMETHasone     Tablet 6 milliGRAM(s) Oral daily  enoxaparin Injectable 40 milliGRAM(s) SubCutaneous daily  hydroxychloroquine 400 milliGRAM(s) Oral <User Schedule>  labetalol 200 milliGRAM(s) Oral two times a day  methimazole 5 milliGRAM(s) Oral daily  multivitamin 1 Tablet(s) Oral daily  nystatin Powder 1 Application(s) Topical two times a day  pantoprazole    Tablet 40 milliGRAM(s) Oral before breakfast  PARoxetine 10 milliGRAM(s) Oral daily  ursodiol Tablet 250 milliGRAM(s) Oral two times a day    MEDICATIONS  (PRN):  acetaminophen   Tablet .. 650 milliGRAM(s) Oral every 8 hours PRN Temp greater or equal to 38.5C (101.3F)  acetaminophen   Tablet .. 650 milliGRAM(s) Oral every 6 hours PRN Moderate Pain (4 - 6)  guaiFENesin   Syrup  (Sugar-Free) 200 milliGRAM(s) Oral every 6 hours PRN Cough  oxyCODONE    IR 10 milliGRAM(s) Oral every 6 hours PRN Severe Pain (7 - 10)    Pertinent Labs: 10-16 @ 05:55: Na 136, BUN 31<H>, Cr 0.30<L>, <H>, K+ 4.5, Phos --, Mg --, Alk Phos 72, ALT/SGPT 14, AST/SGOT 18, HbA1c --    Finger Sticks: n/a    Skin per nursing documentation: no noted pressure injuries  Edema per nursing documentation: none noted    Estimated Needs:   [x] no change since previous assessment  [ ] recalculated:     Previous Nutrition Diagnosis: Severe malnutrition  Nutrition Diagnosis is [x] ongoing    Care plan: [x] in progress   Being addressed with: liberalized diet, Ensure Enlive supplements    New Nutrition Diagnosis: n/a    Recommend  1.    Monitoring and Evaluation:     Continue to monitor nutritional intake, tolerance to diet prescription, weights, labs, skin integrity    RD remains available upon request and will follow up per protocol    Cat Mayberry RD, Pager # [phone] Nutrition Follow Up Note    Patient seen for malnutrition follow up    Source: Unable to conduct a face-to-face interview at this time due to limited contact restrictions related to pt's medical condition and isolation precautions. Attempted to reach pt via room phone with no answer, information obtained from staff and EMR.    Chart reviewed, events noted.     Diet : Diet, Regular:   Low Sodium  Supplement Feeding Modality:  Oral  Ensure Enlive Cans or Servings Per Day:  2       Frequency:  Daily (10-07-20 @ 15:11)    Patient with no documented N/V, last BM 10/14. Pt seen from outside room, sleeping.    PO intake: Per flow sheets pt with continued poor PO intake. Per nursing notes family is requesting provision of Ensure supplements. Requiring feeding assistance, consuming 10-50% of meal trays.    Discussed with covering RN, today pt ate some toast and oatmeal. RN states pt has regularly been drinking Ensure supplements with meals and with medications.    Source for PO intake: EMR    Enteral/Parenteral Nutrition: n/a    No updated weights available to assess at this time, will continue to monitor    Pertinent Medications: MEDICATIONS  (STANDING):  aspirin  chewable 81 milliGRAM(s) Oral daily  benzonatate 100 milliGRAM(s) Oral three times a day  cholecalciferol 2000 Unit(s) Oral daily  dexAMETHasone     Tablet 6 milliGRAM(s) Oral daily  enoxaparin Injectable 40 milliGRAM(s) SubCutaneous daily  hydroxychloroquine 400 milliGRAM(s) Oral <User Schedule>  labetalol 200 milliGRAM(s) Oral two times a day  methimazole 5 milliGRAM(s) Oral daily  multivitamin 1 Tablet(s) Oral daily  nystatin Powder 1 Application(s) Topical two times a day  pantoprazole    Tablet 40 milliGRAM(s) Oral before breakfast  PARoxetine 10 milliGRAM(s) Oral daily  ursodiol Tablet 250 milliGRAM(s) Oral two times a day    MEDICATIONS  (PRN):  acetaminophen   Tablet .. 650 milliGRAM(s) Oral every 8 hours PRN Temp greater or equal to 38.5C (101.3F)  acetaminophen   Tablet .. 650 milliGRAM(s) Oral every 6 hours PRN Moderate Pain (4 - 6)  guaiFENesin   Syrup  (Sugar-Free) 200 milliGRAM(s) Oral every 6 hours PRN Cough  oxyCODONE    IR 10 milliGRAM(s) Oral every 6 hours PRN Severe Pain (7 - 10)    Pertinent Labs: 10-16 @ 05:55: Na 136, BUN 31<H>, Cr 0.30<L>, <H>, K+ 4.5, Phos --, Mg --, Alk Phos 72, ALT/SGPT 14, AST/SGOT 18, HbA1c --    Finger Sticks: n/a    Skin per nursing documentation: no noted pressure injuries  Edema per nursing documentation: none noted    Estimated Needs:   [x] no change since previous assessment  [ ] recalculated:     Previous Nutrition Diagnosis: Severe malnutrition  Nutrition Diagnosis is [x] ongoing    Care plan: [x] in progress   Being addressed with: liberalized diet, Ensure Enlive supplements    New Nutrition Diagnosis: n/a    Recommend  1. Continue low sodium diet.  2. Continue Ensure Enlive 2x daily (700 german and 40 gm protein).  3. Continue multivitamin.  4. Continue to provide PO encouragement and assistance at meals PRN.    Monitoring and Evaluation:     Continue to monitor nutritional intake, tolerance to diet prescription, weights, labs, skin integrity    RD remains available upon request and will follow up per protocol    Cat Mayberry RD, Pager # [phone]

## 2020-10-16 NOTE — PROGRESS NOTE ADULT - SUBJECTIVE AND OBJECTIVE BOX
Patient is a 75y old  Female who presents with a chief complaint of COVID-19 PNA, acute respiratory failure (16 Oct 2020 17:13)      SUBJECTIVE / OVERNIGHT EVENTS:  No overnight events.  No cp/sob/n/v/d.  No HA/dizziness.  No abdominal pain.   O2 continues to improve         Vital Signs Last 24 Hrs  T(C): 36.8 (16 Oct 2020 11:41), Max: 37.1 (16 Oct 2020 05:13)  T(F): 98.2 (16 Oct 2020 11:41), Max: 98.7 (16 Oct 2020 05:13)  HR: 73 (16 Oct 2020 11:41) (66 - 73)  BP: 128/- (16 Oct 2020 11:41) (128/- - 160/79)  BP(mean): --  RR: 18 (16 Oct 2020 13:19) (18 - 18)  SpO2: 95% (16 Oct 2020 13:19) (93% - 95%)  I&O's Summary    15 Oct 2020 07:01  -  16 Oct 2020 07:00  --------------------------------------------------------  IN: 50 mL / OUT: 350 mL / NET: -300 mL    16 Oct 2020 07:01  -  16 Oct 2020 20:04  --------------------------------------------------------  IN: 250 mL / OUT: 300 mL / NET: -50 mL        PHYSICAL EXAM:  GENERAL: NAD, on 2-3 LNCO2, mild hard of hearing  HEAD:  Atraumatic, Normocephalic  EYES: EOMI, PERRLA, conjunctiva and sclera clear  NECK: Supple, No JVD  CHEST/LUNG: mild decrease breath sounds bilaterally; No wheeze   HEART: Regular rate and rhythm; No murmurs, rubs, or gallops  ABDOMEN: Soft, Nontender, Nondistended; Bowel sounds present  Neuro: AAO x 2-3, no focal deficit, 5/5 b/l extremities  EXTREMITIES:  2+ Peripheral Pulses, No clubbing, cyanosis, or edema  SKIN: No rashes or lesions      LABS:                        11.7   13.75 )-----------( 382      ( 16 Oct 2020 05:55 )             34.3     10-16    136  |  102  |  31<H>  ----------------------------<  104<H>  4.5   |  25  |  0.30<L>    Ca    8.8      16 Oct 2020 05:55    TPro  5.7<L>  /  Alb  3.3  /  TBili  0.4  /  DBili  x   /  AST  18  /  ALT  14  /  AlkPhos  72  10-16      CAPILLARY BLOOD GLUCOSE                RADIOLOGY & ADDITIONAL TESTS:    Imaging Personally Reviewed:  [x] YES  [ ] NO    Consultant(s) Notes Reviewed:  [x] YES  [ ] NO      MEDICATIONS  (STANDING):  aspirin  chewable 81 milliGRAM(s) Oral daily  benzonatate 100 milliGRAM(s) Oral three times a day  cholecalciferol 2000 Unit(s) Oral daily  dexAMETHasone     Tablet 4 milliGRAM(s) Oral daily  dexAMETHasone     Tablet   Oral   enoxaparin Injectable 40 milliGRAM(s) SubCutaneous daily  hydroxychloroquine 400 milliGRAM(s) Oral <User Schedule>  labetalol 200 milliGRAM(s) Oral two times a day  methimazole 5 milliGRAM(s) Oral daily  multivitamin 1 Tablet(s) Oral daily  nystatin Powder 1 Application(s) Topical two times a day  pantoprazole    Tablet 40 milliGRAM(s) Oral before breakfast  PARoxetine 10 milliGRAM(s) Oral daily  ursodiol Tablet 250 milliGRAM(s) Oral two times a day    MEDICATIONS  (PRN):  acetaminophen   Tablet .. 650 milliGRAM(s) Oral every 8 hours PRN Temp greater or equal to 38.5C (101.3F)  acetaminophen   Tablet .. 650 milliGRAM(s) Oral every 6 hours PRN Moderate Pain (4 - 6)  guaiFENesin   Syrup  (Sugar-Free) 200 milliGRAM(s) Oral every 6 hours PRN Cough  oxyCODONE    IR 10 milliGRAM(s) Oral every 6 hours PRN Severe Pain (7 - 10)      Care Discussed with Consultants/Other Providers [x] YES  [ ] NO

## 2020-10-16 NOTE — PROGRESS NOTE ADULT - PROBLEM SELECTOR PLAN 1
- maintain SpO2 > 92%, pt on 5LNC currently, consider Hi-Tulio if persistent desaturation below 90%  - avoid BiLevel given (+) COVID-19 status  - monitor inflammatory markers every 2-3 days  - she remains on decadron 6 mg PO daily which was extended owing to her persistently high O2 requirements, now on taper regimen  - continue to wean O2 as she tolerates  - wean 3L NC to 2L, monitoring O2 sats, overall improving

## 2020-10-16 NOTE — PROGRESS NOTE ADULT - ASSESSMENT
76 yo F w/ PMHx significant for recently diagnosed hyperthyroidism (3mo ago, on Methimazole), rheumatoid arthritis (on MTX weekly, Remicade M1bjmah, Hydroxychloroquine, and daily steroids w/ Prednisone 10mg daily), HTN (on multiple anti-hypertensive agents), s/p CVA w/o residual deficits, memory impairment, who presents from  after presenting from [facility] where she resides, w/ c/o nonproductive cough over the past few days, and SOB, along w/ NBNB vomiting 3-4days ago and NB diarrhea beginning today, found to be COVID-19 positive.

## 2020-10-16 NOTE — PROGRESS NOTE ADULT - PROBLEM SELECTOR PLAN 1
COVID-19 PCR positive. CTA chest consistent with COVID.   S/p 5 days of remdesivir. On lovenox.   Inflammatory markers stable/improved.   Oxygenation continues to improved, now on 2L NC.   Pulmonary following, on Dexamethasone  Continue to monitor clinically.

## 2020-10-16 NOTE — PROGRESS NOTE ADULT - ASSESSMENT
Patient is a 75 year old female with PMH of significant for recently diagnosed hyperthyroidism (3mo ago, on Methimazole), RA (on MTX weekly, Remicade B0isral, Hydroxychloroquine, and daily steroids w/ Prednisone 10mg daily), HTN, CVA, memory impairment who presented from [facility] with nonproductive cough and dyspnea with nausea and diarrhea and was found to be COVID-19 positive. Blood cultures positive for Staph epi in both sets, unclear if true or contaminant, treated as true bacteremia.

## 2020-10-16 NOTE — PROGRESS NOTE ADULT - ASSESSMENT
Acute hypoxemic resp failure due to multilobar viral pneumonia  COVID 19 viral infection  CNS bacteremia: unclear significance  RA on immunosupressive regimen  Inflammatory markers largely stable  10/5: Clinically stable, Mild tachypnea; howver increase in nasal O2 to 5 liters noted. Uptrending inflammatory markers noted  10/6:  CXR progressed vs. admission; still requires 5 liters nasal cannula with sat 91%; Ferritin decreased  10/7: Still requires 6 liters nasal oxygen; infl markers downtrending  ; no gross improvement in status  10/8: Clinically unchanged; Ferritin increasing; remains on 6 liters nasal cannula; c/o cogh  10/16: RA sats > 90% on RA at rest    REC    Taper decadron and DC: reduced to 4 mg today. Can decrease to 2 mg SUN  Check ambulatory RA sats  DC planning      Acute hypoxemic resp failure due to multilobar viral pneumonia  COVID 19 viral infection  CNS bacteremia: unclear significance  RA on immunosupressive regimen  Inflammatory markers largely stable  10/5: Clinically stable, Mild tachypnea; howver increase in nasal O2 to 5 liters noted. Uptrending inflammatory markers noted  10/6:  CXR progressed vs. admission; still requires 5 liters nasal cannula with sat 91%; Ferritin decreased  10/7: Still requires 6 liters nasal oxygen; infl markers downtrending  ; no gross improvement in status  10/8: Clinically unchanged; Ferritin increasing; remains on 6 liters nasal cannula; c/o cogh  10/16: RA sats > 90% on RA at rest    REC    Decadron taper ordered  Check ambulatory RA sats  DC planning

## 2020-10-17 NOTE — PROGRESS NOTE ADULT - SUBJECTIVE AND OBJECTIVE BOX
[facility], Division of Infectious Diseases  ROSALINA Cali Y. Patel, S. Shah  [phone]    Name: TARA ESPINAL  Age: 75y  Gender: Female  MRN: [id number]  Note Date: 10-17-20    Interval History:  Patient seen and examined at bedside. No acute overnight events.     Notes reviewed    Antibiotics:  hydroxychloroquine 400 milliGRAM(s) Oral <User Schedule>      Medications:  acetaminophen   Tablet .. 650 milliGRAM(s) Oral every 8 hours PRN  acetaminophen   Tablet .. 650 milliGRAM(s) Oral every 6 hours PRN  aspirin  chewable 81 milliGRAM(s) Oral daily  benzonatate 100 milliGRAM(s) Oral three times a day  cholecalciferol 2000 Unit(s) Oral daily  dexAMETHasone     Tablet 4 milliGRAM(s) Oral daily  dexAMETHasone     Tablet   Oral   enoxaparin Injectable 40 milliGRAM(s) SubCutaneous daily  guaiFENesin   Syrup  (Sugar-Free) 200 milliGRAM(s) Oral every 6 hours PRN  hydroxychloroquine 400 milliGRAM(s) Oral <User Schedule>  labetalol 200 milliGRAM(s) Oral two times a day  methimazole 5 milliGRAM(s) Oral daily  multivitamin 1 Tablet(s) Oral daily  nystatin Powder 1 Application(s) Topical two times a day  oxyCODONE    IR 10 milliGRAM(s) Oral every 6 hours PRN  pantoprazole    Tablet 40 milliGRAM(s) Oral before breakfast  PARoxetine 10 milliGRAM(s) Oral daily  ursodiol Tablet 250 milliGRAM(s) Oral two times a day      Review of Systems:  A 10-point review of systems was obtained.     Pertinent positives and negatives--  Constitutional: No fevers. No Chills. No Rigors.   Cardiovascular: No chest pain. No palpitations.  Respiratory: No shortness of breath. No cough.  Gastrointestinal: No nausea or vomiting. No diarrhea or constipation.   Psychiatric: Pleasant. Appropriate affect.    Review of systems otherwise negative except as previously noted.    Allergies: Actonel (Hives)  clonidine (Other)  crab meat (Unknown)    For details regarding the patient's past medical history, social history, family history, and other miscellaneous elements, please refer the initial infectious diseases consultation and/or the admitting history and physical examination for this admission.    Objective:  Vitals:   T(C): 36.9 (10-17-20 @ 16:35), Max: 36.9 (10-17-20 @ 05:27)  HR: 79 (10-17-20 @ 16:35) (65 - 79)  BP: 112/72 (10-17-20 @ 16:35) (95/60 - 144/81)  RR: 18 (10-17-20 @ 16:35) (18 - 18)  SpO2: 93% (10-17-20 @ 16:35) (92% - 94%)    Physical Examination:  General: no acute distress  HEENT: NC/AT, EOMI, anicteric, no oral lesions  Neck: supple, no palpable LAD  Cardio: S1, S2 heard, RRR, no murmurs  Resp: breath sounds heard bilaterally, no rales, wheezes or rhonchi  Abd: soft, NT, ND, + bowel sounds  Neuro: AAOx3, no obvious focal deficits  Ext: no edema or cyanosis  Skin: warm, dry, no visible rash  Lines:    Laboratory Studies:  CBC:                       11.7   13.75 )-----------( 382      ( 16 Oct 2020 05:55 )             34.3     CMP: 10-16    136  |  102  |  31<H>  ----------------------------<  104<H>  4.5   |  25  |  0.30<L>    Ca    8.8      16 Oct 2020 05:55    TPro  5.7<L>  /  Alb  3.3  /  TBili  0.4  /  DBili  x   /  AST  18  /  ALT  14  /  AlkPhos  72  10-16    LIVER FUNCTIONS - ( 16 Oct 2020 05:55 )  Alb: 3.3 g/dL / Pro: 5.7 g/dL / ALK PHOS: 72 U/L / ALT: 14 U/L / AST: 18 U/L / GGT: x             Microbiology:    Radiology:  ***       [facility], Division of Infectious Diseases  ROSALINA Cali Y. Patel, S. Shah  [phone]    Name: TARA ESPINAL  Age: 75y  Gender: Female  MRN: [id number]  Note Date: 10-17-20    Interval History:  No acute overnight events.   Notes reviewed    Antibiotics:  hydroxychloroquine 400 milliGRAM(s) Oral <User Schedule>      Medications:  acetaminophen   Tablet .. 650 milliGRAM(s) Oral every 8 hours PRN  acetaminophen   Tablet .. 650 milliGRAM(s) Oral every 6 hours PRN  aspirin  chewable 81 milliGRAM(s) Oral daily  benzonatate 100 milliGRAM(s) Oral three times a day  cholecalciferol 2000 Unit(s) Oral daily  dexAMETHasone     Tablet 4 milliGRAM(s) Oral daily  dexAMETHasone     Tablet   Oral   enoxaparin Injectable 40 milliGRAM(s) SubCutaneous daily  guaiFENesin   Syrup  (Sugar-Free) 200 milliGRAM(s) Oral every 6 hours PRN  hydroxychloroquine 400 milliGRAM(s) Oral <User Schedule>  labetalol 200 milliGRAM(s) Oral two times a day  methimazole 5 milliGRAM(s) Oral daily  multivitamin 1 Tablet(s) Oral daily  nystatin Powder 1 Application(s) Topical two times a day  oxyCODONE    IR 10 milliGRAM(s) Oral every 6 hours PRN  pantoprazole    Tablet 40 milliGRAM(s) Oral before breakfast  PARoxetine 10 milliGRAM(s) Oral daily  ursodiol Tablet 250 milliGRAM(s) Oral two times a day      Review of Systems:  A 10-point review of systems was obtained.     Pertinent positives and negatives--  Constitutional: No fevers. No Chills. No Rigors.   Cardiovascular: No chest pain. No palpitations.  Respiratory: No shortness of breath. No cough.  Gastrointestinal: No nausea or vomiting. No diarrhea or constipation.   Psychiatric: Pleasant. Appropriate affect.    Review of systems otherwise negative except as previously noted.    Allergies: Actonel (Hives)  clonidine (Other)  crab meat (Unknown)    For details regarding the patient's past medical history, social history, family history, and other miscellaneous elements, please refer the initial infectious diseases consultation and/or the admitting history and physical examination for this admission.    Objective:  Vitals:   T(C): 36.9 (10-17-20 @ 16:35), Max: 36.9 (10-17-20 @ 05:27)  HR: 79 (10-17-20 @ 16:35) (65 - 79)  BP: 112/72 (10-17-20 @ 16:35) (95/60 - 144/81)  RR: 18 (10-17-20 @ 16:35) (18 - 18)  SpO2: 93% (10-17-20 @ 16:35) (92% - 94%)    Physical Examination:  General: no acute distress  HEENT: NC/AT, EOMI, anicteric, no oral lesions  Neck: supple, no palpable LAD  Cardio: S1, S2 heard, RRR, no murmurs  Resp: breath sounds heard bilaterally, no rales, wheezes or rhonchi  Abd: soft, NT, ND, + bowel sounds  Neuro: AAOx3, no obvious focal deficits  Ext: no edema or cyanosis  Skin: warm, dry, no visible rash    Laboratory Studies:  CBC:                       11.7   13.75 )-----------( 382      ( 16 Oct 2020 05:55 )             34.3     CMP: 10-16    136  |  102  |  31<H>  ----------------------------<  104<H>  4.5   |  25  |  0.30<L>    Ca    8.8      16 Oct 2020 05:55    TPro  5.7<L>  /  Alb  3.3  /  TBili  0.4  /  DBili  x   /  AST  18  /  ALT  14  /  AlkPhos  72  10-16    LIVER FUNCTIONS - ( 16 Oct 2020 05:55 )  Alb: 3.3 g/dL / Pro: 5.7 g/dL / ALK PHOS: 72 U/L / ALT: 14 U/L / AST: 18 U/L / GGT: x             Microbiology:  reviewed    Radiology:  reviewed

## 2020-10-17 NOTE — PROGRESS NOTE ADULT - SUBJECTIVE AND OBJECTIVE BOX
Patient is a 75y old  Female who presents with a chief complaint of COVID-19 PNA, acute respiratory failure (17 Oct 2020 16:52)      SUBJECTIVE / OVERNIGHT EVENTS:  stable O2  episodes of agitation but overall stable, remained calm.   no cp, no sob, no n/v/d. no abdominal pain.  no headache, no dizziness.       Vital Signs Last 24 Hrs  T(C): 36.9 (17 Oct 2020 16:35), Max: 36.9 (17 Oct 2020 05:27)  T(F): 98.5 (17 Oct 2020 16:35), Max: 98.5 (17 Oct 2020 16:35)  HR: 79 (17 Oct 2020 16:35) (65 - 79)  BP: 112/72 (17 Oct 2020 16:35) (95/60 - 144/81)  BP(mean): --  RR: 18 (17 Oct 2020 16:35) (18 - 18)  SpO2: 93% (17 Oct 2020 16:35) (92% - 94%)  I&O's Summary    16 Oct 2020 07:01  -  17 Oct 2020 07:00  --------------------------------------------------------  IN: 250 mL / OUT: 300 mL / NET: -50 mL    17 Oct 2020 07:01  -  17 Oct 2020 21:16  --------------------------------------------------------  IN: 360 mL / OUT: 100 mL / NET: 260 mL      PHYSICAL EXAM:  GENERAL: NAD, on 2-3 LNCO2, mild hard of hearing  HEAD:  Atraumatic, Normocephalic  EYES: EOMI, PERRLA, conjunctiva and sclera clear  NECK: Supple, No JVD  CHEST/LUNG: mild decrease breath sounds bilaterally; No wheeze   HEART: Regular rate and rhythm; No murmurs, rubs, or gallops  ABDOMEN: Soft, Nontender, Nondistended; Bowel sounds present  Neuro: AAO x 2-3, no focal deficit, 5/5 b/l extremities  EXTREMITIES:  2+ Peripheral Pulses, No clubbing, cyanosis, or edema  SKIN: No rashes or lesions    LABS:                        11.7   13.75 )-----------( 382      ( 16 Oct 2020 05:55 )             34.3     10-16    136  |  102  |  31<H>  ----------------------------<  104<H>  4.5   |  25  |  0.30<L>    Ca    8.8      16 Oct 2020 05:55    TPro  5.7<L>  /  Alb  3.3  /  TBili  0.4  /  DBili  x   /  AST  18  /  ALT  14  /  AlkPhos  72  10-16      CAPILLARY BLOOD GLUCOSE                RADIOLOGY & ADDITIONAL TESTS:    Imaging Personally Reviewed:  [x] YES  [ ] NO    Consultant(s) Notes Reviewed:  [x] YES  [ ] NO      MEDICATIONS  (STANDING):  aspirin  chewable 81 milliGRAM(s) Oral daily  benzonatate 100 milliGRAM(s) Oral three times a day  cholecalciferol 2000 Unit(s) Oral daily  dexAMETHasone     Tablet 4 milliGRAM(s) Oral daily  dexAMETHasone     Tablet   Oral   enoxaparin Injectable 40 milliGRAM(s) SubCutaneous daily  hydroxychloroquine 400 milliGRAM(s) Oral <User Schedule>  labetalol 200 milliGRAM(s) Oral two times a day  methimazole 5 milliGRAM(s) Oral daily  multivitamin 1 Tablet(s) Oral daily  nystatin Powder 1 Application(s) Topical two times a day  pantoprazole    Tablet 40 milliGRAM(s) Oral before breakfast  PARoxetine 10 milliGRAM(s) Oral daily  ursodiol Tablet 250 milliGRAM(s) Oral two times a day    MEDICATIONS  (PRN):  acetaminophen   Tablet .. 650 milliGRAM(s) Oral every 8 hours PRN Temp greater or equal to 38.5C (101.3F)  acetaminophen   Tablet .. 650 milliGRAM(s) Oral every 6 hours PRN Moderate Pain (4 - 6)  guaiFENesin   Syrup  (Sugar-Free) 200 milliGRAM(s) Oral every 6 hours PRN Cough  oxyCODONE    IR 10 milliGRAM(s) Oral every 6 hours PRN Severe Pain (7 - 10)      Care Discussed with Consultants/Other Providers [x] YES  [ ] NO

## 2020-10-17 NOTE — PROGRESS NOTE ADULT - ASSESSMENT
74 yo F w/ PMHx significant for recently diagnosed hyperthyroidism (3mo ago, on Methimazole), rheumatoid arthritis (on MTX weekly, Remicade [id number], Hydroxychloroquine, and daily steroids w/ Prednisone 10mg daily), HTN (on multiple anti-hypertensive agents), s/p CVA w/o residual deficits, memory impairment, who presents from  after presenting from [facility] where she resides, w/ c/o nonproductive cough over the past few days, and SOB, along w/ NBNB vomiting 3-4days ago and NB diarrhea beginning today, found to be COVID-19 positive.

## 2020-10-17 NOTE — PROGRESS NOTE ADULT - ASSESSMENT
Patient is a 75 year old female with PMH of significant for recently diagnosed hyperthyroidism (3mo ago, on Methimazole), RA (on MTX weekly, Remicade L5gguud, Hydroxychloroquine, and daily steroids w/ Prednisone 10mg daily), HTN, CVA, memory impairment who presented from [facility] with nonproductive cough and dyspnea with nausea and diarrhea and was found to be COVID-19 positive. Blood cultures positive for Staph epi in both sets, unclear if true or contaminant, treated as true bacteremia. Pt is a 75W w/ PMHx hyperthyroidism (3mo ago, on Methimazole), RA (on MTX weekly, Remicade D6nvtfs, Hydroxychloroquine, and daily steroids w/ Prednisone 10mg daily), HTN, CVA, memory impairment who presented from [facility] with nonproductive cough and dyspnea with nausea and diarrhea and was found to be COVID-19 positive. Blood cultures positive for Staph epi in both sets, unclear if true or contaminant, treated as true bacteremia.

## 2020-10-18 NOTE — PROGRESS NOTE ADULT - ASSESSMENT
74 yo F w/ PMHx significant for recently diagnosed hyperthyroidism (3mo ago, on Methimazole), rheumatoid arthritis (on MTX weekly, Remicade E7coctm, Hydroxychloroquine, and daily steroids w/ Prednisone 10mg daily), HTN (on multiple anti-hypertensive agents), s/p CVA w/o residual deficits, memory impairment, who presents from  after presenting from [facility] where she resides, w/ c/o nonproductive cough over the past few days, and SOB, along w/ NBNB vomiting 3-4days ago and NB diarrhea beginning today, found to be COVID-19 positive.

## 2020-10-18 NOTE — PROGRESS NOTE ADULT - SUBJECTIVE AND OBJECTIVE BOX
Patient is a 75y old  Female who presents with a chief complaint of COVID-19 PNA, acute respiratory failure (17 Oct 2020 16:52)      SUBJECTIVE / OVERNIGHT EVENTS:  stable O2  comfortable  no cp, no sob, no n/v/d. no abdominal pain.  no headache, no dizziness.       Vital Signs Last 24 Hrs  T(C): 36.9 (17 Oct 2020 16:35), Max: 36.9 (17 Oct 2020 05:27)  T(F): 98.5 (17 Oct 2020 16:35), Max: 98.5 (17 Oct 2020 16:35)  HR: 79 (17 Oct 2020 16:35) (65 - 79)  BP: 112/72 (17 Oct 2020 16:35) (95/60 - 144/81)  BP(mean): --  RR: 18 (17 Oct 2020 16:35) (18 - 18)  SpO2: 93% (17 Oct 2020 16:35) (92% - 94%)  I&O's Summary    16 Oct 2020 07:01  -  17 Oct 2020 07:00  --------------------------------------------------------  IN: 250 mL / OUT: 300 mL / NET: -50 mL    17 Oct 2020 07:01  -  17 Oct 2020 21:16  --------------------------------------------------------  IN: 360 mL / OUT: 100 mL / NET: 260 mL      PHYSICAL EXAM:  GENERAL: NAD, on 2-3 LNCO2, mild hard of hearing  HEAD:  Atraumatic, Normocephalic  EYES: EOMI, PERRLA, conjunctiva and sclera clear  NECK: Supple, No JVD  CHEST/LUNG: mild decrease breath sounds bilaterally; No wheeze   HEART: Regular rate and rhythm; No murmurs, rubs, or gallops  ABDOMEN: Soft, Nontender, Nondistended; Bowel sounds present  Neuro: AAO x 2-3, no focal deficit, 5/5 b/l extremities  EXTREMITIES:  2+ Peripheral Pulses, No clubbing, cyanosis, or edema  SKIN: No rashes or lesions    LABS:                        11.7   13.75 )-----------( 382      ( 16 Oct 2020 05:55 )             34.3     10-16    136  |  102  |  31<H>  ----------------------------<  104<H>  4.5   |  25  |  0.30<L>    Ca    8.8      16 Oct 2020 05:55    TPro  5.7<L>  /  Alb  3.3  /  TBili  0.4  /  DBili  x   /  AST  18  /  ALT  14  /  AlkPhos  72  10-16      CAPILLARY BLOOD GLUCOSE                RADIOLOGY & ADDITIONAL TESTS:    Imaging Personally Reviewed:  [x] YES  [ ] NO    Consultant(s) Notes Reviewed:  [x] YES  [ ] NO      MEDICATIONS  (STANDING):  aspirin  chewable 81 milliGRAM(s) Oral daily  benzonatate 100 milliGRAM(s) Oral three times a day  cholecalciferol 2000 Unit(s) Oral daily  dexAMETHasone     Tablet 4 milliGRAM(s) Oral daily  dexAMETHasone     Tablet   Oral   enoxaparin Injectable 40 milliGRAM(s) SubCutaneous daily  hydroxychloroquine 400 milliGRAM(s) Oral <User Schedule>  labetalol 200 milliGRAM(s) Oral two times a day  methimazole 5 milliGRAM(s) Oral daily  multivitamin 1 Tablet(s) Oral daily  nystatin Powder 1 Application(s) Topical two times a day  pantoprazole    Tablet 40 milliGRAM(s) Oral before breakfast  PARoxetine 10 milliGRAM(s) Oral daily  ursodiol Tablet 250 milliGRAM(s) Oral two times a day    MEDICATIONS  (PRN):  acetaminophen   Tablet .. 650 milliGRAM(s) Oral every 8 hours PRN Temp greater or equal to 38.5C (101.3F)  acetaminophen   Tablet .. 650 milliGRAM(s) Oral every 6 hours PRN Moderate Pain (4 - 6)  guaiFENesin   Syrup  (Sugar-Free) 200 milliGRAM(s) Oral every 6 hours PRN Cough  oxyCODONE    IR 10 milliGRAM(s) Oral every 6 hours PRN Severe Pain (7 - 10)      Care Discussed with Consultants/Other Providers [x] YES  [ ] NO

## 2020-10-18 NOTE — PROGRESS NOTE ADULT - ASSESSMENT
Pt is a 75W w/ PMHx hyperthyroidism (3mo ago, on Methimazole), RA (on MTX weekly, Remicade [id number], Hydroxychloroquine, and daily steroids w/ Prednisone 10mg daily), HTN, CVA, memory impairment who presented from [facility] with nonproductive cough and dyspnea with nausea and diarrhea and was found to be COVID-19 positive. Blood cultures positive for Staph epi in both sets, unclear if true or contaminant, treated as true bacteremia.

## 2020-10-18 NOTE — PROGRESS NOTE ADULT - SUBJECTIVE AND OBJECTIVE BOX
[facility], Division of Infectious Diseases  ROSALINA Cali Y. Patel, S. Shah  [phone]    Name: TARA ESPINAL  Age: 75y  Gender: Female  MRN: [id number]  Note Date: 10-18-20    Interval History:  No acute overnight events.   Notes reviewed    Antibiotics:  hydroxychloroquine 400 milliGRAM(s) Oral <User Schedule>      Medications:  acetaminophen   Tablet .. 650 milliGRAM(s) Oral every 8 hours PRN  acetaminophen   Tablet .. 650 milliGRAM(s) Oral every 6 hours PRN  aspirin  chewable 81 milliGRAM(s) Oral daily  benzonatate 100 milliGRAM(s) Oral three times a day  cholecalciferol 2000 Unit(s) Oral daily  dexAMETHasone     Tablet 4 milliGRAM(s) Oral daily  dexAMETHasone     Tablet   Oral   enoxaparin Injectable 40 milliGRAM(s) SubCutaneous daily  guaiFENesin   Syrup  (Sugar-Free) 200 milliGRAM(s) Oral every 6 hours PRN  hydroxychloroquine 400 milliGRAM(s) Oral <User Schedule>  labetalol 200 milliGRAM(s) Oral two times a day  methimazole 5 milliGRAM(s) Oral daily  multivitamin 1 Tablet(s) Oral daily  nystatin Powder 1 Application(s) Topical two times a day  oxyCODONE    IR 10 milliGRAM(s) Oral every 6 hours PRN  pantoprazole    Tablet 40 milliGRAM(s) Oral before breakfast  PARoxetine 10 milliGRAM(s) Oral daily  ursodiol Tablet 250 milliGRAM(s) Oral two times a day      Review of Systems:  A 10-point review of systems was obtained.     Pertinent positives and negatives--  Constitutional: No fevers. No Chills. No Rigors.   Cardiovascular: No chest pain. No palpitations.  Respiratory: No shortness of breath. No cough.  Gastrointestinal: No nausea or vomiting. No diarrhea or constipation.   Psychiatric: Pleasant. Appropriate affect.    Review of systems otherwise negative except as previously noted.    Allergies: Actonel (Hives)  clonidine (Other)  crab meat (Unknown)    For details regarding the patient's past medical history, social history, family history, and other miscellaneous elements, please refer the initial infectious diseases consultation and/or the admitting history and physical examination for this admission.    Objective:  VS reviewed    Physical Examination:  General: no acute distress  HEENT: NC/AT, EOMI, anicteric, no oral lesions  Neck: supple, no palpable LAD  Cardio: S1, S2 heard, RRR, no murmurs  Resp: breath sounds heard bilaterally, no rales, wheezes or rhonchi  Abd: soft, NT, ND, + bowel sounds  Neuro: AAOx3, no obvious focal deficits  Ext: no edema or cyanosis  Skin: warm, dry, no visible rash    Laboratory Studies:  CBC:                       11.7   13.75 )-----------( 382      ( 16 Oct 2020 05:55 )             34.3     CMP: 10-16    136  |  102  |  31<H>  ----------------------------<  104<H>  4.5   |  25  |  0.30<L>    Ca    8.8      16 Oct 2020 05:55    TPro  5.7<L>  /  Alb  3.3  /  TBili  0.4  /  DBili  x   /  AST  18  /  ALT  14  /  AlkPhos  72  10-16    LIVER FUNCTIONS - ( 16 Oct 2020 05:55 )  Alb: 3.3 g/dL / Pro: 5.7 g/dL / ALK PHOS: 72 U/L / ALT: 14 U/L / AST: 18 U/L / GGT: x             Microbiology:  reviewed    Radiology:  reviewed

## 2020-10-19 NOTE — PROGRESS NOTE ADULT - SUBJECTIVE AND OBJECTIVE BOX
[facility], Division of Infectious Diseases  ROSALINA Cali Y. Patel, S. Shah  [phone]  ([phone] - weekdays after 5pm and weekends)    Name: TARA ESPINAL  Age: 75y  Gender: Female  MRN: [id number]    Interval History:  Patient on 2L NC now saturation 94-96%, resting comfortably.   Notes reviewed. Remains afebrile.     Objective:  Vitals:  T(F): 98.3 (10-19-20 @ 05:20), Max: 98.3 (10-18-20 @ 19:57)  HR: 79 (10-19-20 @ 05:20) (69 - 79)  BP: 135/81 (10-19-20 @ 05:20) (113/75 - 135/81)  RR: 18 (10-19-20 @ 05:20) (18 - 18)  SpO2: 94% (10-19-20 @ 05:20) (94% - 96%)    Physical Examination:  General: NAD, comfortable on NC 2L/min  HEENT: NC/AT, EOMI, anicteric, neck supple  Cardio: S1, S2 heard, regular rate   Resp: no respiratory distress  Neuro: AAOx3, no obvious focal deficits  Ext: no edema, moving all extremities  Skin: no visible rash  Psych: calm, cooperative  Lines: PIV    Laboratory Studies:  No new labs today.   Ferritin 1652 < 1764 < 2645 < 3525 < 6426 < 5573 < 4710  CRP 0.45 < 1.15 < 1.39 < 0.74 < 2.00  D-Dimer 224 < 401< 334 < 426< 254 <294    Microbiology:  10/14, 10/17 - COVID-19 PCR - positive  10/4 - blood cultures - negative   10/3 - COVID-19 ab - negative   10/2 - blood cultures - 2/2 - CoNS- S. epidermidis - S to A/S, cefazolin, gent, oxacillin, tetracyclines, bactrim, vancomycin; R to clindamycin, erythromycin, penicillin  10/2 - COVID-19 PCR - positive    Radiology:  Xray Chest 1 View- PORTABLE-Routine (Xray Chest 1 View- PORTABLE-Routine .) (10.12.20 @ 12:49) >The heart is slightly enlarged. A left hiatal hernia is present. Diffuse airspace disease is seen in both lungs more pronounced on the left compatible with pneumonia. Degenerative changes of the thoracic spine and both shoulders. No pleural effusion. No pneumothorax.    Xray Chest 1 View- PORTABLE-Urgent (Xray Chest 1 View- PORTABLE-Urgent .) (10.05.20 @ 12:41) > Impression: The heart is normal insize. Poor inspiratory effort. Diffuse airspace opacities are seen from both lungs compatible with pneumonia status post seen in COVID 19., Correlate with CT scan that was performed on October 2, 2020.    CT Angio Chest w/ IV Cont (10.02.20 @ 13:30) > IMPRESSION: No pulmonary embolism. Bilateral lung opacities may represent Covid-19.    Xray Chest 1 View-PORTABLE IMMEDIATE (10.02.20 @ 11:26) > IMPRESSION: Mid left lung infiltrate.    Medications:  Home Medications:  amLODIPine 10 mg oral tablet: 1 tab(s) orally once a day (02 Oct 2020 16:43)  aspirin 81 mg oral tablet, chewable: 1 tab(s) orally once a day (02 Oct 2020 16:43)  hydroxychloroquine 200 mg oral tablet: 2 tab(s) orally once a day(LUNCH) (02 Oct 2020 16:43)  labetalol 100 mg oral tablet: 2 tab(s) orally 2 times a day (02 Oct 2020 16:43)  lisinopril 10 mg oral tablet: 1 tab(s) orally once a day (02 Oct 2020 16:43)  methIMAzole 5 mg oral tablet: 1 tab(s) orally once a day (02 Oct 2020 16:43)  methotrexate 2.5 mg oral tablet: 5 tab(s) orally once a week on Saturdays (02 Oct 2020 16:43)  omeprazole 20 mg oral delayed release capsule: 1 cap(s) orally 2 times a day (02 Oct 2020 16:43)  oxyCODONE 10 mg oral tablet: 1 tab(s) orally every 6 hours, As Needed  (02 Oct 2020 16:43)  Paxil 10 mg oral tablet: 1 tab(s) orally once a day (02 Oct 2020 16:43)  predniSONE 5 mg oral tablet: 2 tab(s) orally once a day (02 Oct 2020 16:43)  Remicade 100 mg intravenous injection: 450 milliliter(s) intravenous every 6 weeks (02 Oct 2020 16:43)  Tekturna 150 mg oral tablet: 1 tab(s) orally once a day (02 Oct 2020 16:43)  ursodiol 250 mg oral tablet: 1 tab(s) orally 2 times a day (02 Oct 2020 16:43)  Vitamin B Complex oral tablet: 1 tab(s) orally once a day (02 Oct 2020 16:43)  Vitamin D3 2000 intl units oral tablet: 1 tab(s) orally once a day (02 Oct 2020 16:43)    Antimicrobials:  hydroxychloroquine 400 milliGRAM(s) Oral <User Schedule>  S/p cefazolin 10/6-10/13  S/p remdesivir 10/2-10/6  S/p vancomycin 10/2-10/6

## 2020-10-19 NOTE — PROGRESS NOTE ADULT - PROBLEM SELECTOR PLAN 1
COVID-19 PCR positive. CTA chest consistent with COVID.   S/p 5 days of remdesivir. On lovenox.   Inflammatory markers stable/improved.   Oxygenation continues to improved, remains on 2L NC.   Pulmonary following, on Dexamethasone taper  Continue to monitor clinically.  COVID-19 PCR testing remains positive on 10/17, pending negative result to go back to assisted living facility.

## 2020-10-19 NOTE — SWALLOW BEDSIDE ASSESSMENT ADULT - SWALLOW EVAL: DIAGNOSIS
76 yo F admitted with acute hypoxemic resp failure due to multilobar viral pneumonia. Pt with congested breath sounds and wet vocal quality at baseline without improvement given PO trials. Pt presents with an oropharyngeal dysphagia marked by impaired acceptance with reduced labial stripping from utensil and intermittent anterior escape, prolonged mastication for soft and hard solids, intermittent audible swallow suggestive of reduced coordination, and overt s/s penetration/aspiration via persisting wet vocal quality across trials as well as delayed cough for thin liquids as wash post intake hard solids.

## 2020-10-19 NOTE — PROGRESS NOTE ADULT - SUBJECTIVE AND OBJECTIVE BOX
Follow-up Pulm Progress Note  Gordon Fox MD  [phone]    RA sat 94% today  Stable cardio-resp status  On Decadron taper    Vital Signs Last 24 Hrs  T(C): 37.3 (19 Oct 2020 10:55), Max: 37.3 (19 Oct 2020 10:55)  T(F): 99.2 (19 Oct 2020 10:55), Max: 99.2 (19 Oct 2020 10:55)  HR: 80 (19 Oct 2020 10:55) (69 - 80)  BP: 115/73 (19 Oct 2020 10:55) (113/75 - 135/81)  BP(mean): --  RR: 18 (19 Oct 2020 10:55) (18 - 18)  SpO2: 94% (19 Oct 2020 10:55) (94% - 96%)    CULTURES:  Culture Results:   Growth in aerobic and anaerobic bottles: Staphylococcus epidermidis  Susceptibility to follow.  "Due to technical problems, Proteus sp. will Not be reported as part of  the BCID panel until further notice"  ***Blood Panel PCR results on this specimen are available  approximately 3 hours after the Gram stain result.***  Gram stain, PCR, and/or culture results may not always  correspond due to difference in methodologies.  ************************************************************  This PCR assay was performed using [employer].  The following targets are tested for: Enterococcus,  vancomycin resistant enterococci, Listeria monocytogenes,  coagulase negative staphylococci, S. aureus,  methicillin resistant S. aureus, Streptococcus agalactiae  (Group B), S. pneumoniae, S. pyogenes (Group A),  Acinetobacter baumannii, Enterobacter cloacae, E. coli,  Klebsiella oxytoca, K. pneumoniae, Proteus sp.,  Serratia marcescens, Haemophilus influenzae,  Neisseria meningitidis, Pseudomonas aeruginosa, Candida  albicans, C. glabrata, C krusei, C parapsilosis,  C. tropicalis and the KPC resistance gene. (10-02 @ 14:52)  Culture Results:   Growth in aerobic and anaerobic bottles: Staphylococcus epidermidis  "Susceptibilities not performed" (10-02 @ 14:52)    Most recent blood culture -- 10-02 @ 14:52   Blood Culture PCR Blood Culture PCR .Blood Blood-Peripheral 10-02 @ 14:52      Physical Examination:  PULM: Few scattered basilar crackles; no wheeze  CVS: Regular rate and rhythm, no murmurs, rubs, or gallops  ABD: Soft, non-tender  EXT:  No clubbing, cyanosis, or edema    RADIOLOGY REVIEWED  CXR:    CT chest:    PROCEDURE DATE:  10/02/2020        INTERPRETATION:  EXAMINATION: CT ANGIO CHEST WITHOUT AND OR WITH IV CONTRAST    CLINICAL INDICATION: Dyspnea    TECHNIQUE: CTA of the chest was performed for evaluation of pulmonary embolism after administration of 90 ml of Omnipaque-350, 10 ml discarded.  MIP images were reconstructed.    COMPARISON: 10/27/2019.    FINDINGS:    PULMONARY ARTERIES: There is no pulmonary embolism    AIRWAYS AND LUNGS: The central tracheobronchial tree is patent.  Bilateral groundglass opacities and consolidations, predominantly peripherally    MEDIASTINUM AND PLEURA: There are no enlarged mediastinal, hilar or axillary lymph nodes. The visualized portion of the thyroid gland is unremarkable. There is no pleural effusion. There is no pneumothorax.    HEART AND VESSELS: There is mild cardiomegaly. There are atherosclerotic calcifications of the aorta and coronary arteries.  There is no pericardial effusion.    UPPER ABDOMEN: Images of the upper abdomen demonstrate no abnormality.    BONES AND SOFT TISSUES: Unchanged T12 vertebral plasty and L1 vertebral body severe compression deformity.  The soft tissues are unremarkable.    TUBES/LINES: None.    IMPRESSION:  No pulmonary embolism. Bilateral lung opacities may represent Covid-19.    TTE:

## 2020-10-19 NOTE — PROGRESS NOTE ADULT - ASSESSMENT
74 yo F w/ PMHx significant for recently diagnosed hyperthyroidism (3mo ago, on Methimazole), rheumatoid arthritis (on MTX weekly, Remicade [id number], Hydroxychloroquine, and daily steroids w/ Prednisone 10mg daily), HTN (on multiple anti-hypertensive agents), s/p CVA w/o residual deficits, memory impairment, who presents from  after presenting from [facility] where she resides, w/ c/o nonproductive cough over the past few days, and SOB, along w/ NBNB vomiting 3-4days ago and NB diarrhea beginning today, found to be COVID-19 positive.

## 2020-10-19 NOTE — SWALLOW BEDSIDE ASSESSMENT ADULT - PHARYNGEAL PHASE
Wet vocal quality post oral intake/Delayed pharyngeal swallow Delayed pharyngeal swallow/Wet vocal quality post oral intake Delayed pharyngeal swallow/Delayed throat clear post oral intake Delayed cough post oral intake/Delayed pharyngeal swallow/Wet vocal quality post oral intake/Delayed throat clear post oral intake

## 2020-10-19 NOTE — PROGRESS NOTE ADULT - PROBLEM SELECTOR PLAN 1
- maintain SpO2 > 92%, pt on 5LNC currently, consider Hi-Tulio if persistent desaturation below 90%  - avoid BiLevel given (+) COVID-19 status  - monitor inflammatory markers every 2-3 days  - she remains on decadron 6 mg PO daily which was extended owing to her persistently high O2 requirements, now on taper regimen  - continue to wean O2 as she tolerates  - wean 3L NC to 2L, monitoring O2 sats, overall improving  - RN noted possible gurgling when swallowing pills  - NPO, speech consulted  - repeat CXR stable

## 2020-10-19 NOTE — PROVIDER CONTACT NOTE (OTHER) - ASSESSMENT
AAOx4. VSS. Patient with crackles on sounds. AAOx4. VSS. Patient with crackles on auscultation. Newly unable to tolerate oral medications. Medications being held due to gargling and coughing.

## 2020-10-19 NOTE — PROGRESS NOTE ADULT - SUBJECTIVE AND OBJECTIVE BOX
Patient is a 75y old  Female who presents with a chief complaint of COVID-19 PNA, acute respiratory failure (19 Oct 2020 11:42)      SUBJECTIVE / OVERNIGHT EVENTS:  RN noted possible gurgling when swallowing pills  NPO, speech consulted  repeat CXR stable  pt feels okay  wants food   no cp, no sob, no n/v/d. no abdominal pain.  no headache, no dizziness.       Vital Signs Last 24 Hrs  T(C): 37.1 (19 Oct 2020 20:31), Max: 37.3 (19 Oct 2020 10:55)  T(F): 98.7 (19 Oct 2020 20:31), Max: 99.2 (19 Oct 2020 10:55)  HR: 66 (19 Oct 2020 20:31) (66 - 80)  BP: 132/85 (19 Oct 2020 20:31) (115/73 - 135/81)  BP(mean): --  RR: 18 (19 Oct 2020 20:31) (18 - 18)  SpO2: 94% (19 Oct 2020 20:31) (94% - 94%)  I&O's Summary    18 Oct 2020 07:01  -  19 Oct 2020 07:00  --------------------------------------------------------  IN: 240 mL / OUT: 600 mL / NET: -360 mL    19 Oct 2020 07:01  -  19 Oct 2020 21:37  --------------------------------------------------------  IN: 240 mL / OUT: 450 mL / NET: -210 mL        PHYSICAL EXAM:  GENERAL: NAD, on 2-3 LNCO2, mild hard of hearing  HEAD:  Atraumatic, Normocephalic  EYES: EOMI, PERRLA, conjunctiva and sclera clear  NECK: Supple, No JVD  CHEST/LUNG: mild decrease breath sounds bilaterally; No wheeze   HEART: Regular rate and rhythm; No murmurs, rubs, or gallops  ABDOMEN: Soft, Nontender, Nondistended; Bowel sounds present  Neuro: AAO x 2-3, no focal deficit, 5/5 b/l extremities  EXTREMITIES:  2+ Peripheral Pulses, No clubbing, cyanosis, or edema  SKIN: No rashes or lesions      LABS:                        12.6   15.55 )-----------( 302      ( 19 Oct 2020 14:23 )             37.4     10-19    138  |  104  |  36<H>  ----------------------------<  141<H>  4.4   |  24  |  0.34<L>    Ca    9.0      19 Oct 2020 14:23        CAPILLARY BLOOD GLUCOSE                RADIOLOGY & ADDITIONAL TESTS:    Imaging Personally Reviewed:  [x] YES  [ ] NO    Consultant(s) Notes Reviewed:  [x] YES  [ ] NO      MEDICATIONS  (STANDING):  aspirin  chewable 81 milliGRAM(s) Oral daily  benzonatate 100 milliGRAM(s) Oral three times a day  cholecalciferol 2000 Unit(s) Oral daily  dexAMETHasone     Tablet   Oral   dexAMETHasone     Tablet 2 milliGRAM(s) Oral daily  enoxaparin Injectable 40 milliGRAM(s) SubCutaneous daily  hydroxychloroquine 400 milliGRAM(s) Oral <User Schedule>  labetalol 200 milliGRAM(s) Oral two times a day  methimazole 5 milliGRAM(s) Oral daily  multivitamin 1 Tablet(s) Oral daily  nystatin Powder 1 Application(s) Topical two times a day  pantoprazole    Tablet 40 milliGRAM(s) Oral before breakfast  PARoxetine 10 milliGRAM(s) Oral daily  sodium chloride 0.9%. 1000 milliLiter(s) (50 mL/Hr) IV Continuous <Continuous>  ursodiol Tablet 250 milliGRAM(s) Oral two times a day    MEDICATIONS  (PRN):  acetaminophen   Tablet .. 650 milliGRAM(s) Oral every 8 hours PRN Temp greater or equal to 38.5C (101.3F)  acetaminophen   Tablet .. 650 milliGRAM(s) Oral every 6 hours PRN Moderate Pain (4 - 6)  guaiFENesin   Syrup  (Sugar-Free) 200 milliGRAM(s) Oral every 6 hours PRN Cough  oxyCODONE    IR 10 milliGRAM(s) Oral every 6 hours PRN Severe Pain (7 - 10)      Care Discussed with Consultants/Other Providers [x] YES  [ ] NO

## 2020-10-19 NOTE — SWALLOW BEDSIDE ASSESSMENT ADULT - SLP PERTINENT HISTORY OF CURRENT PROBLEM
74 yo F w/ PMHx significant for recently diagnosed hyperthyroidism (3mo ago, on Methimazole), rheumatoid arthritis (on MTX weekly, Remicade [id number], Hydroxychloroquine, and daily steroids w/ Prednisone 10mg daily), HTN (on multiple anti-hypertensive agents), s/p CVA w/o residual deficits, memory impairment, who presents from  after presenting from [facility] where she resides, w/ c/o nonproductive cough over the past few days, and SOB, along w/ NBNB vomiting 3-4days ago and NB diarrhea beginning today, found to be COVID-19 positive.

## 2020-10-19 NOTE — PROGRESS NOTE ADULT - ASSESSMENT
Acute hypoxemic resp failure due to multilobar viral pneumonia  COVID 19 viral infection  CNS bacteremia: unclear significance  RA on immunosupressive regimen  Inflammatory markers largely stable  10/5: Clinically stable, Mild tachypnea; howver increase in nasal O2 to 5 liters noted. Uptrending inflammatory markers noted  10/6:  CXR progressed vs. admission; still requires 5 liters nasal cannula with sat 91%; Ferritin decreased  10/7: Still requires 6 liters nasal oxygen; infl markers downtrending  ; no gross improvement in status  10/8: Clinically unchanged; Ferritin increasing; remains on 6 liters nasal cannula; c/o cogh  10/16: RA sats > 90% on RA at rest  10/19: RA sat 94% today. ON decadron taper    REC    Complete decadron taper   DC planning

## 2020-10-19 NOTE — PROVIDER CONTACT NOTE (OTHER) - ASSESSMENT
Pt have difficulty swallowing this AM was previously swallowing water and pills whole with water with no problem, this morning, began coughing after drinking water, was able to tolerate one bite of applesauce with crushed medications, unable to swallow the rest, unsure how much of am meds pt recvd, protonix unable to be crushed so not given. low urinary output overnight.

## 2020-10-19 NOTE — SWALLOW BEDSIDE ASSESSMENT ADULT - COMMENTS
Per Pulmonology note 10/19, "Acute hypoxemic resp failure due to multilobar viral pneumonia  COVID 19 viral infection. CNS bacteremia: unclear significance. RA on immunosupressive regimen. Inflammatory markers largely stable  10/5: Clinically stable, Mild tachypnea; howver increase in nasal O2 to 5 liters noted. Uptrending inflammatory markers noted  10/6:  CXR progressed vs. admission; still requires 5 liters nasal cannula with sat 91%; Ferritin decreased  10/7: Still requires 6 liters nasal oxygen; infl markers downtrending  ; no gross improvement in status  10/8: Clinically unchanged; Ferritin increasing; remains on 6 liters nasal cannula; c/o cogh  10/16: RA sats > 90% on RA at rest  10/19: RA sat 94% today. ON decadron taper    REC  Complete decadron taper   DC planning"     CXR 10/12 reports "A left hiatal hernia is present. Diffuse airspace disease is seen in both lungs more pronounced on the left compatible with pneumonia. Degenerative changes of the thoracic spine and both shoulders. No pleural effusion. No pneumothorax."    Pt known to this service. Per bedside speech language evaluation 07/30/16, pt was admitted with evidence of a mild-moderate global aphasia. MRI with scattered punctate infarcts. Strong clinical suspicion for cardioembolic source. CT head with no acute changes. Symptoms likely 2/2 left-sided CVA of unknown etiology. "Pt presents with a fluent aphasia with deficits more severe in English (L3) than Cantonese (L1). Pt with deficits in complex command following, complex yes/no questions, body part identification, more complex automatic sequences (days of the week vs counting), word finding, and repetition of phrases and sentences. Evaluation terminated at patient's request 2/2 frustration and "tiredness."  Phonemic cues did not improve accuracy and pt appeared unaware of errors until addressed by clinician. Pt speaks English, Cantonese, and Mandarin. Reports L1 as Cantonese. Reports deficits worse in English."

## 2020-10-19 NOTE — SWALLOW BEDSIDE ASSESSMENT ADULT - ORAL PREPARATORY PHASE
Reduced oral grading Within functional limits/Decreased mastication ability Decreased mastication ability/Anterior loss of bolus Anterior loss of bolus/Reduced oral grading Reduced oral grading/Anterior loss of bolus

## 2020-10-19 NOTE — PROGRESS NOTE ADULT - ASSESSMENT
Patient is a 75 year old female with PMH of significant for recently diagnosed hyperthyroidism (3mo ago, on Methimazole), RA (on MTX weekly, Remicade J6hbyow, Hydroxychloroquine, and daily steroids w/ Prednisone 10mg daily), HTN, CVA, memory impairment who presented from [facility] with nonproductive cough and dyspnea with nausea and diarrhea and was found to be COVID-19 positive. Blood cultures positive for Staph epi in both sets, unclear if true or contaminant, treated as true bacteremia.

## 2020-10-19 NOTE — SWALLOW BEDSIDE ASSESSMENT ADULT - SLP GENERAL OBSERVATIONS
Pt encountered awake and alert in bed on 2L nasal cannula, sp02 94%, A&Ox2-3 with reduced specificity. Pt is primarily Cantonese speaking and benifited from [facility] ([employer] ID#[id number]). Pt offers no complaints for swallowing. Vocal quality is wet at baseline with reduced loudness. +congested breath sounds  Pt was brought to an upright position for safe administration of PO trials.

## 2020-10-20 NOTE — CHART NOTE - NSCHARTNOTEFT_GEN_A_CORE
Nutrition Follow Up Note    Patient seen for malnutrition follow up    Source: Unable to conduct a face-to-face interview at this time due to limited contact restrictions related to pt's medical condition and isolation precautions. Attempted to reach pt via room phone with no answer, information obtained from staff and EMR.    Chart reviewed, events noted. Yesterday pt with difficulty swallowing and crackles on lung sounds. Made NPO. Pt s/p swallow evaluation yesterday with recommendations for NPO with non-oral nutrition/hydration/medications.    Diet : NPO    Patient A&Ox1 per chart, no documented N/V, last BM 10/20.    PO intake: pt NPO x 2 days, previously with varying PO intake per flow sheet documentation.     Source for PO intake: RN, EMR    Enteral/Parenteral Nutrition: n/a    No updated weights available to assess at this time, will continue to monitor    Pertinent Medications: MEDICATIONS  (STANDING):  aspirin  chewable 81 milliGRAM(s) Oral daily  benzonatate 100 milliGRAM(s) Oral three times a day  cholecalciferol 2000 Unit(s) Oral daily  enoxaparin Injectable 40 milliGRAM(s) SubCutaneous daily  hydroxychloroquine 400 milliGRAM(s) Oral <User Schedule>  labetalol 200 milliGRAM(s) Oral two times a day  methimazole 5 milliGRAM(s) Oral daily  multivitamin 1 Tablet(s) Oral daily  nystatin Powder 1 Application(s) Topical two times a day  pantoprazole    Tablet 40 milliGRAM(s) Oral before breakfast  PARoxetine 10 milliGRAM(s) Oral daily  sodium chloride 0.9%. 1000 milliLiter(s) (50 mL/Hr) IV Continuous <Continuous>  ursodiol Tablet 250 milliGRAM(s) Oral two times a day    MEDICATIONS  (PRN):  acetaminophen   Tablet .. 650 milliGRAM(s) Oral every 8 hours PRN Temp greater or equal to 38.5C (101.3F)  acetaminophen   Tablet .. 650 milliGRAM(s) Oral every 6 hours PRN Moderate Pain (4 - 6)  guaiFENesin   Syrup  (Sugar-Free) 200 milliGRAM(s) Oral every 6 hours PRN Cough  oxyCODONE    IR 10 milliGRAM(s) Oral every 6 hours PRN Severe Pain (7 - 10)    Pertinent Labs: 10-20 @ 06:02: Na 140, BUN 29<H>, Cr 0.35<L>, <H>, K+ 4.0, Phos --, Mg --, Alk Phos --, ALT/SGPT --, AST/SGOT --, HbA1c --    Finger Sticks: n/a    Skin per nursing documentation: no noted pressure injuries  Edema per nursing documentation: none noted    Estimated Needs:   [x] no change since previous assessment  [ ] recalculated:     Previous Nutrition Diagnosis: Severe malnutrition  Nutrition Diagnosis is [x] ongoing    Care plan: [x] in progress   Being addressed with:    New Nutrition Diagnosis:  Related to:    As evidenced by:      Interventions:     Recommend  1.    Monitoring and Evaluation:     Continue to monitor nutritional intake, tolerance to diet prescription, weights, labs, skin integrity    RD remains available upon request and will follow up per protocol    Cat Mayberry RD, Pager # [phone] Nutrition Follow Up Note    Patient seen for malnutrition follow up    Source: Unable to conduct a face-to-face interview at this time due to limited contact restrictions related to pt's medical condition and isolation precautions. Attempted to reach pt via room phone with no answer, information obtained from staff and EMR.    Chart reviewed, events noted. Yesterday pt with difficulty swallowing and crackles on lung sounds. Made NPO. Pt s/p swallow evaluation yesterday with recommendations for NPO with non-oral nutrition/hydration/medications.    Diet : NPO    Patient A&Ox1 per chart, no documented N/V, last BM 10/20.    PO intake: pt NPO x 2 days, previously with varying PO intake per flow sheet documentation.     Source for PO intake: RN, EMR    Enteral/Parenteral Nutrition: n/a    No updated weights available to assess at this time, will continue to monitor    Pertinent Medications: MEDICATIONS  (STANDING):  aspirin  chewable 81 milliGRAM(s) Oral daily  benzonatate 100 milliGRAM(s) Oral three times a day  cholecalciferol 2000 Unit(s) Oral daily  enoxaparin Injectable 40 milliGRAM(s) SubCutaneous daily  hydroxychloroquine 400 milliGRAM(s) Oral <User Schedule>  labetalol 200 milliGRAM(s) Oral two times a day  methimazole 5 milliGRAM(s) Oral daily  multivitamin 1 Tablet(s) Oral daily  nystatin Powder 1 Application(s) Topical two times a day  pantoprazole    Tablet 40 milliGRAM(s) Oral before breakfast  PARoxetine 10 milliGRAM(s) Oral daily  sodium chloride 0.9%. 1000 milliLiter(s) (50 mL/Hr) IV Continuous <Continuous>  ursodiol Tablet 250 milliGRAM(s) Oral two times a day    MEDICATIONS  (PRN):  acetaminophen   Tablet .. 650 milliGRAM(s) Oral every 8 hours PRN Temp greater or equal to 38.5C (101.3F)  acetaminophen   Tablet .. 650 milliGRAM(s) Oral every 6 hours PRN Moderate Pain (4 - 6)  guaiFENesin   Syrup  (Sugar-Free) 200 milliGRAM(s) Oral every 6 hours PRN Cough  oxyCODONE    IR 10 milliGRAM(s) Oral every 6 hours PRN Severe Pain (7 - 10)    Pertinent Labs: 10-20 @ 06:02: Na 140, BUN 29<H>, Cr 0.35<L>, <H>, K+ 4.0, Phos --, Mg --, Alk Phos --, ALT/SGPT --, AST/SGOT --, HbA1c --    Finger Sticks: n/a    Skin per nursing documentation: no noted pressure injuries  Edema per nursing documentation: none noted    Estimated Needs:   [x] no change since previous assessment  [ ] recalculated:     Previous Nutrition Diagnosis: Severe malnutrition  Nutrition Diagnosis is [x] ongoing - pt unable to take PO.    New Nutrition Diagnosis: n/a    Recommend  1. Defer diet initiation to team/SLP. If pt unable to take PO > 7 days consider alternate routes of nutrition if medically feasible or within GOC.  2. Continue multivitamin when medically feasible.  3. RD remains available to provide recommendations for nutrition support if needed.    Monitoring and Evaluation:     Continue to monitor nutritional intake, tolerance to diet prescription, weights, labs, skin integrity    RD remains available upon request and will follow up per protocol    Cat Mayberry RD, Pager # [phone]

## 2020-10-20 NOTE — PROGRESS NOTE ADULT - PROBLEM SELECTOR PLAN 1
COVID-19 PCR positive. CTA chest consistent with COVID.   S/p 5 days of remdesivir.  Inflammatory markers stable/improved.   Oxygenation continues to improved, remains on 2L NC.   Pulmonary following, on Dexamethasone taper  Continue to monitor clinically.  COVID-19 PCR testing remains positive on 10/19, pending negative x2 result to go back to assisted living facility.

## 2020-10-20 NOTE — PROGRESS NOTE ADULT - ASSESSMENT
76 yo F w/ PMHx significant for recently diagnosed hyperthyroidism (3mo ago, on Methimazole), rheumatoid arthritis (on MTX weekly, Remicade [id number], Hydroxychloroquine, and daily steroids w/ Prednisone 10mg daily), HTN (on multiple anti-hypertensive agents), s/p CVA w/o residual deficits, memory impairment, who presents from  after presenting from [facility] where she resides, w/ c/o nonproductive cough over the past few days, and SOB, along w/ NBNB vomiting 3-4days ago and NB diarrhea beginning today, found to be COVID-19 positive.

## 2020-10-20 NOTE — PROGRESS NOTE ADULT - PROBLEM SELECTOR PLAN 1
- maintain SpO2 > 92%, pt on 5LNC currently, consider Hi-Tulio if persistent desaturation below 90%  - avoid BiLevel given (+) COVID-19 status  - monitor inflammatory markers every 2-3 days  - she remains on decadron 6 mg PO daily which was extended owing to her persistently high O2 requirements, now on taper regimen  - continue to wean O2 as she tolerates  - wean 3L NC to 2L, monitoring O2 sats, overall improving  - RN noted possible gurgling when swallowing pills  - NPO, speech consulted  - repeat CXR stable  - asking for water and food all day, on gentle IVF  - discussed with RN, cautiously started dysphagia I with aspiration precaution  - speech to come back tomorrow early. risk of aspiration discussed with the pt.

## 2020-10-20 NOTE — PROGRESS NOTE ADULT - SUBJECTIVE AND OBJECTIVE BOX
Patient is a 75y old  Female who presents with a chief complaint of COVID-19 PNA, acute respiratory failure (20 Oct 2020 09:46)      SUBJECTIVE / OVERNIGHT EVENTS:  asking for water and food all day  on gentle IVF  discussed with RN, cautiously started dysphagia I with aspiration precaution  speech to come back tomorrow.   no cp, no sob, no n/v/d. no abdominal pain.  no headache, no dizziness.       Vital Signs Last 24 Hrs  T(C): 37.3 (20 Oct 2020 20:26), Max: 37.3 (20 Oct 2020 20:26)  T(F): 99.1 (20 Oct 2020 20:26), Max: 99.1 (20 Oct 2020 20:26)  HR: 80 (20 Oct 2020 20:26) (70 - 80)  BP: 149/82 (20 Oct 2020 20:26) (142/82 - 154/77)  BP(mean): --  RR: 18 (20 Oct 2020 20:26) (18 - 18)  SpO2: 95% (20 Oct 2020 20:26) (94% - 95%)  I&O's Summary    19 Oct 2020 07:01  -  20 Oct 2020 07:00  --------------------------------------------------------  IN: 240 mL / OUT: 650 mL / NET: -410 mL    20 Oct 2020 07:01  -  20 Oct 2020 21:27  --------------------------------------------------------  IN: 600 mL / OUT: 800 mL / NET: -200 mL      PHYSICAL EXAM:  GENERAL: NAD, on 2-3 LNCO2, mild hard of hearing  HEAD:  Atraumatic, Normocephalic  EYES: EOMI, PERRLA, conjunctiva and sclera clear  NECK: Supple, No JVD  CHEST/LUNG: mild decrease breath sounds bilaterally; No wheeze   HEART: Regular rate and rhythm; No murmurs, rubs, or gallops  ABDOMEN: Soft, Nontender, Nondistended; Bowel sounds present  Neuro: AAO x 2-3, no focal deficit, 5/5 b/l extremities  EXTREMITIES:  2+ Peripheral Pulses, No clubbing, cyanosis, or edema  SKIN: No rashes or lesions      LABS:                        11.9   13.67 )-----------( 273      ( 20 Oct 2020 06:02 )             36.1     10-20    140  |  107  |  29<H>  ----------------------------<  107<H>  4.0   |  22  |  0.35<L>    Ca    8.5      20 Oct 2020 06:02        CAPILLARY BLOOD GLUCOSE                RADIOLOGY & ADDITIONAL TESTS:    Imaging Personally Reviewed:  [x] YES  [ ] NO    Consultant(s) Notes Reviewed:  [x] YES  [ ] NO      MEDICATIONS  (STANDING):  aspirin  chewable 81 milliGRAM(s) Oral daily  benzonatate 100 milliGRAM(s) Oral three times a day  cholecalciferol 2000 Unit(s) Oral daily  enoxaparin Injectable 40 milliGRAM(s) SubCutaneous daily  hydroxychloroquine 400 milliGRAM(s) Oral <User Schedule>  labetalol 200 milliGRAM(s) Oral two times a day  methimazole 5 milliGRAM(s) Oral daily  multivitamin 1 Tablet(s) Oral daily  nystatin Powder 1 Application(s) Topical two times a day  pantoprazole    Tablet 40 milliGRAM(s) Oral before breakfast  PARoxetine 10 milliGRAM(s) Oral daily  sodium chloride 0.9%. 1000 milliLiter(s) (50 mL/Hr) IV Continuous <Continuous>  ursodiol Tablet 250 milliGRAM(s) Oral two times a day    MEDICATIONS  (PRN):  acetaminophen   Tablet .. 650 milliGRAM(s) Oral every 8 hours PRN Temp greater or equal to 38.5C (101.3F)  acetaminophen   Tablet .. 650 milliGRAM(s) Oral every 6 hours PRN Moderate Pain (4 - 6)  guaiFENesin   Syrup  (Sugar-Free) 200 milliGRAM(s) Oral every 6 hours PRN Cough  labetalol Injectable 10 milliGRAM(s) IV Push every 8 hours PRN Systolic blood pressure > 150  oxyCODONE    IR 10 milliGRAM(s) Oral every 6 hours PRN Severe Pain (7 - 10)      Care Discussed with Consultants/Other Providers [x] YES  [ ] NO

## 2020-10-20 NOTE — PROGRESS NOTE ADULT - ASSESSMENT
Patient is a 75 year old female with PMH of significant for recently diagnosed hyperthyroidism (3mo ago, on Methimazole), RA (on MTX weekly, Remicade F6xwint, Hydroxychloroquine, and daily steroids w/ Prednisone 10mg daily), HTN, CVA, memory impairment who presented from [facility] with nonproductive cough and dyspnea with nausea and diarrhea and was found to be COVID-19 positive. Blood cultures positive for Staph epi in both sets, unclear if true or contaminant, treated as true bacteremia.

## 2020-10-20 NOTE — PROGRESS NOTE ADULT - SUBJECTIVE AND OBJECTIVE BOX
[facility] [address], Division of Infectious Diseases  ROSALINA Cali Y. Patel, S. Shah  [phone]  ([phone] - weekdays after 5pm and weekends)    Name: TARA ESPINAL  Age: 75y  Gender: Female  MRN: [id number]    Interval History:  Patient remains on 2L NC, resting comfortably. No acute events overnight. Notes reviewed. Remains afebrile.    Objective:  Vitals:  T(F): 99 (10-20-20 @ 08:01), Max: 99.2 (10-19-20 @ 10:55)  HR: 74 (10-20-20 @ 04:31) (66 - 80)  BP: 154/77 (10-20-20 @ 08:01) (115/73 - 154/77)  RR: 18 (10-20-20 @ 08:01) (18 - 18)  SpO2: 95% (10-20-20 @ 08:01) (94% - 95%)    Physical Examination:  General: NAD, comfortable on NC 2L/min  HEENT: NC/AT, EOMI, anicteric, neck supple  Cardio: S1, S2 heard, regular rate   Resp: no respiratory distress  Neuro: AAOx3, no obvious focal deficits  Ext: no edema, moving all extremities  Skin: no visible rash  Psych: calm, cooperative  Lines: PIV    Laboratory Studies:  CBC:                       11.9   13.67 )-----------( 273      ( 20 Oct 2020 06:02 )             36.1     CMP: 10-20    140  |  107  |  29<H>  ----------------------------<  107<H>  4.0   |  22  |  0.35<L>    Ca    8.5      20 Oct 2020 06:02    Ferritin 1652 < 1764 < 2645 < 3525 < 6426 < 5573 < 4710  CRP 0.45 < 1.15 < 1.39 < 0.74 < 2.00  D-Dimer 224 < 401< 334 < 426< 254 <294    Microbiology:  10/14, 10/17, 10/19 - COVID-19 PCR - positive  10/4 - blood cultures - negative   10/3 - COVID-19 ab - negative   10/2 - blood cultures - 2/2 - CoNS- S. epidermidis - S to A/S, cefazolin, gent, oxacillin, tetracyclines, bactrim, vancomycin; R to clindamycin, erythromycin, penicillin  10/2 - COVID-19 PCR - positive    Radiology:  Xray Chest 1 View- PORTABLE-Routine (Xray Chest 1 View- PORTABLE-Routine .) (10.19.20 @ 18:54) >Impression: Poor inspiratory effort. Left hiatal hernia. Pulmonary vascular congestion. The overall appearance of the chest remain unchanged when compared to previous study done October 12, 2020. Pneumonia cannot be ruled out entirely. Calcified aortic knob.    Xray Chest 1 View- PORTABLE-Routine (Xray Chest 1 View- PORTABLE-Routine .) (10.12.20 @ 12:49) >The heart is slightly enlarged. A left hiatal hernia is present. Diffuse airspace disease is seen in both lungs more pronounced on the left compatible with pneumonia. Degenerative changes of the thoracic spine and both shoulders. No pleural effusion. No pneumothorax.    Xray Chest 1 View- PORTABLE-Urgent (Xray Chest 1 View- PORTABLE-Urgent .) (10.05.20 @ 12:41) > Impression: The heart is normal insize. Poor inspiratory effort. Diffuse airspace opacities are seen from both lungs compatible with pneumonia status post seen in COVID 19., Correlate with CT scan that was performed on October 2, 2020.    CT Angio Chest w/ IV Cont (10.02.20 @ 13:30) > IMPRESSION: No pulmonary embolism. Bilateral lung opacities may represent Covid-19.    Xray Chest 1 View-PORTABLE IMMEDIATE (10.02.20 @ 11:26) > IMPRESSION: Mid left lung infiltrate.    Medications:  MEDICATIONS  (STANDING):  aspirin  chewable 81 milliGRAM(s) Oral daily  benzonatate 100 milliGRAM(s) Oral three times a day  cholecalciferol 2000 Unit(s) Oral daily  enoxaparin Injectable 40 milliGRAM(s) SubCutaneous daily  hydroxychloroquine 400 milliGRAM(s) Oral <User Schedule>  labetalol 200 milliGRAM(s) Oral two times a day  methimazole 5 milliGRAM(s) Oral daily  multivitamin 1 Tablet(s) Oral daily  nystatin Powder 1 Application(s) Topical two times a day  pantoprazole    Tablet 40 milliGRAM(s) Oral before breakfast  PARoxetine 10 milliGRAM(s) Oral daily  sodium chloride 0.9%. 1000 milliLiter(s) (50 mL/Hr) IV Continuous <Continuous>  ursodiol Tablet 250 milliGRAM(s) Oral two times a day    Antimicrobials:  hydroxychloroquine 400 milliGRAM(s) Oral <User Schedule>  S/p cefazolin 10/6-10/13  S/p remdesivir 10/2-10/6  S/p vancomycin 10/2-10/6

## 2020-10-21 NOTE — PROGRESS NOTE ADULT - ASSESSMENT
Acute hypoxemic resp failure due to multilobar viral pneumonia  COVID 19 viral infection  CNS bacteremia: unclear significance  RA on immunosupressive regimen  Inflammatory markers largely stable  10/5: Clinically stable, Mild tachypnea; howver increase in nasal O2 to 5 liters noted. Uptrending inflammatory markers noted  10/6:  CXR progressed vs. admission; still requires 5 liters nasal cannula with sat 91%; Ferritin decreased  10/7: Still requires 6 liters nasal oxygen; infl markers downtrending  ; no gross improvement in status  10/8: Clinically unchanged; Ferritin increasing; remains on 6 liters nasal cannula; c/o cogh  10/16: RA sats > 90% on RA at rest  10/19: RA sat 94% today. ON decadron taper    REC    Complete decadron taper   DC planning   Patient will need pulm f/u as outpatient: Methotrexate should be held until clinical resolution of COVID pneumonia. Patient should have f/u CT chest prior to resumption of methotrexate. Remicade should be held until fully recovered  Outpatient pulmonary: Dr Wilbert Pastor [facility]

## 2020-10-21 NOTE — PROGRESS NOTE ADULT - SUBJECTIVE AND OBJECTIVE BOX
Patient is a 75y old  Female who presents with a chief complaint of COVID-19 PNA, acute respiratory failure (20 Oct 2020 09:46)      SUBJECTIVE / OVERNIGHT EVENTS:  93-95% on 1LNCO2  on gentle IVF  discussed yesterday with RN, cautiously started dysphagia I with aspiration precaution  speech to come back today.     Vital Signs Last 24 Hrs  T(C): 36.6 (21 Oct 2020 11:13), Max: 37.3 (20 Oct 2020 20:26)  T(F): 97.8 (21 Oct 2020 11:13), Max: 99.1 (20 Oct 2020 20:26)  HR: 83 (21 Oct 2020 11:13) (80 - 85)  BP: 142/76 (21 Oct 2020 11:13) (142/76 - 155/87)  BP(mean): --  RR: 18 (21 Oct 2020 11:13) (18 - 18)  SpO2: 95% (21 Oct 2020 11:13) (93% - 95%)    I&O's Summary    10-20-20 @ 07:01  -  10-21-20 @ 07:00  --------------------------------------------------------  IN: 1436 mL / OUT: 950 mL / NET: 486 mL    10-21-20 @ 07:01  -  10-21-20 @ 16:23  --------------------------------------------------------  IN: 120 mL / OUT: 0 mL / NET: 120 mL          PHYSICAL EXAM:  GENERAL: NAD, on 1 LNCO2, mild hard of hearing  HEAD:  Atraumatic, Normocephalic  EYES: EOMI, PERRLA, conjunctiva and sclera clear  NECK: Supple, No JVD  CHEST/LUNG: mild decrease breath sounds bilaterally; No wheeze   HEART: Regular rate and rhythm; No murmurs, rubs, or gallops  ABDOMEN: Soft, Nontender, Nondistended; Bowel sounds present  Neuro: AAO x 2-3, no focal deficit, 5/5 b/l extremities  EXTREMITIES:  2+ Peripheral Pulses, No clubbing, cyanosis, or edema  SKIN: No rashes or lesions    LABS:                        12.3   14.34 )-----------( 250      ( 21 Oct 2020 10:59 )             36.5     10-21    139  |  105  |  19  ----------------------------<  72  3.4<L>   |  20<L>  |  <0.30<L>    Ca    8.3<L>      21 Oct 2020 10:59    TPro  5.6<L>  /  Alb  3.1<L>  /  TBili  0.7  /  DBili  x   /  AST  26  /  ALT  21  /  AlkPhos  66  10-21      CAPILLARY BLOOD GLUCOSE                RADIOLOGY & ADDITIONAL TESTS:    Imaging Personally Reviewed:  [x] YES  [ ] NO    Consultant(s) Notes Reviewed:  [x] YES  [ ] NO

## 2020-10-21 NOTE — PROGRESS NOTE ADULT - SUBJECTIVE AND OBJECTIVE BOX
[facility], Division of Infectious Diseases  ROSALINA Cali Y. Patel, S. Shah  [phone]  ([phone] - weekdays after 5pm and weekends)    Name: TARA ESPINAL  Age: 75y  Gender: Female  MRN: [id number]    Interval History:  Patient now on 1L NC this morning, saturating well, resting comfortably.   No acute events overnight. Notes reviewed. Remains afebrile.    Objective:  Vitals:  T(F): 97.8 (10-21-20 @ 11:13), Max: 99.1 (10-20-20 @ 20:26)  HR: 83 (10-21-20 @ 11:13) (80 - 85)  BP: 142/76 (10-21-20 @ 11:13) (142/76 - 155/87)  RR: 18 (10-21-20 @ 11:13) (18 - 18)  SpO2: 95% (10-21-20 @ 11:13) (93% - 95%)    Physical Examination:  General: NAD, comfortable on NC 1L/min  HEENT: NC/AT, EOMI, anicteric, neck supple  Cardio: S1, S2 present, normal rate  Resp: no respiratory distress  Neuro: AAOx3, no obvious focal deficits  Ext: no edema, moving all extremities  Skin: no visible rash  Psych: calm, cooperative  Lines: PIV    Laboratory Studies:  CBC:                       12.3   14.34 )-----------( 250      ( 21 Oct 2020 10:59 )             36.5     CMP: 10-21    139  |  105  |  19  ----------------------------<  72  3.4<L>   |  20<L>  |  <0.30<L>    Ca    8.3<L>      21 Oct 2020 10:59    TPro  5.6<L>  /  Alb  3.1<L>  /  TBili  0.7  /  DBili  x   /  AST  26  /  ALT  21  /  AlkPhos  66  10-21    LIVER FUNCTIONS - ( 21 Oct 2020 10:59 )  Alb: 3.1 g/dL / Pro: 5.6 g/dL / ALK PHOS: 66 U/L / ALT: 21 U/L / AST: 26 U/L / GGT: x           Microbiology:  10/14, 10/17, 10/19 - COVID-19 PCR - positive  10/4 - blood cultures - negative   10/3 - COVID-19 ab - negative   10/2 - blood cultures - 2/2 - CoNS- S. epidermidis - S to A/S, cefazolin, gent, oxacillin, tetracyclines, bactrim, vancomycin; R to clindamycin, erythromycin, penicillin  10/2 - COVID-19 PCR - positive    Radiology:  Xray Chest 1 View- PORTABLE-Routine (Xray Chest 1 View- PORTABLE-Routine .) (10.19.20 @ 18:54) >Impression: Poor inspiratory effort. Left hiatal hernia. Pulmonary vascular congestion. The overall appearance of the chest remain unchanged when compared to previous study done October 12, 2020. Pneumonia cannot be ruled out entirely. Calcified aortic knob.    Xray Chest 1 View- PORTABLE-Routine (Xray Chest 1 View- PORTABLE-Routine .) (10.12.20 @ 12:49) >The heart is slightly enlarged. A left hiatal hernia is present. Diffuse airspace disease is seen in both lungs more pronounced on the left compatible with pneumonia. Degenerative changes of the thoracic spine and both shoulders. No pleural effusion. No pneumothorax.    Xray Chest 1 View- PORTABLE-Urgent (Xray Chest 1 View- PORTABLE-Urgent .) (10.05.20 @ 12:41) > Impression: The heart is normal insize. Poor inspiratory effort. Diffuse airspace opacities are seen from both lungs compatible with pneumonia status post seen in COVID 19., Correlate with CT scan that was performed on October 2, 2020.    CT Angio Chest w/ IV Cont (10.02.20 @ 13:30) > IMPRESSION: No pulmonary embolism. Bilateral lung opacities may represent Covid-19.    Xray Chest 1 View-PORTABLE IMMEDIATE (10.02.20 @ 11:26) > IMPRESSION: Mid left lung infiltrate.    Medications:  MEDICATIONS  (STANDING):  aspirin  chewable 81 milliGRAM(s) Oral daily  benzonatate 100 milliGRAM(s) Oral three times a day  cholecalciferol 2000 Unit(s) Oral daily  enoxaparin Injectable 40 milliGRAM(s) SubCutaneous daily  hydroxychloroquine 400 milliGRAM(s) Oral <User Schedule>  labetalol 200 milliGRAM(s) Oral two times a day  methimazole 5 milliGRAM(s) Oral daily  multivitamin 1 Tablet(s) Oral daily  nystatin Powder 1 Application(s) Topical two times a day  pantoprazole  Injectable 40 milliGRAM(s) IV Push daily  PARoxetine 10 milliGRAM(s) Oral daily  sodium chloride 0.9%. 1000 milliLiter(s) (50 mL/Hr) IV Continuous <Continuous>  ursodiol Tablet 250 milliGRAM(s) Oral two times a day    Antimicrobials:  hydroxychloroquine 400 milliGRAM(s) Oral <User Schedule>  S/p cefazolin 10/6-10/13  S/p remdesivir 10/2-10/6  S/p vancomycin 10/2-10/6

## 2020-10-21 NOTE — PROGRESS NOTE ADULT - PROBLEM SELECTOR PLAN 1
- maintain SpO2 > 92%, pt on 1LNC currently  - avoid BiLevel given (+) COVID-19 status  - monitor inflammatory markers every 2-3 days  - she was on decadron PO daily from 10/2-10/20 which was extended owing to her persistently high O2 requirements  - continue to wean O2 as she tolerates  - RN noted possible gurgling when swallowing pills  - NPO, speech consulted  - repeat CXR stable  - asking for water and food all day, on gentle IVF  - discussed with RN 10/20/20, cautiously started dysphagia I with aspiration precaution  - speech to come back today . risk of aspiration discussed with the pt.

## 2020-10-21 NOTE — PROGRESS NOTE ADULT - PROBLEM SELECTOR PLAN 4
- chronic condition  - can resume hydroxychloroquine upon discharge  - holding home methotrexate and remicade until she has fully recovered from COVID-19 as documented by repeat chest CT as outpt in 2-3 weeks  - at home patient on Prednisone 10mg daily  - at home on weekly MTX on Saturdays and Q6week Remicade

## 2020-10-21 NOTE — PROGRESS NOTE ADULT - PROBLEM SELECTOR PLAN 1
COVID-19 PCR positive. CTA chest consistent with COVID.   S/p 5 days of remdesivir.  Inflammatory markers stable/improved.   Oxygenation continues to improved, down to 1L NC today  Pulmonary following, on Dexamethasone taper  Continue to monitor clinically.  COVID-19 PCR testing remains positive on 10/19, pending negative x2 result to go back to assisted living facility.

## 2020-10-21 NOTE — PROGRESS NOTE ADULT - PROBLEM SELECTOR PLAN 3
Health Maintenance Due   Topic Date Due   • Diabetes Eye Exam  07/08/1974   • Shingles Vaccine (1 of 2) 07/08/2007   • Pneumococcal Vaccine 0-64 (2 of 3 - PCV13) 10/02/2010   • DM/CKD Microalbumin  12/18/2018   • Diabetes Foot Exam  12/18/2018   • Diabetes A1C  02/01/2019   • Depression Screening  02/19/2019   • DM/CKD GFR  08/01/2019   • Influenza Vaccine (1) 09/01/2019       Patient is due for topics as listed above but is not proceeding with Immunization(s) Influenza, Pneumococcal and Shingles at this time. Patient wishes to discuss the rest with provider.            - COVID19 PCR detected    - supportive management including for fever/cough  - monitor respiratory status closely and serial inflammatory markers to monitor for severity progression  - maintain oxygenation and monitoring as above  - Lovenox 40mg SubQ for high risk of microthrombi a/w COVID-19  - Completed course of Remdesivir under EUA as Spo2 < 94% on RA (pt requiring O2 supp via NC currently), eGFR = 90 (>30), ALT = 33 (WNL), and pt does not meet exclusion criteria -- given weight = 49.9kg, dose: 200mg IV x1 followed by 100mg IV Q24 daily x4days; discussed fact sheet extensively with patient's  via telephone  - also initiated steroids: as patient on daily Prednisone and received Solucortef 100mg IV x1 in ED at 1:40pm, she finished extended course of Dexamethasone 6mg daily on 10/20/20  - stopped Azithromycin as pt on chronic home Hydroxychloroquine for RA as below, and risk of Qtc prolongation

## 2020-10-21 NOTE — PROGRESS NOTE ADULT - ASSESSMENT
76 yo F w/ PMHx significant for recently diagnosed hyperthyroidism (3mo ago, on Methimazole), rheumatoid arthritis (on MTX weekly, Remicade [id number], Hydroxychloroquine, and daily steroids w/ Prednisone 10mg daily), HTN (on multiple anti-hypertensive agents), s/p CVA w/o residual deficits, memory impairment, who presents from  after presenting from [facility] where she resides, w/ c/o nonproductive cough over the past few days, and SOB, along w/ NBNB vomiting 3-4days ago and NB diarrhea beginning today, found to be COVID-19 positive.

## 2020-10-21 NOTE — PROGRESS NOTE ADULT - ASSESSMENT
Patient is a 75 year old female with PMH of significant for recently diagnosed hyperthyroidism (3mo ago, on Methimazole), RA (on MTX weekly, Remicade X2ntodn, Hydroxychloroquine, and daily steroids w/ Prednisone 10mg daily), HTN, CVA, memory impairment who presented from [facility] with nonproductive cough and dyspnea with nausea and diarrhea and was found to be COVID-19 positive. Blood cultures positive for Staph epi in both sets, unclear if true or contaminant, treated as true bacteremia.

## 2020-10-21 NOTE — PROGRESS NOTE ADULT - SUBJECTIVE AND OBJECTIVE BOX
Follow-up Pulm Progress Note  Gordon Fox MD  [phone]    Alternating RA and 1 liter nasal cannula  Recent CXR with pathcy opcities - c/w residual pna  On Decadron taper    Vital Signs Last 24 Hrs  T(C): 37.3 (19 Oct 2020 10:55), Max: 37.3 (19 Oct 2020 10:55)  T(F): 99.2 (19 Oct 2020 10:55), Max: 99.2 (19 Oct 2020 10:55)  HR: 80 (19 Oct 2020 10:55) (69 - 80)  BP: 115/73 (19 Oct 2020 10:55) (113/75 - 135/81)  BP(mean): --  RR: 18 (19 Oct 2020 10:55) (18 - 18)  SpO2: 94% (19 Oct 2020 10:55) (94% - 96%)    CULTURES:  Culture Results:   Growth in aerobic and anaerobic bottles: Staphylococcus epidermidis  Susceptibility to follow.  "Due to technical problems, Proteus sp. will Not be reported as part of  the BCID panel until further notice"  ***Blood Panel PCR results on this specimen are available  approximately 3 hours after the Gram stain result.***  Gram stain, PCR, and/or culture results may not always  correspond due to difference in methodologies.  ************************************************************  This PCR assay was performed using [employer].  The following targets are tested for: Enterococcus,  vancomycin resistant enterococci, Listeria monocytogenes,  coagulase negative staphylococci, S. aureus,  methicillin resistant S. aureus, Streptococcus agalactiae  (Group B), S. pneumoniae, S. pyogenes (Group A),  Acinetobacter baumannii, Enterobacter cloacae, E. coli,  Klebsiella oxytoca, K. pneumoniae, Proteus sp.,  Serratia marcescens, Haemophilus influenzae,  Neisseria meningitidis, Pseudomonas aeruginosa, Candida  albicans, C. glabrata, C krusei, C parapsilosis,  C. tropicalis and the KPC resistance gene. (10-02 @ 14:52)  Culture Results:   Growth in aerobic and anaerobic bottles: Staphylococcus epidermidis  "Susceptibilities not performed" (10-02 @ 14:52)    Most recent blood culture -- 10-02 @ 14:52   Blood Culture PCR Blood Culture PCR .Blood Blood-Peripheral 10-02 @ 14:52      Physical Examination:  PULM: Few scattered basilar crackles; no wheeze  CVS: Regular rate and rhythm, no murmurs, rubs, or gallops  ABD: Soft, non-tender  EXT:  No clubbing, cyanosis, or edema    RADIOLOGY REVIEWED  CXR:    CT chest:    PROCEDURE DATE:  10/02/2020        INTERPRETATION:  EXAMINATION: CT ANGIO CHEST WITHOUT AND OR WITH IV CONTRAST    CLINICAL INDICATION: Dyspnea    TECHNIQUE: CTA of the chest was performed for evaluation of pulmonary embolism after administration of 90 ml of Omnipaque-350, 10 ml discarded.  MIP images were reconstructed.    COMPARISON: 10/27/2019.    FINDINGS:    PULMONARY ARTERIES: There is no pulmonary embolism    AIRWAYS AND LUNGS: The central tracheobronchial tree is patent.  Bilateral groundglass opacities and consolidations, predominantly peripherally    MEDIASTINUM AND PLEURA: There are no enlarged mediastinal, hilar or axillary lymph nodes. The visualized portion of the thyroid gland is unremarkable. There is no pleural effusion. There is no pneumothorax.    HEART AND VESSELS: There is mild cardiomegaly. There are atherosclerotic calcifications of the aorta and coronary arteries.  There is no pericardial effusion.    UPPER ABDOMEN: Images of the upper abdomen demonstrate no abnormality.    BONES AND SOFT TISSUES: Unchanged T12 vertebral plasty and L1 vertebral body severe compression deformity.  The soft tissues are unremarkable.    TUBES/LINES: None.    IMPRESSION:  No pulmonary embolism. Bilateral lung opacities may represent Covid-19.    TTE:

## 2020-10-21 NOTE — CHART NOTE - NSCHARTNOTEFT_GEN_A_CORE
Pt being followed by this service for dysphagia.     74 yo F w/ PMHx significant for recently diagnosed hyperthyroidism (3mo ago, on Methimazole), rheumatoid arthritis (on MTX weekly, Remicade P3meoac, Hydroxychloroquine, and daily steroids w/ Prednisone 10mg daily), HTN (on multiple anti-hypertensive agents), s/p CVA w/o residual deficits, memory impairment, who presents from  after presenting from [facility] where she resides, w/ c/o nonproductive cough over the past few days, and SOB, along w/ NBNB vomiting 3-4days ago and NB diarrhea beginning today, found to be COVID-19 positive. Admitted with acute respiratory failure with hypoxia. +Decadron PO daily from 10/2-10/20 which was extended owing to her persistently high O2 requirements. Pt was maintained on a regular texture diet until 10/19 when RN noted possible gurgling when swallowing pills. S&S consulted.     Initial Bedside swallow evaluation completed on 10/19 with recommendations for NPO, with non-oral nutrition/hydration/medications due to poor secretion management. Pt was started on puree texture diet and honey thick liquids by team on 10/20.     Today, Pt seen for re-evaluation of swallow function. Per NELDA Antonio, Pt was fed with no overt, clinical s/s of aspiration/penetration. Pt encountered awake and alert, reclined in bed on O2 via NC 1l/min. +Cantonese speaking. Video pacific  utilized (isolation room, therefore unable to scribe  name/number). A&Ox1. Oriented x2 with choices. HOB elevated for evaluation purposes. +Baseline wet, gurgly and hoarse vocal quality noted at baseline. Upon cued throat clear and swallow, wetness cleared. Required multiple verbal cues/repetitions to follow directives and answer questions appropriately.    SLP provided thin and thick puree and honey thick liquids. Pt presented with 1) Suspected delayed pharyngeal swallow and wet, vocal quality post PO with 1x subtle throat clear noted. 2) Cognitive communication impairment. Of note, congested breath sounds were not noted on exam this date. Given concern for reduced secretion management, this service recommends NPO, with non-oral nutrition/hydration/medications. However, discussed with NP Sobeida that Pt will continue on dysphagia diet with plan for re-assessment on 10/23 to determine plan of care.     -Purposeful proactive rounding reinforced and 5 Ps addressed. Pt left in no distress. D/W RN Marisela, Patient, and NP Sobeida    -Recommendations: 1) NPO, with non-oral nutrition/hydration/medications. Although discussed with NP Sobeida that Pt to continue on conservative PO dysphagia diet. 2) Aspiration precautions 3) Maintain adequate oral hygiene. 3) Maintain adequate oral hygiene.     Belinda Krueger MS CCC-SLP  Speech-Language Pathologist  ; [phone]

## 2020-10-22 NOTE — PROGRESS NOTE ADULT - PROBLEM SELECTOR PLAN 1
S/p 5 days of remdesivir.  Oxygenation continues to improve, on room air this morning  Pulmonary following, on Dexamethasone taper  Continue to monitor clinically.  COVID-19 PCR testing remains positive on 10/21, pending negative x2 results to go to assisted living facility.

## 2020-10-22 NOTE — PROGRESS NOTE ADULT - PROBLEM SELECTOR PLAN 1
- maintain SpO2 > 90 %  - avoid BiLevel given (+) COVID-19 status  - monitor inflammatory markers every 2-3 days  - she was on decadron PO daily from 10/2-10/20 which was extended owing to her persistently high O2 requirements  - continue to wean O2 as she tolerates  - RN noted possible gurgling when swallowing pills  - NPO, speech consulted  - repeat CXR stable  - asking for water and food all day, on gentle IVF  - discussed with RN 10/20/20, cautiously started dysphagia I with aspiration precaution  - speech to come back today . risk of aspiration discussed with the pt.

## 2020-10-22 NOTE — PROGRESS NOTE ADULT - PROBLEM SELECTOR PLAN 3
secondary to COVID-19 pneumonia. Improving, on room air this morning, on dexamethasone taper per pulm.

## 2020-10-22 NOTE — PROGRESS NOTE ADULT - SUBJECTIVE AND OBJECTIVE BOX
[facility], Division of Infectious Diseases  ROSALINA Cali Y. Patel, S. Shah  [phone]  ([phone] - weekdays after 5pm and weekends)    Name: TARA ESPINAL  Age: 75y  Gender: Female  MRN: [id number]    Interval History:  Patient on room air this morning, resting comfortably with no acute distress. No acute events overnight. Notes reviewed. Remains afebrile.    Objective:  Vitals:  T(F): 98.3 (10-22-20 @ 04:26), Max: 98.3 (10-22-20 @ 04:26)  HR: 76 (10-22-20 @ 04:26) (76 - 84)  BP: 142/74 (10-22-20 @ 04:26) (106/71 - 142/76)  RR: 18 (10-22-20 @ 05:00) (18 - 18)  SpO2: 91% (10-22-20 @ 05:00) (91% - 96%)    Physical Examination:  General: NAD, appears comfortable on room air  HEENT: NC/AT, EOMI, anicteric, neck supple  Cardio: S1, S2 present, normal rate  Resp: no respiratory distress  Neuro: AAOx3, no obvious focal deficits  Ext: no edema, moving all extremities  Skin: no visible rash  Psych: calm, cooperative  Lines: PIV    Laboratory Studies:  CBC:                       11.0   14.87 )-----------( 217      ( 22 Oct 2020 06:02 )             33.4     CMP: 10-22    138  |  114<H>  |  15  ----------------------------<  81  3.4<L>   |  16<L>  |  <0.30<L>    Ca    6.8<L>      22 Oct 2020 06:02  Phos  1.9     10-22  Mg     1.6     10-22    TPro  5.6<L>  /  Alb  3.1<L>  /  TBili  0.7  /  DBili  x   /  AST  26  /  ALT  21  /  AlkPhos  66  10-21    LIVER FUNCTIONS - ( 21 Oct 2020 10:59 )  Alb: 3.1 g/dL / Pro: 5.6 g/dL / ALK PHOS: 66 U/L / ALT: 21 U/L / AST: 26 U/L / GGT: x           Microbiology:  10/14, 10/17, 10/19, 10/21 - COVID-19 PCR - positive  10/4 - blood cultures - negative   10/3 - COVID-19 ab - negative   10/2 - blood cultures - 2/2 - CoNS- S. epidermidis - S to A/S, cefazolin, gent, oxacillin, tetracyclines, bactrim, vancomycin; R to clindamycin, erythromycin, penicillin  10/2 - COVID-19 PCR - positive    Radiology:  Xray Chest 1 View- PORTABLE-Routine (Xray Chest 1 View- PORTABLE-Routine .) (10.19.20 @ 18:54) >Impression: Poor inspiratory effort. Left hiatal hernia. Pulmonary vascular congestion. The overall appearance of the chest remain unchanged when compared to previous study done October 12, 2020. Pneumonia cannot be ruled out entirely. Calcified aortic knob.    Xray Chest 1 View- PORTABLE-Routine (Xray Chest 1 View- PORTABLE-Routine .) (10.12.20 @ 12:49) >The heart is slightly enlarged. A left hiatal hernia is present. Diffuse airspace disease is seen in both lungs more pronounced on the left compatible with pneumonia. Degenerative changes of the thoracic spine and both shoulders. No pleural effusion. No pneumothorax.    Xray Chest 1 View- PORTABLE-Urgent (Xray Chest 1 View- PORTABLE-Urgent .) (10.05.20 @ 12:41) > Impression: The heart is normal insize. Poor inspiratory effort. Diffuse airspace opacities are seen from both lungs compatible with pneumonia status post seen in COVID 19., Correlate with CT scan that was performed on October 2, 2020.    CT Angio Chest w/ IV Cont (10.02.20 @ 13:30) > IMPRESSION: No pulmonary embolism. Bilateral lung opacities may represent Covid-19.    Xray Chest 1 View-PORTABLE IMMEDIATE (10.02.20 @ 11:26) > IMPRESSION: Mid left lung infiltrate.    Medications:  MEDICATIONS  (STANDING):  aspirin  chewable 81 milliGRAM(s) Oral daily  benzonatate 100 milliGRAM(s) Oral three times a day  cholecalciferol 2000 Unit(s) Oral daily  enoxaparin Injectable 40 milliGRAM(s) SubCutaneous daily  hydroxychloroquine 400 milliGRAM(s) Oral <User Schedule>  labetalol 200 milliGRAM(s) Oral two times a day  magnesium sulfate  IVPB 1 Gram(s) IV Intermittent once  methimazole 5 milliGRAM(s) Oral daily  multivitamin 1 Tablet(s) Oral daily  nystatin Powder 1 Application(s) Topical two times a day  pantoprazole  Injectable 40 milliGRAM(s) IV Push daily  PARoxetine 10 milliGRAM(s) Oral daily  potassium chloride   Powder 40 milliEquivalent(s) Oral every 6 hours  sodium chloride 0.9%. 1000 milliLiter(s) (50 mL/Hr) IV Continuous <Continuous>  ursodiol Tablet 250 milliGRAM(s) Oral two times a day    Antimicrobials:  hydroxychloroquine 400 milliGRAM(s) Oral <User Schedule>  S/p cefazolin 10/6-10/13  S/p remdesivir 10/2-10/6  S/p vancomycin 10/2-10/6

## 2020-10-22 NOTE — PROGRESS NOTE ADULT - ASSESSMENT
Acute hypoxemic resp failure due to multilobar viral pneumonia  COVID 19 viral infection  CNS bacteremia: unclear significance  RA on immunosupressive regimen  Inflammatory markers largely stable  10/5: Clinically stable, Mild tachypnea; howver increase in nasal O2 to 5 liters noted. Uptrending inflammatory markers noted  10/6:  CXR progressed vs. admission; still requires 5 liters nasal cannula with sat 91%; Ferritin decreased  10/7: Still requires 6 liters nasal oxygen; infl markers downtrending  ; no gross improvement in status  10/8: Clinically unchanged; Ferritin increasing; remains on 6 liters nasal cannula; c/o cogh  10/16: RA sats > 90% on RA at rest  10/19: RA sat 94% today. ON decadron taper    REC    Complete decadron taper   DC planning   Patient will need pulm f/u as outpatient: Methotrexate should be held until clinical resolution of COVID pneumonia. Patient should have f/u CT chest prior to resumption of methotrexate. Remicade should be held until fully recovered  Outpatient pulmonary: Dr Wilbert Pastor [facility]

## 2020-10-22 NOTE — PROGRESS NOTE ADULT - ASSESSMENT
74 yo F w/ PMHx significant for recently diagnosed hyperthyroidism (3mo ago, on Methimazole), rheumatoid arthritis (on MTX weekly, Remicade T4ocnwa, Hydroxychloroquine, and daily steroids w/ Prednisone 10mg daily), HTN (on multiple anti-hypertensive agents), s/p CVA w/o residual deficits, memory impairment, who presents from  after presenting from [facility] where she resides, w/ c/o nonproductive cough over the past few days, and SOB, along w/ NBNB vomiting 3-4days ago and NB diarrhea beginning today, found to be COVID-19 positive.

## 2020-10-22 NOTE — PROGRESS NOTE ADULT - SUBJECTIVE AND OBJECTIVE BOX
Follow-up Pulm Progress Note  Gordon Fox MD  [phone]    Room Air sats >90%  Repeat COVID PCR positive  On Decadron taper    Vital Signs Last 24 Hrs  T(C): 37.1 (22 Oct 2020 11:07), Max: 37.1 (22 Oct 2020 11:07)  T(F): 98.7 (22 Oct 2020 11:07), Max: 98.7 (22 Oct 2020 11:07)  HR: 72 (22 Oct 2020 11:07) (72 - 84)  BP: 120/78 (22 Oct 2020 11:07) (106/71 - 142/74)  BP(mean): --  RR: 18 (22 Oct 2020 11:07) (18 - 18)  SpO2: 92% (22 Oct 2020 11:07) (91% - 96%)                          11.0   14.87 )-----------( 217      ( 22 Oct 2020 06:02 )             33.4     10-22    138  |  114<H>  |  15  ----------------------------<  81  3.4<L>   |  16<L>  |  <0.30<L>    Ca    6.8<L>      22 Oct 2020 06:02  Phos  1.9     10-22  Mg     1.6     10-22    TPro  5.6<L>  /  Alb  3.1<L>  /  TBili  0.7  /  DBili  x   /  AST  26  /  ALT  21  /  AlkPhos  66  10-21    CULTURES:  Culture Results:   Growth in aerobic and anaerobic bottles: Staphylococcus epidermidis  Susceptibility to follow.  "Due to technical problems, Proteus sp. will Not be reported as part of  the BCID panel until further notice"  ***Blood Panel PCR results on this specimen are available  approximately 3 hours after the Gram stain result.***  Gram stain, PCR, and/or culture results may not always  correspond due to difference in methodologies.  ************************************************************  This PCR assay was performed using [employer].  The following targets are tested for: Enterococcus,  vancomycin resistant enterococci, Listeria monocytogenes,  coagulase negative staphylococci, S. aureus,  methicillin resistant S. aureus, Streptococcus agalactiae  (Group B), S. pneumoniae, S. pyogenes (Group A),  Acinetobacter baumannii, Enterobacter cloacae, E. coli,  Klebsiella oxytoca, K. pneumoniae, Proteus sp.,  Serratia marcescens, Haemophilus influenzae,  Neisseria meningitidis, Pseudomonas aeruginosa, Candida  albicans, C. glabrata, C krusei, C parapsilosis,  C. tropicalis and the KPC resistance gene. (10-02 @ 14:52)  Culture Results:   Growth in aerobic and anaerobic bottles: Staphylococcus epidermidis  "Susceptibilities not performed" (10-02 @ 14:52)    Most recent blood culture -- 10-02 @ 14:52   Blood Culture PCR Blood Culture PCR .Blood Blood-Peripheral 10-02 @ 14:52      Physical Examination:  PULM: Few scattered basilar crackles; no wheeze  CVS: Regular rate and rhythm, no murmurs, rubs, or gallops  ABD: Soft, non-tender  EXT:  No clubbing, cyanosis, or edema    RADIOLOGY REVIEWED  CXR:    CT chest:    PROCEDURE DATE:  10/02/2020        INTERPRETATION:  EXAMINATION: CT ANGIO CHEST WITHOUT AND OR WITH IV CONTRAST    CLINICAL INDICATION: Dyspnea    TECHNIQUE: CTA of the chest was performed for evaluation of pulmonary embolism after administration of 90 ml of Omnipaque-350, 10 ml discarded.  MIP images were reconstructed.    COMPARISON: 10/27/2019.    FINDINGS:    PULMONARY ARTERIES: There is no pulmonary embolism    AIRWAYS AND LUNGS: The central tracheobronchial tree is patent.  Bilateral groundglass opacities and consolidations, predominantly peripherally    MEDIASTINUM AND PLEURA: There are no enlarged mediastinal, hilar or axillary lymph nodes. The visualized portion of the thyroid gland is unremarkable. There is no pleural effusion. There is no pneumothorax.    HEART AND VESSELS: There is mild cardiomegaly. There are atherosclerotic calcifications of the aorta and coronary arteries.  There is no pericardial effusion.    UPPER ABDOMEN: Images of the upper abdomen demonstrate no abnormality.    BONES AND SOFT TISSUES: Unchanged T12 vertebral plasty and L1 vertebral body severe compression deformity.  The soft tissues are unremarkable.    TUBES/LINES: None.    IMPRESSION:  No pulmonary embolism. Bilateral lung opacities may represent Covid-19.    TTE:

## 2020-10-22 NOTE — PROGRESS NOTE ADULT - SUBJECTIVE AND OBJECTIVE BOX
Patient is a 75y old  Female who presents with a chief complaint of COVID-19 PNA, acute respiratory failure (20 Oct 2020 09:46)      SUBJECTIVE / OVERNIGHT EVENTS:  91% on RA this orning  COVID-19 PCR (+) yesterday    Vital Signs Last 24 Hrs  T(C): 36.8 (22 Oct 2020 04:26), Max: 36.8 (22 Oct 2020 04:26)  T(F): 98.3 (22 Oct 2020 04:26), Max: 98.3 (22 Oct 2020 04:26)  HR: 76 (22 Oct 2020 04:26) (76 - 84)  BP: 142/74 (22 Oct 2020 04:26) (106/71 - 142/76)  BP(mean): --  RR: 18 (22 Oct 2020 05:00) (18 - 18)  SpO2: 91% (22 Oct 2020 05:00) (91% - 96%)    I&O's Summary    10-21-20 @ 07:01  -  10-22-20 @ 07:00  --------------------------------------------------------  IN: 240 mL / OUT: 500 mL / NET: -260 mL        PHYSICAL EXAM:  GENERAL: NAD, mild hard of hearing  HEAD:  Atraumatic, Normocephalic  EYES: EOMI, PERRLA, conjunctiva and sclera clear  NECK: Supple, No JVD  CHEST/LUNG: mild decrease breath sounds bilaterally; No wheeze   HEART: Regular rate and rhythm; No murmurs, rubs, or gallops  ABDOMEN: Soft, Nontender, Nondistended; Bowel sounds present  Neuro: AAO x 2-3, no focal deficit, 5/5 b/l extremities  EXTREMITIES:  2+ Peripheral Pulses, No clubbing, cyanosis, or edema  SKIN: No rashes or lesions    LABS:                        11.0   14.87 )-----------( 217      ( 22 Oct 2020 06:02 )             33.4     10-22    138  |  114<H>  |  15  ----------------------------<  81  3.4<L>   |  16<L>  |  <0.30<L>    Ca    6.8<L>      22 Oct 2020 06:02  Phos  1.9     10-22  Mg     1.6     10-22    TPro  5.6<L>  /  Alb  3.1<L>  /  TBili  0.7  /  DBili  x   /  AST  26  /  ALT  21  /  AlkPhos  66  10-21      CAPILLARY BLOOD GLUCOSE                RADIOLOGY & ADDITIONAL TESTS:    Imaging Personally Reviewed:  [x] YES  [ ] NO    Consultant(s) Notes Reviewed:  [x] YES  [ ] NO

## 2020-10-22 NOTE — CHART NOTE - NSCHARTNOTEFT_GEN_A_CORE
Nutrition Follow Up Note  Patient seen for malnutrition follow up.     Source: Unable to conduct a face-to-face interview at this time due to limited contact restrictions related to pt's medical condition and isolation precautions. Attempted to reach pt via room phone with no answer, information obtained from RN and Medical record.    Diet :   Dysphagia 1 Pureed-Honey Consistency Fluid    Per RN, pt is eating poorly in-house since diet advancement on 10/20. No known issues c food texture. No recently reported N/V, constipation, or diarrhea. Last BM 10/22. Per RN, pt's  was asking about Ensure Enlive supplement pt was previously receiving. Recommend reinstating supplement thickened to appropriate consistency.     Nutrition Events:  - Pt seen by SLP on 10/21 with recommendation, " 1) NPO, with non-oral nutrition/hydration/medications. Although discussed with NP Sobeida that Pt to continue on conservative PO dysphagia diet. 2) Aspiration precaution"      PO intake :  30-40% of meals in-house     Source for PO intake:  RN and RN flowsheet    Daily wt in lbs: 110.6 (10/2)  Dosing wt 100 lbs  No new daily wts to trend.     Pertinent Medications: MEDICATIONS  (STANDING):  aspirin  chewable 81 milliGRAM(s) Oral daily  benzonatate 100 milliGRAM(s) Oral three times a day  cholecalciferol 2000 Unit(s) Oral daily  enoxaparin Injectable 40 milliGRAM(s) SubCutaneous daily  hydroxychloroquine 400 milliGRAM(s) Oral <User Schedule>  labetalol 200 milliGRAM(s) Oral two times a day  methimazole 5 milliGRAM(s) Oral daily  multivitamin 1 Tablet(s) Oral daily  nystatin Powder 1 Application(s) Topical two times a day  pantoprazole  Injectable 40 milliGRAM(s) IV Push daily  PARoxetine 10 milliGRAM(s) Oral daily  potassium chloride   Powder 40 milliEquivalent(s) Oral every 6 hours  sodium chloride 0.9%. 1000 milliLiter(s) (50 mL/Hr) IV Continuous <Continuous>  ursodiol Tablet 250 milliGRAM(s) Oral two times a day    MEDICATIONS  (PRN):  acetaminophen   Tablet .. 650 milliGRAM(s) Oral every 8 hours PRN Temp greater or equal to 38.5C (101.3F)  acetaminophen   Tablet .. 650 milliGRAM(s) Oral every 6 hours PRN Moderate Pain (4 - 6)  guaiFENesin   Syrup  (Sugar-Free) 200 milliGRAM(s) Oral every 6 hours PRN Cough  labetalol Injectable 10 milliGRAM(s) IV Push every 8 hours PRN Systolic blood pressure > 150    Pertinent Labs: 10-22 @ 06:02: Na 138, BUN 15, Cr <0.30<L>, BG 81, K+ 3.4<L>, Phos 1.9<L>, Mg 1.6    Skin per nursing documentation: No pressure injuries noted.  Edema per nursing documentation: None noted.     Estimated Needs:   [x] no change since previous assessment  Based on weight: 110.6 lbs (10/2)   Estimated Energy Needs (30-35 calories/kg): 3138-6240 kcal/day  Estimated Protein Needs (1.2-1.4 gm/kg): 60-70 g/day  Estimated Fluid Needs (25-30 ml/kg): 6362-2331 ml/day    Previous Nutrition Diagnosis: Severe malnutrition  Nutrition Diagnosis is: ongoing. Care plan in place and being addressed with recommended nutrition supplements.     New Nutrition Diagnosis: N/A    Recommend  1) Recommend continue without therapeutic diet restrictions in setting of poor intake. Continue Dysphagia 1 Pureed-Honey Consistency Fluid; consistency deferred to provider and SLP.  2) Recommend restarting Ensure Enlive x2 daily to optimize nutrition status.   3) Continue micronutrients to optimize nutrient status as medically feasible.     Monitoring and Evaluation:   Continue to monitor Nutritional intake, Tolerance to diet prescription, weights, labs, skin integrity    RD remains available upon request and will follow up per protocol  Maureen Fischer MS, RD, Pager #[phone]

## 2020-10-22 NOTE — PROGRESS NOTE ADULT - ASSESSMENT
Patient is a 75 year old female with PMH of significant for recently diagnosed hyperthyroidism (3mo ago, on Methimazole), RA (on MTX weekly, Remicade F3xohza, Hydroxychloroquine, and daily steroids w/ Prednisone 10mg daily), HTN, CVA, memory impairment who presented from [facility] with nonproductive cough and dyspnea with nausea and diarrhea and was found to be COVID-19 positive. Blood cultures positive for Staph epi in both sets, unclear if true or contaminant, treated as true bacteremia.

## 2020-10-22 NOTE — PROGRESS NOTE ADULT - PROBLEM SELECTOR PLAN 3
- COVID19 PCR detected    - supportive management including for fever/cough  - monitor respiratory status closely and serial inflammatory markers to monitor for severity progression  - maintain oxygenation and monitoring as above  - Lovenox 40mg SubQ for high risk of microthrombi a/w COVID-19  - Completed course of Remdesivir under EUA as Spo2 < 94% on RA (pt requiring O2 supp via NC currently), eGFR = 90 (>30), ALT = 33 (WNL), and pt does not meet exclusion criteria -- given weight = 49.9kg, dose: 200mg IV x1 followed by 100mg IV Q24 daily x4days; discussed fact sheet extensively with patient's  via telephone  - also initiated steroids: as patient on daily Prednisone and received Solucortef 100mg IV x1 in ED at 1:40pm, she finished extended course of Dexamethasone 6mg daily on 10/20/20  - stopped Azithromycin as pt on chronic home Hydroxychloroquine for RA as below, and risk of Qtc prolongation

## 2020-10-23 NOTE — PROGRESS NOTE ADULT - SUBJECTIVE AND OBJECTIVE BOX
So far seems to be tolerating the dysphagia I diet  Saturating 91-92% on 1.5LNC O2    Vital Signs Last 24 Hrs  T(C): 37 (23 Oct 2020 04:42), Max: 37.1 (22 Oct 2020 11:07)  T(F): 98.6 (23 Oct 2020 04:42), Max: 98.7 (22 Oct 2020 11:07)  HR: 80 (23 Oct 2020 04:42) (72 - 91)  BP: 134/78 (23 Oct 2020 04:42) (118/71 - 143/84)  BP(mean): --  RR: 18 (23 Oct 2020 09:46) (18 - 18)  SpO2: 92% (23 Oct 2020 09:46) (91% - 94%)    I&O's Summary    10-22-20 @ 07:01  -  10-23-20 @ 07:00  --------------------------------------------------------  IN: 630 mL / OUT: 600 mL / NET: 30 mL    10-23-20 @ 07:01  -  10-23-20 @ 10:56  --------------------------------------------------------  IN: 50 mL / OUT: 0 mL / NET: 50 mL        PHYSICAL EXAM:  GENERAL: NAD, mild hard of hearing  HEAD:  Atraumatic, Normocephalic  EYES: EOMI, PERRLA, conjunctiva and sclera clear  NECK: Supple, No JVD  CHEST/LUNG: mild decrease breath sounds bilaterally; No wheeze   HEART: Regular rate and rhythm; No murmurs, rubs, or gallops  ABDOMEN: Soft, Nontender, Nondistended; Bowel sounds present  Neuro: AAO x 2-3, no focal deficit, 5/5 b/l extremities  EXTREMITIES:  2+ Peripheral Pulses, No clubbing, cyanosis, or edema  SKIN: No rashes or lesions    LABS:                        11.5   14.74 )-----------( 212      ( 23 Oct 2020 06:56 )             34.5     10-23    137  |  109<H>  |  12  ----------------------------<  64<L>  4.4   |  17<L>  |  0.31<L>    Ca    8.0<L>      23 Oct 2020 06:51  Phos  1.9     10-22  Mg     2.2     10-23    TPro  5.6<L>  /  Alb  3.1<L>  /  TBili  0.7  /  DBili  x   /  AST  26  /  ALT  21  /  AlkPhos  66  10-21      CAPILLARY BLOOD GLUCOSE                RADIOLOGY & ADDITIONAL TESTS:    Imaging Personally Reviewed:  [x] YES  [ ] NO    Consultant(s) Notes Reviewed:  [x] YES  [ ] NO

## 2020-10-23 NOTE — PROGRESS NOTE ADULT - PROBLEM SELECTOR PLAN 1
- 2' COVID-19 PNA  - maintain SpO2 > 90 %  - avoid BiLevel given (+) COVID-19 status  - monitor inflammatory markers every 2-3 days  - she was on decadron PO daily from 10/2-10/20 which was extended owing to her persistently high O2 requirements  - continue to wean O2 as she tolerates

## 2020-10-23 NOTE — CHART NOTE - NSCHARTNOTEFT_GEN_A_CORE
Pt being followed by this service for dysphagia.     74 yo F w/ PMHx significant for recently diagnosed hyperthyroidism (3mo ago, on Methimazole), rheumatoid arthritis (on MTX weekly, Remicade U3jxawb, Hydroxychloroquine, and daily steroids w/ Prednisone 10mg daily), HTN (on multiple anti-hypertensive agents), s/p CVA w/o residual deficits, memory impairment, who presents from  after presenting from [facility] where she resides, w/ c/o nonproductive cough over the past few days, and SOB, along w/ NBNB vomiting 3-4days ago and NB diarrhea beginning today, found to be COVID-19 positive. Admitted with acute respiratory failure with hypoxia. +Decadron PO daily from 10/2-10/20 which was extended owing to her persistently high O2 requirements. Pt was maintained on a regular texture diet until 10/19 when RN noted possible gurgling when swallowing pills. S&S consulted.     Initial Bedside swallow evaluation completed on 10/19 with recommendations for NPO, with non-oral nutrition/hydration/medications due to poor secretion management. Pt was started on puree texture diet and honey thick liquids by team on 10/20. Re-assesed by SLP on 10/21 with continued recommendation for NPO, with non-oral nutrition/hydration/medications.     Today, Pt seen for re-evaluation of swallow function. Pt encountered awake and alert, reclined in bed. On 4l/min via NC, which is an increase in O2 compared for initial evaluations. +Mandarin Speaking- Video  on unit utilized t/o evaluation (unable to scribe  name/number due to airborne/contact precautions). A&Ox1. HOB elevated for evaluation purposes. Pt with clear baseline vocal quality. SLP provided trials of purees and honey thick liquids. Pt continues to present with 1) Suspected delayed pharyngeal swallow, wet, vocal quality, and coughing episode post PO. Therefore, no additional PO trials were provided. Of note, this clinician has not observed any coughing episodes during previous evaluations (at baseline or post PO). 2) Cognitive communication impairment.  Given concern for reduced secretion management and coughing post PO, this service recommends NPO, with non-oral nutrition/hydration/medications. However, discussed with LATHA Garcia that Pt will continue on dysphagia diet.     -Purposeful proactive rounding reinforced and 5 Ps addressed. Pt left in no distress. D/W NP Radha, Patient, Pt  Al    -Recommendations: 1) NPO, with non-oral nutrition/hydration/medications. Although discussed with LATHA Garcia that Pt to continue on conservative PO dysphagia diet. 2) Aspiration precautions 3) Maintain adequate oral hygiene. 3) Maintain adequate oral hygiene. 4) This service will continue to follow Pt.     Belinda Krueger MS CCC-SLP  Speech-Language Pathologist  ; [phone]. Pt being followed by this service for dysphagia.     74 yo F w/ PMHx significant for recently diagnosed hyperthyroidism (3mo ago, on Methimazole), rheumatoid arthritis (on MTX weekly, Remicade Y1cbuar, Hydroxychloroquine, and daily steroids w/ Prednisone 10mg daily), HTN (on multiple anti-hypertensive agents), s/p CVA w/o residual deficits, memory impairment, who presents from  after presenting from [facility] where she resides, w/ c/o nonproductive cough over the past few days, and SOB, along w/ NBNB vomiting 3-4days ago and NB diarrhea beginning today, found to be COVID-19 positive. Admitted with acute respiratory failure with hypoxia. +Decadron PO daily from 10/2-10/20 which was extended owing to her persistently high O2 requirements. Pt was maintained on a regular texture diet until 10/19 when RN noted possible gurgling when swallowing pills. S&S consulted.     Initial Bedside swallow evaluation completed on 10/19 with recommendations for NPO, with non-oral nutrition/hydration/medications due to poor secretion management. Pt was started on puree texture diet and honey thick liquids by team on 10/20. Re-assesed by SLP on 10/21 with continued recommendation for NPO, with non-oral nutrition/hydration/medications.     Today, Pt seen for re-evaluation of swallow function. Pt encountered awake and alert, reclined in bed. On 4l/min via NC, which is an increase in O2 compared for initial evaluations. O2 94% +Mandarin Speaking- Video  on unit utilized t/o evaluation (unable to scribe  name/number due to airborne/contact precautions). A&Ox1. HOB elevated for evaluation purposes. Pt with clear baseline vocal quality. SLP provided trials of purees and honey thick liquids. Pt continues to present with 1) Suspected delayed pharyngeal swallow, wet, vocal quality, and coughing episode post PO. Therefore, no additional PO trials were provided. Of note, this clinician has not observed any coughing episodes during previous evaluations (at baseline or post PO). 2) Cognitive communication impairment.  Given concern for reduced secretion management and coughing post PO, this service recommends NPO, with non-oral nutrition/hydration/medications. However, discussed with NP Radha that Pt will continue on dysphagia diet.     -Purposeful proactive rounding reinforced and 5 Ps addressed. Pt left in no distress. D/W NP Radha, Patient, Pt  Al    -Recommendations: 1) NPO, with non-oral nutrition/hydration/medications. Although discussed with LATHA Garcia that Pt to continue on conservative PO dysphagia diet. 2) Aspiration precautions 3) Maintain adequate oral hygiene. 3) Maintain adequate oral hygiene. 4) This service will continue to follow Pt.     Belinda Krueger MS CCC-SLP  Speech-Language Pathologist  ; [phone].

## 2020-10-23 NOTE — PROGRESS NOTE ADULT - PROBLEM SELECTOR PLAN 6
- at home, patient on Lisinopril 10mg daily, Labetalol 200mg BID, and Amlodipine 10mg daily  - initially held all antihypertensive agents on admission given presenting hypotension in setting of above infection  - continue Labetalol 200 mg BID with hold parameters  - holding home Tekturna as well, not on formulary

## 2020-10-23 NOTE — PROGRESS NOTE ADULT - ASSESSMENT
Patient is a 75 year old female with PMH of significant for recently diagnosed hyperthyroidism (3mo ago, on Methimazole), RA (on MTX weekly, Remicade K1vnejc, Hydroxychloroquine, and daily steroids w/ Prednisone 10mg daily), HTN, CVA, memory impairment who presented from [facility] with nonproductive cough and dyspnea with nausea and diarrhea and was found to be COVID-19 positive. Blood cultures positive for Staph epi in both sets, unclear if true or contaminant, treated as true bacteremia.

## 2020-10-23 NOTE — PROGRESS NOTE ADULT - SUBJECTIVE AND OBJECTIVE BOX
Follow-up Pulm Progress Note  Gordon Fox MD  [phone]    Afebrile post decadron/Remdesevir  Recorded sats fluctuating - apparently on 1 liter, the 4 liters  Stable resp status clinically  Repeat PCR 10/19 positive    Vital Signs Last 24 Hrs  T(C): 36.6 (23 Oct 2020 11:38), Max: 37 (23 Oct 2020 04:42)  T(F): 97.9 (23 Oct 2020 11:38), Max: 98.6 (23 Oct 2020 04:42)  HR: 73 (23 Oct 2020 11:38) (73 - 91)  BP: 149/83 (23 Oct 2020 11:38) (118/71 - 149/83)  BP(mean): --  RR: 18 (23 Oct 2020 11:38) (18 - 18)  SpO2: 95% (23 Oct 2020 11:38) (86% - 95%)                        11.5   14.74 )-----------( 212      ( 23 Oct 2020 06:56 )             34.5       10-23    137  |  109<H>  |  12  ----------------------------<  64<L>  4.4   |  17<L>  |  0.31<L>    Ca    8.0<L>      23 Oct 2020 06:51  Phos  1.9     10-22  Mg     2.2     10-23      CULTURES:  Culture Results:   Growth in aerobic and anaerobic bottles: Staphylococcus epidermidis  Susceptibility to follow.  "Due to technical problems, Proteus sp. will Not be reported as part of  the BCID panel until further notice"  ***Blood Panel PCR results on this specimen are available  approximately 3 hours after the Gram stain result.***  Gram stain, PCR, and/or culture results may not always  correspond due to difference in methodologies.  ************************************************************  This PCR assay was performed using [employer].  The following targets are tested for: Enterococcus,  vancomycin resistant enterococci, Listeria monocytogenes,  coagulase negative staphylococci, S. aureus,  methicillin resistant S. aureus, Streptococcus agalactiae  (Group B), S. pneumoniae, S. pyogenes (Group A),  Acinetobacter baumannii, Enterobacter cloacae, E. coli,  Klebsiella oxytoca, K. pneumoniae, Proteus sp.,  Serratia marcescens, Haemophilus influenzae,  Neisseria meningitidis, Pseudomonas aeruginosa, Candida  albicans, C. glabrata, C krusei, C parapsilosis,  C. tropicalis and the KPC resistance gene. (10-02 @ 14:52)  Culture Results:   Growth in aerobic and anaerobic bottles: Staphylococcus epidermidis  "Susceptibilities not performed" (10-02 @ 14:52)    Most recent blood culture -- 10-02 @ 14:52   Blood Culture PCR Blood Culture PCR .Blood Blood-Peripheral 10-02 @ 14:52      Physical Examination:  PULM: Few scattered basilar crackles; no wheeze  CVS: Regular rate and rhythm, no murmurs, rubs, or gallops  ABD: Soft, non-tender  EXT:  No clubbing, cyanosis, or edema    RADIOLOGY REVIEWED  CXR:    CT chest:    PROCEDURE DATE:  10/02/2020        INTERPRETATION:  EXAMINATION: CT ANGIO CHEST WITHOUT AND OR WITH IV CONTRAST    CLINICAL INDICATION: Dyspnea    TECHNIQUE: CTA of the chest was performed for evaluation of pulmonary embolism after administration of 90 ml of Omnipaque-350, 10 ml discarded.  MIP images were reconstructed.    COMPARISON: 10/27/2019.    FINDINGS:    PULMONARY ARTERIES: There is no pulmonary embolism    AIRWAYS AND LUNGS: The central tracheobronchial tree is patent.  Bilateral groundglass opacities and consolidations, predominantly peripherally    MEDIASTINUM AND PLEURA: There are no enlarged mediastinal, hilar or axillary lymph nodes. The visualized portion of the thyroid gland is unremarkable. There is no pleural effusion. There is no pneumothorax.    HEART AND VESSELS: There is mild cardiomegaly. There are atherosclerotic calcifications of the aorta and coronary arteries.  There is no pericardial effusion.    UPPER ABDOMEN: Images of the upper abdomen demonstrate no abnormality.    BONES AND SOFT TISSUES: Unchanged T12 vertebral plasty and L1 vertebral body severe compression deformity.  The soft tissues are unremarkable.    TUBES/LINES: None.    IMPRESSION:  No pulmonary embolism. Bilateral lung opacities may represent Covid-19.    TTE:

## 2020-10-23 NOTE — PROGRESS NOTE ADULT - ASSESSMENT
Acute hypoxemic resp failure due to multilobar viral pneumonia  COVID 19 viral infection  CNS bacteremia: unclear significance  RA on immunosupressive regimen  Inflammatory markers largely stable  10/5: Clinically stable, Mild tachypnea; howver increase in nasal O2 to 5 liters noted. Uptrending inflammatory markers noted  10/6:  CXR progressed vs. admission; still requires 5 liters nasal cannula with sat 91%; Ferritin decreased  10/7: Still requires 6 liters nasal oxygen; infl markers downtrending  ; no gross improvement in status  10/8: Clinically unchanged; Ferritin increasing; remains on 6 liters nasal cannula; c/o cogh  10/16: RA sats > 90% on RA at rest  10/19: RA sat 94% today. ON decadron taper    REC    10/23: Repeat CXR ordered, Monitor RA sats again at rest and ambulating  DC planning for assisted living once PCR neg  Patient will need pulm f/u as outpatient: Methotrexate should be held until clinical resolution of COVID pneumonia. Patient should have f/u CT chest prior to resumption of methotrexate. Remicade should be held until fully recovered  Outpatient pulmonary: Dr Wilbert Pastor [facility]

## 2020-10-23 NOTE — PROGRESS NOTE ADULT - PROBLEM SELECTOR PLAN 4
- COVID19 PCR still detected    - supportive management including for fever/cough  - monitor respiratory status closely and serial inflammatory markers to monitor for severity progression  - maintain oxygenation and monitoring as above  - Lovenox 40mg SubQ for high risk of microthrombi a/w COVID-19  - Completed course of Remdesivir under EUA as Spo2 < 94% on RA (pt requiring O2 supp via NC currently), eGFR = 90 (>30), ALT = 33 (WNL), and pt does not meet exclusion criteria -- given weight = 49.9kg, dose: 200mg IV x1 followed by 100mg IV Q24 daily x4days; discussed fact sheet extensively with patient's  via telephone  - also initiated steroids: as patient on daily Prednisone and received Solucortef 100mg IV x1 in ED at 1:40pm, she finished extended course of Dexamethasone 6mg daily on 10/20/20  - stopped Azithromycin as pt on chronic home Hydroxychloroquine for RA as below, and risk of Qtc prolongation

## 2020-10-23 NOTE — PROGRESS NOTE ADULT - ASSESSMENT
74 yo F w/ PMHx significant for recently diagnosed hyperthyroidism (3mo ago, on Methimazole), rheumatoid arthritis (on MTX weekly, Remicade [id number], Hydroxychloroquine, and daily steroids w/ Prednisone 10mg daily), HTN (on multiple anti-hypertensive agents), s/p CVA w/o residual deficits, memory impairment, who presents from  after presenting from [facility] where she resides, w/ c/o nonproductive cough over the past few days, and SOB, along w/ NBNB vomiting 3-4days ago and NB diarrhea beginning today, found to be COVID-19 positive.

## 2020-10-23 NOTE — PROGRESS NOTE ADULT - SUBJECTIVE AND OBJECTIVE BOX
[facility], Division of Infectious Diseases  ROSALINA Cali Y. Patel, S. Shah  [phone]  ([phone] - weekdays after 5pm and weekends)    Name: TARA ESPINAL  Age: 75y  Gender: Female  MRN: [id number]    Interval History:  Patient resting comfortably on room air, no acute distress. No acute events overnight.   Notes reviewed. Remains afebrile.    Objective:  Vitals:  T(F): 98.6 (10-23-20 @ 04:42), Max: 98.7 (10-22-20 @ 11:07)  HR: 80 (10-23-20 @ 04:42) (72 - 91)  BP: 134/78 (10-23-20 @ 04:42) (118/71 - 143/84)  RR: 18 (10-23-20 @ 04:42) (18 - 18)  SpO2: 91% (10-23-20 @ 04:42) (91% - 94%)    Physical Examination:  General: NAD, appears comfortable on room air  HEENT: NC/AT, EOMI, anicteric, neck supple  Cardio: S1, S2 present, normal rate  Resp: no respiratory distress  Neuro: AAOx3, no obvious focal deficits  Ext: no edema, moving all extremities  Skin: no visible rash  Psych: calm, cooperative  Lines: PIV    Laboratory Studies:  CBC:                       11.5   14.74 )-----------( 212      ( 23 Oct 2020 06:56 )             34.5     CMP: 10-23    137  |  109<H>  |  12  ----------------------------<  64<L>  4.4   |  17<L>  |  0.31<L>    Ca    8.0<L>      23 Oct 2020 06:51  Phos  1.9     10-22  Mg     2.2     10-23    TPro  5.6<L>  /  Alb  3.1<L>  /  TBili  0.7  /  DBili  x   /  AST  26  /  ALT  21  /  AlkPhos  66  10-21    LIVER FUNCTIONS - ( 21 Oct 2020 10:59 )  Alb: 3.1 g/dL / Pro: 5.6 g/dL / ALK PHOS: 66 U/L / ALT: 21 U/L / AST: 26 U/L / GGT: x                  Microbiology:  10/14, 10/17, 10/19, 10/21 - COVID-19 PCR - positive  10/4 - blood cultures - negative   10/3 - COVID-19 ab - negative   10/2 - blood cultures - 2/2 - CoNS- S. epidermidis - S to A/S, cefazolin, gent, oxacillin, tetracyclines, bactrim, vancomycin; R to clindamycin, erythromycin, penicillin  10/2 - COVID-19 PCR - positive    Radiology:  Xray Chest 1 View- PORTABLE-Routine (Xray Chest 1 View- PORTABLE-Routine .) (10.19.20 @ 18:54) >Impression: Poor inspiratory effort. Left hiatal hernia. Pulmonary vascular congestion. The overall appearance of the chest remain unchanged when compared to previous study done October 12, 2020. Pneumonia cannot be ruled out entirely. Calcified aortic knob.    Xray Chest 1 View- PORTABLE-Routine (Xray Chest 1 View- PORTABLE-Routine .) (10.12.20 @ 12:49) >The heart is slightly enlarged. A left hiatal hernia is present. Diffuse airspace disease is seen in both lungs more pronounced on the left compatible with pneumonia. Degenerative changes of the thoracic spine and both shoulders. No pleural effusion. No pneumothorax.    Xray Chest 1 View- PORTABLE-Urgent (Xray Chest 1 View- PORTABLE-Urgent .) (10.05.20 @ 12:41) > Impression: The heart is normal insize. Poor inspiratory effort. Diffuse airspace opacities are seen from both lungs compatible with pneumonia status post seen in COVID 19., Correlate with CT scan that was performed on October 2, 2020.    CT Angio Chest w/ IV Cont (10.02.20 @ 13:30) > IMPRESSION: No pulmonary embolism. Bilateral lung opacities may represent Covid-19.    Xray Chest 1 View-PORTABLE IMMEDIATE (10.02.20 @ 11:26) > IMPRESSION: Mid left lung infiltrate.    Medications:  MEDICATIONS  (STANDING):  aspirin  chewable 81 milliGRAM(s) Oral daily  benzonatate 100 milliGRAM(s) Oral three times a day  cholecalciferol 2000 Unit(s) Oral daily  enoxaparin Injectable 40 milliGRAM(s) SubCutaneous daily  hydroxychloroquine 400 milliGRAM(s) Oral <User Schedule>  labetalol 200 milliGRAM(s) Oral two times a day  methimazole 5 milliGRAM(s) Oral daily  multivitamin 1 Tablet(s) Oral daily  nystatin Powder 1 Application(s) Topical two times a day  pantoprazole  Injectable 40 milliGRAM(s) IV Push daily  PARoxetine 10 milliGRAM(s) Oral daily  sodium chloride 0.9%. 1000 milliLiter(s) (50 mL/Hr) IV Continuous <Continuous>  ursodiol Tablet 250 milliGRAM(s) Oral two times a day    Antimicrobials:  hydroxychloroquine 400 milliGRAM(s) Oral <User Schedule>  S/p cefazolin 10/6-10/13  S/p remdesivir 10/2-10/6  S/p vancomycin 10/2-10/6

## 2020-10-24 NOTE — PROGRESS NOTE ADULT - ASSESSMENT
76 yo F w/ PMHx significant for recently diagnosed hyperthyroidism (3mo ago, on Methimazole), rheumatoid arthritis (on MTX weekly, Remicade [id number], Hydroxychloroquine, and daily steroids w/ Prednisone 10mg daily), HTN (on multiple anti-hypertensive agents), s/p CVA w/o residual deficits, memory impairment, who presents from  after presenting from [facility] where she resides, w/ c/o nonproductive cough over the past few days, and SOB, along w/ NBNB vomiting 3-4days ago and NB diarrhea beginning today, found to be COVID-19 positive.

## 2020-10-24 NOTE — PROGRESS NOTE ADULT - PROBLEM SELECTOR PLAN 1
- 2' COVID-19 PNA  - maintain SpO2 > 90 %  - avoid BiLevel given (+) COVID-19 status  - aspiration precautions during eating  - she was on decadron PO daily from 10/2-10/20 which was extended owing to her persistently high O2 requirements  - continue to wean O2 as she tolerates  - CXR 10/23 shows worsening of infiltrates; coupled with her low-grade of 99.8 on 10/24, may have to consider treating for aspiration pneumonitis

## 2020-10-24 NOTE — PROVIDER CONTACT NOTE (CRITICAL VALUE NOTIFICATION) - TEST AND RESULT REPORTED:
Glucose 44
blood cultures from 10/2 1st set showed gram positive cocci in clusters both in aerobic and anerobic set,2nd set preliminary showed gram positive cocci in clusters in aerobic bottle

## 2020-10-24 NOTE — PROVIDER CONTACT NOTE (CRITICAL VALUE NOTIFICATION) - BACKGROUND
74 y/o F with PMHx of hyperthyroidism (x last 3 months), RA on steroids, MTX, Tekturna and Remicade infusions, & Plaquenil prior stroke w/o residual deficit, memory impairment
Patient admitted with COVID, hx RA. Poor appetite

## 2020-10-24 NOTE — PROGRESS NOTE ADULT - SUBJECTIVE AND OBJECTIVE BOX
Desaturated to 86% yesterday morning on 1LNCO2  Now on 4L, saturating in low 90s  99.8 temp earlier this morning    Vital Signs Last 24 Hrs  T(C): 37.7 (24 Oct 2020 11:52), Max: 37.7 (24 Oct 2020 11:52)  T(F): 99.8 (24 Oct 2020 11:52), Max: 99.8 (24 Oct 2020 11:52)  HR: 80 (24 Oct 2020 11:52) (70 - 80)  BP: 115/64 (24 Oct 2020 11:52) (115/64 - 155/75)  BP(mean): --  RR: 18 (24 Oct 2020 11:52) (18 - 20)  SpO2: 90% (24 Oct 2020 11:52) (90% - 95%)    I&O's Summary    10-23-20 @ 07:01  -  10-24-20 @ 07:00  --------------------------------------------------------  IN: 200 mL / OUT: 0 mL / NET: 200 mL        PHYSICAL EXAM:  GENERAL: NAD, mild hard of hearing  HEAD:  Atraumatic, Normocephalic  EYES: EOMI, PERRLA, conjunctiva and sclera clear  NECK: Supple, No JVD  CHEST/LUNG: mild decrease breath sounds bilaterally; No wheeze   HEART: Regular rate and rhythm; No murmurs, rubs, or gallops  ABDOMEN: Soft, Nontender, Nondistended; Bowel sounds present  Neuro: AAO x 2-3, no focal deficit, 5/5 b/l extremities  EXTREMITIES:  2+ Peripheral Pulses, No clubbing, cyanosis, or edema  SKIN: No rashes or lesions    LABS:                        10.6   15.29 )-----------( 214      ( 24 Oct 2020 05:58 )             31.4     10-24    134<L>  |  104  |  9   ----------------------------<  44<LL>  3.7   |  15<L>  |  <0.30<L>    Ca    7.9<L>      24 Oct 2020 05:58  Phos  2.7     10-24  Mg     2.2     10-23        CAPILLARY BLOOD GLUCOSE      POCT Blood Glucose.: 117 mg/dL (24 Oct 2020 09:18)  POCT Blood Glucose.: 89 mg/dL (24 Oct 2020 09:00)  POCT Blood Glucose.: 76 mg/dL (24 Oct 2020 08:30)            RADIOLOGY & ADDITIONAL TESTS:    Imaging Personally Reviewed:  [x] YES  [ ] NO    Consultant(s) Notes Reviewed:  [x] YES  [ ] NO

## 2020-10-25 NOTE — PROGRESS NOTE ADULT - SUBJECTIVE AND OBJECTIVE BOX
Infectious Diseases progress note:    Subjective:  Covering for [facility].  Pt placed on NRB for desaturation.  WBC trending upwards, afebrile.  Suspected aspiration.  d/w RN.    ROS:  CONSTITUTIONAL:  No fever, chills, rigors  CARDIOVASCULAR:  No chest pain or palpitations  RESPIRATORY:   No SOB, cough, dyspnea on exertion.  No wheezing  GASTROINTESTINAL:  No abd pain, N/V, diarrhea/constipation  EXTREMITIES:  No swelling or joint pain  GENITOURINARY:  No burning on urination, increased frequency or urgency.  No flank pain  NEUROLOGIC:  No HA, visual disturbances  SKIN: No rashes    Allergies    Actonel (Hives)  clonidine (Other)  crab meat (Unknown)    Intolerances        ANTIBIOTICS/RELEVANT:  antimicrobials  hydroxychloroquine 400 milliGRAM(s) Oral <User Schedule>  piperacillin/tazobactam IVPB.. 3.375 Gram(s) IV Intermittent every 8 hours    immunologic:    OTHER:  acetaminophen   Tablet .. 650 milliGRAM(s) Oral every 8 hours PRN  acetaminophen   Tablet .. 650 milliGRAM(s) Oral every 6 hours PRN  aspirin  chewable 81 milliGRAM(s) Oral daily  benzonatate 100 milliGRAM(s) Oral three times a day  cholecalciferol 2000 Unit(s) Oral daily  enoxaparin Injectable 40 milliGRAM(s) SubCutaneous daily  guaiFENesin   Syrup  (Sugar-Free) 200 milliGRAM(s) Oral every 6 hours PRN  labetalol 200 milliGRAM(s) Oral two times a day  labetalol Injectable 10 milliGRAM(s) IV Push every 8 hours PRN  methimazole 5 milliGRAM(s) Oral daily  multivitamin 1 Tablet(s) Oral daily  nystatin Powder 1 Application(s) Topical two times a day  oxyCODONE    IR 10 milliGRAM(s) Oral every 6 hours PRN  pantoprazole  Injectable 40 milliGRAM(s) IV Push daily  PARoxetine 10 milliGRAM(s) Oral daily  ursodiol Tablet 250 milliGRAM(s) Oral two times a day      Objective:  Vital Signs Last 24 Hrs  T(C): 37.3 (25 Oct 2020 12:54), Max: 37.4 (24 Oct 2020 20:26)  T(F): 99.2 (25 Oct 2020 12:54), Max: 99.4 (24 Oct 2020 20:26)  HR: 92 (25 Oct 2020 18:30) (76 - 92)  BP: 157/- (25 Oct 2020 18:40) (102/64 - 170/92)  BP(mean): --  RR: 28 (25 Oct 2020 18:30) (21 - 28)  SpO2: 92% (25 Oct 2020 18:30) (81% - 98%)    PHYSICAL EXAM:  Constitutional:  on NRB  Eyes:ALEJANDRO, EOMI  Ear/Nose/Throat: no thrush, mucositis.  Moist mucous membranes	  Neck:no JVD, no lymphadenopathy, supple  Respiratory: CTA gustabo  Cardiovascular: S1S2 RRR, no murmurs  Gastrointestinal:soft, nontender,  nondistended (+) BS  Extremities:no e/e/c  Skin:  no rashes, open wounds or ulcerations        LABS:                        11.1   17.16 )-----------( 226      ( 25 Oct 2020 06:05 )             32.9     10-25    135  |  104  |  13  ----------------------------<  94  3.6   |  19<L>  |  0.37<L>    Ca    8.5      25 Oct 2020 06:05  Phos  2.7     10-24    TPro  5.4<L>  /  Alb  2.6<L>  /  TBili  0.8  /  DBili  x   /  AST  24  /  ALT  17  /  AlkPhos  73  10-25          Procalcitonin, Serum: 0.12 (10-25 @ 06:05)  Procalcitonin, Serum: 0.04 (10-12 @ 05:42)  Procalcitonin, Serum: <0.03 (10-10 @ 06:03)  Procalcitonin, Serum: 0.05 (10-06 @ 05:53)  Procalcitonin, Serum: 0.08 (10-03 @ 11:12)                    MICROBIOLOGY:      Culture - Blood in AM (10.04.20 @ 11:07)   Specimen Source: .Blood Blood-Venous   Culture Results:   No Growth Final     COVID-19 PCR . (10.23.20 @ 12:47)   COVID-19 PCR: Detected: Testing is performed using polymerase chain reaction (PCR) or   transcription mediated amplification (TMA). This COVID-19 (SARS-CoV-2)   nucleic acid amplification test was validated by [facility] and is   in use under the FDA Emergency Use Authorization (EUA) for clinical labs   CLIA-certified to perform high complexity testing. Test results should be   correlated with clinical presentation, patient history, and epidemiology.     RADIOLOGY & ADDITIONAL STUDIES:    < from: Xray Chest 1 View- PORTABLE-Urgent (Xray Chest 1 View- PORTABLE-Urgent .) (10.23.20 @ 14:24) >  PROCEDURE DATE:  10/23/2020            INTERPRETATION:  Chest one view    HISTORY: Follow-up Covid-19 pneumonia    COMPARISON STUDY: 10/19/2020    Frontal expiratory view of the chest shows the heart to be similar in size. The lungs show progression of infiltrate within the lower half of the left lung and the mid right lung. There is no evidence of pneumothorax nor definite pleural effusion.    IMPRESSION:    Progression of infiltrates.    < end of copied text >

## 2020-10-25 NOTE — PROGRESS NOTE ADULT - NUTRITIONAL ASSESSMENT
This patient has been assessed with a concern for Malnutrition and has been determined to have a diagnosis/diagnoses of Severe protein-calorie malnutrition.    This patient is being managed with:   Diet Regular-  Low Sodium  Supplement Feeding Modality:  Oral  Ensure Enlive Cans or Servings Per Day:  2       Frequency:  Daily  Entered: Oct  7 2020  3:11PM    

## 2020-10-25 NOTE — PROGRESS NOTE ADULT - ASSESSMENT
Patient is a 75 year old female with PMH of significant for recently diagnosed hyperthyroidism (3mo ago, on Methimazole), RA (on MTX weekly, Remicade X5ptymh, Hydroxychloroquine, and daily steroids w/ Prednisone 10mg daily), HTN, CVA, memory impairment who presented from [facility] with nonproductive cough and dyspnea with nausea and diarrhea and was found to be COVID-19 positive. Blood cultures positive for Staph epi in both sets, unclear if true or contaminant, treated as true bacteremia.

## 2020-10-25 NOTE — PROGRESS NOTE ADULT - PROBLEM SELECTOR PLAN 3
- Cautiously started dysphagia 1 on 10/20/20  - Aspiration precautions - Cautiously started dysphagia 1 on 10/20/20  - Aspiration precautions  - NPO 2' aspiration 10/25 - Cautiously started dysphagia 1 on 10/20/20  - Aspiration precautions  - NPO 2' aspiration 10/25  - son is OK with NGT if needed

## 2020-10-25 NOTE — PROGRESS NOTE ADULT - REASON FOR ADMISSION
COVID-19 PNA, acute respiratory failure

## 2020-10-25 NOTE — PROGRESS NOTE ADULT - SUBJECTIVE AND OBJECTIVE BOX
More lethargic  Oxygen requirements have increased in past 24 hours  Inflammatory markers worsened w/exception of ferritin  CXR 10/23 showed progression of infiltrates    Vital Signs Last 24 Hrs  T(C): 37.3 (25 Oct 2020 08:32), Max: 37.7 (24 Oct 2020 11:52)  T(F): 99.1 (25 Oct 2020 08:32), Max: 99.8 (24 Oct 2020 11:52)  HR: 83 (25 Oct 2020 05:21) (76 - 83)  BP: 153/74 (25 Oct 2020 05:21) (102/64 - 153/74)  BP(mean): --  RR: 28 (25 Oct 2020 08:32) (18 - 28)  SpO2: 98% (25 Oct 2020 08:32) (82% - 98%)    I&O's Summary    10-24-20 @ 07:01  -  10-25-20 @ 07:00  --------------------------------------------------------  IN: 0 mL / OUT: 200 mL / NET: -200 mL        PHYSICAL EXAM:  GENERAL: more lethargic, mild hard of hearing  HEAD:  Atraumatic, Normocephalic  EYES: EOMI, PERRLA, conjunctiva and sclera clear  NECK: Supple, No JVD  CHEST/LUNG: mild decrease breath sounds bilaterally; No wheeze   HEART: Regular rate and rhythm; No murmurs, rubs, or gallops  ABDOMEN: Soft, Nontender, Nondistended; Bowel sounds present  Neuro: AAO x 1-2, no focal deficits  EXTREMITIES:  2+ Peripheral Pulses, No clubbing, cyanosis, or edema  SKIN: No rashes or lesions    LABS:                        11.1   17.16 )-----------( 226      ( 25 Oct 2020 06:05 )             32.9     10-25    135  |  104  |  13  ----------------------------<  94  3.6   |  19<L>  |  0.37<L>    Ca    8.5      25 Oct 2020 06:05  Phos  2.7     10-24    TPro  5.4<L>  /  Alb  2.6<L>  /  TBili  0.8  /  DBili  x   /  AST  24  /  ALT  17  /  AlkPhos  73  10-25      CAPILLARY BLOOD GLUCOSE      POCT Blood Glucose.: 117 mg/dL (24 Oct 2020 09:18)  POCT Blood Glucose.: 89 mg/dL (24 Oct 2020 09:00)            RADIOLOGY & ADDITIONAL TESTS:    Imaging Personally Reviewed:  [x] YES  [ ] NO    Consultant(s) Notes Reviewed:  [x] YES  [ ] NO

## 2020-10-25 NOTE — PROGRESS NOTE ADULT - PROBLEM SELECTOR PLAN 1
- 2' COVID-19 PNA  - maintain SpO2 > 90 %  - avoid BiLevel given (+) COVID-19 status  - aspiration precautions during eating  - she was on decadron PO daily from 10/2-10/20 which was extended owing to her persistently high O2 requirements  - CXR 10/23 shows worsening of infiltrates; coupled with her low-grade of 99.8 on 10/24, start zosyn 10/25/20  - consider acapella vest treatment for congestion

## 2020-10-25 NOTE — PROGRESS NOTE ADULT - ASSESSMENT
76 yo F w/ PMHx significant for recently diagnosed hyperthyroidism (3mo ago, on Methimazole), rheumatoid arthritis (on MTX weekly, Remicade [id number], Hydroxychloroquine, and daily steroids w/ Prednisone 10mg daily), HTN (on multiple anti-hypertensive agents), s/p CVA w/o residual deficits, memory impairment, who presents from  after presenting from [facility] where she resides, w/ c/o nonproductive cough over the past few days, and SOB, along w/ NBNB vomiting 3-4days ago and NB diarrhea beginning today, found to be COVID-19 positive.

## 2020-10-25 NOTE — PROGRESS NOTE ADULT - ATTENDING COMMENTS
- Dr. BRIGIDO Barrett ([facility])  - [phone]
- Dr. BRIGIDO Barrett ([facility])  - [phone]
- Dr. BRIGIDO Barrett ([facility])  - [phone]
Glenroy Evans will be covering for me starting 10/7/20. He can be reached at  if needed.     - Dr. BRIGIDO Barrett ([facility])  - [phone]
- Dr. BRIGIDO Barrett ([facility])  - [phone]
- Dr. BRIGIDO Barrett ([facility])  - [phone]
10/25 - pt with worsening WBC, now with new hypoxia, requiring NRB.  Suspected aspiration event, pt recently started on dysphagia diet.  Repeat cxr with worsening infiltrates.    Cont zosyn.  Monitor WBC and temp curve.  If pt spikes fever, send blood cx x 2.  Pt currently w/o productive sputum.  Cont supplemental O2, maintain appropriate O2 saturation.  Repeat inflammatory markers noted:  CRP 17  D-dimer 223  Ferritin 1095.    (Covering [facility] ID)    Marisela Bradley  [phone]
Dr. Murillo will be covering for me over the weekend, please call [phone].    Miguel Tyler M.D.  [facility], Division of Infectious Diseases  [phone]  After 5pm on weekdays and all day on weekends - please call [phone]
Glenroy Evans will be covering for me starting 10/21/20. He can be reached at  if needed.     - Dr. BRIGIDO Barrett ([facility])  - [phone]
ID will sign off at this time but remains available for any further questions/concerns.    Miguel Tyler M.D.  [facility], Division of Infectious Diseases  [phone]  After 5pm on weekdays and all day on weekends - please call [phone]
If her O2 status remain stable on 2-3 L NC O2, she can be discharged with home O2.   Physical therapy follow up.     - [facility]OD [facility]  - [phone]
If her O2 status remain stable on 2-3 L NC O2, she can be discharged with home O2. (Her assisted living require neg testing)   Physical therapy follow up.     - [facility])  - [phone]
If her O2 status remain stable on 2-3 L NC O2, she can be discharged with home O2. (Her assisted living require neg testing)   Physical therapy follow up.     - [facility]  - [phone]
If her O2 status remain stable on 2-3 L NC O2, she can be discharged with home O2. (Her assisted living require neg testing)   Physical therapy follow up.     - [facility])  - [phone]
Infectious Diseases will continue to follow.   Please call with any questions.   Beatriz Murillo M.D.  [facility], Division of Infectious Diseases [phone]  For over the weekend and after hours, please call [phone]  Dr. Tyler will resume service on Monday 10/19
Infectious Diseases will continue to follow.   Please call with any questions.   Beatriz Murillo M.D.  [facility], Division of Infectious Diseases [phone]  For over the weekend and after hours, please call [phone]  Dr. Tyler will resume service on Monday 10/19
Miguel Tyler M.D.  [facility], Division of Infectious Diseases  [phone]  After 5pm on weekdays and all day on weekends - please call [phone]
Miguel Tyler M.D.  [facility], Division of Infectious Diseases  [phone]  After 5pm on weekdays and all day on weekends - please call [phone]
Miguel Tyler M.D.  [facility], Division of Infectious Diseases  [phone]  After 5pm on weekdays and all day on weekends - please call [phone]
Miguel Tyler M.D.  [facility], Division of Infectious Diseases  [phone]  After 5pm on weekdays and all day on weekends - please call [phone]
Miguel Tyler M.D.  [facility], Division of Infectious Diseases  [phone]  After 5pm on weekdays and all day on weekends - please call [phone]
Miguel Tyler M.D.  [facility], Division of Infectious Diseases  [phone]  After 5pm on weekdays and all day on weekends - please call [phone]
Miguel Tyler M.D.  [facility], Division of Infectious Diseases  [phone]  After 5pm on weekdays and all day on weekends - please call [phone]
Miguel Tyler M.D.  [facility], Division of Infectious Diseases  [phone]  After 5pm on weekdays and all day on weekends - please call [phone]
Miguel Tyler M.D.  [facility], Division of Infectious Diseases  [phone]  After 5pm on weekdays and all day on weekends - please call [phone]
Miguel Tyler M.D.  [facility], Division of Infectious Diseases  [phone]  After 5pm on weekdays and all day on weekends - please call [phone]
Miguel Tyler M.D.  [facility], Division of Infectious Diseases  [phone]  After 5pm on weekdays and all day on weekends - please call [phone]
Miguel Tyler M.D.  [facility], Division of Infectious Diseases  [phone]  After 5pm on weekdays and all day on weekends - please call [phone]
Miguel Tyler M.D.  [facility], Division of Infectious Diseases  [phone]  After 5pm on weekdays and all day on weekends - please call [phone]
Miguel Tyler M.D.  [facility], Division of Infectious Diseases  [phone]  After 5pm on weekdays and all day on weekends - please call [phone]
Miguel Tyler M.D.  [facility], Division of Infectious Diseases  [phone]  After 5pm on weekdays and all day on weekends - please call [phone]

## 2020-10-25 NOTE — CHART NOTE - NSCHARTNOTEFT_GEN_A_CORE
Patient's Oxygen requirements has increased and with increased work of breathing. Overnight was on 6L NC and this morning patient desated to 81%. Placed on NRB and oxygen saturation improved to 98%. Patient is a poor historian, but screaming loud but does not want to answer or want to disturbed.     Vital Signs Last 24 Hrs  T(C): 37.3 (25 Oct 2020 12:54), Max: 37.4 (24 Oct 2020 20:26)  T(F): 99.2 (25 Oct 2020 12:54), Max: 99.4 (24 Oct 2020 20:26)  HR: 82 (25 Oct 2020 12:54) (76 - 83)  BP: 135/74 (25 Oct 2020 12:54) (102/64 - 153/74)  RR: 28 (25 Oct 2020 12:54) (21 - 28)  SpO2: 93% (25 Oct 2020 12:54) (81% - 98%)                          11.1   17.16 )-----------( 226      ( 25 Oct 2020 06:05 )             32.9     10-25    135  |  104  |  13  ----------------------------<  94  3.6   |  19<L>  |  0.37<L>    Ca    8.5      25 Oct 2020 06:05  Phos  2.7     10-24    TPro  5.4<L>  /  Alb  2.6<L>  /  TBili  0.8  /  DBili  x   /  AST  24  /  ALT  17  /  AlkPhos  73  10-25    CXR 10/23 shows worsening of infiltrates, rise in WBC with low-grade of 99.8 on 10/24.    Neuro: AAO x 1-2, no focal deficit  NECK: Supple, No JVD  CHEST/LUNG: mild decrease breath sounds bilaterally, scattered rhonchi  HEART: Regular rate and rhythm; No murmurs, rubs, or gallops  ABDOMEN: Soft, Nontender, Nondistended; Bowel sounds presents  EXTREMITIES:  2+ Peripheral Pulses, No clubbing, cyanosis, or edema  SKIN: No rashes or lesions    76 yo F w/ PMHx significant for recently diagnosed hyperthyroidism (3mo ago, on Methimazole), rheumatoid arthritis (on MTX weekly, Remicade [id number], Hydroxychloroquine, and daily prednisone, HTN, s/p CVA w/o residual deficits, memory impairment, admitted with COVID-19 positive. Hospital course complicated by Dysphagia was on dysphagia with honey thick liquids, now with worsening respiratory distress likely secondary to Aspiration Pneumonia.  Discussed with Dr. Naylor who started Zosyn  ID reconsulted by Dr. Naylor  Discontinued PO diet - now NPO    [id number]

## 2020-10-26 NOTE — PROVIDER CONTACT NOTE (OTHER) - RECOMMENDATIONS
Continue to manage pain, Monitor O2 sat on continuous pulse ox
NRB 15L
Notify NP, continue to monitor, speech and swallow eval, NPO until speech and swallow evaluates?
Notify provider, ?maintenance fluids?
Labs? Chest xray?

## 2020-10-26 NOTE — DISCHARGE NOTE FOR THE EXPIRED PATIENT - HOSPITAL COURSE
76 yo F w/ PMHx significant for recently diagnosed hyperthyroidism (3mo ago, on Methimazole), rheumatoid arthritis (on MTX weekly, Remicade M7kiyvm, Hydroxychloroquine, and daily steroids w/ Prednisone 10mg daily), HTN (on multiple anti-hypertensive agents), s/p CVA w/o residual deficits, memory impairment, who presents from  after presenting from [facility] where she resides, w/ c/o nonproductive cough over the past few days, and SOB, along w/ NBNB vomiting 3-4days ago and NB diarrhea admitted for COVID-19 positive. Hospital course c/b suspected aspiration PNA.

## 2020-10-26 NOTE — RAPID RESPONSE TEAM SUMMARY - NSMEDICATIONSRRT_GEN_ALL_CORE
- Was given Epi 1mg IV x4  - 1g of calcium gluconate, sodium bicarb 50mg IVP x1  - IO access was established during code blue  - Family was called during Code and stated patient is full code. - Was given Epi 1mg IV x4  - 1g of calcium gluconate, sodium bicarb 50mg IVP x2  - IO access was established during code blue  - Family was called during Code and stated patient is full code.

## 2020-10-26 NOTE — PROVIDER CONTACT NOTE (OTHER) - ACTION/TREATMENT ORDERED:
As per provider, administer 250cc bolus of LR over 2 hours. Continue to monitor patient.
LATHA Shearer made aware. NRB 15L placed on pt with O2 increase to 98%. Order for NRB to be placed by LATHA Shearer. Hold PO intake as per LATHA Shearer until further evaluation.
NP aware, continue to monitor, keep NPO until speech and swallow evaluates.
NP made aware, team aware, continue to monitor on continuous pulse ox
As per NP, will order STAT labs. Will continue to monitor.

## 2020-10-26 NOTE — RAPID RESPONSE TEAM SUMMARY - NSSITUATIONBACKGROUNDRRT_GEN_ALL_CORE
76 yo F w/ PMHx significant for recently diagnosed hyperthyroidism (3mo ago, on Methimazole), rheumatoid arthritis (on MTX weekly, Remicade C2rlgua, Hydroxychloroquine, and daily steroids w/ Prednisone 10mg daily), HTN (on multiple anti-hypertensive agents), s/p CVA w/o residual deficits, memory impairment, who presents from  after presenting from [facility] where she resides, w/ c/o nonproductive cough over the past few days, and SOB, along w/ NBNB vomiting 3-4days ago and NB diarrhea beginning today, found to be COVID-19 positive. RRT called initially for unresponsiveness and bradycardia. On arrival, patient was unresponsive, pulseless, and CPR was initiated. ACLS protocol was followed thereafter.  
75 year old female with PMH of significant for recently diagnosed hyperthyroidism (3mo ago, on Methimazole), RA (on MTX weekly, Remicade W2wfpae, Hydroxychloroquine, and daily steroids w/ Prednisone 10mg daily), HTN, CVA, memory impairment who presented from [facility] with nonproductive cough and dyspnea with nausea and diarrhea and was found to be COVID-19 positive. Blood cultures positive for Staph epi in both sets, unclear if true or contaminant, treated as true bacteremia. RRT called today for cardiac arrest

## 2020-10-26 NOTE — RAPID RESPONSE TEAM SUMMARY - NSOTHERINTERVENTIONSRRT_GEN_ALL_CORE
Patient was pronounced dead at approximately 3:00 AM.
Despite all measures to achieve ROSC the patient remained Asystolic. The patient was pronounced  by medical team

## 2020-12-16 NOTE — H&P ADULT - DOES THIS PATIENT HAVE A HISTORY OF OR HAS BEEN DX WITH HEART FAILURE?
23y G0 LMP 11/14 presenting to [facility] ED as transfer from [place] with acute onset, pelvic/LLQ pain. CTAP obtained at [place] with radiology read noting  possible hemorrhagic left adnexal cyst, with free fluid in pelvis. Patient with two vasovagal episodes while at [place], however fluid responsive. Currently with significant tenderness upon exam, however stable vitals. Differential diagnosis includes ruptured ovarian cyst versus ovarian torsion.      - Please obtain TVUS   - NPO/ Maintenance fluids   - Please obtain T&S with confirmatory   - Will follow-up results of TVUS, and consider potential need for operative management versus close observation.     Patient seen and evaluated with Dr. Toy Babcock, PGY-2  no

## 2023-01-20 NOTE — DIETITIAN INITIAL EVALUATION ADULT. - ADD RECOMMEND
1. Recommend liberalize diet to low sodium only to promote adequate po intake 2. Continue to provide 2 ensure enlive daily and monitor for pt acceptance 3. Consider addition of multivitamin 4. Consider appetite stimulant if no contraindications given longstanding h/o poor appetite reported 5. Will continue to monitor nutrient intake, wt, labs, f/u per protocol Bilateral Helical Rim Advancement Flap Text: The defect edges were debeveled with a #15 blade scalpel.  Given the location of the defect and the proximity to free margins (helical rim) a bilateral helical rim advancement flap was deemed most appropriate.  Using a sterile surgical marker, the appropriate advancement flaps were drawn incorporating the defect and placing the expected incisions between the helical rim and antihelix where possible.  The area thus outlined was incised through and through with a #15 scalpel blade.  With a skin hook and iris scissors, the flaps were gently and sharply undermined and freed up.

## 2023-03-31 NOTE — ED PROVIDER NOTE - PRINCIPAL DIAGNOSIS
Left msg devon Jimenez that we will send an NP out to pts home to document home bound status    Sepsis

## 2024-01-05 NOTE — ED PROVIDER NOTE - GASTROINTESTINAL NEGATIVE STATEMENT, MLM
[facility]  Initial Interdisciplinary Treatment Plan NO      Original treatment plan Date & Time: 2024   12:57 pm    Admission Type:  Admission Type: Voluntary    Reason for admission:   Reason for Admission: Pt brought to ED via TPD with symptoms of acute psychosis. Pt presented as erratic, having severe anxiety with tangential, disorganized thoughts. Pt was complaining of feelings of \"doom\" and felt like she has a \"knot in her stomach\", and \"just doesn't feel right.\" Pt reports her symptoms started after her cat  a week ago. Pt states she was \"having a timothy vu\" of all the bad things that happened to her which caused her to start having severe anxiety and \"not feeling safe.\"    Estimated Length of Stay:  5-7days  Estimated Discharge Date: to be determined by physician    PATIENT STRENGTHS:  Patient Strengths:   Patient Strengths and Limitations:Limitations: Multiple barriers to leisure interests, Inappropriate/potentially harmful leisure interests, General negative or hopeless attitude about future/recovery  Addictive Behavior: Addictive Behavior  In the Past 3 Months, Have You Felt or Has Someone Told You That You Have a Problem With  : None  Medical Problems:  Past Medical History:   Diagnosis Date    Arthritis     B12 deficiency 2023    Bronchitis     acute    Cervicodynia     Chronic mental illness     Depression     mental illness section of HX form checked    FREDDY (generalized anxiety disorder)     FREDDY (generalized anxiety disorder)     Headache(784.0)     migraine    Heroin abuse (HCC)     Heroin/Fentanyl abuse with overdose    IBS (irritable bowel syndrome)     Neuropathy     Pain syndrome, chronic     Polysubstance abuse (HCC)     polysubstance abuse includes snorting heroin/fentanyl, cocaine/crack, cannabis    Severe recurrent major depression without psychotic features (HCC) 10/15/2021    Sinusitis acute     Spinal stenosis     URI, acute      Status EXAM:Mental Status and  no abdominal pain, no bloating, no constipation, no diarrhea, no nausea and no vomiting.

## 2024-05-28 NOTE — PHYSICAL THERAPY INITIAL EVALUATION ADULT - GAIT DEVIATIONS NOTED, PT EVAL
PROGRESS NOTE    Subjective   Chief complaint: Ermelinda Benoit is a 89 y.o. female who is an assisted living facility patient being seen and evaluated for fatigue    HPI:  Patient seen and evaluated for complaints of fatigue, ongoing for weeks.  Labs ordered and reviewed- WNL.  Patient complains of feeling dizzy with any new medication and most of her regular medications.  Offers no complaints of chest pain, palpitations, SOB, lightheadedness or dizziness with position changes.  Patient not drinking enough p.o. fluids - indicates drinking 1, 16 ounce water bottle per day.  Discussed possible causes of dizziness      Objective   Vital signs: 121/77-97.4-63-18-95%    Physical Exam  Constitutional:       Appearance: Normal appearance.   HENT:      Head: Normocephalic.      Nose: Nose normal.      Mouth/Throat:      Mouth: Mucous membranes are moist.   Eyes:      Extraocular Movements: Extraocular movements intact.   Cardiovascular:      Rate and Rhythm: Normal rate. Rhythm irregular.      Pulses: Normal pulses.      Heart sounds: Normal heart sounds.   Pulmonary:      Effort: Pulmonary effort is normal.      Breath sounds: Normal breath sounds.   Abdominal:      General: Bowel sounds are normal.      Palpations: Abdomen is soft.   Musculoskeletal:         General: Normal range of motion.      Cervical back: Normal range of motion and neck supple.      Right lower leg: Edema present.      Left lower leg: Edema present.      Comments: , weakness   Skin:     General: Skin is warm and dry.      Capillary Refill: Capillary refill takes less than 2 seconds.   Neurological:      Mental Status: She is alert. Mental status is at baseline.   Psychiatric:         Mood and Affect: Mood normal.         Behavior: Behavior normal.         Assessment/Plan   Problem List Items Addressed This Visit       CHF (congestive heart failure) (Multi)      Stable   no SOB, rales   trace edema   compression hose and elevation   Torsemide    monitor weight   monitor         Hypertension, essential      Stable   BP within goal   losartan Torsemide   monitor         Ambulatory dysfunction - Primary      ambulate with walker assist due to weakness   Patient to use wheelchair for distance         Controlled atrial fibrillation (Multi)      Stable   no SOB, dizziness, palpitations   rate controlled   Eliquis   bleeding precautions   monitor           Dizziness      Continues to complain of dizziness - not related to position changes   Patient insists dizziness is related to all of her medications that she takes   encourage p.o. fluid intake   Reinforced calling for assistance when changing positions to avoid falls         Fatigue due to depression      Recent lab work WNL   Discussed depression with patient   Patient endorses not enjoying certain activities anymore, withdrew from therapy, prefers to stay in her room by herself, decrease in appetite   No SI HI   Will speak with daughter regarding escitalopram            Medications, treatments, and labs reviewed  Continue medications and treatments as listed in EMR    Ashlee Nicholas, APRN-CNP     decreased weight-shifting ability

## 2024-06-06 NOTE — H&P ADULT - NSICDXPASTMEDICALHX_GEN_ALL_CORE_FT
Patient contacted medication refill line requesting refill be sent to pharmacy on file (confirmed) of:    LIPITOR 80 mg tablet  TRICOR 145 mg tablet  ZESTRIL 20 mg tablet   PAST MEDICAL HISTORY:  CVA (cerebral vascular accident)     GERD (gastroesophageal reflux disease)     Hypertension     Leukocytosis     Osteoporosis     Pneumobilia     Rheumatoid arthritis

## 2024-08-20 NOTE — PHYSICAL THERAPY INITIAL EVALUATION ADULT - IMPAIRMENTS FOUND, PT EVAL
Problem: Adult Inpatient Plan of Care  Goal: Plan of Care Review  Outcome: Progressing  Flowsheets (Taken 8/20/2024 1304)  Plan of Care Reviewed With: patient  Goal: Patient-Specific Goal (Individualized)  Outcome: Progressing  Flowsheets (Taken 8/20/2024 1304)  Individualized Care Needs: recliner, blanket, pillow, conversation  Anxieties, Fears or Concerns: none  Patient/Family-Specific Goals (Include Timeframe): no s/s of rx with inf     Problem: Fatigue  Goal: Improved Activity Tolerance  Outcome: Progressing  Intervention: Promote Improved Energy  Flowsheets (Taken 8/20/2024 1304)  Activity Management:   Up in chair - L3   Ambulated in [place] - L4   Pt tolerated Feraheme #2/2 infusion well.  No adverse reaction noted.   IV flushed with NS and D/C per protocol.  Patient left clinic in no acute distress.    
muscle strength/posture/gait, locomotion, and balance

## 2024-09-16 NOTE — ED ADULT NURSE NOTE - CCCP TRG CHIEF CMPLNT
Primary Care Provider Appointment   [facility]        Subjective:       Patient ID:  Karina is a 70 y.o. female being seen for Cough      Chief Complaint: Cough    The patient location is: [place]  The chief complaint leading to consultation is: cough, emphysema    Visit type: audiovisual    Face to Face time with patient: 10 min  10 minutes of total time spent on the encounter, which includes face to face time and non-face to face time preparing to see the patient (eg, review of tests), Obtaining and/or reviewing separately obtained history, Documenting clinical information in the electronic or other health record, Independently interpreting results (not separately reported) and communicating results to the patient/family/caregiver, or Care coordination (not separately reported).       Each patient to whom he or she provides medical services by telemedicine is:  (1) informed of the relationship between the physician and patient and the respective role of any other health care provider with respect to management of the patient; and (2) notified that he or she may decline to receive medical services by telemedicine and may withdraw from such care at any time.    Notes:       HPI: 71 yo female presents for follow up emphvioleta. She had steroid and abx 10 days ago feels improvement. Cough is better. She is still using inhaler 1-2 times per day when she's feels wheezing. She only got SOB once when walking up stairs but otherwise able to do her regular activity. She is feeling tired. No previous PFTs. LDCT showed moderate centrolobular emphysema    Needs refill on cipro and pyridium that she keeps on hand for recurrent uti.     Past Medical History:   Diagnosis Date    Anxiety     GERD (gastroesophageal reflux disease)     Hyperlipidemia        Review of Systems   Constitutional:  Positive for activity change. Negative for unexpected weight change.   HENT:  Negative for hearing  "loss, rhinorrhea and trouble swallowing.    Eyes:  Negative for discharge and visual disturbance.   Respiratory:  Positive for wheezing. Negative for chest tightness.    Cardiovascular:  Negative for chest pain and palpitations.   Gastrointestinal:  Negative for blood in stool, constipation, diarrhea and vomiting.   Endocrine: Negative for polydipsia and polyuria.   Genitourinary:  Negative for difficulty urinating, dysuria, hematuria and menstrual problem.   Musculoskeletal:  Positive for neck pain. Negative for arthralgias and joint swelling.   Neurological:  Positive for weakness and headaches.   Psychiatric/Behavioral:  Negative for confusion and dysphoric mood.              Health Maintenance         Date Due Completion Date    RSV Vaccine (Age 60+ and Pregnant patients) (1 - 1-dose 60+ series) Never done ---    TETANUS VACCINE 10/01/2015 10/1/2005    Shingles Vaccine (2 of 3) 08/10/2016 6/15/2016    Influenza Vaccine (1) 09/01/2024 10/23/2020    COVID-19 Vaccine (4 - 2023-24 season) 09/01/2024 10/26/2021    Mammogram 10/10/2024 10/10/2023    DEXA Scan 06/09/2025 6/9/2020    LDCT Lung Screen 06/17/2025 6/17/2024    Colorectal Cancer Screening 08/12/2025 8/12/2020    Override on 8/12/2020: Done    High Dose Statin 09/01/2025 9/1/2024    Lipid Panel 02/26/2029 2/26/2024                     Objective:      There were no vitals filed for this visit.  Estimated body mass index is 23.93 kg/m² as calculated from the following:    Height as of 8/31/24: 5' 9" (1.753 m).    Weight as of 8/31/24: 73.5 kg (162 lb 0.6 oz).  Physical Exam  Constitutional:       Appearance: Normal appearance.   Neurological:      Mental Status: She is alert.           Assessment and Plan:         1. Centrilobular emphysema  -     Complete PFT w/ bronchodilator; Future  -     continue albuterol as needed, will add inhaler.    2. Recurrent UTI  -     ciprofloxacin HCl (CIPRO) 500 MG tablet; Take 1 tablet (500 mg total) by mouth 2 (two) times " weakness daily. for 7 days  Dispense: 14 tablet; Refill: 0  -     phenazopyridine (PYRIDIUM) 200 MG tablet; Take 1 tablet (200 mg total) by mouth 3 (three) times daily as needed for Pain.  Dispense: 30 tablet; Refill: 0         Follow Up:           Amy Lado, PA-C [facility]

## 2024-11-04 NOTE — SWALLOW BEDSIDE ASSESSMENT ADULT - CONSISTENCIES ADMINISTERED
Other (Free Text): No sign of infection Detail Level: Zone Note Text (......Xxx Chief Complaint.): This diagnosis correlates with the Render Risk Assessment In Note?: no puree/puree thin soft solid solid nectar thick thin liquid

## 2025-02-20 NOTE — PROGRESS NOTE ADULT - PROBLEM SELECTOR PLAN 3
Statement Selected - COVID19 PCR detected    - supportive management including for fever/cough  - monitor respiratory status closely and serial inflammatory markers to monitor for severity progression  - maintain oxygenation and monitoring as above  - Lovenox 40mg SubQ for high risk of microthrombi a/w COVID-19  - Completed course of Remdesivir under EUA as Spo2 < 94% on RA (pt requiring O2 supp via NC currently), eGFR = 90 (>30), ALT = 33 (WNL), and pt does not meet exclusion criteria -- given weight = 49.9kg, dose: 200mg IV x1 followed by 100mg IV Q24 daily x4days; discussed fact sheet extensively with patient's  via telephone  - also initiated steroids: as patient on daily Prednisone and received Solucortef 100mg IV x1 in ED at 1:40pm, remains on extended course of Dexamethasone 6mg daily   - stopped Azithromycin as pt on chronic home Hydroxychloroquine for RA as below, and risk of Qtc prolongation

## 2025-04-04 NOTE — PATIENT PROFILE ADULT - NSPRESCRALCFREQ_GEN_A_NUR
Medication: potassium chloride 20 MEQ crystals ER tablets passed protocol.   Last office visit date: 12/17/25  Next appointment scheduled?: Yes   Number of refills given: 3         Potassium Supplement Refill Protocol - 12 Month Protocol [id number]/2025 04:58 PM   Protocol Details Seen by prescribing provider or same department within the last 12 months or has a future appt in 3 months - IF FAILED PLEASE LOOK AT CHART REVIEW FOR LAST VISIT AND PROCEED ACCORDINGLY    Normal Potassium within last 12 months looking at last value -- IF CRITERIA FAILED REFER TO PROTOCOL DETAILS    Medication (including dose and sig) on current meds list           
Never